# Patient Record
Sex: MALE | Race: WHITE
[De-identification: names, ages, dates, MRNs, and addresses within clinical notes are randomized per-mention and may not be internally consistent; named-entity substitution may affect disease eponyms.]

---

## 2018-06-09 ENCOUNTER — HOSPITAL ENCOUNTER (OUTPATIENT)
Dept: HOSPITAL 97 - ER | Age: 53
Setting detail: OBSERVATION
LOS: 2 days | Discharge: HOME | End: 2018-06-11
Attending: HOSPITALIST | Admitting: HOSPITALIST
Payer: COMMERCIAL

## 2018-06-09 VITALS — BODY MASS INDEX: 19.9 KG/M2

## 2018-06-09 DIAGNOSIS — F17.210: ICD-10-CM

## 2018-06-09 DIAGNOSIS — E11.9: ICD-10-CM

## 2018-06-09 DIAGNOSIS — K92.2: Primary | ICD-10-CM

## 2018-06-09 LAB
ALBUMIN SERPL BCP-MCNC: 5.1 G/DL (ref 3.2–5.5)
ALP SERPL-CCNC: 190 IU/L (ref 42–121)
ALT SERPL W P-5'-P-CCNC: 135 IU/L (ref 10–60)
AST SERPL W P-5'-P-CCNC: 147 IU/L (ref 10–42)
BUN BLD-MCNC: 29 MG/DL (ref 6–20)
GLUCOSE SERPLBLD-MCNC: 178 MG/DL (ref 65–120)
HCT VFR BLD CALC: 51.2 % (ref 39.6–49)
INR BLD: 1.02
LIPASE SERPL-CCNC: 31 U/L (ref 22–51)
LYMPHOCYTES # SPEC AUTO: 0.5 K/UL (ref 0.7–4.9)
MCH RBC QN AUTO: 32.1 PG (ref 27–35)
MCV RBC: 92.1 FL (ref 80–100)
MORPHOLOGY BLD-IMP: (no result)
PMV BLD: 9.5 FL (ref 7.6–11.3)
POTASSIUM SERPL-SCNC: 3.8 MEQ/L (ref 3.6–5)
RBC # BLD: 5.56 M/UL (ref 4.33–5.43)

## 2018-06-09 PROCEDURE — 80048 BASIC METABOLIC PNL TOTAL CA: CPT

## 2018-06-09 PROCEDURE — 85014 HEMATOCRIT: CPT

## 2018-06-09 PROCEDURE — 81015 MICROSCOPIC EXAM OF URINE: CPT

## 2018-06-09 PROCEDURE — 85018 HEMOGLOBIN: CPT

## 2018-06-09 PROCEDURE — 84100 ASSAY OF PHOSPHORUS: CPT

## 2018-06-09 PROCEDURE — 85610 PROTHROMBIN TIME: CPT

## 2018-06-09 PROCEDURE — 80053 COMPREHEN METABOLIC PANEL: CPT

## 2018-06-09 PROCEDURE — 85730 THROMBOPLASTIN TIME PARTIAL: CPT

## 2018-06-09 PROCEDURE — 81003 URINALYSIS AUTO W/O SCOPE: CPT

## 2018-06-09 PROCEDURE — 83690 ASSAY OF LIPASE: CPT

## 2018-06-09 PROCEDURE — 99285 EMERGENCY DEPT VISIT HI MDM: CPT

## 2018-06-09 PROCEDURE — 74176 CT ABD & PELVIS W/O CONTRAST: CPT

## 2018-06-09 PROCEDURE — 82962 GLUCOSE BLOOD TEST: CPT

## 2018-06-09 PROCEDURE — 85025 COMPLETE CBC W/AUTO DIFF WBC: CPT

## 2018-06-09 PROCEDURE — 80076 HEPATIC FUNCTION PANEL: CPT

## 2018-06-09 PROCEDURE — 36415 COLL VENOUS BLD VENIPUNCTURE: CPT

## 2018-06-09 PROCEDURE — 83735 ASSAY OF MAGNESIUM: CPT

## 2018-06-09 NOTE — XMS REPORT
Clinical Summary

 Created on:2018



Patient:Cleveland Barkley Jr

Sex:Male

:1965

External Reference #:[id number]





Demographics







 Address  [address]



   



   [place] [address]

 

 Home Phone  [phone]

 

 Home Phone  [phone]

 

 Email Address  [email or web address]

 

 Preferred Language  English

 

 Marital Status  

 

 Synagogue Affiliation  Unknown

 

 Race  White

 

 Ethnic Group  Not  or 









Author







 Organization  [facility]

 

 Address  [address] [place] [address]









Support







 Name  Relationship  Address  Phone

 

 Sabrina Greer  Unavailable  Unavailable  [phone]









Care Team Providers







 Name  Role  Phone

 

 Asked, No Pcp  Primary Care Provider  Unavailable









Allergies

No Known Allergies



Current Medications







 Prescription  Sig.  Disp.  Refills  Start  End  Status



         Date  Date  

 

 metFORMIN XR  Take 1,000 mg      20    Active



 (GLUCOPHAGE-XR) 500  by mouth daily.      17    



 mg 24 hr tablet            

 

 insulin GLARGINE  Inject 20 Units          Active



 (LANTUS) 100  under the skin          



 unit/mL injection  nightly.          



 (vial)            

 

 levETIRAcetam  Take 1,000 mg          Active



 (KEPPRA) 1000 MG  by mouth 2          



 tablet  (two) times a          



   day.          

 

 venlafaxine XR  Take 150 mg by          Active



 (EFFEXOR-XR) 150 MG  mouth daily.          



 24 hr capsule            

 

 diazePAM (VALIUM)  Take 10 mg by      20    Active



 10 MG tablet  mouth 3 (three)      18    



   times a day.          

 

 QUEtiapine  Take 600 mg by      20    Active



 (SEROquel) 300 MG  mouth nightly.      18    



 tablet            

 

 traMADol (ULTRAM)  Take 50 mg by          Active



 50 mg tablet  mouth every 6          



   (six) hours as          



   needed for          



   moderate pain.          

 

 gabapentin  Take 800 mg by    2  20  Discontinued



 (NEURONTIN) 800 mg  mouth 4 (four)      17  018  



 tablet  times a day.          

 

 VENTOLIN HFA 90  Inhale 2 puffs    0  20  Discontinued



 mcg/actuation  every 6 (six)      17  017  



 inhaler  hours as needed          



   for wheezing or          



   shortness of          



   breath.          

 

 meloxicam (MOBIC)  Take 7.5 mg by    2  04/22/20  07/15/2  Discontinued



 7.5 mg tablet  mouth daily.      17  017  

 

 QUEtiapine  Take by mouth    2  20  Discontinued



 (SEROquel) 25 MG  daily.      17  017  



 tablet            

 

 QUEtiapine  Take 800 mg by    2  05/15/20  02/22/2  Discontinued



 (SEROquel) 400 MG  mouth nightly.      17  018  



 tablet  Take 2 tablets          



   at bedtime          

 

 busPIRone (BUSPAR)  Take 10 mg by          Discontinued



 10 MG tablet  mouth 3 (three)        018  



   times a day.          

 

 LEVETIRACETAM  Take 1 tablet          Discontinued



 (KEPPRA ORAL)  by mouth 2        017  



   (two) times a          



   day.          

 

 HYDROcodone-acetami  Take 1 tablet          Discontinued



 nophen (NORCO)  by mouth every        017  



  mg per  8 (eight) hours          



 tablet  as needed for          



   moderate pain.          

 

 levETIRAcetam  Take 1,000 mg        07/10/2  Discontinued



 (KEPPRA) 500 MG  by mouth 2        017  



 tablet  (two) times a          



   day.          

 

 acetaminophen-codei  TK 1 TO 2 TS PO    0  07/15/20  03/03/2  Discontinued



 ne (TYLENOL WITH  Q 6 H PRN P      17  018  



 CODEINE #3) 300-30            



 mg per tablet            

 

 ertapenem 1 g in  Infuse 1 g into  20 each  0  20  



 sodium chloride 0.9  a venous      17  017  



 % MBP 50 mL IVPB  catheter daily          



   for 20 days.          

 

 vancomycin 1,500 mg  Infuse 1,500 mg  20 each  0  20  



 in sodium chloride  into a venous      17  017  



 0.9 % 500 mL IVPB  catheter daily          



   for 20 days.          

 

 HYDROcodone-acetami  Take 1 tablet  10 tablet  0  20  



 nophen (NORCO)  by mouth every      17  017  



  mg per  6 (six) hours          



 tablet  as needed for          



   moderate pain          



   for up to 10          



   doses. Max          



   Daily Amount: 4          



   tablets          

 

 gabapentin  Take 1 tablet  90 tablet  0  20  



 (NEURONTIN) 800 mg  (800 mg total)      18  018  



 tablet  by mouth 3          



   (three) times a          



   day for 30          



   days.          

 

 metoprolol  Take 0.5  15 tablet  0  20  



 succinate XL  tablets (12.5      18  018  



 (TOPROL-XL) 25 mg  mg total) by          



 24 hr tablet  mouth daily for          



   30 days.          

 

 HYDROcodone-acetami  Take 1 tablet  15 tablet  0  20  



 nophen (NORCO)  by mouth every      18  018  



 7.5-325 mg per  6 (six) hours          



 tablet  as needed for          



   severe pain for          



   up to 14 days.          



   Max Daily          



   Amount: 4          



   tablets          

 

 pantoprazole  Take 1 tablet  30 tablet  0  20  



 (PROTONIX) 40 MG EC  (40 mg total)      18  018  



 tablet  by mouth daily          



   for 30 days.          

 

 ondansetron ODT  Take 1 tablet  15 tablet  0  20  Discontinued



 (ZOFRAN ODT) 4 MG  (4 mg total) by      18  018  



 disintegrating  mouth every 8          



 tablet  (eight) hours          



   as needed for          



   nausea or          



   vomiting for up          



   to 15 doses.          

 

 promethazine  Insert 1  12 suppository  0  20  Discontinued



 (PHENERGAN) 25 MG  suppository (25      18  018  



 suppository  mg total) into          



   the rectum          



   every 6 (six)          



   hours as needed          



   for nausea or          



   vomiting for up          



   to 12 doses.          

 

 hydromorPHONE  Take 1 tablet  10 tablet  0  03/03/20  03/10/2  



 (DILAUDID) 2 MG  (2 mg total) by      18  018  



 tablet  mouth every 8          



   (eight) hours          



   as needed for          



   severe pain          



   (score 7-10)          



   for up to 7          



   days. Max Daily          



   Amount: 6 mg          

 

 promethazine  Take 1 tablet  20 tablet  0  20  



 (PHENERGAN) 12.5 MG  (12.5 mg total)      18  018  



 tablet  by mouth every          



   8 (eight) hours          



   as needed for          



   nausea or          



   vomiting for up          



   to 30 days.          

 

 HYDROcodone-acetami  Take 1 tablet  20 tablet  0  03/03/20  03/10/2  



 nophen (NORCO)  by mouth every      18  018  



 7.5-325 mg per  8 (eight) hours          



 tablet  as needed for          



   moderate pain          



   for up to 7          



   days. Max Daily          



   Amount: 3          



   tablets          

 

 naproxen (NAPROSYN)  Take 1 tablet  28 tablet  0  20  



 500 MG tablet  (500 mg total)      18  018  



   by mouth 2          



   (two) times a          



   day with meals          



   for 14 days.          

 

 omeprazole  Take 2 capsules  60 capsule  0  20  



 (PriLOSEC) 20 MG  (40 mg total)      18  018  



 capsule  by mouth daily          



   for 30 days.          

 

 cyclobenzaprine  Take 1 tablet  20 tablet  0  20  



 (FLEXERIL) 10 mg  (10 mg total)      18  018  



 tablet  by mouth 3          



   (three) times a          



   day as needed          



   for muscle          



   spasms for up          



   to 10 days.          







Active Problems







 Problem  Noted Date

 

 Sciatica of left side  2018

 

 Syncope  2018

 

 Essential hypertension  2017

 

 Type 2 diabetes mellitus with hyperglycemia  2017

 

 PRIMITIVO (acute kidney injury)  2017

 

 Seizure disorder  2017

 

 Intractable vomiting with nausea  2017

 

 Abdominal pain  2017









 Overview: 







 Added automatically from request for surgery [id number]









 Gastrointestinal hemorrhage  10/11/2017

 

 Gastrointestinal hemorrhage with hematemesis  10/11/2017









 Overview: 







 Added automatically from request for surgery [id number]









 Vertigo  2017

 

 Pneumonia due to infectious organism  2017

 

 COPD with acute bronchitis  2017

 

 COPD (chronic obstructive pulmonary disease)  2017

 

 COPD exacerbation  2017

 

 Lung mass  2017

 

 Tobacco dependence  2017







Resolved Problems







 Problem  Noted Date  Resolved Date

 

 Leukocytosis  2017

 

 Pneumonia of right lower lobe due to infectious organism  2017







Encounters







 Date  Type  Specialty  Care Team  Description

 

 20  Emergency  Emergency Medicine  Cati,  Suicidal ideation (Primary Dx
)



 18      Chago Olivas MD  

 

 20  Emergency  Emergency Medicine  Hyder  Chronic right-sided thoracic 
back pain (Primary Dx);



 18      Judy,  Narcotic abuse;



       Raiza  Drug-seeking behavior



       MD Bryn  

 

 20  Hospital  Radiology  Dioni Garay  BC (bronchogenic carcinoma)



 18  Encounter    MD Milagros  

 

 20  Transcribe  Access  Dioni Garay  BC (bronchogenic carcinoma)



 18  Orders    MD Milagros  (Primary Dx)

 

 20  Emergency  General Internal  Drew Landaverde  Sciatica of left side (
Primary Dx);



 18 -    Gary GRECO MD



  Intractable pain



 20      Travon,  



 MD Jessenia Swift, Princess Guerin MD  

 

 20  Emergency  Emergency Medicine  Nestorky,  Other chronic pain (Primary



 18      Shaw Clement)



       DO  

 

 20  Emergency  General Internal  Norinsky,  Syncope, unspecified syncope 
type (Primary Dx);



 18 -    Medicine  Chago FORTUNE,  Other chronic pain



 20      DO



  



 18      Marianela Turner MD  

 

 20  Anesthesia  Gastroenterology  Natali,  



 Ros  Event    Svitlana Villalta MD  

 

 20  Procedure Pass  Gastroenterology    



 18        

 

 20  Surgery  Gastroenterology  Con Knowles  ESOPHAGOGASTRODUODENOSCOPY



 Ros Croft MD  (EGD) with bx

 

 20  Hospital  General Internal  Reichl, Ricco  Intractable vomiting with 
nausea, unspecified vomiting type (Primary Dx);



 17 -  Encounter  Medicine  MD Ariel



  Pain;



 20      Bavare,  Abdominal pain, unspecified abdominal location;



 18      Jm Cabrera,  Essential hypertension;



       MD



  Type 2 diabetes mellitus with hyperglycemia, with long-term current use of 
insulin



       Cinthya Orourke,   

 

 10/24/20  Telephone  Family Medicine  Jordy Lyles MD  

 

 10/19/20  Telephone  Gastroenterology  Jordy Lyles MD  

 

 10/13/20  Telephone  Gastroenterology  Jordy Lyles MD  

 

 10/13/20  Procedure Pass  Gastroenterology    



 17        

 

 10/13/20  Procedure Pass  Gastroenterology    



 17        

 

 10/11/20  Emergency  General Internal  Reichl, Ricco  Gastrointestinal 
hemorrhage with hematemesis (Primary Dx);



 17 -    Medicine  MD Ariel



  Gastrointestinal hemorrhage, unspecified gastrointestinal hemorrhage type;



 10/13/20      Gandhi, Izabel  Pain of upper abdomen;



 Umair Snow MD  Nausea and vomiting, intractability of vomiting not specified
, unspecified vomiting type

 

 20  Emergency  Emergency Medicine  Nile Lilly,  Vision blurred (Primary 
Dx)



 Umair MINAYA  

 

 20  Emergency  Emergency Medicine  Soledad Emanuel  Right upper quadrant 
abdominal pain (Primary Dx);



 Umair Almaraz DO  Other chronic pain

 

 20  Emergency  Emergency Medicine    



 17        

 

 20  Transcribe  Access  Gandhi, Izabel  Abnormal renal function test



 17  Juli Snow MD  (Primary Dx)

 

 20  [facility] Internal  [facility]  Vertigo (Primary Dx);



 17 -  Encounter  Medicine  Chago FORTUNE,  Acute nonintractable headache, 
unspecified headache type;



 20      DO



  Lactic acidosis;



 17      Gandhi, Izabel  Pneumonia due to infectious organism, unspecified 
laterality, unspecified part of lung;



       MD Gwendolyn  Pneumonia of right upper lobe due to infectious organism

 

 20  [facility]  General Internal  Drew Landaverde  Pneumonia due to 
infectious organism, unspecified laterality, unspecified part of lung (Primary 
Dx);



 17 -  Encounter  Medicine  MD ANGUS



  Shortness of breath;



 20      Popeye Strickland  Pneumonia of right middle lobe due to infectious 
organism



 17      MD Hiram Strange, Izabel Snow MD  

 

 20  Telephone  Cardiovascular  [facility]  



 17      Kacy Holcomb MA  

 

 20  [facility]  [facility]  P[facility]onia of right lower [facility]



 17 -  Encounter  Medicine  MD Alodnra



  due to infectious organism



 20      Popeye Strickland  (Primary Dx)



 17      MD Alie  

 

 20  Orders Only  Cardiovascular  Fingleman,  Shortness of breath (Primary



 17      SAHIL Rosa  Dx)

 

 20  Anesthesia  General Surgery  [facility],  



   Event    Sandra Fischer MD  

 

 20  Procedure Pass  General Surgery    



 17        

 

 20  Surgery  General Surgery  O'Chris,  Right Lobectomy, Using Vats,



 17      Jonathan MONK  WEDGE RESECTION, FROZEN



       MD Jimmy  BIOPSY-RIGHT

 

 20  [facility]  Pulmonology  Macy Verde  



 17  Encounter    CARMEN Tran  

 

 07/10/20  [facility]  Critical Care  [facility],  COPD exacerbation (Primary Dx);



 17 -  Encounter  Medicine  Chaka  Lung mass;



 07/15/20      MD Jesús



  Bronchitis, chronic obstructive, with exacerbation



 17      Ray Stinson MD Burns, Izabel Snow MD  

 

 20  Telephone  Cardiothoracic  Hemlock,  



     Surgery  [facility] [place]  

 

 20  Hospital  Procedural  O'Chris,  Shortness of breath;



 17  Encounter  Cardiology  Jonathan MONK  Mass of lower lobe of right lung



       MD Jimmy  

 

 20  Transcribe  Cardiovascular  O'Chris,  Shortness of breath (Primary Dx)
;



 17  Orders    Jonathan MONK  Lung mass



       MD Jimmy  

 

 20  Office Visit  Cardiovascular  O'Chris,  Lung mass (Primary Dx)



 MD Ammon Kunz Jr., Amy Kathryn, PA-C  

 

 20  Transcribe  Cardiovascular  O'Chris,  Shortness of breath (Primary Dx)
;



 17  Orders    Jonathan MONK  Mass of lower lobe of right lung



       MD Jimmy  

 

 20  Hospital  Procedural  Moran,  



 17  Encounter  Cardiology  Luis Carlos Cook MD  

 

 20  Telephone  Cardiothoracic  Elisha,  



 17    Surgery  SAHIL Cornejo  

 

 20  Procedure Pass  Procedural    



 17    Cardiology    

 

 20  Surgery  Procedural  Moran,  Ep cardioversion w thien [98560



 17    Cardiology  Luis Carlos  (CPT)]



       MD Joey  

 

 20  Transcribe  Access  Moran,  Mass in chest (Primary Dx)



 17  Orders    Luis Carlos Cook MD  

 

 20  Procedure Pass  General Surgery    



 17        

 

 20  [facility]  General Internal  Juan Daniel Emanuelmarce  Pneumonia of right lower 
lobe due to infectious organism (Primary Dx);



 17 -  Encounter  Medicine  DO Sung



  Tachycardia;



 20      Gandhi, Izabel  Hypoxia;



 17      MD Gwendolyn  Lung mass

 

 20  Transcribe  Access  Sashital,  Secondary malignant neoplasm of left 
lung (Primary Dx);



 17  Orders    Terra Ponce  Lung mass



       MD  

 

 20  [facility]  Radiology  Rio,  



 17  Encounter    Rustam Grace III, MD  

 

 20  [facility]  Radiology  Rio,  



 17  Encounter    Rustam Grace III, MD  

 

 20  [facility]  Radiology  Rio,  



 17  Encounter    Rustam Grace III, MD  

 

 20  [facility]  Radiology  Sashital,  Lung mass



 17  Encounter    Terra Ponce MD  

 

 20  Ancillary  Access  Sashital,  Lung mass



 17  Orders    Terra Ponce MD  

 

 20  Ancillary  Access  Sashital,  Lung mass



 17  Orders    Terra Ponce MD  

 

 20  Ancillary  Access  Sashital,  



 17  Orders    Terra Ponce MD  

 

 20  Ancillary  Access  Sashital,  Lung mass



 17  Orders    Terra Ponce MD  

 

 20  Ancillary  Access  Sashital,  Lung mass



 17  Orders    Terra Ponce MD  

 

 20  Transcribe  Access  Sashital,  Lung mass (Primary Dx)



 17  Orders    Terra Ponce MD  

 

 20  [facility]  Radiology  Sashital,  Nonspecific abnormal results of 
liver function study;



 17  Encounter    Terra Ponce  Lung mass



       MD  



after 2017



Immunizations







 Name  Dates Previously Given  Next Due

 

 FLUCELVAX QUAD PF (0.5mL syringe)  2018  







Family History







 Medical History  Relation  Name  Comments

 

 Cerebral aneurysm  Father    

 

 Heart disease  Father    

 

 Kidney cancer  Father    

 

 Lung cancer  Father    

 

 Stroke  Father    

 

 Diabetes  Mother    

 

 Heart attack  Mother    

 

 Heart disease  Mother    

 

 Hyperlipidemia  Sister    

 

 Hypertension  Sister    









 Relation  Name  Status  Comments

 

 Father      

 

 Mother      

 

 Sister      







Social History







 Tobacco Use  Types  Packs/Day  Years Used  Date

 

 Current Every Day Smoker  Cigarettes  0.5  35  









 Smokeless Tobacco: Former User      









 Tobacco Cessation: Counseling Given: Yes



 Comments: per patient now only smoking 4-5 cigarette/day









 Alcohol Use  Drinks/Week  oz/Week  Comments

 

 No      stopped for 1.5 yrs









 Sex Assigned at Birth  Date Recorded

 

 Not on file  







Last Filed Vital Signs







 Vital Sign  Reading  Time Taken

 

 Blood Pressure  120/77  2018  1:15 PM CDT

 

 Pulse  76  2018  1:15 PM CDT

 

 Temperature  35.8 C (96.5 F)  2018  1:15 PM CDT

 

 Respiratory Rate  16  2018  1:15 PM CDT

 

 Oxygen Saturation  97%  2018  1:15 PM CDT

 

 Inhaled Oxygen Concentration  -  -

 

 Weight  73.9 kg (163 lb)  2018  8:09 AM CDT

 

 Height  185.4 cm (6' 1")  2018  8:09 AM CDT

 

 Body Mass Index  21.51  2018  8:09 AM CDT







Plan of Treatment







 Health Maintenance  Due Date  Last Done  Comments

 

 DIABETIC FOOT EXAM  1974    

 

 DIABETIC RETINAL EYE EXAM  1974    

 

 COLON CANCER SCREENING  2014    

 

 SHINGRIX VACCINE (#1)  2014    

 

 INFLUENZA VACCINE  2018  







Implants







 Implanted  Type  Area    Device  Expiration  Model /



         Identifier  Date  Serial /



             Lot

 

 Stimulator  Stimulator          

 

 Stimulator-2007  Stimulator  Hip        



 Implanted: 2007 (Quantity not on file)            

 

 Kit [employer] Air Leak 4ml Strl Progel - [id number]  Surgical  N/A: N/A  
[employer]      [id number] /



 Implanted: Qty: 1 on 2017 by Jonathan Fuentes Jr., MD  Implants;       
    /



   Expanders;          



   Extenders;          



   Surgical Wires          

 

 Dorsal Colum            



 Stimulator            

 

 Dorsal Column            



 Stimulator            

 

 Dorsal Colum Stimulator-2007    Lower:        



 Implanted: 2007 (Quantity not on file)    Back,        



     Other        



     than        



     Spine        







Procedures







 Procedure Name  Priority  Date/Time  Associated  Comments



       Diagnosis  

 

 ESOPHAGOGASTRODUODENOSCOPY (EGD)    2018  Abdominal pain,  



 with bx    12:15 PM CST  unspecified  



       abdominal  



       location  

 

 HC CATH DUAL LUMEN PICC  Routine  2017    Results for



     12:19 PM CDT    this procedure



         are in the



         results



         section.

 

 HC US GUIDED VASCULAR ACCESS  Routine  2017    Results for



     12:19 PM CDT    this procedure



         are in the



         results



         section.

 

 HC CVL PICC INSERT 5 YRS OR >  Routine  2017    Results for



     12:19 PM CDT    this procedure



         are in the



         results



         section.

 

 WI AN ELECTIVE ENDOTRACHEAL  Routine  2017    



 AIRWAY    8:51 AM CDT    









   Procedure Note - Sandra Bro MD - 2017  8:50 AM CDT



Airway



   Performed by: SANDRA BRO



   Authorized by: SANDRA BRO



   



   Location:  OR



   Urgency:  Elective



   Difficult Airway: No



   Anesthesiologist:  SANDRA BRO



   Performed by:



      anesthesiologist



   Preoxygenated with 100% O2: Yes



   Mask Ventilation:  Easy mask



   Final Airway Type:  Endotracheal airway



   Final Endotracheal Airway:  ETT - double lumen left



   Cuffed: Yes



   Technique Used:  Direct laryngoscopy



   Insertion Site:  Oral



   Blade Type:  Kraig



   Laryngoscope Blade/Videolaryngoscope Blade Size:  3



   ETT Double Lumen (fr):  37



   Cuff at minimum occlusion pressure: Yes



   Measured from:  Lips



   Placement Verified by: CO2 detection, direct visualization and equal breath 
sounds



   Laryngoscopic view:  Grade IIa - partial view of glottis



   Number of Attempts at Approach:  1









 SPIROMETRY PRE AND POST  Routine  2017 12:31 PM  Lung mass  



 WITH BRONCHILATOR    CDT    

 

 ECHOCARDIOGRAM 2D  Routine  2017  8:37 AM  Shortness of  Results for this



 COMPLETE W MMODE    CDT  breath



  procedure are in



 SPECTRAL COLOR DOPPLER      Mass of lower lobe  the results



 (63905)      of right lung  section.

 

 WI CRITICAL CARE, E/M  Routine  2017  8:18 PM    Results for this



 30-74 MINUTES    CDT    procedure are in



         the results



         section.



after 2017



Results

Urinalysis screen and microscopy, with reflex to culture (2018  9:16 AM)
Only the most recent of6 resultswithin the time period is included.





 Component  Value  Ref Range

 

 Specimen site  Clean catch  

 

 Color, UA  Yellow  

 

 Appearance, UA  Clear  

 

 Specific gravity, UA  1.011  1.001 - 1.035

 

 pH, UA  5.0  5.0 - 8.5

 

 Protein, UA  Negative  Negative

 

 Glucose, UA  Negative  Negative

 

 Ketones, UA  Negative  Negative

 

 Bilirubin, UA  Negative  Negative

 

 Blood, UA  Negative  Negative

 

 Nitrite, UA  Negative  Negative

 

 Urobilinogen, UA  2.0 (A)  <2.0

 

 Leukocyte esterase, UA  Negative  Negative

 

 Epithelial cells, UA  Few  /HPF

 

 WBC, UA  1  0 - 1 /HPF

 

 RBC, UA  <1  0 - 5 /HPF

 

 Bacteria, UA  None seen  None seen

 

 Yeast, UA  None seen  

 

 Yeast with pseudohyphae, UA  None seen  









 Specimen  Performing Laboratory

 

 [place]  [facility]







   [address]







   [place] [address]



Urine drugs of abuse screen (2018  9:16 AM)Only the most recent of3 
resultswithin the time period is included.





 Component  Value  Ref Range

 

 Amphetamine screen, urine  NEG  

 

 Barbiturate screen, urine  NEG  

 

 Benzodiazepine screen, urine  POS  

 

 Cocaine screen, urine  NEG  

 

 Methadone screen, urine  NEG  

 

 Opiates screen, urine  POS  

 

 Phencyclidine screen, urine  NEG  

 

 Cannabinoid screen, urine  POS  



   Comment:  



   Drug screen minimum concentration of detectability  



   Oaxnnztwngva3503 ng/mL  



   Wztriyodhrvzpyvo3540 ng/mL  



   Barbiturates 300 ng/mL  



   Dplyddoojxqxsut925 ng/mL  



   Ceiwbuj668 ng/mL  



   Dzqlbbuif702 ng/mL  



   Ssphqot342 ng/mL  



   Phencyclidine 25 ng/mL  



   Jkoyditbduob10 ng/mL  



   Bvkdlqjohd1767 ng/mL  



   Negative test results indicates presumptive evidence of lack  



   of clinically significant drug concentration in this urine  



   specimen.  



   Positive test results are presumptive evidence of clinically  



   significant drug concentration in this urine specimen.  



   Testing performed for medical purposes only.  



     









 Specimen  Performing Laboratory

 

 [place]rine  [facility]







   [address]







   [place] [address]



Urine culture (2018  9:16 AM)Only the most recent of5 resultswithin the 
time period is included.





 Component  Value  Ref Range

 

 Urine culture  SEE COMMENTComment: Bacteriuria screen negative.  









 Specimen  Performing Laboratory

 

 Urine  [facility]







   [address]







   [place] [address]



Estimated GFR (2018  9:02 AM)Only the most recent of35 resultswithin the 
time period is included.





 Component  Value  Ref Range

 

 GFR Non Af Amer  49 (A)  mL/min/1.73 m2

 

 GFR Af Amer  59 (A)  mL/min/1.73 m2



   Comment:  



   Chronic kidney disease: <60 mL/min/1.73m2  



   Kidney failure: <15 mL/min/1.73m2  



   The estimated GFR is calculated from the IDMS-traceable Modification of Diet
  



   in Renal Disease Equation. The accuracy of the calculation is poor when the  



   creatinine is normal. Calculated values >90 mL/min/1.73m2 are not reported.  



   This equation has not been validated in children (<18 years), pregnant  



   women, the elderly (>70 years), or ethnic groups other than Caucasians and  



    Americans.  



     









 Specimen  Performing Laboratory

 

 Plasma specimen  [facility] – [place] DEPARTMENT OF [facility]OLOGY AND GENOMIC MEDICINE







   [address]







   [place] [address]



CBC with platelet and differential (2018  9:02 AM)Only the most recent 
of34 resultswithin the time period is included.





 Component  Value  Ref Range

 

 WBC  9.1  4.2 - 11.0 k/uL

 

 RBC  4.57  4.04 - 5.86 m/uL

 

 HGB  14.7  13.0 - 17.3 g/dL

 

 HCT  44.6  34.0 - 45.0 %

 

 MCV  97.6  80.0 - 98.0 fL

 

 MCH  32.2  27.0 - 34.0 pg

 

 MCHC  33.0  31.5 - 36.5 g/dL

 

 RDW - SD  55.1 (H)  37.0 - 51.0 fL

 

 MPV  10.1  7.4 - 10.4 fL

 

 Platelet count  124 (L)  150 - 400 k/uL

 

 Nucleated RBC  0.00  /100 WBC

 

 Neutrophils  80.4 (H)  36.0 - 66.0 %

 

 Lymphocytes  11.6 (L)  24.0 - 44.0 %

 

 Monocytes  5.8  0.0 - 6.0 %

 

 Eosinophils  1.3  0.0 - 6.0 %

 

 Basophils  0.6  0.0 - 1.2 %

 

 Immature granulocytes  0.3  0.0 - 1.0 %









 Specimen  Performing Laboratory

 

 Blood  [facility]







   [address]







   [place] [address]



Thyroid stimulating hormone (2018  9:02 AM)Only the most recent of2 
resultswithin the time period is included.





 Component  Value  Ref Range

 

 TSH  2.60  0.38 - 4.82 uIU/mL









 Specimen  Performing Laboratory

 

 Plasma specimen  [facility]







   [address]







   [place] [address]



T4, free (2018  9:02 AM)





 Component  Value  Ref Range

 

 T4, free  1.00  0.70 - 1.61 ng/dL









 Specimen  Performing Laboratory

 

 Plasma specimen  [facility]







   [address]







   [place] [address]



Alcohol level, blood (2018  9:02 AM)Only the most recent of2 
resultswithin the time period is included.





 Component  Value  Ref Range

 

 Alcohol  None Detected  mg/dL



   Comment:  



   NormalNone Detected  



   Legal Intoxication in [place] 80 mg/dL (0.08%)  



   Toxic Concentration 200 mg/dL (0.2%)  



   Potentially Fatal 350-500 mg/dL (0.35%-0.5%)  



     

 

 Alcohol percent  None Detected  %









 Specimen  Performing Laboratory

 

 Blood  [facility]







   [address]







   [place] [address]



Acetaminophen level (2018  9:02 AM)





 Component  Value  Ref Range

 

 Acetaminophen level  <2.0 (L)  10.0 - 20.0 ug/mL



   Comment:  



   Therapeutic 10-30 ug/mL
  



   Possible Toxicity 150-200 ug/mL
  



   Probable Toxicity >200 ug/mL  



     









 Specimen  Performing Laboratory

 

 Blood  [facility]







   [address]







   [place] [address]



Salicylate level (2018  9:02 AM)





 Component  Value  Ref Range

 

 Salicylate  7.0  mg/dL



   Comment:  



   Therapeutic Range:  



    5 - 30 mg/dL  



     









 Specimen  Performing Laboratory

 

 Blood  [facility]







   [address]







   [place] [address]



Comprehensive metabolic panel (2018  9:02 AM)Only the most recent of20 
resultswithin the time period is included.





 Component  Value  Ref Range

 

 Sodium  140  135 - 150 mEq/L

 

 Potassium  3.8  3.5 - 5.0 mEq/L

 

 Chloride  109  100 - 109 mEq/L

 

 CO2  24  24 - 32 mmol/L

 

 Anion gap  7@ANIO  7 - 15 mEq/L

 

 BUN  14  7 - 18 mg/dL

 

 Creatinine  1.5  0.8 - 1.5 mg/dL

 

 Glucose  103 (H)  65 - 100 mg/dL

 

 Calcium  8.6  8.6 - 10.7 mg/dL

 

 Protein  7.9  6.3 - 8.2 g/dL

 

 Albumin  3.4  3.2 - 5.0 g/dL

 

 A/G ratio  0.8  0.7 - 3.8

 

 Alkaline phosphatase  153 (H)  30 - 120 U/L

 

 AST  21  15 - 37 U/L

 

 ALT  17 (L)  30 - 65 U/L

 

 Total bilirubin  0.2  0.2 - 1.2 mg/dL









 Specimen  Performing Laboratory

 

 Plasma specimen  [facility]







   [address]







   [place] [address]



CT Abdomen Pelvis Wo Contrast (2018  3:27 AM)Only the most recent of3 
resultswithin the time period is included.





 Specimen  Performing Laboratory

 

    [facility]







   [address] [place] [address]









 Narrative

 

 EXAMINATION:CT ABDOMEN PELVIS WO CONTRAST







  







 CLINICAL HISTORY:R flank pain







  







 TECHNIQUE: Multiple axial images of the abdomen and pelvis were obtained 
without



 intravenous administration of iodinated contrast. Sagittal and coronal 
computerized



 reformatted images were also obtained. The lack of intravenous contrast 
reduces the 







 sensitivity of detecting solid organ disease.







  







 CT imaging was performed with iterative reconstruction technique and/or 
automated



 exposure control to reduce radiation dose.







  







 COMPARISON:2017







  







  







 IMPRESSION:







  







 Subsegmental atelectasis is seen of the lung bases.







  







 Patient is status post cholecystectomy. Pancreas and adrenal glands are normal.







  







 Cirrhotic liver.







  







 Spleen is enlarged measuring 14.5 cm in length. 







  







 Kidneys, ureters and bladder are normal.







  







 No free intraperitoneal fluid or air. Atherosclerotic vascular calcifications 
are



 seen. Some paraesophageal varices are seen, compatible with portal 
hypertension.







  







 Diverticulosis is seen without diverticulitis. Appendix cannot be seen. No



 gastrointestinal tract obstruction.







  







 No acute osseous abnormalities. Postoperative appearance of the lower lumbar 
spine. A



 device is seen of the right gluteal region, with lead entering the thoracic 
spinal



 canal.







  







 CONCLUSION:







  







 Cirrhotic liver with small paraesophageal varices, compatible with portal



 hypertension.







  







  







  







  







  







  







  







  







  







  







 [facility]-[id number]







 









 Procedure Note

 

  Interface, Radiology Results Incoming - 2018  4:08 AM CDT



EXAMINATION:  CT ABDOMEN PELVIS WO CONTRAST



 



 CLINICAL HISTORY:  R flank pain



 



 TECHNIQUE: Multiple axial images of the abdomen and pelvis were obtained 
without intravenous administration of iodinated contrast. Sagittal and coronal 
computerized reformatted images were also obtained. The lack of intravenous 
contrast reduces the



 sensitivity of detecting solid organ disease.



 



 CT imaging was performed with iterative reconstruction technique and/or 
automated exposure control to reduce radiation dose.



 



 COMPARISON:  2017



 



 



 IMPRESSION:



 



 Subsegmental atelectasis is seen of the lung bases.



 



 Patient is status post cholecystectomy. Pancreas and adrenal glands are normal.



 



 Cirrhotic liver.



 



 Spleen is enlarged measuring 14.5 cm in length.



 



 Kidneys, ureters and bladder are normal.



 



 No free intraperitoneal fluid or air. Atherosclerotic vascular calcifications 
are seen. Some paraesophageal varices are seen, compatible with portal 
hypertension.



 



 Diverticulosis is seen without diverticulitis. Appendix cannot be seen. No 
gastrointestinal tract obstruction.



 



 No acute osseous abnormalities. Postoperative appearance of the lower lumbar 
spine. A device is seen of the right gluteal region, with lead entering the 
thoracic spinal canal.



 



 CONCLUSION:



 



 Cirrhotic liver with small paraesophageal varices, compatible with portal 
hypertension.



 



 



 



 



 



 



 



 



 



 



 [facility]-[id number]



Smear review (2018  3:13 AM)Only the most recent of5 resultswithin the 
time period is included.





 Component  Value  Ref Range

 

 Smear review  Smear Reviewed  









 Specimen  Performing Laboratory

 

   [facility]







   [address] [place] [address].







   [place] [address]



Prothrombin time with INR (2018  3:13 AM)Only the most recent of10 
resultswithin the time period is included.





 Component  Value  Ref Range

 

 Prothrombin time  14.5  12.0 - 15.0 sec

 

 INR  1.11  0.92 - 1.12



   Comment:  



   For patients on anticoagulant therapy, reference ranges below:  



   Indication:INR 
Value  



   Treatment of Venous Thrombosis, 2.0-3.0  



   pulmonary emboli, or prophylaxis  



   of a venous thrombosis, or systemic  



   emboli.  



   High dose, high risk patients 3.0-4.5  



   with mechanical valves.  



   NOTE:INR values over 3.0 are sometimes associated with  



   gastrointestinal hemorrhage, especially values over 4.0.  



     









 Specimen  Performing Laboratory

 

 Blood  [facility]







   [address] [place] [address].







   [place] [address]



Lipase level (2018  3:13 AM)Only the most recent of7 resultswithin the 
time period is included.





 Component  Value  Ref Range

 

 Lipase  129  65 - 230 U/L









 Specimen  Performing Laboratory

 

 Plasma specimen  [facility]







   [address] [place] [address].







   [place] [address]



ECG 12 lead (2018  2:43 AM)Only the most recent of13 resultswithin the 
time period is included.





 Component  Value  Ref Range

 

 Ventricular rate  77  

 

 Atrial rate  77  

 

 WI interval  144  

 

 QRSD interval  94  

 

 QT interval  418  

 

 QTC interval  473  

 

 P axis 1  32  

 

 QRS axis 1  94  

 

 T wave axis  85  

 

 EKG impression  Normal sinus rhythm-Rightward axis-Borderline ECG-In  



   automated comparison with ECG of 2018 18:18,-No  



   significant change was found-Electronically Signed By Missy Meneses MD ([id number]) on 5/10/2018 7:43:28 AM  









 Specimen  Performing Laboratory

 

   [facility]







   [address] [place] [address]



ECG ED Preliminary Interpretation - NOT AN ORDER (2018  2:07 AM)Only the 
most recent of6 resultswithin the time period is included.





 Narrative

 

 Raiza Juarez MD 20183:47 AM







 ECG ED Preliminary Interpretation - Not an Order







 Performed by: RAIZA JUAREZ







 Authorized by: RAIZA JUAREZ 







  







 ECG reviewed by ED Physician in the absence of a cardiologist: yes







 Previous ECG: 







 Previous ECG:Compared to current







 Comparison ECG info:18







 Similarity:No change







 Interpretation: 







 Interpretation: normal







 Rate: 







 ECG rate:77







 ECG rate assessment: normal







 Rhythm: 







 Rhythm: sinus rhythm







 QRS: 







 QRS axis:Right







 Comments: 







  Normal sinus rhythm. Right axis deviation.



PET/CT Skull Base To Mid Thigh (2018  1:45 PM)Only the most recent of2 
resultswithin the time period is included.





 Specimen  Performing Laboratory

 

   [facility]







   [address] [place] [address]









 Narrative

 

 EXAMINATION:







 PET CT SKULL BASE TO MID THIGH







  







 Date and place of service: 3[place]26/2018 10:00 [place] [facility]







  







 DOSE: 11.8 mCi of 71-Bvrvez-7-Deoxyglucose (FDG) was injected intravenously 
into left



 wrist done infiltration at a serum glucose level of90 mg/dl.







  







 TECHNIQUE:







  







 PROCEDURE: Following injection of 92-Besymx-4-Deoxyglucose (FDG) and a standard



 uptake., The patient was imaged on a [employer] whole body PET CT scanner. Multiple 6 
minute



 bed position acquisitions were obtained along the length of the patient's body 
from 







 approximately the Skull base to the mid thighs without administration of 
intravenous



 or oral contrast. The software fused PET/CT images as well as the PET and CT 
images



 could be reviewed separately.







  







 The dose length product for this procedure was 408 mGy-cm.







  







 CT imaging was performed with iterative reconstruction technique and/or 
automated



 exposure control to reduce radiation dose







  







 COMPARISON:







 2017 done at [facility]. 







  







 CLINICAL HISTORY:







 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 Status post 
right



 lower lobectomy . No history of recent chemotherapy or radiation 
therapy.



 For restaging.







  







 FINDINGS:







  







 [facility] with the 
CAT



 scan from the Skull base to the mid thighs to the same time as the PET scan.







  







 Physiologic uptake is present in the components of Waldeyer's ring in the 
lateral



 pharynx. Vocal cord uptake is noted of doubtful significance. Mild tracer 
uptake is



 seen in the right and left axillary lymph node with the degree of uptake less 
than



 2.5 







 and is of doubtful significance.







  







 No abnormal radiotracer uptake is demonstrated in the visualized brain, neck, 
chest,



 abdomen, pelvis, spine, and visualized proximal upper and lower extremities.







  







 No abnormal radiotracer uptake is demonstrated in the brain. However, normal 
intense



 metabolic uptake of radiotracer in the brain can mask the presence of lesions. 
Brain



 lesions are generally better evaluated with an MRI without and with 
intravenous 







 gadolinium, if clinically indicated.







  







 Physiologic excretion of radiotracer is demonstrated into the kidneys, ureters
, and



 bladder. Physiologic excretion of radiotracer is also demonstrated into the 
small and



 large bowel.







  







 Interrogation of the CT scan of the chest in lung window settings demonstrates 
the



 previously noted masses in each lower lobe are not identified on the current



 examination consistent with right lower lobectomy on the right and 
posttreatment of



 the left 







 lung lesion.. Dependent atelectasis is noted in the posterior lung bases 
bilaterally.







  







 Interrogation of the CT scan of the whole body with soft tissue window settings



 demonstratessignificant dilatation of the pulmonary outflow tract 
consistent with



 pulmonary arterial hypertension. Coronary artery calcifications are present.







  







 An irregular contoured liver is noted consistent with cirrhosis. Calcified



 atheromatous plaque formation is seen in the aortoiliac system.







  







 Interrogation of the CT scan of the whole body and bone window settings 
demonstrates



 no osteolytic or osteoblastic lesions. Patient is had a spinal fusion in the 
lower



 lumbar spine.







  







 IMPRESSION:







 No evidence of metabolically active tumor.







  







 Otherwise unchanged .Pulmonary arterial hypertension as before. Cirrhosis as 
before.







  







 [id number]







 









 Procedure Note

 

 [facility], Radiology Results Incoming - 2018  4:30 PM CDT



EXAMINATION:



 PET CT SKULL BASE TO MID THIGH



 



 Date and place of service: 3[place]26/2018 10:00 [place] [facility]



 



 DOSE: 11.8 mCi of 85-Lsvazg-4-Deoxyglucose (FDG) was injected intravenously 
into left wrist done infiltration at a serum glucose level of  90 mg/dl.



 



 TECHNIQUE:



 



 PROCEDURE: Following injection of 68-Lkhqoq-7-Deoxyglucose (FDG) and a 
standard uptake., The patient was imaged on a [employer] whole body PET CT scanner. 
Multiple 6 minute bed position acquisitions were obtained along the length of 
the patient's body from



 approximately the Skull base to the mid thighs without administration of 
intravenous or oral contrast. The software fused PET/CT images as well as the 
PET and CT images could be reviewed separately.



 



 The dose length product for this procedure was 408 mGy-cm.



 



 CT imaging was performed with iterative reconstruction technique and/or 
automated exposure control to reduce radiation dose



 



 COMPARISON:



 2017 done at [facility].



 



 CLINICAL HISTORY:



 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 Status post 
right lower lobectomy . No history of recent chemotherapy or 
radiation therapy. For restaging.



 



 FINDINGS:



 



 Software fusion was performed of the nuclear medicine  FDG PET scan with the 
CAT scan from the Skull base to the mid thighs to the same time as the PET scan.



 



 Physiologic uptake is present in the components of Waldeyer's ring in the 
lateral pharynx. Vocal cord uptake is noted of doubtful significance. Mild 
tracer uptake is seen in the right and left axillary lymph node with the



 degree of uptake less than 2.5



 and is of doubtful significance.



 



 No abnormal radiotracer uptake is demonstrated in the visualized brain, neck, 
chest, abdomen, pelvis, spine, and visualized proximal upper and lower 
extremities.



 



 No abnormal radiotracer uptake is demonstrated in the brain. However, normal 
intense metabolic uptake of radiotracer in the brain can mask the presence of 
lesions. Brain lesions are generally better evaluated with an MRI without and 
with intravenous



 gadolinium, if clinically indicated.



 



 Physiologic excretion of radiotracer is demonstrated into the kidneys, ureters
, and bladder. Physiologic excretion of radiotracer is also demonstrated into 
the small and large bowel.



 



 Interrogation of the CT scan of the chest in lung window settings demonstrates 
the previously noted masses in each lower lobe are not identified on the 
current examination consistent with right lower



 lobectomy on the right and posttreatment of the left



 lung lesion.. Dependent atelectasis is noted in the posterior lung bases 
bilaterally.



 



 Interrogation of the CT scan of the whole body with soft tissue window 
settings demonstrates  significant dilatation of the pulmonary outflow tract 
consistent with pulmonary arterial hypertension. Coronary artery calcifications 
are present.



 



 An irregular contoured liver is noted consistent with cirrhosis. Calcified 
atheromatous plaque formation is seen in the aortoiliac system.



 



 Interrogation of the CT scan of the whole body and bone window settings 
demonstrates no osteolytic or osteoblastic lesions. Patient is had a spinal 
fusion in the lower lumbar spine.



 



 IMPRESSION:



 No evidence of metabolically active tumor.



 



 Otherwise unchanged .Pulmonary arterial hypertension as before. Cirrhosis as 
before.



 



 [facility][id number]



POC glucose (2018 11:36 AM)Only the most recent of147 resultswithin the 
time period is included.





 Component  Value  Ref Range

 

 POC glucose  90  65 - 100 mg/dL



   Comment:  



   Meter ID: [id number]  



   : Cinthya Mcduffie  



     









 Specimen  Performing Laboratory

 

   [facility]







   [address]







   [place] [address]



Keppra (Levetiracetam) level (2018  5:13 AM)





 Component  Value  Ref Range

 

 Levetiracetam  27  12 - 46 ug/mL



   Comment:  



   INTERPRETIVE INFORMATION: Keppra (Levetiracetam)  



   Therapeutic Range:12-46 ug/mL  



   Toxic:Not well Established  



   Pharmacokinetics of levetiracetam are affected by renal function.  



   Adverse effects may include somnolence, weakness, headache and  



   vomiting.  



   Performed by [facility],
  



   [address] [place] [address] [phone]
  



   [email or web address] Charlie Balbuena MD - Lab. Director  



     









 Specimen  Performing Laboratory

 

 Serum  [facility]







   [address] [place] [address]



Basic metabolic panel (2018  5:13 AM)Only the most recent of15 
resultswithin the time period is included.





 Component  Value  Ref Range

 

 Sodium  134 (L)  135 - 150 mEq/L

 

 Potassium  4.2  3.5 - 5.0 mEq/L

 

 Chloride  102  100 - 109 mEq/L

 

 CO2  22 (L)  24 - 32 mmol/L

 

 Anion gap  10  7 - 15 mEq/L



   Comment:  



   Starting from  , anion gap calculation  



   no longer incorporates potassium. Please note the change.  



     

 

 BUN  17  7 - 18 mg/dL

 

 Creatinine  1.2  0.8 - 1.5 mg/dL

 

 Glucose  139 (H)  65 - 100 mg/dL

 

 Calcium  9.4  8.6 - 10.7 mg/dL









 Specimen  Performing Laboratory

 

 Plasma specimen  [facility]







   [address]







   [place] [address]



Hepatic function panel (2018  8:14 PM)





 Component  Value  Ref Range

 

 Albumin  3.8  3.2 - 5.0 g/dL

 

 Total bilirubin  0.8  0.2 - 1.2 mg/dL

 

 Bilirubin direct  0.2  0.0 - 0.4 mg/dL

 

 Alkaline phosphatase  231 (H)  30 - 120 U/L

 

 Protein  9.7 (H)  6.3 - 8.2 g/dL

 

 ALT  17 (L)  30 - 65 U/L

 

 AST  29  15 - 37 U/L









 Specimen  Performing Laboratory

 

 Plasma specimen  [facility]







   [address]







   [place] [address]



CT Lumbar Spine Wo Contrast (2018  5:12 PM)





 Specimen  Performing Laboratory

 

   [facility]







   [address] [place] [address]









 Narrative

 

 EXAMINATION: CT LUMBAR SPINE WO CONTRAST







  







 CLINICAL HISTORY: pain







  







 COMPARISON: CT myelogram lumbar spine 2011







  







 TECHNIQUE: Axial noncontrast enhanced images of lumbar spine was performed with



 coronal sagittal reconstruction algorithms. CT imaging was performed with 
iterative



 reconstruction technique and/or automated exposure control to reduce radiation 
dose.







  







  







 FINDINGS:







  







 There are 5 non-rib bearing lumbar type vertebrae. Status post posterior 
spinous



 mentation with rods and screws and anterior interbody grafts at L4-5 and L5-S1.



 Status post decompressive laminectomies at L4-5 and L5-S1. There is partial 
solid



 interbody 







 osseous fusion at L5-S1, without definite solid interbody osseous fusion at L4-
5.



 There is solid osseous fusion of the posterior elements of L4-S1. No evidence 
of



 hardware loosening. Hardware is well positioned.







  







 No fracture. 1 to 2 mm retrolisthesis L3 on L4. Facet alignment is anatomic.







  







 Limited dilation of the visualized intracranial contents demonstrates 
atherosclerosis



 of the abdominal aorta and branch vessels. No abdominal mass is identified on 
limited



 assessment. 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 







 sclerosis.







  







  







 Axial images through the disc spaces demonstrate the following:







  







 L1-L2: Small disc bulge without significant spinal canal or neural foraminal



 stenosis. Small Schmorl's nodes.







  







 L2-L3: Small disc bulge contributes to mild subarticular zone stenosis without



 significant spinal canal or neural foraminal stenosis. Small Schmorl's nodes.







  







 L3-L4: Small disc bulge, slight prominence of dorsal epidural fat, and mild 
ligament



 flavum infolding contribute to mild spinal canal stenosis. There is mild 
bilateral



 neural foraminal stenosis secondary to facet arthropathy and small endplate 







 osteophytes.







  







 L4-L5: Status post decompressive laminectomy. Mild neural foraminal stenosis



 secondary to small endplate osteophytes bilaterally.







  







 L5-S1: Status post decompressive laminectomy. No significant neural foraminal



 stenosis.







  







  







  







  







 IMPRESSION: 







  







 Postsurgical changes L4-S1 as detailed above.







  







 No fracture. Multilevel degenerative changes of the lumbar spine, notably with 
mild



 spinal canal and neural foraminal stenosis at L3-4.







  







  







  







  







 [id number]







 









 Procedure Note

 

 [facility] Interface, Radiology Results Incoming - 2018  5:24 PM CST



EXAMINATION: CT LUMBAR SPINE WO CONTRAST



 



 CLINICAL HISTORY: pain



 



 COMPARISON: CT myelogram lumbar spine 2011



 



 TECHNIQUE: Axial noncontrast enhanced images of lumbar spine was performed 
with coronal sagittal reconstruction algorithms. CT imaging was performed with 
iterative reconstruction technique and/or automated exposure control to reduce 
radiation dose.



 



 



 FINDINGS:



 



 There are 5 non-rib bearing lumbar type vertebrae. Status post posterior 
spinous mentation with rods and screws and anterior interbody grafts at L4-5 
and L5-S1. Status post decompressive laminectomies at L4-5 and L5-S1.



 There is partial solid interbody



 osseous fusion at L5-S1, without definite solid interbody osseous fusion at L4-
5. There is solid osseous fusion of the posterior elements of L4-S1. No 
evidence of hardware loosening. Hardware is well positioned.



 



 No fracture. 1 to 2 mm retrolisthesis L3 on L4. Facet alignment is anatomic.



 



 Limited dilation of the visualized intracranial contents demonstrates 
atherosclerosis of the abdominal aorta and branch vessels. No abdominal mass is 
identified on limited assessment. Osteoarthrosis sacroiliac joints



 with small vacuum clefts and mild



 sclerosis.



 



 



 Axial images through the disc spaces demonstrate the following:



 



 L1-L2: Small disc bulge without significant spinal canal or neural foraminal 
stenosis. Small Schmorl's nodes.



 



 L2-L3: Small disc bulge contributes to mild subarticular zone stenosis without 
significant spinal canal or neural foraminal stenosis. Small Schmorl's nodes.



 



 L3-L4: Small disc bulge, slight prominence of dorsal epidural fat, and mild 
ligament flavum infolding contribute to mild spinal canal stenosis. There is 
mild bilateral neural foraminal stenosis secondary to facet arthropathy and 
small endplate



 osteophytes.



 



 L4-L5: Status post decompressive laminectomy. Mild neural foraminal stenosis 
secondary to small endplate osteophytes bilaterally.



 



 L5-S1: Status post decompressive laminectomy. No significant neural foraminal 
stenosis.



 



 



 



 



 IMPRESSION:



 



 Postsurgical changes L4-S1 as detailed above.



 



 No fracture. Multilevel degenerative changes of the lumbar spine, notably with 
mild spinal canal and neural foraminal stenosis at L3-4.



 



 



 



 



 [id number]



Partial thromboplastin time, activated (2018 12:29 PM)Only the most 
recent of5 resultswithin the time period is included.





 Component  Value  Ref Range

 

 PTT  29.2  23.0 - 36.0 sec



   Comment:  



   PTT therapeutic range for unfractionated heparin is  



   61.0-112.0 seconds which corresponds to Anti-Xa  



   0.3-0.7 U/ml.  



   Note:Change in Panic Value  



   The PTT Panic Value is changing from 110 sec. to 100 sec.  



   due to new instrumentation and reagents.  



   Correlation studies have been performed to validate this result.  



     









 Specimen  Performing Laboratory

 

 Blood  [facility]







   [address] [place] [address]



Creatine kinase, total (CPK) (2018  6:29 PM)Only the most recent of3 
resultswithin the time period is included.





 Component  Value  Ref Range

 

 Creatine kinase  134  61 - 224 U/L









 Specimen  Performing Laboratory

 

 Plasma specimen  [facility]







   [address] [place] [address]



CT Head Wo Contrast (2018  6:19 PM)Only the most recent of2 resultswithin 
the time period is included.





 Specimen  Performing Laboratory

 

   [facility]







   [address] [place] [address]









 Narrative

 

 EXAMINATION: CT HEAD WO CONTRAST







  







 CLINICAL HISTORY: seizure syncope







  







 COMPARISON:Brain CT dated 2017







  







 TECHNIQUE: Noncontrast enhanced images of the brain were obtained from the 
skull base



 to the vertex. Both soft tissue and bone reconstruction algorithms were



 performed.CT imaging was performed with iterative reconstruction technique 
and/or



 automated 







 exposure control to reduce radiation dose.







  







  







 FINDINGS:







  







 There is no evidence of acute intracranial hemorrhage, intracranial mass, mass



 effect, midline shift or focal extra-axial collection. The gray-white matter



 differentiation is preserved, and I do not see a confluent area of acute



 transcortical ischemia. 







 No discretely hyperdense vessel is identified. 







  







 The ventricles and extra-axial spaces are mildly prominent bilaterally. The



 ventricles have remained stable in size and configuration compared to the 
previous



 head CT.







  







 Minimal foci of decreased attenuation are noted mainly along the 
periventricular



 white matter at the level of the corona radiata. These are nonspecific, but 
most



 commonly related to chronic microvascular ischemic change.







  







 Beam hardening artifact obscures details at the level of the skull base, 
including



 portions of the supraorbital frontal lobes, inferior temporal lobes, brainstem 
and



 cerebellum.







  







 The mastoid air cells and middle ear cavities are clear. There is soft tissue 
density



 in the external auditory canals bilaterally, nonspecific, but most commonly



 representing cerumen.







  







 The calvarium shows no aggressive bone lesion or evidence of fracture. No fluid



 levels are seen in the visualized paranasal sinuses.







  







 IMPRESSION:







  







 No acute intracranial abnormality identified.







  







  







  







  







  







  







 [id number]







 









 Procedure Note

 

 Hm Interface, Radiology Results Incoming - 2018  6:27 PM CST



EXAMINATION: CT HEAD WO CONTRAST



 



 CLINICAL HISTORY: seizure syncope



 



 COMPARISON:  Brain CT dated 2017



 



 TECHNIQUE: Noncontrast enhanced images of the brain were obtained from the 
skull base to the vertex. Both soft tissue and bone reconstruction algorithms 
were performed.  CT imaging was performed with iterative



 reconstruction technique and/or automated



 exposure control to reduce radiation dose.



 



 



 FINDINGS:



 



 There is no evidence of acute intracranial hemorrhage, intracranial mass, mass 
effect, midline shift or focal extra-axial collection. The gray-white matter 
differentiation is preserved, and I do not see a confluent area of



 acute transcortical ischemia.



 No discretely hyperdense vessel is identified.



 



 The ventricles and extra-axial spaces are mildly prominent bilaterally. The 
ventricles have remained stable in size and configuration compared to the 
previous head CT.



 



 Minimal foci of decreased attenuation are noted mainly along the 
periventricular white matter at the level of the corona radiata. These are 
nonspecific, but most commonly related to chronic microvascular ischemic change.



 



 Beam hardening artifact obscures details at the level of the skull base, 
including portions of the supraorbital frontal lobes, inferior temporal lobes, 
brainstem and cerebellum.



 



 The mastoid air cells and middle ear cavities are clear. There is soft tissue 
density in the external auditory canals bilaterally, nonspecific, but most 
commonly representing cerumen.



 



 The calvarium shows no aggressive bone lesion or evidence of fracture. No 
fluid levels are seen in the visualized paranasal sinuses.



 



 IMPRESSION:



 



 No acute intracranial abnormality identified.



 



 



 



 



 



 



 [id number]



Surgical pathology request (2018  1:28 PM)Only the most recent of4 
resultswithin the time period is included.





 Component  Value  Ref Range

 

 Case number  [id number]  

 

 Surgical pathology report  See link below for PDF Lab Report  

 

 Result status  This is Final Report to [id number]  









 Specimen  Performing Laboratory

 

   [facility]







   [address]







   [place] [address]



Magnesium level (2018  5:59 AM)Only the most recent of4 resultswithin the 
time period is included.





 Component  Value  Ref Range

 

 Magnesium  2.00  1.60 - 2.40 mg/dL









 Specimen  Performing Laboratory

 

 Plasma specimen  [facility]







   [address]







   [place] [address]



XR Chest 2 Vw (2017  7:47 PM)Only the most recent of4 resultswithin the 
time period is included.





 Specimen  Performing Laboratory

 

    [facility]







   [address] [place] [address]









 Narrative

 

 EXAMINATION:XR CHEST 2 VW







  







 CLINICAL HISTORY:sob cough todayleukocytosisR basilar crackles







  







  







  







 IMPRESSION:







  







 Aortic arch calcified. Heart size is normal. Lungs are clear of acute 
infiltrate.



 There has been a prior partial pulmonary resection in the right side. There 
are no



 effusions. There is an intraspinal catheter present, and there is hardware in



 cervical 







 spine.







  







  







  







 [facility]-[id number]







 









 Procedure Note

 

  Interface, Radiology Results Incoming - 2017  8:23 PM CST



EXAMINATION:  XR CHEST 2 VW



 



 CLINICAL HISTORY:  sob cough today  leukocytosis  R basilar crackles



 



 



 



 IMPRESSION:



 



 Aortic arch calcified. Heart size is normal. Lungs are clear of acute 
infiltrate. There has been a prior partial pulmonary resection in the right 
side. There are no effusions. There is an intraspinal catheter



 present, and there is hardware in cervical



 spine.



 



 



 



 [facility]-[id number]



Troponin (2017  7:33 PM)Only the most recent of5 resultswithin the time 
period is included.





 Component  Value  Ref Range

 

 Troponin  <0.01  0.00 - 0.60 ng/mL



   Comment:  



   0.11 - 1.49 ng/mlMay indicate increased risk of acute
  



    coronary syndrome.
  



   >=1.5 ng/mlConsistent with acute myocardial  



    infarction.  



   
  



   The diagnostic value of a single normal or non-diagnostic  



   result is questionable.Serial samples at 2-6 hour intervals  



   are required to rule out acute myocardial injury.
  



     



     









 Specimen  Performing Laboratory

 

 Plasma specimen  [facility]







   [address]







   [place] [address]



Influenza antigen (2017  3:58 PM)





 Component  Value  Ref Range

 

 Influenza antigen  Negative for Influenza A/B antigen.  



   Comment:  



   Specimen Information  



   Specimen Source: Nares  



   Specimen Site: Right  



     









 Specimen  Performing Laboratory

 

 Nares - Right  [facility]







   [address]







   [place] [address]



B natriuretic peptide (2017  3:45 PM)Only the most recent of2 
resultswithin the time period is included.





 Component  Value  Ref Range

 

 BNP  43  0 - 100 pg/mL









 Specimen  Performing Laboratory

 

 Blood  [facility]







   [address]







   [place] [address]



US Abdomen Complete (10/13/2017  7:28 AM)





 Specimen  Performing Laboratory

 

    [facility]







   [address] [place] [address]









 Narrative

 

 EXAM: US ABDOMEN COMPLETE







  







 CLINICAL DATA:ABDOMINAL PAIN







  







 COMPARISON: 2016







  







 FINDINGS:







  







 LIVER:The liver is slightly heterogeneous in echogenicity and has an 
irregular



 contour suggestive of cirrhosis. No discrete mass is identified with 
ultrasound.







  







 MPV:Doppler evaluation of the portal vein demonstrates normal hepatopetal 
flow. 







  







 GALLBLADDER:The gallbladder has been surgically removed.







  







 CBD: Common bile duct is not dilated measuring 6 mm.







  







 PANCREAS:The visualized portions of the pancreas are within normal limits.







  







 SPLEEN:Spleen is enlarged measuring 15.3 x 7 x 6.7 cm. 







  







 KIDNEYS:The kidneys are normal in size and echogenicity. There is no 
evidence of



 hydronephrosis.







  







 AORTA:The visualized upper abdominal aorta demonstrates no evidence of 
ectasia or



 aneurysm.







  







 IVC:The visualized portions of the inferior vena cava are unremarkable.







  







 ASCITES: No abnormal abdominal fluid collections are visualized. There is no 
evidence



 of ascites.







  







 PLEURAL EFFUSION:There are no pleural effusions.







  







  







 IMPRESSION:







 1. Cirrhosis with splenomegaly.







  







 2.Status post cholecystectomy.







  







  







 [id number]







 









 Procedure Note

 

  Interface, Radiology Results Incoming - 10/13/2017  7:34 AM CDT



EXAM: US ABDOMEN COMPLETE



 



 CLINICAL DATA:  ABDOMINAL PAIN



 



 COMPARISON: 2016



 



 FINDINGS:



 



 LIVER:  The liver is slightly heterogeneous in echogenicity and has an 
irregular contour suggestive of cirrhosis. No discrete mass is identified with 
ultrasound.



 



 MPV:  Doppler evaluation of the portal vein demonstrates normal hepatopetal 
flow.



 



 GALLBLADDER:  The gallbladder has been surgically removed.



 



 CBD: Common bile duct is not dilated measuring 6 mm.



 



 PANCREAS:  The visualized portions of the pancreas are within normal limits.



 



 SPLEEN:  Spleen is enlarged measuring 15.3 x 7 x 6.7 cm.



 



 KIDNEYS:  The kidneys are normal in size and echogenicity. There is no 
evidence of hydronephrosis.



 



 AORTA:  The visualized upper abdominal aorta demonstrates no evidence of 
ectasia or aneurysm.



 



 IVC:  The visualized portions of the inferior vena cava are unremarkable.



 



 ASCITES: No abnormal abdominal fluid collections are visualized. There is no 
evidence of ascites.



 



 PLEURAL EFFUSION:  There are no pleural effusions.



 



 



 IMPRESSION:



 1.   Cirrhosis with splenomegaly.



 



 2.  Status post cholecystectomy.



 



 



 [id number]



Total iron binding capacity (10/13/2017  5:33 AM)Only the most recent of2 
resultswithin the time period is included.





 Component  Value  Ref Range

 

 Iron level  86  76 - 198 ug/dL

 

 Iron binding capacity  252 (L)  271 - 474 ug/dL

 

 % Saturation  34.1  20.0 - 40.0 %









 Specimen  Performing Laboratory

 

 Blood  [facility]







   [address] [place], [place] [id number]



Hepatitis C antibody (10/13/2017  5:33 AM)Only the most recent of2 
resultswithin the time period is included.





 Component  Value  Ref Range

 

 Hepatitis C Ab  Non-reactive  Non-reactive









 Specimen  Performing Laboratory

 

 Blood  [facility]







   [address] [place] [address]



Alpha-1 antitrypsin level (10/13/2017  5:33 AM)Only the most recent of2 
resultswithin the time period is included.





 Component  Value  Ref Range

 

 Alpha-1 antitrypsin  134  90 - 200 mg/dL









 Specimen  Performing Laboratory

 

 Plasma specimen  [facility] DEPARTMENT OF PATHOLOGY AND [facility] MEDICINE







   [address] [place] [address]



Hepatitis A antibody IgM (10/13/2017  5:33 AM)





 Component  Value  Ref Range

 

 Hepatitis A IgM  Non-reactive  Non-reactive









 Specimen  Performing Laboratory

 

 Blood  [facility]







   [address] [place] [address]



Ceruloplasmin level (10/13/2017  5:33 AM)Only the most recent of2 resultswithin 
the time period is included.





 Component  Value  Ref Range

 

 Ceruloplasmin  26  15 - 30 mg/dL









 Specimen  Performing Laboratory

 

 Plasma specimen  [facility] DEPARTMENT OF PATHOLOGY AND GENOMIC MEDICINE







   [address] [place] [address]



Alpha fetoprotein (10/13/2017  5:33 AM)Only the most recent of2 resultswithin 
the time period is included.





 Component  Value  Ref Range

 

 Alpha fetoprotein  3.5  0.0 - 8.3 ng/mL



   Comment:  



   The Tru 8000 AFP immunoassay was used.  



   Results obtained with different assay methods or kits  



   should not be used interchangeably and may be different.  



     









 Specimen  Performing Laboratory

 

 Serum  [facility] DEPARTMENT OF PATHOLOGY AND [facility] MEDICINE







   [address] [place] [address]



Hepatitis B core antibody IgM (10/13/2017  5:33 AM)Only the most recent of2 
resultswithin the time period is included.





 Component  Value  Ref Range

 

 Hepatitis B core IgM  Non-reactive  Non-reactive









 Specimen  Performing Laboratory

 

 Blood  [facility] MEDICINE







   [address]







   [place] [address]



Hepatitis B surface antibody (10/13/2017  5:33 AM)Only the most recent of2 
resultswithin the time period is included.





 Component  Value  Ref Range

 

 Hepatitis B surface Ab  Non-reactive  Non-reactive









 Specimen  Performing Laboratory

 

 Blood  [facility] OF PATHOLOGY AND [facility] MEDICINE







   [address] [place] [address].







   [place] [address]



Hepatitis B surface antigen (10/13/2017  5:33 AM)Only the most recent of2 
resultswithin the time period is included.





 Component  Value  Ref Range

 

 Hepatitis B surface Ag  Non-reactive  Non-reactive









 Specimen  Performing Laboratory

 

 Blood  [facility] PATHOLOGY AND [facility] MEDICINE







   [address] [place] [address]



RADHA (10/13/2017  5:33 AM)Only the most recent of2 resultswithin the time period 
is included.





 Component  Value  Ref Range

 

 RADHA screen  Not Detected  Not-Detected









 Specimen  Performing Laboratory

 

 Blood  [facility] PATHOLOGY [address] [address]



Ferritin level (10/13/2017  5:33 AM)Only the most recent of2 resultswithin the 
time period is included.





 Component  Value  Ref Range

 

 Ferritin level  143  18 - 158 ng/mL









 Specimen  Performing Laboratory

 

 Serum  [facility]







   [address] [place] [address].







   [place] [address]



CT Abdomen Pelvis W Wo Contrast (10/13/2017  1:34 AM)





 Specimen  Performing Laboratory

 

   [address] [address]









 Narrative

 

 CT ABDOMEN PELVIS W WO CONTRAST







  







 CLINICAL INDICATION:cirrhosis







  







 TECHNIQUE: Multidetector CT of the abdomen and pelvis was performed before and 
then



 following intravenous administration of iodinated contrast with multiplanar



 reformats.







  







 CT scans are performed using radiation dose reduction techniques (iterative



 reconstruction and/or automated exposure control). Technical factors are 
evaluated



 and adjusted to ensure appropriate moderation of exposure. Automated dose 
management



 technology







  is applied to adjust radiation exposure while achieving a diagnostic quality 
image.







  







 COMPARISON:None. 







  







 FINDINGS:







  







 Lung bases:Dependent subsegmental atelectasis/scarring.







  







 Liver:Cirrhotic morphology with underlying hepatic steatosis. No arterial



 hyperenhancing lesions are visualized.







  







 Gallbladder and biliary:The gallbladder is absent. Clips within the 
gallbladder



 fossa. 







  







 Pancreas:Moderately atrophic with fatty replacement.







  







 Spleen: Enlarged measuring up to 15.5 cm.







  







 Gastrointestinal:Mild sigmoid diverticulosis. Large and small bowel are 
normal in



 caliber. Appendix is not visualized. No focal inflammatory changes within the 
right



 lower quadrant of the abdomen.







  







 Adrenals:Normal. 







  







 Kidneys and ureters:No mass or hydronephrosis.







  







 Urinary bladder: Decompressed.







  







 Lymph nodes:No enlarged lymph nodes in the abdomen or pelvis.







  







 Peritoneum:No ascites or free air.







  







 Vascular:Moderate-extensive atherosclerotic changes of the abdominal aorta 
and



 major branch vessels.







  







 Reproductive organs:Normal prostate gland and seminal vesicles.







  







 Abdominal wall: Spinal stimulator device in the right lower back subcutaneous 
soft



 tissues with partially visualized electrodes.







  







 Bones:Posterior spinal instrumentation at L4-S1.







  







 IMPRESSION:







  







 1. Cirrhosis. No focal or suspicious hepatic lesions visualized on liver 
protocol CT.







  







 2. Splenomegaly.







  







  







 [facility]-[id number]







 









 Procedure Note

 

 [facility], Radiology Results Incoming - 10/13/2017  1:55 AM CDT



CT ABDOMEN PELVIS W WO CONTRAST



 



 CLINICAL INDICATION:  cirrhosis



 



 TECHNIQUE: Multidetector CT of the abdomen and pelvis was performed before and 
then following intravenous administration of iodinated contrast with 
multiplanar reformats.



 



 CT scans are performed using radiation dose reduction techniques (iterative 
reconstruction and/or automated exposure control). Technical factors are 
evaluated and adjusted to ensure appropriate moderation of



 exposure. Automated dose management technology



  is applied to adjust radiation exposure while achieving a diagnostic quality 
image.



 



 COMPARISON:  None.



 



 FINDINGS:



 



 Lung bases:  Dependent subsegmental atelectasis/scarring.



 



 Liver:  Cirrhotic morphology with underlying hepatic steatosis. No arterial 
hyperenhancing lesions are visualized.



 



 Gallbladder and biliary:  The gallbladder is absent. Clips within the 
gallbladder fossa.



 



 Pancreas:  Moderately atrophic with fatty replacement.



 



 Spleen: Enlarged measuring up to 15.5 cm.



 



 Gastrointestinal:  Mild sigmoid diverticulosis. Large and small bowel are 
normal in caliber. Appendix is not visualized. No focal inflammatory changes 
within the right lower quadrant of the abdomen.



 



 Adrenals:  Normal.



 



 Kidneys and ureters:  No mass or hydronephrosis.



 



 Urinary bladder: Decompressed.



 



 Lymph nodes:  No enlarged lymph nodes in the abdomen or pelvis.



 



 Peritoneum:  No ascites or free air.



 



 Vascular:  Moderate-extensive atherosclerotic changes of the abdominal aorta 
and major branch vessels.



 



 Reproductive organs:  Normal prostate gland and seminal vesicles.



 



 Abdominal wall: Spinal stimulator device in the right lower back subcutaneous 
soft tissues with partially visualized electrodes.



 



 Bones:  Posterior spinal instrumentation at L4-S1.



 



 IMPRESSION:



 



 1. Cirrhosis. No focal or suspicious hepatic lesions visualized on liver 
protocol CT.



 



 2. Splenomegaly.



 



 



 [facility]-[id number]



Ammonia level (10/12/2017  5:45 PM)Only the most recent of2 resultswithin the 
time period is included.





 Component  Value  Ref Range

 

 Ammonia  33  3 - 37 umol/L









 Specimen  Performing Laboratory

 

 Plasma specimen  [facility]







   [address] [place] [address] [place]







   [place] [address]



Anti smooth muscle Ab titer (10/12/2017  1:27 PM)





 Component  Value  Ref Range

 

 Anti smooth muscle Ab titer  1:80 (A)  Not-Detected









 Specimen  Performing Laboratory

 

 Blood  [facility] DEPARTMENT OF PATHOLOGY AND GENOMIC MEDICINE







   [address] [place] [address]



Anti smooth muscle Ab screen (10/12/2017  1:27 PM)





 Component  Value  Ref Range

 

 Anti smooth muscle Ab screen  Detected (A)  Not-Detected









 Specimen  Performing Laboratory

 

 Blood  [facility] DEPARTMENT OF PATHOLOGY AND GENOMIC MEDICINE







   [address] [place] [address]



Hemoglobin &amp; hematocrit (10/12/2017  1:27 PM)





 Component  Value  Ref Range

 

 HGB  14.4  13.0 - 17.3 g/dL

 

 HCT  44.0  34.0 - 45.0 %









 Specimen  Performing Laboratory

 

 Blood  [facility]







   [address] [place] [address] [place]







   [place] [address]



Lactic acid level (10/11/2017 10:17 PM)Only the most recent of6 resultswithin 
the time period is included.





 Component  Value  Ref Range

 

 Lactic acid  1.4  0.5 - 2.2 mmol/L









 Specimen  Performing Laboratory

 

 Blood  [facility]IC[facility]







   [address] [place] [address] [place]







   [place] [address]



Bilirubin direct (10/11/2017  7:49 PM)Only the most recent of3 resultswithin 
the time period is included.





 Component  Value  Ref Range

 

 Bilirubin direct  0.1  0.0 - 0.4 mg/dL









 Specimen  Performing Laboratory

 

 Plasma specimen  [facility]IC[facility]







   [address]







   [place] [address]









 Narrative

 

 LA RESULT CALLED TO DAHLIA SUMNER NP10/11/461229:25 BY BRIEN



Amylase level (10/11/2017  7:49 PM)Only the most recent of3 resultswithin the 
time period is included.





 Component  Value  Ref Range

 

 Amylase  59  34 - 122 U/L









 Specimen  Performing Laboratory

 

 Plasma specimen  [facility]OMIC MEDICINE







   [address]







   [place] [address]



CT Chest W Contrast Abdomen W Contrast Pelvis W Contrast (2017  9:31 PM)





 Specimen  Performing Laboratory

 

   [facility]







   [address] [place] [address]









 Narrative

 

 EXAMINATION:CT CHEST W CONTRAST ABDOMEN W CONTRAST PELVIS W CONTRAST







  







 CLINICAL HISTORY:RLL chest pain







  







 TECHNIQUE: Multiple axial images of the chest, abdomen, and pelvis were 
obtained



 following intravenous administration of iodinated contrast. Sagittal and 
coronal



 computerized reformatted images were obtained.Automatic exposure control and



 iterative 







 reconstruction techniques used to reduce dose.







  







 COMPARISON:2017







  







 FINDINGS:







  







 Chest: 







  Mild diffuse emphysematous changes throughout the lungs.







  







 Mild atelectasis in the lingula postoperative changes in the right middle 
lobe. Mild



 bronchiectasis in the bilateral upper and lower lobes. No suspicious focal 
pulmonary



 nodules or infiltrates present







  







 Small nonspecific subcentimeter mediastinal and hilar lymph nodes







  







 Diffuse calcified atherosclerotic vascular disease throughout the arterial



 structures.







  







 No pleural or pericardial effusions are present.







  







 Degenerative changes are present throughout the bony structures without 
evidence of a



 suspicious focal lesion.







  







 Abdomen/Pelvis:







  Diffuse cirrhotic changes are present throughout the liver without evidence 
of focal



 mass







  







 The gallbladder has been removed







  







 The spleen is enlarged







  







 Portal vein is patent







  







 Pancreas is unremarkable







  







 Subcentimeter adenoma in the left adrenal gland







  







 Kidneys are within normal limits







  







 No significant lymphadenopathy free fluid present







  







 Diffuse calcified atherosclerotic vascular disease throughout the arterial



 structures.







  







 No evidence of appendicitis







  







 Diffuse diverticulosis of colon without evidence of diverticulitis. The small 
bowel



 is unremarkable







  







 Diffuse calcified atherosclerotic vascular disease throughout the arterial



 structures.







  







 Pelvis:







  







 No significant lymphadenopathy, solid masses or free fluid present







  







 Degenerative changes are present throughout the bony structures without 
evidence of a



 suspicious focal lesion.







  







  







 IMPRESSION:







  







 Diffuse emphysematous changes throughout the lungs. No suspicious focal 
pulmonary



 nodules or infiltrates present







  







 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







  







 Diverticulosis without evidence of diverticulitis







  







 [facility]-[id number]







 









 Procedure Note

 

  Interface, Radiology Results Incoming - 2017  9:42 PM CDT



EXAMINATION:  CT CHEST W CONTRAST ABDOMEN W CONTRAST PELVIS W CONTRAST



 



 CLINICAL HISTORY:  RLL chest pain



 



 TECHNIQUE: Multiple axial images of the chest, abdomen, and pelvis were 
obtained following intravenous administration of iodinated contrast. Sagittal 
and coronal computerized reformatted images were obtained.Automatic exposure 
control and iterative



 reconstruction techniques used to reduce dose.



 



 COMPARISON:  2017



 



 FINDINGS:



 



 Chest:



  Mild diffuse emphysematous changes throughout the lungs.



 



 Mild atelectasis in the lingula postoperative changes in the right middle 
lobe. Mild bronchiectasis in the bilateral upper and lower lobes. No suspicious 
focal pulmonary nodules or infiltrates present



 



 Small nonspecific subcentimeter mediastinal and hilar lymph nodes



 



 Diffuse calcified atherosclerotic vascular disease throughout the arterial 
structures.



 



 No pleural or pericardial effusions are present.



 



 Degenerative changes are present throughout the bony structures without 
evidence of a suspicious focal lesion.



 



 Abdomen/Pelvis:



  Diffuse cirrhotic changes are present throughout the liver without evidence 
of focal mass



 



 The gallbladder has been removed



 



 The spleen is enlarged



 



 Portal vein is patent



 



 Pancreas is unremarkable



 



 Subcentimeter adenoma in the left adrenal gland



 



 Kidneys are within normal limits



 



 No significant lymphadenopathy free fluid present



 



 Diffuse calcified atherosclerotic vascular disease throughout the arterial 
structures.



 



 No evidence of appendicitis



 



 Diffuse diverticulosis of colon without evidence of diverticulitis. The small 
bowel is unremarkable



 



 Diffuse calcified atherosclerotic vascular disease throughout the arterial 
structures.



 



 Pelvis:



 



 No significant lymphadenopathy, solid masses or free fluid present



 



 Degenerative changes are present throughout the bony structures without 
evidence of a suspicious focal lesion.



 



 



 IMPRESSION:



 



 Diffuse emphysematous changes throughout the lungs. No suspicious focal 
pulmonary nodules or infiltrates present



 



 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



 



 Diverticulosis without evidence of diverticulitis



 



 [facility]-[id number]



Vancomycin level, trough (2017  9:07 AM)





 Component  Value  Ref Range

 

 Vancomycin, trough  18.9  10.0 - 20.0 ug/mL



   Comment:  



   Therapeutic Ranges:  



    Peak30.0 - 40.0 ug/mL  



    Eopcgx72.0 - 20.0 ug/mL  



     









 Specimen  Performing Laboratory

 

 Blood  [facility] [place] [place]







   [address] [place] [address] [address]







   [place] [address]



XR Chest 1 Vw Portable (2017  5:27 PM)Only the most recent of15 
resultswithin the time period is included.





 Specimen  Performing Laboratory

 

    [facility]







   [address] [place] [address]









 Narrative

 

 EXAMINATION:XR CHEST 1 VW PORTABLE







  







 CLINICAL HISTORY: PICC Line Insertion







  







 COMPARISON:2017







  







 IMPRESSION:







  







 Redemonstration of a right sided PICC with the catheter tip at the cavoatrial



 junction. Spinal canal stimulator leads are again noted. Furthermore, partial



 residual aeration of the lower cervical ACDF.







  







 Cardiomediastinal silhouette is within normal limits of size. Calcifications 
are



 identified within the aortic arch.







  







 Perihilar vascular congestion is present, not significant change from prior



 examination. Lungs are otherwise clear. Trace right-sided pleural effusion is



 suspected. No significant left-sided pleural effusion or pneumothorax 
identified. 







  







 Visualized osseous structures are intact.







  







  







 [facility]-[id number]







 









 Procedure Note

 

  Interface, Radiology Results Incoming - 2017  5:33 PM CDT



EXAMINATION:  XR CHEST 1 VW PORTABLE



 



 CLINICAL HISTORY: PICC Line Insertion



 



 COMPARISON:  2017



 



 IMPRESSION:



 



 Redemonstration of a right sided PICC with the catheter tip at the cavoatrial 
junction. Spinal canal stimulator leads are again noted. Furthermore, partial 
residual aeration of the lower cervical ACDF.



 



 Cardiomediastinal silhouette is within normal limits of size. Calcifications 
are identified within the aortic arch.



 



 Perihilar vascular congestion is present, not significant change from prior 
examination. Lungs are otherwise clear. Trace right-sided pleural effusion is 
suspected. No significant left-sided pleural effusion or pneumothorax 
identified.



 



 Visualized osseous structures are intact.



 



 



 [facility]-[id number]



Blood culture, aerobic &amp; anaerobic (2017  5:16 PM)Only the most 
recent of6 resultswithin the time period is included.





 Component  Value  Ref Range

 

 Blood culture isolate  No growth after 5 days of incubation.  



   Comment:  



   Specimen Information  



   Specimen Source: Blood  



   Specimen Site: Peripheral Hand Left  



     









 Specimen  Performing Laboratory

 

 Blood  [facility] DEPARTMENT OF PATHOLOGY AND GENOMIC MEDICINE







   [address] [place] [address]



PICC INSERTION (2017 12:19 PM)





 Narrative

 

 Cinthya Whitt 2017 12:19 PM







 PICC insertion







 Date/Time: 2017 12:16 PM







 Performed by: CINTHYA WHITT







 Authorized by: POPEYE STRICKLAND 







  







 Consent: 







 Consent obtained:Written







 Consent given by:Patient







 Risks discussed: arterial puncture, incorrect placement, nerve damage, 







 pneumothorax, infection, bleeding, superficial thrombus and deep vein 







 thrombus







 Universal protocol: 







 Procedure explained and questions answered to patient or proxy's 







 satisfaction: yes







 Relevant documents present and verified: yes







 Test results available and properly labeled: yes







 Imaging studies available: yes







 Required blood products, implants, devices, and special equipment 







 available: yes







 Site/side marked: yes







 Immediately prior to procedure, a time out was called: yes







 Patient identity confirmed:Verbally with patient, hospital-assigned 







 identification number and arm band







 Pre-procedure details: 







 Hand hygiene: Hand hygiene performed prior to insertion







 Sterile barrier technique: All elements of maximal sterile technique 







 followed







 Skin preparation:ChloraPrep







 Skin preparation agent: Skin preparation agent completely dried prior to 







 procedure







 Anesthesia (see MAR for exact dosages): 







 Anesthesia method:Local infiltration







 Local anesthetic:Lidocaine 1% w/o epi







 Route of administration:Subcutaneous







 PICC Line Placement Details (Will create an LDA): 







 Extremity Cirumference Upper (cm):30







 Extremity Circumference Forearm (cm):23







 Extremity Circumference Site:27







 Patient position:Flat







 Vessel Size (mm):5







 Indication:Known long term IV therapy







 Location:Right basilic







 Device Type:Valved







 Catheter size:5 Fr







 PICC Characteristics: 







 Catheter Brand:Power PICC solo catheter with sherlock 3CG tip 







 positioning system stylet







 External Catheter Length (cm):0







 Internal Catheter Length (cm):40







 Total Catheter Length (cm):40







 Catheter Lot Number:[id number]







 Catheter Expiration Date:3/31/2018







 Micro-Introducer Lot Number:[id number]







 Micro-Introducer Expiration Date:3/31/2018







 Procedure Details: 







 Landmarks identified: yes







 Ultrasound guidance: yes







 Sterile ultrasound techniques: Sterile gel and sterile probe covers were 







 used







 Number of attempts:1







 Number of PICC kits used during procedure:1







 Purpose of procedure:PICC Placement







 Successful PICC Placement: Yes







 Patency/Placement:Flushes without difficulty, flushed with 10 mL 







 normal saline and positive blood return







 PICC placed utlizing ultrasound-guided Modified Seldinger Technique: Yes 







  







 Dressing/Securement:Gauze applied, transparent semipermeable dressing 







 and catheter securement device







 Blood Loss Amount:Less than 20 mL







 Post-Procedure Details: 







 Post-procedure:Dressing applied







 Patient tolerance of procedure:Tolerated well, no immediate 







 complications







 Comments: 







  3CG confirmation obtained and placed in chart. Report given to Anay Arcos RN



CT Chest Wo Contrast (2017  1:37 PM)Only the most recent of2 
resultswithin the time period is included.





 Specimen  Performing Laboratory

 

    [facility]







   [address] [place] [address]









 Narrative

 

 EXAMINATION: CT CHEST WO CONTRAST







  







 CLINICAL HISTORY: chest tubeempyema







  







 TECHNIQUE:Axial images of the chest were obtained without intravenous 
contrast.



 The lack of intravenous contrast reduces the sensitivity of the exam and 
evaluating



 vasculature. CT imaging was performed with iterative reconstruction technique 
and/or 







 automated exposure control to reduce radiation dose.







  







 COMPARISON:CT chest dated 2017.







  







 FINDINGS:







  







 CHEST:







  







 1. Aorta: The thoracic aorta is nonaneurysmal with atherosclerotic disease. 
Main



 pulmonary artery is dilated measuring up to 4.3 cm consistent with pulmonary 
arterial



 hypertension.







  







 2. Heart: Heart is mildly enlarged with coronary artery calcification.







  







 3. Pericardial Fluid: Small pericardial effusion.







  







 4. Mediastinum: Multiple subcentimeter mediastinal lymph nodes are unchanged 
compared



 to previous examination and likely reactive. Limited evaluation of the hilum 
the



 absence of intravenous contrast material.







  







 5. Airways: Central airways are patent.







  







 6. Lungs: Slightly more prominent focus of nodular consolidation in the 
lingula on



 series 3, slice 69 compared to previous examination. Minimal left lower lobe 
and



 lingular atelectasis. Postoperative opacity extending along the major fissure 
with 







 associated calcification and surgical material is again unchanged.







  







 7. Pleural Fluid: Interval placement of a right-sided pleural catheter into the



 previously identified right sided empyema which has significantly decreased in 
size.



 Small amount of fluid and air persists.







  







 8. Bones: Osseous structures intact. No destructive bony lesions.







  







 9. Upper Abdomen: Nodular appearance of the liver consistent with cirrhosis. 
Spleen



 is at the upper limits of normal in size. Fatty infiltration of the pancreas.



 Gallbladder surgically absent.







  







 10. Other Findings: None







  







 IMPRESSION:







  







  







 1. Right pleural drainage catheter in place with significant decrease in size 
of a



 loculated right pleural effusion. Only a small amount of pleural fluid and air



 persists.







 2. Small nodular consolidation in the lingula is slightly more prominent 
compared to



 prior examination, likely infectious in etiology.







 3. Enlarged main pulmonary artery which can be seen with pulmonary arterial



 hypertension.







  







  







 [facility]-[id number]







 









 Procedure Note

 

  Interface, Radiology Results Incoming - 2017  2:01 PM CDT



EXAMINATION: CT CHEST WO CONTRAST



 



 CLINICAL HISTORY: chest tube  empyema



 



 TECHNIQUE:  Axial images of the chest were obtained without intravenous 
contrast. The lack of intravenous contrast reduces the sensitivity of the exam 
and evaluating vasculature. CT imaging was performed with iterative



 reconstruction technique and/or



 automated exposure control to reduce radiation dose.



 



 COMPARISON:  CT chest dated 2017.



 



 FINDINGS:



 



 CHEST:



 



 1. Aorta: The thoracic aorta is nonaneurysmal with atherosclerotic disease. 
Main pulmonary artery is dilated measuring up to 4.3 cm consistent with 
pulmonary arterial hypertension.



 



 2. Heart: Heart is mildly enlarged with coronary artery calcification.



 



 3. Pericardial Fluid: Small pericardial effusion.



 



 4. Mediastinum: Multiple subcentimeter mediastinal lymph nodes are unchanged 
compared to previous examination and likely reactive. Limited evaluation of the 
hilum the absence of intravenous contrast material.



 



 5. Airways: Central airways are patent.



 



 6. Lungs: Slightly more prominent focus of nodular consolidation in the 
lingula on series 3, slice 69 compared to previous examination. Minimal left 
lower lobe and lingular atelectasis. Postoperative opacity extending along the 
major fissure with



 associated calcification and surgical material is again unchanged.



 



 7. Pleural Fluid: Interval placement of a right-sided pleural catheter into 
the previously identified right sided empyema which has significantly decreased 
in size. Small amount of fluid and air persists.



 



 8. Bones: Osseous structures intact. No destructive bony lesions.



 



 9. Upper Abdomen: Nodular appearance of the liver consistent with cirrhosis. 
Spleen is at the upper limits of normal in size. Fatty infiltration of the 
pancreas. Gallbladder surgically absent.



 



 10. Other Findings: None



 



 IMPRESSION:



 



 



 1. Right pleural drainage catheter in place with significant decrease in size 
of a loculated right pleural effusion. Only a small amount of pleural fluid and 
air persists.



 2. Small nodular consolidation in the lingula is slightly more prominent 
compared to prior examination, likely infectious in etiology.



 3. Enlarged main pulmonary artery which can be seen with pulmonary arterial 
hypertension.



 



 



 [facility]-[id number]



Aerobic culture (2017  4:40 PM)Only the most recent of2 resultswithin the 
time period is included.





 Component  Value  Ref Range

 

 Aerobic culture isolate  Streptococcus anginosus  



   Recovered in Broth only:  



   The performance characteristics of this assay on this isolate  



   were validated by the Microbiology Laboratory at [facility].This source has not been approved by the U.S. Food  



   and Drug Administration.The results are not intended to be  



   used as the sole means for clinical diagnosis or patient  



   management.The Microbiology Laboratory is authorized under  



   the clinical Laboratory Improvement Amendments of 1988  



   (CLIA-88) to perform high complexity testing.  



     (A)  



   Comment:  



   Specimen Information  



   Specimen Source: Pleural fluid  



   Specimen Site: Right Pleural Fluid  



     









 Specimen  Performing Laboratory

 

 Pleural fluid  [facility] DEPARTMENT OF PATHOLOGY AND [facility] MEDICINE







   [address] [place] [id number]









 Organism  Antibiotic  Method  Susceptibility

 

 Streptococcus anginosus  Clindamycin  RACHEL  >2 mcg/mL: Resistant

 

 Streptococcus anginosus  Ceftriaxone  RACHEL  <=0.0625 mcg/mL: Susceptible

 

 Streptococcus anginosus  Cefotaxime  RACHEL  <=0.0625 mcg/mL: Susceptible

 

 Streptococcus anginosus  Penicillin G  RACHEL  <=0.08648 mcg/mL: Susceptible

 

 Streptococcus anginosus  Vancomycin  RACHEL  1 mcg/mL: Susceptible



Gram stain (2017  4:40 PM)Only the most recent of2 resultswithin the time 
period is included.





 Component  Value  Ref Range

 

 Gram stain isolate  Many WBC's  



   Many Gram positive cocci in pairs  



     



   Comment:  



   Specimen Information  



   Specimen Source: Pleural fluid  



   Specimen Site: Right Pleural Fluid  



     









 Specimen  Performing Laboratory

 

 Pleural fluid  [facility] [place] [address]



CT Needle Biopsy No Contrast (2017 10:00 AM)Only the most recent of3 
resultswithin the time period is included.





 Specimen  Performing Laboratory

 

   [facility]







   [address] [place] [address]









 Narrative

 

 Clinical history: Empyema







  







 drainage , right pleural effusion







  







 TECHNIQUE:







 DLP:Technical factors are evaluated and adjusted to ensure appropriate



 moderation of exposure. Automated dose management technology is supplied to 
adjust



 the radiation dose to minimize exposure while achieving a diagnostic quality 
image.







  







  







 PROCEDURE: CT-GUIDED PERCUTANEOUS CATHETER ABSCESS DRAINAGE.







  







 Informed consent was obtained. The procedure and risks as well as other 
options were



 discussed.







 The patient was fully monitored and given moderate sedation.







 Doctor-patient face-to-face time was 30 minutes..







 The patient was prepped and draped in a sterile fashion. Measurements were 
obtained



 on the CT computer showing abscess outline and adjacent anatomic structures. 
Angle



 and depth of abscess drain placement was determined.







 Local anesthetic was given.







 A Yueh needle was placed into the abscess.







  







 Needle placement was documented with CT images.







 An Amplatzguidewire was inserted into the Yueh sheath after the Yueh 
needle was



 removed. Fascial dilators were placed over the guidewire that were sized to 
match the



 catheter size. An 8.5 French pigtail all-purpose drainage catheter was 
inserted over 







 the guidewire.







  







 The drainage catheter position was documented with additional CT images.







 The pigtail catheter was locked and abscess fluid was removed with a syringe 
and sent



 for culture and sensitivity..







  







 The catheter was fastened with monofilament suture and left to gravity 
drainage.







 The patient tolerated the procedure well and left the radiology department in 
good



 condition.







  







 IMPRESSION:







  







 There were no complications 







 150 cc of purulent material were sent to the laboratory for culture 
sensitivity and



 Gram stain.







  







 [id number] 







 









 Procedure Note

 

 Hm Interface, Radiology Results Incoming - 2017  3:21 PM CDT



Clinical history: Empyema



 



 drainage , right pleural effusion



 



 TECHNIQUE:



 DLP:    Technical factors are evaluated and adjusted to ensure appropriate 
moderation of exposure. Automated dose management technology is supplied to 
adjust the radiation dose to minimize exposure while achieving a diagnostic 
quality image.



 



 



 PROCEDURE: CT-GUIDED PERCUTANEOUS CATHETER ABSCESS DRAINAGE.



 



 Informed consent was obtained. The procedure and risks as well as other 
options were discussed.



 The patient was fully monitored and given moderate sedation.



 Doctor-patient face-to-face time was 30 minutes..



 The patient was prepped and draped in a sterile fashion. Measurements were 
obtained on the CT computer showing abscess outline and adjacent anatomic 
structures. Angle and depth of abscess drain placement was determined.



 Local anesthetic was given.



 A Yueh needle was placed into the abscess.



 



 Needle placement was documented with CT images.



 An Amplatz  guidewire was inserted into the Yueh sheath after the Yueh needle 
was removed. Fascial dilators were placed over the guidewire that were sized to 
match the catheter size. An 8.5 French pigtail all-purpose



 drainage catheter was inserted over



 the guidewire.



 



 The drainage catheter position was documented with additional CT images.



 The pigtail catheter was locked and abscess fluid was removed with a syringe 
and sent for culture and sensitivity..



 



 The catheter was fastened with monofilament suture and left to gravity 
drainage.



 The patient tolerated the procedure well and left the radiology department in 
good condition.



 



 IMPRESSION:



 



 There were no complications



 150 cc of purulent material were sent to the laboratory for culture 
sensitivity and Gram stain.



 



 [id number]



Hemoglobin A1c (2017  5:45 AM)





 Component  Value  Ref Range

 

 Hemoglobin A1C  7.5 (H)  4.0 - 6.0 %



   Comment:  



   *****************************************************  



   Less than 6% - Goal of therapy for Type II Diabetes  



   Less than 7%-Goal of therapy for Type I Diabetes  



   Less than 8%-Acceptable control for Type I or Type  



   II Diabetes  



   Greater than 8%-Unacceptable control; action indicated.  



   (ADA94)  



     









 Specimen  Performing Laboratory

 

 [place]  [facility] [place]







   [address]







   [place] [address]



CT Abdomen Pelvis W Contrast (2017  4:49 PM)





 Specimen  Performing Laboratory

 

    [facility]







   [address] [place] [address]









 Narrative

 

 EXAMINATION:CT ABDOMEN PELVIS W CONTRAST







  







 CLINICAL HISTORY:ABDOMINAL PAIN







  







 TECHNIQUE:







  







  Multiple axial images of the abdomen and pelvis were obtained following 
intravenous



 administration of iodinated contrast. Sagittal and coronal computerized 
reformatted



 images were also obtained. Approximately 80 cc of Omnipaque 300 was used.







  







 All CT scan performed using radiation dose reduction techniques. Technical 
factors



 are evaluated and adjusted to ensure appropriate moderation of exposure. 
Automated



 dose management technology is applied to adjust the radiation dose to minimize 
expose



 







 whileachieving a diagnostic quality image.







  







  







 COMPARISON:CT chest 2017..







  







 FINDINGS:







  







 Lung bases: A loculated wall enhancing moderate pleural effusion within the 
posterior



 aspect of the right lung base is again seen, suggesting of empyema.. A right 
middle



 lobe and right lower lobe masslike density with curvilinear calcification is 
again 







 noted..







  







 Liver: Hepatic cirrhosis is seen. There is no intrahepatic biliary dilatation 
or



 enhancing mass.







  







 Gallbladder: Surgically absent.







  







 Pancreas: The pancreas is normal in caliber and attenuation. No inflammatory 
process.



 The pancreatic duct is within normal limits.







  







 Spleen: Splenomegaly is seen. The spleen is otherwise unremarkable...







  







 Kidneys and ureters: The kidneys function symmetrically. There is no enhancing 
renal



 lesion. No hydronephrosis or renal stone. The ureters are normal in course and



 caliber.







  







 Adrenal glands: Unremarkable.







  







 GI tract: The small bowel is normal in course and caliber. The colon is 
unremarkable.



 No bowel wall thickening is identified. There is no acute inflammatory 
process. The



 appendix is not seen. No right lower quadrant inflammation is seen. The 
stomach is 







 unremarkable







  







 Ascites: None.







  







 Pelvis:







  







 The urinary bladder is within normal limits.







  







 Limited evaluation of the prostate gland is unremarkable. Incidental note is 
made of



 right hydrocele..







  







 Bones: Unremarkable.







  







 Retroperitoneum: No retroperitoneal or mesenteric lymphadenopathy is seen. 
Scattered



 atherosclerotic disease is seen.No abnormal aortic aneurysm is seen.. The



 visceral and mesenteric vascular branches are patent.







  







 Abdominal wall: Unremarkable.







  







  







 IMPRESSION:







  







 Hepatitic cirrhosis with splenomegaly. No CT evidence of hepatitic mass.







  







 No CT evidence of colitis or bowel obstruction.







  







 Findings again suggesting of a moderate right empyema.







  







  







 [id number]







 









 Procedure Note

 

 [facility] Interface, Radiology Results Incoming - 2017  5:06 PM CDT



EXAMINATION:  CT ABDOMEN PELVIS W CONTRAST



 



 CLINICAL HISTORY:      ABDOMINAL PAIN



 



 TECHNIQUE:



 



  Multiple axial images of the abdomen and pelvis were obtained following 
intravenous administration of iodinated contrast. Sagittal and coronal 
computerized reformatted images were also obtained. Approximately 80 cc of 
Omnipaque 300 was used.



 



 All CT scan performed using radiation dose reduction techniques. Technical 
factors are evaluated and adjusted to ensure appropriate moderation of 
exposure. Automated dose management technology is applied to adjust the



 radiation dose to minimize expose



 while  achieving a diagnostic quality image.



 



 



 COMPARISON:  CT chest 2017..



 



 FINDINGS:



 



 Lung bases: A loculated wall enhancing moderate pleural effusion within the 
posterior aspect of the right lung base is again seen, suggesting of empyema.. 
A right middle lobe and right lower lobe masslike density with



 curvilinear calcification is again



 noted..



 



 Liver: Hepatic cirrhosis is seen. There is no intrahepatic biliary dilatation 
or enhancing mass.



 



 Gallbladder: Surgically absent.



 



 Pancreas: The pancreas is normal in caliber and attenuation. No inflammatory 
process. The pancreatic duct is within normal limits.



 



 Spleen: Splenomegaly is seen. The spleen is otherwise unremarkable...



 



 Kidneys and ureters: The kidneys function symmetrically. There is no enhancing 
renal lesion. No hydronephrosis or renal stone. The ureters are normal in 
course and caliber.



 



 Adrenal glands: Unremarkable.



 



 GI tract: The small bowel is normal in course and caliber. The colon is 
unremarkable. No bowel wall thickening is identified. There is no acute 
inflammatory process. The appendix is not seen. No right lower quadrant



 inflammation is seen. The stomach is



 unremarkable



 



 Ascites: None.



 



 Pelvis:



 



 The urinary bladder is within normal limits.



 



 Limited evaluation of the prostate gland is unremarkable. Incidental note is 
made of right hydrocele..



 



 Bones: Unremarkable.



 



 Retroperitoneum: No retroperitoneal or mesenteric lymphadenopathy is seen. 
Scattered atherosclerotic disease is seen.  No abnormal aortic aneurysm is 
seen.. The visceral and mesenteric vascular branches are patent.



 



 Abdominal wall: Unremarkable.



 



 



 IMPRESSION:



 



 Hepatitic cirrhosis with splenomegaly. No CT evidence of hepatitic mass.



 



 No CT evidence of colitis or bowel obstruction.



 



 Findings again suggesting of a moderate right empyema.



 



 



 [id number]



CT Chest W Contrast (2017  7:21 PM)





 Specimen  Performing Laboratory

 

    [facility]







   [address] [address]









 Narrative

 

 Study:CT CHEST W CONTRAST







  







 History: R chest wall tendernessrecent biopsy







  







 COMPARISON: 2017, 2017







  







 TECHNIQUE: Multiple axial CT images of the chest performed With IV contrast.. 
Coronal



 and sagittal reconstructions were done. 







  







 CT imaging was performed with iterative reconstruction technique and/or 
automated



 exposure control to reduce radiation dose.







  







 FINDINGS:







  







 LUNGS: The opacity in the right lung is mainly right middle lobe and right 
lower lobe



 and is branching straddles the right major fissure difficult to measure but is



 approximately 3.6 x 1.7 x 2.8 cm associated with some linear calcification. 
This



 appears 







 more lobulated when compared to prior exam but is similar in size. The right 
lower



 lobe posterior and medial loculated pleural collection has become more 
lobulated when



 compared to the prior exam measuring up to 8 cm in greatest dimension with 
some 







 surrounding lung parenchymal thickening. Focal opacities in the lingula from 
the



 prior exams have resolved. Some residual tiny nodular opacities in the lingula



 measure 6 mm in image 72 series 3. No pneumothorax.







  







 AIRWAYS: No central endobronchial or endotracheal lesions are seen.







  







 MEDIASTINUM: The thoracic aorta is without aneurysm or dissection. The main 
pulmonary



 artery is dilated to about 24.4 cm in diameter indicating pulmonary arterial



 hypertension similar to the prior exams. Heart is upper limits of normal in 
size with



 







 atherosclerotic calcification of the coronary arteries. No significant 
pericardial



 effusion is present. Small mediastinal lymph nodes are similar to the prior 
exam.



 Partially calcified right hilar lymph node measuring up to 2 cm also stable. 
The 







 esophagus is unremarkable.







  







 BONES AND OVERLYING SOFT TISSUES: The visualized bones are without destructive



 lesions. No enlarged axillary lymph nodes present. There is no acute 
abnormality of



 the chest wall.







  







 VISUALIZED LOWER NECK AND UPPER ABDOMEN: Liver is cirrhotic. Visualized lower







 Unremarkable.







  







 IMPRESSION:







  







 No acute abnormality of the right chest wall.







  







 Branching lobulated right middle and right lower lobe opacity straddling the 
right



 major fissure associated with some linear calcification probably postoperative.



 Previous opacities in the lingula have resolved.







  







 A pleural collection in the posterior medial right lower lobe has become more



 loculated.







  







  







 [id number] 







 









 Procedure Note

 

  Interface, Radiology Results Incoming - 2017  7:50 PM CDT



Study:CT CHEST W CONTRAST



 



 History: R chest wall tenderness  recent biopsy



 



 COMPARISON: 2017, 2017



 



 TECHNIQUE: Multiple axial CT images of the chest performed With IV contrast.. 
Coronal and sagittal reconstructions were done.



 



 CT imaging was performed with iterative reconstruction technique and/or 
automated exposure control to reduce radiation dose.



 



 FINDINGS:



 



 LUNGS: The opacity in the right lung is mainly right middle lobe and right 
lower lobe and is branching straddles the right major fissure difficult to 
measure but is approximately 3.6 x 1.7 x 2.8 cm associated with some



 linear calcification. This appears



 more lobulated when compared to prior exam but is similar in size. The right 
lower lobe posterior and medial loculated pleural collection has become more 
lobulated when compared to the prior exam measuring up to 8 cm in greatest 
dimension with some



 surrounding lung parenchymal thickening. Focal opacities in the lingula from 
the prior exams have resolved. Some residual tiny nodular opacities in the 
lingula measure 6 mm in image 72 series 3. No pneumothorax.



 



 AIRWAYS: No central endobronchial or endotracheal lesions are seen.



 



 MEDIASTINUM: The thoracic aorta is without aneurysm or dissection. The main 
pulmonary artery is dilated to about 24.4 cm in diameter indicating pulmonary 
arterial hypertension similar to the prior exams. Heart is upper limits of 
normal in size with



 atherosclerotic calcification of the coronary arteries. No significant 
pericardial effusion is present. Small mediastinal lymph nodes are similar to 
the prior exam. Partially calcified right hilar lymph node measuring up to 2 cm 
also stable. The



 esophagus is unremarkable.



 



 BONES AND OVERLYING SOFT TISSUES: The visualized bones are without destructive 
lesions. No enlarged axillary lymph nodes present. There is no acute 
abnormality of the chest wall.



 



 VISUALIZED LOWER NECK AND UPPER ABDOMEN: Liver is cirrhotic. Visualized lower



 Unremarkable.



 



 IMPRESSION:



 



 No acute abnormality of the right chest wall.



 



 Branching lobulated right middle and right lower lobe opacity straddling the 
right major fissure associated with some linear calcification probably 
postoperative. Previous opacities in the lingula have resolved.



 



 A pleural collection in the posterior medial right lower lobe has become more 
loculated.



 



 



 [id number]



XR Ribs 2 Vw Right (2017  3:55 PM)





 Specimen  Performing Laboratory

 

   [facility]







   [address] [place] [address]









 Narrative

 

 EXAM:XR RIBS 2 VW RIGHT







  







 HISTORY:CHEST PAINNORMAL EKG







  







 COMPARISON:None available







  







 IMPRESSION:







  







  







 1. No displaced rib fracture or acute osseous abnormality.







 2. Postsurgical changes are in the right middle lobe. There is atelectasis or



 scarring in the right middle lobe and right lower lobe. There is a small right



 pleural effusion or pleural thickening.







 3. The cardiac silhouette is not enlarged. The thoracic aorta is 
atherosclerotic.







 4. A spinal stimulator device projects over the lower thoracic spine. Cervical 
spine



 fusion hardware is partially visualized.







  







  







  







  







 [id number]







 









 Procedure Note

 

 [facility], Radiology Results Incoming - 2017  4:50 PM CDT



EXAM:  XR RIBS 2 VW RIGHT



 



 HISTORY:  CHEST PAIN  NORMAL EKG



 



 COMPARISON:  None available



 



 IMPRESSION:



 



 



 1. No displaced rib fracture or acute osseous abnormality.



 2. Postsurgical changes are in the right middle lobe. There is atelectasis or 
scarring in the right middle lobe and right lower lobe. There is a small right 
pleural effusion or pleural thickening.



 3. The cardiac silhouette is not enlarged. The thoracic aorta is 
atherosclerotic.



 4. A spinal stimulator device projects over the lower thoracic spine. Cervical 
spine fusion hardware is partially visualized.



 



 



 



 



 [id number]



Fungus smear (2017 10:03 AM)





 Component  Value  Ref Range

 

 Fungus smear  No fungi observed.  



   Comment:  



   Specimen Information  



   Specimen Source: Tissue  



   Specimen Site: Lung, right middle lobe  



     









 Specimen  Performing Laboratory

 

 Tissue - Lung, right middle lobe  [facility] DEPARTMENT OF PATHOLOGY AND GENOMIC 
MEDICINE







   [address] [place] [address]



AFB culture (2017 10:03 AM)





 Component  Value  Ref Range

 

 AFB culture isolate  No growth after 6 weeks of incubation.  



   Comment:  



   Specimen Information  



   Specimen Source: Tissue  



   Specimen Site: Lung, right middle lobe  



     









 Specimen  Performing Laboratory

 

 Tissue - Lung, right middle lobe  [facility] DEPARTMENT OF PATHOLOGY AND GENOMIC 
MEDICINE







   [address] [place] [address]



AFB stain (2017 10:03 AM)





 Component  Value  Ref Range

 

 AFB stain  No acid fast bacilli (AFB) seen.  



   Comment:  



   Specimen Information  



   Specimen Source: Tissue  



   Specimen Site: Lung, right middle lobe  



     









 Specimen  Performing Laboratory

 

 Tissue - Lung, right middle lobe  [facility] DEPARTMENT OF PATHOLOGY AND GENOMIC 
MEDICINE







   [address] [place] [address]



Fungus culture (2017 10:03 AM)





 Component  Value  Ref Range

 

 Fungus culture isolate  No growth after 4 weeks of incubation.  



   Comment:  



   Specimen Information  



   Specimen Source: Tissue  



   Specimen Site: Lung, right middle lobe  



     









 Specimen  Performing Laboratory

 

 Tissue - Lung, right middle lobe  [facility] DEPARTMENT OF PATHOLOGY AND GENOMIC 
MEDICINE







   [address] [place] [address]



Anaerobic culture (2017 10:03 AM)





 Component  Value  Ref Range

 

 Anaerobic culture isolate  No anaerobic organisms isolated.  



   Comment:  



   Specimen Information  



   Specimen Source: Tissue  



   Specimen Site: Lung, right middle lobe  



     









 Specimen  Performing Laboratory

 

 Tissue - Lung, right middle lobe  [facility] DEPARTMENT OF PATHOLOGY AND GENOMIC 
MEDICINE







   [address] [place] [address]



Type and screen (2017  2:19 PM)





 Component  Value  Ref Range

 

 ABO grouping  A  

 

 Rh type  POS  

 

 Antibody screen (gel)  NEG  









 Specimen  Performing Laboratory

 

 Blood  [facility]







   [address]







   [place] [address]



Spirometry pre &amp; post w/ bronchodilator (2017 12:31 PM)Echocardiogram 
complete w contrast and 3D if needed (2017  8:37 AM)





 Component  Value  Ref Range

 

 Velocity Ratio (V1/V2)  0.78  m/s

 

 IVS,d  1.10  0.6 - 1.2 cm

 

 EF  66.08  %

 

 LVPWD,d  1.06  cm

 

 AoV Mean PG  3.27  mmHg

 

 AV LVOT peak gradient  4.37  mmHg

 

 MV mean gradient  1.22  mmHg

 

 MV valve area p 1/2 method  4.51  cm2

 

 PV Pk Grad  3.08  mmHg

 

 E/A ratio  0.88  

 

 E wave decelartion time  168.22  msec

 

 LVOT Diam,S  1.81  cm

 

 LVOT area  2.57  cm2

 

 LVOT Vmax  1.05  m/s

 

 LVOT VTI  0.18  m

 

 AoV Peak PG  5.94  mmHg

 

 MV Peak E Tristan  0.67  m/s

 

 MV stenosis pressure 1/2 time  48.78  ms

 

 MV Peak A Tristan  0.76  m/s

 

 BSA  2.04  m2

 

 AoV Area, Vmax  2.00  cm2

 

 AoV Area, VTI  2.53  cm2

 

 AoV Vmax  1.34  m/s

 

 BSA Snow  2.04  m2

 

 BSA Haycock  2.04  m2

 

 IVS/LVPW,2D  1.04  

 

 Left Atrium Dimension Anterior  2.77  cm

 

 LV,d  4.02  cm

 

 LV,s  2.57  cm

 

 PV VMAX  0.88  m/s

 

 TR Vpeak  3.98  mm/s

 

 BMI  24.41  kg/m2

 

 MV E A ratio  0.88  mmHg

 

 TR pk grad  45.78  mmHg

 

 AoV area i VTI BSA Balta  1.26  cm2/m2

 

 MR peak grad  3.46  mmHg

 

 Ao Root Diameter  3.31  cm

 

 LV SYS VOL  23.99  ml

 

 LV FALK VOL  70.73  ml

 

 LV SI Teich 2D  22.95  ml/m2

 

 LV SV Teich 2D  46.75  ml

 

 LV Vol s Teich PSAX  23.99  ml

 

 LVOT CI  2.02  l/min/m2

 

 LVOT CO  4.13  l/min

 

 LVOT HR for LVOT CO  89.07  bpm

 

 LVOT SI  22.70  ml/m2

 

 MV Vmax  0.93  m

 

 MV VTI Tips  0.15  m

 

 AoV Vmn  0.84  

 

 IVS s 2D  1.72  

 

 LV FS Cube 2D  35.95  

 

 LV FS Teich 2D  35.95  

 

 Ao Root Diameter  3.31  cm

 

 AoV VTI  0.18  m

 

 LV EF,2D  73.72  %

 

 MV AE ratio  1.14  

 

 LVOT Vmn  0.79  

 

 Pt Size  182.88  

 

 Pt Wt  81.65  

 

 Aov area Vmn  2.36  cm2

 

 LVOT mean grad  2.69  mmHg

 

 MAX Pred HR  166.64  

 

 85 of MPHR  141.64  

 

 AoV area I VMN bsa  1.16  cm2/m2

 

 Calc MPHR  166.64  bpm

 

 IVS pct thck PLAX  55.63  %

 

 LV SI Cube 2D  23.47  ml/m2

 

 LV SV Cube 2D  47.81  ml

 

 LV vol d cube 2D  64.85  ml

 

 LV vol s cube 2D  17.04  ml

 

 LVPW pct thck PLAX  56.42  %

 

 LVPW s PLAX  1.66  cm

 

 MV Decel slope  3.98  m/s2

 

 Pred Exer Dur R1  9.73  

 

 Pred METS R1  10.00  









 Specimen  Performing Laboratory

 

    [facility]







   [address] [place] [address]









 Narrative

 

  Left Ventricle: Left Ventricular ejection fraction is 65 - 70%. The left 







 ventricular chamber size is







  Trace mitral valve regurgitation







  Pulmonic Valve: Mild pulmonary valve regurgitation.







  Pericardium: No pericardial effusion seen.







 



Hepatitis acute panel (2017  3:51 AM)





 Component  Value  Ref Range

 

 Hepatitis A IgM  Non-reactive  Non-reactive

 

 Hepatitis B core IgM  Non-reactive  Non-reactive

 

 Hepatitis B surface Ag  Non-reactive  Non-reactive

 

 Hepatitis C Ab  Non-reactive  Non-reactive









 Specimen  Performing Laboratory

 

 Serum  [facility] [place]IC[facility]







   [address]







   [place] [address]



Phosphorus level (2017  3:51 AM)Only the most recent of2 resultswithin 
the time period is included.





 Component  Value  Ref Range

 

 Phosphorus  4.9 (H)  2.5 - 4.5 mg/dL









 Specimen  Performing Laboratory

 

 Plasma specimen  [facility]OM[facility]IC[facility]







   [address]







   [place] [address]



CRITICAL CARE (2017  8:18 PM)





 Narrative

 

 Soledad Emanuel [place] 20178:18 PM







 Critical Care







 Performed by: SOLEDAD EMANUEL







 Authorized by: SOLEDAD EMANUEL 







  







 Critical care provider statement: 







 Critical care time (minutes):40







 Critical care time was exclusive of:Separately billable procedures and 







 treating other patients







 Critical care was necessary to treat or prevent imminent or 







 life-threatening deterioration of the following conditions:Circulatory 







 failure and respiratory failure







 Critical care was time spent personally by me on the following 







 activities:Development of treatment plan with patient or surrogate, 







 discussions with consultants, discussions with primary provider, 







 evaluation of patient's response to treatment, examination of patient, 







 obtaining history from patient or surrogate, interpretation of cardiac 







 output measurements, ordering and performing treatments and interventions, 







 ordering and review of laboratory studies, ordering and review of 







 radiographic studies, pulse oximetry, re-evaluation of patient's condition 







 and review of old charts



CT Head W Wo Contrast (2017 11:14 AM)





 Specimen  Performing Laboratory

 

   [facility]







   [address] [place] [address]









 Narrative

 

 EXAMINATION:CT HEAD W WO CONTRAST







  







 CLINICAL HISTORY:LUNG CANCERUNSPECIFIED







  







 COMPARISON:2015







  







  







 FINDINGS:







  







  







  







 Axial images were obtained before and after intravenous contrast infusion.







  







  CT scans are performed using radiation dose reduction techniques. Technical 
factors



 are evaluated and adjusted to ensure appropriate moderation of exposure. 
Automated



 dose management technology is applied to adjust radiation exposure while 
achieving a 







 highly diagnostic quality image..







  







 The ventricular system and subarachnoid spaces are dilated consistent with



 age-related atrophic changes. There are minimal white matter lucencies in the 
centrum



 semiovale bilaterally consistent with minimal nonspecific chronic microvascular



 ischemic 







 changes appropriate for the patient's age.







  







 After intravenous contrast infusion, there was no evidence of abnormal 
enhancement of



 the brain parenchyma or the leptomeninges to suggest metastatic disease. There 
is



 visualization of a normal vascular enhancement pattern within the arterial 
venous 







 structures.







  







 There is no gross bone destruction.







  







  







 IMPRESSION:







  







 Stable mild age-appropriate involutional changes.







  







 No enhancing lesions to suggest intracranial metastatic disease.







  







 No gross bone destruction.







  







  







 [facility]-[id number]







 









 Procedure Note

 

 Hm Interface, Radiology Results Incoming - 2017 11:22 AM CDT



EXAMINATION:  CT HEAD W WO CONTRAST



 



 CLINICAL HISTORY:  LUNG CANCER  UNSPECIFIED



 



 COMPARISON:  2015



 



 



 FINDINGS:



 



 



 



 Axial images were obtained before and after intravenous contrast infusion.



 



  CT scans are performed using radiation dose reduction techniques. Technical 
factors are evaluated and adjusted to ensure appropriate moderation of 
exposure. Automated dose management technology is applied to



 adjust radiation exposure while achieving a



 highly diagnostic quality image..



 



 The ventricular system and subarachnoid spaces are dilated consistent with age-
related atrophic changes. There are minimal white matter lucencies in the 
centrum semiovale bilaterally consistent with minimal nonspecific chronic 
microvascular ischemic



 changes appropriate for the patient's age.



 



 After intravenous contrast infusion, there was no evidence of abnormal 
enhancement of the brain parenchyma or the leptomeninges to suggest metastatic 
disease. There is visualization of a normal vascular enhancement pattern within 
the arterial venous



 structures.



 



 There is no gross bone destruction.



 



 



 IMPRESSION:



 



 Stable mild age-appropriate involutional changes.



 



 No enhancing lesions to suggest intracranial metastatic disease.



 



 No gross bone destruction.



 



 



 [facility]-[id number]



after 2017



Insurance







 Payer  Benefit Plan / Group  Subscriber ID  Type  Phone  Address

 

 [employer]ARE  [employer] MEDICARE HMO/PPO [facility]  xxxxxxxx  HMO    









 Guarantor Name  Account Type  Relation to  Date of  Phone  Billing Address



     Patient  Birth    

 

 CLEVELAND BARKLEY  Personal/Famil  Self  1965  Home:  [address]   y      [phone]  [address]







         [address]  







           [place]



           [address]

## 2018-06-09 NOTE — XMS REPORT
Patient Summary Document

 Created on:2018



Patient:CARLOS BARKLEY

Sex:Male

:1965

External Reference #:[id number]





Demographics







 Address  [address]



   [place] [address]

 

 Home Phone  [phone]

 

 Preferred Language  Unknown

 

 Marital Status  Unknown

 

 Adventist Affiliation  Unknown

 

 Race  Unknown

 

 Additional Race(s)  Unavailable

 

 Ethnic Group  Unknown









Author







 Organization  [facility]

 

 Address  [facility] Yo[place] Lowe [address] [place] [id number]

 

 Phone  [phone]









Care Team Providers







 Name  Role  Phone

 

 RENATA SAHUD  Unavailable  Unavailable









Problems

This patient has no known problems.



Allergies, Adverse Reactions, Alerts

This patient has no known allergies or adverse reactions.



Medications

This patient has no known medications.



Results







 Test Description  Test Time  Test Comments  Text Results  Atomic Results  
Result Comments









 Comprehensive Metabolic Panel  2017 04:50:00    









   Test Item  Value  Reference Range  Comments









 Sodium (test code=NA)  137 mmol/L  135-145  

 

 Potassium (test code=K)  4.1 mmol/L  3.5-5.1  

 

 Chloride (test code=CL)  101 mmol/L    

 

 Carbon Dioxide (test  23 mmol/L  22-29  



 code=CO2)      

 

 Glucose (test code=GLU)  145 mg/dL    

 

 Blood Urea Nitrogen (test  9 mg/dL  6-20  



 code=BUN)      

 

 Creatinine (test code=CREAT)  1.0 mg/dL  0.7-1.2  

 

 Calcium (test code=CA)  9.6 mg/dL  8.3-10.5  

 

 Prot Total (test code=TP)  8.0 g/dL  6.4-8.3  

 

 Albumin (test code=ALB)  3.8 g/dL  3.5-5.2  

 

 A/G Ratio (test  0.9 Ratio    



 code=AGRATIO)      

 

 Globulin (test code=GLOB)  4.2  2.9-3.1  

 

 Bili Total (test code=TBIL)  0.4 mg/dL  0.1-0.9  

 

 Alk Phos (test code=APHOS)  149 U/L    

 

 AST (test code=AST)  29 U/L  1-40  

 

 ALT (test code=ALT)  22 U/L  1-41  

 

 BUN/Creatinine Ratio (test  9.0    



 code=BCRATIO)      

 

 Anion Gap (test code=AGAP)  13 mmol/L  7-16  

 

 Estimated GFR (test  >60 mL/min/1.73m2    eGFR (estimated Glomerular



 code=GFR)      Filtration Rate) is an



       estimated value,calculated



       from the patient's serum



       creatinine using the MDRD



       equation.It is NOT the



       patient's actual GFR. The eGFR



       provides a more



       clinicallyuseful measure of



       kidney disease than serum



       creatinine alone.***This



       calculation takes sex and race



       into account, if the



       informationis provided. If the



       race is not provided, and the



       patient isAfrican-American,



       multiply by 1.212. If sex is



       not provided, and thepatient



       is female, multiply by 0.742.



       Results for patients <18 years



       ofage have not been validated



       by the MDRD study and should



       be interpretedwith



       caution.eGFR Result



       Interpretation:eGFR > or=60 is



       in the Normal RangeeGFR < 60



       may mean kidney diseaseeGFR <



       15 may mean kidney



       failure***Ranges recommended



       by the National Kidney



       Foundation,http://nkdep.nih.go



       v



[id number] 04:50:00





 Test Item  Value  Reference Range  Comments

 

 Amphetamine (test code=AMPH)  Negative  Negative  For diagnostic purposes only,



       positive results should always



       be assessedin conjunctionwith



       the patient's medical



       history,clinical examination



       and otherfindings.To fulfill



       legal requirements, a more



       specific alternate chemical



       methodmust be used inorder to



       obtain a Confirmed analytical



       result. GC/MS is the preferred



       confirmatory method.

 

 Barbiturates (test code=MARQUIS)  Negative  Negative  

 

 Benzodiazepine (test  Negative  Negative  



 code=DEJAN)      

 

 Cocaine (test code=COCA)  Negative  Negative  

 

 Methadone (test code=MTHD)  Negative  Negative  

 

 Opiates (test code=OPIA)  Negative  Negative  

 

 PCP (test code=PCP)  Negative  Negative  

 

 Propoxyphene (test  Negative  Negative  



 code=PROPOX)      

 

 THC (test code=THC)  POSITIVE  Negative  



CBC with [id number] 03:53:00





 Test Item  Value  Reference Range  Comments

 

 WBC (test code=WBC)  12.5 K/cumm  4.4-10.5  

 

 RBC (test code=RBC)  4.71 M/cumm  4.10-5.70  

 

 Hemoglobin (test code=HGB)  15.0 gm/dL  13.4-17.4  

 

 Hematocrit (test code=HCT)  44.0 %  38.7-52.0  

 

 MCV (test code=MCV)  93.5 fL    

 

 MCH (test code=MCH)  31.8 pg  27.0-32.5  

 

 MCHC (test code=MCHC)  34.0 g/dL  32.0-37.5  

 

 RDW (test code=RDW)  14.7 %  11.5-14.5  

 

 Platelet Count (test code=PLTCT)  257 K/cumm  140-440  

 

 MPV (test code=MPV)  9.0 fL    

 

 Diff Method (test code=DIFFM)  Auto    

 

 Neutrophil (test code=NEUT)  79.4 %  36-70  

 

 Lymphocyte (test code=LYMPH)  13.7 %  12-44  

 

 Monocyte (test code=MONO)  5.2 %  0-11  

 

 Eosinophil (test code=EOS)  1.3 %  0-7  

 

 Basophil (test code=BASO)  0.4 %  0-2  

 

 Neutro Abs (test code=ANEUT)  9.9 K/cumm  1.6-7.4  

 

 Lymph Abs (test code=ALYMPH)  1.7 K/cumm  0.5-4.6  

 

 Mono Abs (test code=AMONO)  0.7 K/cumm  0.0-1.2  

 

 Eos Abs (test code=AEOS)  0.16 K/cumm  0.00-0.74  

 

 Baso Abs (test code=ABASO)  0.1 K/cumm  0.00-0.21

## 2018-06-10 VITALS — OXYGEN SATURATION: 95 %

## 2018-06-10 LAB
BUN BLD-MCNC: 25 MG/DL (ref 6–20)
GLUCOSE SERPLBLD-MCNC: 104 MG/DL (ref 65–120)
HCT VFR BLD CALC: 42.4 % (ref 39.6–49)
HCT VFR BLD CALC: 44.3 % (ref 39.6–49)
POTASSIUM SERPL-SCNC: 3.9 MEQ/L (ref 3.6–5)
UA COMPLETE W REFLEX CULTURE PNL UR: (no result)
UA DIPSTICK W REFLEX MICRO PNL UR: (no result)

## 2018-06-10 RX ADMIN — SODIUM CHLORIDE SCH MLS: 0.9 INJECTION, SOLUTION INTRAVENOUS at 21:43

## 2018-06-10 RX ADMIN — SODIUM CHLORIDE SCH MLS: 0.9 INJECTION, SOLUTION INTRAVENOUS at 08:34

## 2018-06-10 RX ADMIN — ONDANSETRON PRN MG: 2 INJECTION INTRAMUSCULAR; INTRAVENOUS at 04:07

## 2018-06-10 RX ADMIN — MORPHINE SULFATE PRN MG: 4 INJECTION, SOLUTION INTRAMUSCULAR; INTRAVENOUS at 13:09

## 2018-06-10 RX ADMIN — MORPHINE SULFATE PRN MG: 4 INJECTION, SOLUTION INTRAMUSCULAR; INTRAVENOUS at 21:40

## 2018-06-10 RX ADMIN — SODIUM CHLORIDE SCH MLS: 0.9 INJECTION, SOLUTION INTRAVENOUS at 18:15

## 2018-06-10 RX ADMIN — MORPHINE SULFATE PRN MG: 4 INJECTION, SOLUTION INTRAMUSCULAR; INTRAVENOUS at 17:40

## 2018-06-10 RX ADMIN — DIAZEPAM SCH MG: 5 TABLET ORAL at 16:48

## 2018-06-10 RX ADMIN — DIAZEPAM SCH: 5 TABLET ORAL at 13:07

## 2018-06-10 RX ADMIN — Medication SCH: at 21:00

## 2018-06-10 RX ADMIN — Medication SCH: at 08:36

## 2018-06-10 RX ADMIN — SODIUM CHLORIDE SCH MLS: 0.9 INJECTION, SOLUTION INTRAVENOUS at 04:06

## 2018-06-10 RX ADMIN — SODIUM CHLORIDE SCH MLS: 0.9 INJECTION, SOLUTION INTRAVENOUS at 08:33

## 2018-06-10 NOTE — ER
Nurse's Notes                                                                                     

 [facility]                                                                

Name: Cleveland Ricketts Jr                                                                            

Age: 53 yrs                                                                                       

Sex: Male                                                                                         

: 1965                                                                                   

MRN: [id number]                                                                                   

Arrival Date: 2018                                                                          

Time: 21:12                                                                                       

Account#: [id number]                                                                            

Bed 7                                                                                             

Private MD:                                                                                       

Diagnosis: Hematemesis                                                                            

                                                                                                  

Presentation:                                                                                     

                                                                                             

21:25 Presenting complaint: Patient states: back pain, abdominal pain, vomiting anne blood   aj1 

      since 0600 this am. Reports weakness. Transition of care: patient was not received from     

      another setting of care. Onset of symptoms was 2018 at 06:00. Risk Assessment:     

      Do you want to hurt yourself or someone else? Patient reports no desire to harm self or     

      others. Initial Sepsis Screen: Does the patient meet any 2 criteria? No. Patient's          

      initial sepsis screen is negative. Does the patient have a suspected source of              

      infection? No. Patient's initial sepsis screen is negative. Care prior to arrival: None.    

21:25 Method Of Arrival: Wheelchair                                                           aj1 

21:25 Acuity: JENNIFER 3                                                                           aj1 

                                                                                                  

Triage Assessment:                                                                                

21:30 General: Appears in no apparent distress. uncomfortable, Behavior is calm, cooperative, aj1 

      appropriate for age. Pain: Complains of pain in back and abdomen Pain currently is 9        

      out of 10 on a pain scale.                                                                  

                                                                                                  

Historical:                                                                                       

- Allergies:                                                                                      

21:30 No Known Allergies;                                                                     aj1 

- Home Meds:                                                                                      

21:30 Effexor XR Oral [Active]; tizanidine oral oral [Active]; Keppra Oral [Active]; Seroquel aj1 

      Oral [Active];                                                                              

- PMHx:                                                                                           

21:30 lunch cancer; Seizures;                                                                 aj1 

- PSHx:                                                                                           

21:30 lobectomy; cervical fusion; lumbar fusion; dorsal column stimulator implant;            aj1 

      Appendectomy;                                                                               

                                                                                                  

- Immunization history:: Flu vaccine is up to date.                                               

- Social history:: Smoking status: Patient uses tobacco products, smokes one pack                 

  cigarettes per day.                                                                             

- Ebola Screening: : Patient denies travel to an Ebola-affected area in the 21 days               

  before illness onset.                                                                           

- Family history:: not pertinent.                                                                 

- Hospitalizations: : Patient was recently seen at.                                               

                                                                                                  

                                                                                                  

Screenin:42 Abuse screen: Denies threats or abuse. Nutritional screening: No deficits noted.        jd3 

      Tuberculosis screening: No symptoms or risk factors identified. Fall Risk None              

      identified.                                                                                 

                                                                                                  

Assessment:                                                                                       

21:50 General: Appears uncomfortable, Behavior is cooperative, anxious. Pain: Complains of    jd3 

      pain in abdomen Pain does not radiate. Quality of pain is described as aching,              

      pressure, sharp, Is continuous, Also complains of constipation, nausea. Neuro: Level of     

      Consciousness is awake, alert, obeys commands, Oriented to person, place, time,             

      situation. Cardiovascular: Heart tones S1 S2 present Capillary refill < 3 seconds           

      Patient's skin is warm and dry. Respiratory: Airway is patent Respiratory effort is         

      even, unlabored, Respiratory pattern is regular, symmetrical, Breath sounds are clear       

      bilaterally. GI: Abdomen is flat, Bowel sounds present X 4 quads. Abd is soft Abdomen       

      is tender to palpation X 4 quads. Reports lower abdominal pain, upper abdominal pain,       

      nausea, vomiting, dark colored stools and vomiting dark color bile. : No signs and/or     

      symptoms were reported regarding the genitourinary system. EENT: No signs and/or            

      symptoms were reported regarding the EENT system. Derm: Skin is intact, Skin is dry,        

      Skin is normal, Skin temperature is warm. Musculoskeletal: Circulation, motion, and         

      sensation intact. Range of motion: intact in all extremities.                               

23:42 Reassessment: Patient appears in no apparent distress at this time. No changes from     jd3 

      previously documented assessment. Patient and/or family updated on plan of care and         

      expected duration. Pain level reassessed. Patient is alert, oriented x 3, equal             

      unlabored respirations, skin warm/dry/pink.                                                 

06/10                                                                                             

00:34 Reassessment: Patient is alert, oriented x 3, equal unlabored respirations, skin        bb  

      warm/dry/pink. pt returned from CT via stretcher, IV sites intact, patent with fluids       

      infusing.                                                                                   

01:35 Reassessment: Patient appears in no apparent distress at this time. Patient and/or      jd3 

      family updated on plan of care and expected duration. Pain level reassessed. Patient is     

      alert, oriented x 3, equal unlabored respirations, skin warm/dry/pink.                      

02:47 Reassessment: Patient appears in no apparent distress at this time. Patient and/or      jd3 

      family updated on plan of care and expected duration. Pain level reassessed. Patient is     

      alert, oriented x 3, equal unlabored respirations, skin warm/dry/pink. pt lying in bed,     

      with eyes closed, even and unlabored respirations, no distress noted at this time. call     

      bell in reach. Patient states feeling better.                                               

03:15 Reassessment: Patient appears in no apparent distress at this time. Patient and/or      jd3 

      family updated on plan of care and expected duration. Pain level reassessed. Patient is     

      alert, oriented x 3, equal unlabored respirations, skin warm/dry/pink. pt reported          

      understanding of need for admission.                                                        

                                                                                                  

Vital Signs:                                                                                      

                                                                                             

21:30  / 92; Pulse 99; Resp 22; Temp 98.1; Pulse Ox 98% ; Weight 73.94 kg; Height 6 ft. aj1 

      1 in. (185.42 cm); Pain 9/10;                                                               

23:42  / 91; Pulse 97; Resp 19 S; Pulse Ox 98% on R/A; Pain 8/10;                       jd3 

06/10                                                                                             

00:35  / 91; Pulse 85; Resp 16 S; Pulse Ox 97% on R/A;                                  bb  

01:28  / 88; Pulse 84; Resp 16 S; Temp 97.6(TE); Pulse Ox 96% on R/A;                   bb  

02:44  / 98; Pulse 81; Resp 17 S; Pulse Ox 95% on R/A; Pain 0/10;                       jd3 

                                                                                             

21:30 Body Mass Index 21.51 (73.94 kg, 185.42 cm)                                             aj1 

                                                                                                  

ED Course:                                                                                        

                                                                                             

21:12 Patient arrived in ED.                                                                  am2 

21:27 Triage completed.                                                                       aj1 

21:30 Arm band placed on Patient placed in an exam room.                                      aj1 

21:34 Theron May MD is Attending Physician.                                                rn  

22:31 Joe Rich RN is Primary Nurse.                                                  jd3 

22:32 Inserted saline lock: 20 gauge in right antecubital area, using aseptic technique.      oe  

      Blood collected.                                                                            

23:00 Radiology exam delayed due to lab results not completed at this time. (BUN/Creatinine). vm2 

23:25 Inserted saline lock: 22 gauge in left antecubital area, using aseptic technique.       jd3 

23:42 Patient has correct armband on for positive identification. Placed in gown. Bed in low  jd3 

      position. Call light in reach. Side rails up X2. Adult w/ patient.                          

06/10                                                                                             

00:18 Patient moved to CT via stretcher.                                                      eh  

00:21 Abdomen In Process Unspecified.                                                         EDMS

00:33 CT completed. Patient tolerated procedure well. Patient moved back from CT.               

01:14 Luiz Lyles MD is Hospitalizing Provider.                                           rn  

01:30 No provider procedures requiring assistance completed. Patient admitted, IV remains in  bb  

      place.                                                                                      

                                                                                                  

Administered Medications:                                                                         

                                                                                             

23:34 Drug: Zofran 4 mg Route: IVP; Site: right antecubital;                                  [facility] 

06/10                                                                                             

02:48 Follow up: Response: No adverse reaction; Nausea is decreased                           [facility] 

                                                                                             

23:35 Drug: Rocephin - (cefTRIAXone) 1 grams Route: IVPB; Infused Over: 30 mins; Site: right  [facility] 

      antecubital;                                                                                

06/10                                                                                             

02:48 Follow up: Response: No adverse reaction; IV Status: Completed infusion                 [facility] 

                                                                                             

23:36 Drug: Octreotide Infusion (50 mcg/hr) - (Octreotide 500 mcg, NS 0.9% 500 ml) Route: IV; jd3 

      Rate: 50 ml/hr; Site: left antecubital;                                                     

06/10                                                                                             

02:49 Follow up: Response: No adverse reaction; IV Status: Infusion continued upon admission  [facility] 

                                                                                             

23:37 Drug: ProTONIX 40 mg Route: IVP; Site: right antecubital;                               [facility] 

06/10                                                                                             

02:49 Follow up: Response: No adverse reaction                                                [facility] 

                                                                                             

23:37 Drug: ProTONIX 8 mg/hr Route: IV; Rate: 25 ml/hr; Site: right antecubital;              [facility] 

06/10                                                                                             

02:49 Follow up: Response: No adverse reaction; IV Status: Infusion continued upon admission  [facility] 

                                                                                             

23:37 Drug: Octreotide 50 mcg Route: IV; Rate: calculated rate; Site: left antecubital;       [facility] 

06/10                                                                                             

02:49 Follow up: Response: No adverse reaction; IV Status: Completed infusion                 j 

00:06 Drug: NS 0.9% 500 ml Route: IV; Rate: bolus; Site: left antecubital;                    jd3 

02:48 Follow up: Response: No adverse reaction; IV Status: Completed infusion; IV Intake:     jd3 

      500ml                                                                                       

                                                                                                  

                                                                                                  

Intake:                                                                                           

02:48 IV: 500ml; Total: 500ml.                                                                j 

                                                                                                  

Outcome:                                                                                          

01:14 Decision to Hospitalize by Provider.                                                    rn  

01:31 Admitted to [facility] accompanied by tech, via stretcher, room 427, with chart.              bb  

01:31 Condition: stable                                                                           

01:31 Instructed on the need for admit.                                                           

02:57 Admitted to Tele accompanied by tech, via stretcher, room 427, with chart, Report       jd3 

      called to  Keily MONGE                                                                      

03:16 Patient left the ED.                                                                    jd3 

                                                                                                  

Signatures:                                                                                       

Dispatcher [employer]                           EDMelodie Davidson, RN                     RN   aj1                                                  

Abdoul Landeros Brenda, RN                     RN   bb                                                   

Theron May MD MD rn Espinosa, Orlando oe Moreno, Amanda                               Inez Harris                            [facility]                                                  

Joe Rich RN                    RN   jd3                                                  

                                                                                                  

Corrections: (The following items were deleted from the chart)                                    

01:31 01:28  / 96; Pulse 84bpm; Resp 16bpm; Spontaneous; Pulse Ox 96% RA; Temp 97.6F    bb  

      Temporal; bb                                                                                

                                                                                                  

**************************************************************************************************

## 2018-06-10 NOTE — EDPHYS
Physician Documentation                                                                           

 [facility]                                                                

Name: Cleveland Ricketts Jr                                                                            

Age: 53 yrs                                                                                       

Sex: Male                                                                                         

: 1965                                                                                   

MRN: [id number]                                                                                   

Arrival Date: 2018                                                                          

Time: 21:12                                                                                       

Account#: [id number]                                                                            

Bed 7                                                                                             

Private MD:                                                                                       

ED Physician Theron May                                                                         

HPI:                                                                                              

                                                                                             

22:50 This 53 yrs old  Male presents to ER via Wheelchair with complaints of         rn  

      Abdominal Pain, Vomiting.                                                                   

22:50 The patient presents to the emergency department with nausea, vomiting, abdominal pain. rn  

      Onset: The symptoms/episode began/occurred 3 day(s) ago. Possible causes: unknown.          

      Associated signs and symptoms: Pertinent positives: abdominal pain, GI bleeding,            

      nausea, vomiting. Severity of symptoms: At their worst the symptoms were mild in the        

      emergency department the symptoms are unchanged. The patient has experienced a previous     

      episode. Reports dark red blood in vomit, + dark stool for 2 days, + cirrhosis, not on      

      blood thinners, + upper abd pain. .                                                         

                                                                                                  

Historical:                                                                                       

- Allergies:                                                                                      

21:30 No Known Allergies;                                                                     aj1 

- Home Meds:                                                                                      

21:30 Effexor XR Oral [Active]; tizanidine oral oral [Active]; Keppra Oral [Active]; Seroquel aj1 

      Oral [Active];                                                                              

- PMHx:                                                                                           

21:30 lunch cancer; Seizures;                                                                 aj1 

- PSHx:                                                                                           

21:30 lobectomy; cervical fusion; lumbar fusion; dorsal column stimulator implant;            aj1 

      Appendectomy;                                                                               

                                                                                                  

- Immunization history:: Flu vaccine is up to date.                                               

- Social history:: Smoking status: Patient uses tobacco products, smokes one pack                 

  cigarettes per day.                                                                             

- Ebola Screening: : Patient denies travel to an Ebola-affected area in the 21 days               

  before illness onset.                                                                           

- Family history:: not pertinent.                                                                 

- Hospitalizations: : Patient was recently seen at.                                               

                                                                                                  

                                                                                                  

ROS:                                                                                              

22:50 Constitutional: Negative for fever, chills, and weight loss, Eyes: Negative for injury, rn  

      pain, redness, and discharge, Neck: Negative for injury, pain, and swelling,                

      Cardiovascular: Negative for chest pain, palpitations, and edema, Respiratory: Negative     

      for shortness of breath, cough, wheezing, and pleuritic chest pain, Abdomen/GI: + abd       

      pain/nausea/vomiting MS/Extremity: Negative for injury and deformity, Skin: Negative        

      for injury, rash, and discoloration, Neuro: Negative for headache, weakness, numbness,      

      tingling, and seizure.                                                                      

                                                                                                  

Exam:                                                                                             

22:50 Constitutional:  thin male, no acute distress Head/Face:  Normocephalic, atraumatic.    rn  

      Eyes:  Pupils equal round and reactive to light, extra-ocular motions intact.  Lids and     

      lashes normal.  Conjunctiva and sclera are non-icteric and not injected.  Cornea within     

      normal limits.  Periorbital areas with no swelling, redness, or edema. Cardiovascular:      

      Regular rate and rhythm with a normal S1 and S2.  No gallops, murmurs, or rubs.  Normal     

      PMI, no JVD.  No pulse deficits. Respiratory:  + mild tachypnea, faint exp wheezing, no     

      retractions, speaking full sentences Abdomen/GI:  soft, + mild epigastric tenderness        

      MS/ Extremity:  Pulses equal, no cyanosis.  Neurovascular intact.  Full, normal range       

      of motion.  Equal circumference. Neuro:  Awake and alert, GCS 15, oriented to person,       

      place, time, and situation.  Cranial nerves II-XII grossly intact.  Motor strength 5/5      

      in all extremities.  Sensory grossly intact.  Cerebellar exam normal.                       

                                                                                                  

Vital Signs:                                                                                      

21:30  / 92; Pulse 99; Resp 22; Temp 98.1; Pulse Ox 98% ; Weight 73.94 kg; Height 6 ft. aj1 

      1 in. (185.42 cm); Pain 9/10;                                                               

23:42  / 91; Pulse 97; Resp 19 S; Pulse Ox 98% on R/A; Pain 8/10;                       jd3 

06/10                                                                                             

00:35  / 91; Pulse 85; Resp 16 S; Pulse Ox 97% on R/A;                                  bb  

01:28  / 88; Pulse 84; Resp 16 S; Temp 97.6(TE); Pulse Ox 96% on R/A;                   bb  

02:44  / 98; Pulse 81; Resp 17 S; Pulse Ox 95% on R/A; Pain 0/10;                       jd3 

                                                                                             

21:30 Body Mass Index 21.51 (73.94 kg, 185.42 cm)                                             aj1 

                                                                                                  

MDM:                                                                                              

                                                                                             

21:34 Patient medically screened.                                                             rn  

06/10                                                                                             

01:13 Differential diagnosis: Nonspecific abd pain, gastritis, viral gastroenteritis,         rn  

      gastroenteritis, UGIB. Data reviewed: vital signs, nurses notes, lab test result(s),        

      radiologic studies, CT scan, and as a result, I will admit patient. Counseling: I had a     

      detailed discussion with the patient and/or guardian regarding: the historical points,      

      exam findings, and any diagnostic results supporting the discharge/admit diagnosis, lab     

      results, radiology results, the need for further work-up and treatment in the hospital.     

      Admission orders: after a detailed discussion of the patient's condition and case, the      

      admit orders are written by me.                                                             

                                                                                                  

                                                                                             

21:35 Order name: Basic Metabolic Panel; Complete Time: 23:37                                 rn  

                                                                                             

21:35 Order name: CBC with Diff; Complete Time: 23:37                                         rn  

                                                                                             

21:35 Order name: Hepatic Function; Complete Time: 23:37                                      rn  

                                                                                             

21:35 Order name: Lipase; Complete Time: 23:37                                                rn  

                                                                                             

21:35 Order name: Urine Microscopic Only                                                      rn  

                                                                                             

21:35 Order name: Protime (+inr); Complete Time: 23:37                                        rn  

                                                                                             

21:35 Order name: Ptt, Activated; Complete Time: 23:37                                        rn  

                                                                                             

22:40 Order name: Manual Differential; Complete Time: 23:37                                   EDMS

06/10                                                                                             

00:17 Order name: Abdomen                                                                     EDMS

                                                                                             

21:35 Order name: IV Saline Lock; Complete Time: 22:32                                        rn  

                                                                                             

21:35 Order name: Labs collected and sent; Complete Time: 22:32                               rn  

                                                                                                  

Administered Medications:                                                                         

                                                                                             

23:34 Drug: Zofran 4 mg Route: IVP; Site: right antecubital;                                  jd3 

06/10                                                                                             

02:48 Follow up: Response: No adverse reaction; Nausea is decreased                           jd3 

                                                                                             

23:35 Drug: Rocephin - (cefTRIAXone) 1 grams Route: IVPB; Infused Over: 30 mins; Site: right  jd3 

      antecubital;                                                                                

06/10                                                                                             

02:48 Follow up: Response: No adverse reaction; IV Status: Completed infusion                 jd3 

                                                                                             

23:36 Drug: Octreotide Infusion (50 mcg/hr) - (Octreotide 500 mcg, NS 0.9% 500 ml) Route: IV; jd3 

      Rate: 50 ml/hr; Site: left antecubital;                                                     

06/10                                                                                             

02:49 Follow up: Response: No adverse reaction; IV Status: Infusion continued upon admission  jd3 

                                                                                             

23:37 Drug: ProTONIX 40 mg Route: IVP; Site: right antecubital;                               jd3 

06/10                                                                                             

02:49 Follow up: Response: No adverse reaction                                                jd3 

                                                                                             

23:37 Drug: ProTONIX 8 mg/hr Route: IV; Rate: 25 ml/hr; Site: right antecubital;              jd3 

06/10                                                                                             

02:49 Follow up: Response: No adverse reaction; IV Status: Infusion continued upon admission  jd3 

                                                                                             

23:37 Drug: Octreotide 50 mcg Route: IV; Rate: calculated rate; Site: left antecubital;       jd3 

06/10                                                                                             

02:49 Follow up: Response: No adverse reaction; IV Status: Completed infusion                 jd3 

00:06 Drug: NS 0.9% 500 ml Route: IV; Rate: bolus; Site: left antecubital;                    jd3 

02:48 Follow up: Response: No adverse reaction; IV Status: Completed infusion; IV Intake:     jd3 

      500ml                                                                                       

                                                                                                  

                                                                                                  

Disposition:                                                                                      

06/10/18 01:14 Hospitalization ordered by Luiz Lyles for Inpatient Admission. Preliminary     

  diagnosis is Hematemesis.                                                                       

- Bed requested for Telemetry/MedSurg (Inpatient).                                                

- Status is Inpatient Admission.                                                              jd3 

- Condition is Stable.                                                                            

- Problem is new.                                                                                 

- Symptoms have improved.                                                                         

UTI on Admission? No                                                                              

                                                                                                  

                                                                                                  

                                                                                                  

Signatures:                                                                                       

Dispatcher [employer]                           [facility]                                                 

Melodie Lomax RN                     RN   aj1                                                  

Ban Chavarria RN                        Theron Do MD MD rn Davies, Jonathon, RN                    RN   jd3                                                  

                                                                                                  

Corrections: (The following items were deleted from the chart)                                    

                                                                                             

23:23 21:36 Creatinine for Radiology+C.LAB.BRZ ordered. [facility]

06/10                                                                                             

00:17  21:36 Abdomen Pelvis W Con+CT.RAD.BRZ ordered. [facility]

06/10                                                                                             

01:21 01:14 Hospitalization Ordered by Luiz Lyles MD for Inpatient Admission. Preliminary    

      diagnosis is Hematemesis. Bed requested for Telemetry/MedSurg (Inpatient). Status is        

      Inpatient Admission. Condition is Stable. Problem is new. Symptoms have improved. UTI       

      on Admission? No. rn                                                                        

03:16 01:21 06/10/2018 01:14 Hospitalization Ordered by Luiz Lyles MD for Inpatient        jd3 

      Admission. Preliminary diagnosis is Hematemesis. Bed requested for Telemetry/MedSurg        

      (Inpatient). Status is Inpatient Admission. Condition is Stable. Problem is new.            

      Symptoms have improved. UTI on Admission? No. mw                                            

                                                                                                  

**************************************************************************************************

## 2018-06-10 NOTE — P.HP
Certification for Inpatient


Patient admitted to: Inpatient


With expected LOS: >2 Midnights


Patient will require the following post-hospital care: None


Practitioner: I am a practitioner with admitting privileges, knowledge of 

patient current condition, hospital course, and medical plan of care.


Services: Services provided to patient in accordance with Admission 

requirements found in Title 42 Section 412.3 of the Code of Federal Regulations





Patient History


Date of Service: 06/10/18


Reason for admission: Upper GI bleeding


History of Present Illness: 





Patient is a 53-year-old gentleman who came to the hospital with upper GI 

bleeding.  Patient had bright red blood in his vomit.  Patient recently moved 

here from [facility].  He was in the hospital less than a year ago for upper GI 

bleeding.  He states he has had some bleeding issues related to his alcohol 

use.  He quit in 1999. He has also has some liver disease and has clubbing in 

both of his hands.  He had a partial pneumonectomy from what he describes as an 

infection.  It was felt that patient may have a malignancy but his 

pneumonectomy did not reveal any cancer.  Patient does not take any medicine 

for gastritis.  Will go ahead and admit patient put him on a PPI.  Monitor his H

&H.


Allergies





No Known Allergies Allergy (Verified 06/10/18 04:25)


 





Home Medications: 








Albuterol Sulfate [Ventolin Hfa] 2 puff IH Q4H PRN 06/10/18 


Aspirin [Adult Aspirin] 81 mg PO DAILY 06/10/18 


Atorvastatin Calcium 20 mg PO BEDTIME 06/10/18 


Hydrocodone Bit/Acetaminophen [Hydrocodon-Acetaminoph 7.5-325] 1 each PO Q12H 

PRN 06/10/18 


Insulin Glargine,Hum.rec.anlog [Lantus] 20 unit SQ DAILY 06/10/18 


Levetiracetam [Keppra] 1,000 mg PO BID 06/10/18 


Lorazepam [Ativan] 0.25 mg PO BEDTIME 06/10/18 


Quetiapine Fumarate [Seroquel] 600 mg PO BEDTIME 06/10/18 


Tizanidine HCl 4 mg PO Q12H PRN 06/10/18 


Venlafaxine HCl [Effexor XR] 150 mg PO BEDTIME 06/10/18 


diazePAM [Diazepam] 10 mg PO TID 06/10/18 


traMADol HCL [Ultram*] 50 mg PO Q8H PRN 06/10/18 








- Past Medical/Surgical History


Has patient received pneumonia vaccine in the past: Yes


Diabetic: Yes


-: DM


-: LUNG MASS-benign


-: SEIZURES


-: APPY


-: CERVICAL FUSION


-: LUMBAR FUSION


-: R HIP IMPLANT


-: DORSAL COLUMN STIMULATOR IMPLANT


-: HEAD SX





- Family History


  ** Father


Family History: Reviewed- Non-Contributory





- Social History


Smoking Status: Current every day smoker


Alcohol use: No


Caffeine use: Yes


Place of Residence: Home





Review of Systems


10-point ROS is otherwise unremarkable





Physical Examination





- Vital Signs


Temperature: 98.2 F


Blood Pressure: 100/69


Pulse: 76


Respirations: 16


Pulse Ox (%): 97





- Physical Exam


General: Alert, In no apparent distress, Oriented x3


HEENT: Atraumatic, PERRLA, Mucous membr. moist/pink, EOMI, Sclerae nonicteric


Neck: Supple, 2+ carotid pulse no bruit, No LAD, Without JVD or thyroid 

abnormality


Respiratory: Clear to auscultation bilaterally, Normal air movement


Cardiovascular: Regular rate/rhythm, Normal S1 S2, No murmurs


Gastrointestinal: Normal bowel sounds, Soft and benign, Non-distended, No 

tenderness


Musculoskeletal: No clubbing, No swelling, No tenderness


Integumentary: No rashes


Neurological: Normal gait, Normal speech, Normal strength at 5/5 x4 extr, 

Normal tone, Sensation intact, Cranial nerves 3-12 intact, Normal affect


Lymphatics: No axilla or inguinal lymphadenopathy





- Studies


Laboratory Data (last 24 hrs)





06/09/18 22:10: PT 12.0, INR 1.02, APTT 28.5


06/09/18 22:10: Creatinine Cancelled


06/09/18 22:10: WBC 10.1, Hgb 17.8, Hct 51.2 H, Plt Count 140 L


06/09/18 22:10: Sodium 137, Potassium 3.8, BUN 29 H, Creatinine 1.60 H, Glucose 

178 H, Total Bilirubin 0.9,  H,  H, Alkaline Phosphatase 190 H, 

Lipase 31








Assessment & Plan





- Problems (Diagnosis)


(1) UGIB (upper gastrointestinal bleed)


Current Visit: Yes   Status: Acute   





(2) Type 2 diabetes mellitus


Current Visit: Yes   Status: Acute   





(3) History of seizures


Current Visit: Yes   Status: Acute   





- Plan





Plan:


1. Continue with IV hydration and PPI drip


2. Continue with IV antibiotics


3. Continue with pain control


4. NPO


5. GI consultation


6. Serial H&H, and we will monitor LFTs and lipase along with electrolytes.


7. GI and DVT prophylaxis





- Advance Directives


Does patient have a Living Will: No


Does patient have a Durable POA for Healthcare: No





- Code Status/Comfort Care


Code Status Assessed: Yes


Code Status: Full Code


Critical Care: No


Time Spent Managing PTS Care (In Minutes): 50

## 2018-06-10 NOTE — RAD REPORT
EXAM DESCRIPTION:  CT - Abdomen   Pelvis Wo Contrast - 6/10/2018 3:20 am

 

CLINICAL HISTORY:  Abdominal  pain with vomiting

 

COMPARISON:   None

 

TECHNIQUE:  Computed axial tomography of the abdomen and pelvis was obtained. IV contrast was not req
uested. A preliminary report was generated by [employer] and reviewed prior to this dictation


 

All CT scans are performed using dose optimization technique as appropriate and may include automated
 exposure control or mA/KV adjustment according to patient size.

 

FINDINGS:   The evaluation of solid organs, and vessels is limited secondary to the lack of contrast 
administration.

 

A cirrhotic liver is present. The spleen is mildly enlarged.

 

There is no evidence of diverticulitis. A a moderate amount of stool is present within the colon.

 

Postsurgical changes involve the spine. A neurostimulator device is in place.

 

IMPRESSION:  Cirrhosis

 

Moderate amount of stool within the colon

## 2018-06-11 VITALS — TEMPERATURE: 97.6 F

## 2018-06-11 VITALS — SYSTOLIC BLOOD PRESSURE: 131 MMHG | DIASTOLIC BLOOD PRESSURE: 73 MMHG

## 2018-06-11 LAB
ALBUMIN SERPL BCP-MCNC: 3 G/DL (ref 3.2–5.5)
ALP SERPL-CCNC: 119 IU/L (ref 42–121)
ALT SERPL W P-5'-P-CCNC: 78 IU/L (ref 10–60)
AST SERPL W P-5'-P-CCNC: 71 IU/L (ref 10–42)
BLD SMEAR INTERP: (no result)
BUN BLD-MCNC: 19 MG/DL (ref 6–20)
GLUCOSE SERPLBLD-MCNC: 67 MG/DL (ref 65–120)
HCT VFR BLD CALC: 39.5 % (ref 39.6–49)
INR BLD: 1.08
LYMPHOCYTES # SPEC AUTO: 1.4 K/UL (ref 0.7–4.9)
MAGNESIUM SERPL-MCNC: 1.9 MG/DL (ref 1.8–2.5)
MCH RBC QN AUTO: 32.1 PG (ref 27–35)
MCV RBC: 94.4 FL (ref 80–100)
MORPHOLOGY BLD-IMP: (no result)
PMV BLD: 9.4 FL (ref 7.6–11.3)
POTASSIUM SERPL-SCNC: 3.7 MEQ/L (ref 3.6–5)
RBC # BLD: 4.18 M/UL (ref 4.33–5.43)

## 2018-06-11 RX ADMIN — MORPHINE SULFATE PRN MG: 4 INJECTION, SOLUTION INTRAMUSCULAR; INTRAVENOUS at 14:22

## 2018-06-11 RX ADMIN — ONDANSETRON PRN MG: 2 INJECTION INTRAMUSCULAR; INTRAVENOUS at 14:25

## 2018-06-11 RX ADMIN — DIAZEPAM SCH MG: 5 TABLET ORAL at 10:12

## 2018-06-11 RX ADMIN — SODIUM CHLORIDE SCH: 0.9 INJECTION, SOLUTION INTRAVENOUS at 04:00

## 2018-06-11 RX ADMIN — MORPHINE SULFATE PRN MG: 4 INJECTION, SOLUTION INTRAMUSCULAR; INTRAVENOUS at 10:14

## 2018-06-11 RX ADMIN — MORPHINE SULFATE PRN MG: 4 INJECTION, SOLUTION INTRAMUSCULAR; INTRAVENOUS at 01:48

## 2018-06-11 RX ADMIN — ONDANSETRON PRN MG: 2 INJECTION INTRAMUSCULAR; INTRAVENOUS at 10:14

## 2018-06-11 RX ADMIN — MORPHINE SULFATE PRN MG: 4 INJECTION, SOLUTION INTRAMUSCULAR; INTRAVENOUS at 05:54

## 2018-06-11 RX ADMIN — DIAZEPAM SCH MG: 5 TABLET ORAL at 14:25

## 2018-06-11 RX ADMIN — SODIUM CHLORIDE SCH: 0.9 INJECTION, SOLUTION INTRAVENOUS at 09:00

## 2018-06-11 RX ADMIN — Medication SCH ML: at 10:13

## 2018-06-12 ENCOUNTER — HOSPITAL ENCOUNTER (OUTPATIENT)
Dept: HOSPITAL 97 - ER | Age: 53
Setting detail: OBSERVATION
LOS: 1 days | Discharge: HOME | End: 2018-06-13
Attending: FAMILY MEDICINE | Admitting: FAMILY MEDICINE
Payer: COMMERCIAL

## 2018-06-12 VITALS — BODY MASS INDEX: 21.1 KG/M2

## 2018-06-12 DIAGNOSIS — R53.1: ICD-10-CM

## 2018-06-12 DIAGNOSIS — E87.6: ICD-10-CM

## 2018-06-12 DIAGNOSIS — M54.2: ICD-10-CM

## 2018-06-12 DIAGNOSIS — E83.42: ICD-10-CM

## 2018-06-12 DIAGNOSIS — E11.65: ICD-10-CM

## 2018-06-12 DIAGNOSIS — M54.9: ICD-10-CM

## 2018-06-12 DIAGNOSIS — K92.0: Primary | ICD-10-CM

## 2018-06-12 DIAGNOSIS — G40.909: ICD-10-CM

## 2018-06-12 LAB
ALBUMIN SERPL BCP-MCNC: 3.9 G/DL (ref 3.2–5.5)
ALP SERPL-CCNC: 129 IU/L (ref 42–121)
ALT SERPL W P-5'-P-CCNC: 59 IU/L (ref 10–60)
AST SERPL W P-5'-P-CCNC: 45 IU/L (ref 10–42)
BUN BLD-MCNC: 12 MG/DL (ref 6–20)
CKMB CREATINE KINASE MB: 1.5 NG/ML (ref 0.3–4)
GLUCOSE SERPLBLD-MCNC: 122 MG/DL (ref 65–120)
HCT VFR BLD CALC: 41.8 % (ref 39.6–49)
HCT VFR BLD CALC: 42.8 % (ref 39.6–49)
INR BLD: 0.98
LIPASE SERPL-CCNC: 38 U/L (ref 22–51)
LYMPHOCYTES # SPEC AUTO: 0.6 K/UL (ref 0.7–4.9)
MAGNESIUM SERPL-MCNC: 1.6 MG/DL (ref 1.8–2.5)
MCH RBC QN AUTO: 32.1 PG (ref 27–35)
MCV RBC: 93.2 FL (ref 80–100)
MORPHOLOGY BLD-IMP: (no result)
PMV BLD: 8.9 FL (ref 7.6–11.3)
POTASSIUM SERPL-SCNC: 3.1 MEQ/L (ref 3.6–5)
RBC # BLD: 4.48 M/UL (ref 4.33–5.43)

## 2018-06-12 PROCEDURE — 87088 URINE BACTERIA CULTURE: CPT

## 2018-06-12 PROCEDURE — 94760 N-INVAS EAR/PLS OXIMETRY 1: CPT

## 2018-06-12 PROCEDURE — 85014 HEMATOCRIT: CPT

## 2018-06-12 PROCEDURE — 36415 COLL VENOUS BLD VENIPUNCTURE: CPT

## 2018-06-12 PROCEDURE — 81003 URINALYSIS AUTO W/O SCOPE: CPT

## 2018-06-12 PROCEDURE — 83880 ASSAY OF NATRIURETIC PEPTIDE: CPT

## 2018-06-12 PROCEDURE — 86850 RBC ANTIBODY SCREEN: CPT

## 2018-06-12 PROCEDURE — 82553 CREATINE MB FRACTION: CPT

## 2018-06-12 PROCEDURE — 83735 ASSAY OF MAGNESIUM: CPT

## 2018-06-12 PROCEDURE — 86901 BLOOD TYPING SEROLOGIC RH(D): CPT

## 2018-06-12 PROCEDURE — 82962 GLUCOSE BLOOD TEST: CPT

## 2018-06-12 PROCEDURE — 85610 PROTHROMBIN TIME: CPT

## 2018-06-12 PROCEDURE — 85730 THROMBOPLASTIN TIME PARTIAL: CPT

## 2018-06-12 PROCEDURE — 87086 URINE CULTURE/COLONY COUNT: CPT

## 2018-06-12 PROCEDURE — 80048 BASIC METABOLIC PNL TOTAL CA: CPT

## 2018-06-12 PROCEDURE — 99285 EMERGENCY DEPT VISIT HI MDM: CPT

## 2018-06-12 PROCEDURE — 85018 HEMOGLOBIN: CPT

## 2018-06-12 PROCEDURE — 93005 ELECTROCARDIOGRAM TRACING: CPT

## 2018-06-12 PROCEDURE — 80076 HEPATIC FUNCTION PANEL: CPT

## 2018-06-12 PROCEDURE — 86900 BLOOD TYPING SEROLOGIC ABO: CPT

## 2018-06-12 PROCEDURE — 82550 ASSAY OF CK (CPK): CPT

## 2018-06-12 PROCEDURE — 83690 ASSAY OF LIPASE: CPT

## 2018-06-12 PROCEDURE — 84484 ASSAY OF TROPONIN QUANT: CPT

## 2018-06-12 PROCEDURE — 71045 X-RAY EXAM CHEST 1 VIEW: CPT

## 2018-06-12 PROCEDURE — 85025 COMPLETE CBC W/AUTO DIFF WBC: CPT

## 2018-06-12 RX ADMIN — DEXTROSE AND SODIUM CHLORIDE SCH MLS: 5; .45 INJECTION, SOLUTION INTRAVENOUS at 21:53

## 2018-06-12 RX ADMIN — SODIUM CHLORIDE SCH: 0.9 INJECTION, SOLUTION INTRAVENOUS at 12:00

## 2018-06-12 RX ADMIN — MEPERIDINE HYDROCHLORIDE PRN MG: 25 INJECTION, SOLUTION INTRAMUSCULAR; INTRAVENOUS; SUBCUTANEOUS at 16:39

## 2018-06-12 RX ADMIN — HUMAN INSULIN SCH: 100 INJECTION, SOLUTION SUBCUTANEOUS at 21:00

## 2018-06-12 RX ADMIN — MEPERIDINE HYDROCHLORIDE PRN MG: 25 INJECTION, SOLUTION INTRAMUSCULAR; INTRAVENOUS; SUBCUTANEOUS at 20:28

## 2018-06-12 RX ADMIN — SODIUM CHLORIDE SCH: 0.9 INJECTION, SOLUTION INTRAVENOUS at 21:46

## 2018-06-12 RX ADMIN — DEXTROSE AND SODIUM CHLORIDE SCH MLS: 5; .45 INJECTION, SOLUTION INTRAVENOUS at 13:00

## 2018-06-12 RX ADMIN — HUMAN INSULIN SCH: 100 INJECTION, SOLUTION SUBCUTANEOUS at 16:30

## 2018-06-12 RX ADMIN — ONDANSETRON PRN MG: 2 INJECTION INTRAMUSCULAR; INTRAVENOUS at 20:28

## 2018-06-12 RX ADMIN — Medication SCH: at 20:24

## 2018-06-12 NOTE — EDPHYS
Physician Documentation                                                                           

 [facility]                                                                

Name: Cleveland Ricketts Jr                                                                            

Age: 53 yrs                                                                                       

Sex: Male                                                                                         

: 1965                                                                                   

MRN: [id number]                                                                                   

Arrival Date: 2018                                                                          

Time: 09:44                                                                                       

Account#: [id number]                                                                            

Bed 4                                                                                             

Private MD: None, None                                                                            

ED Physician Jas Tate                                                                      

HPI:                                                                                              

                                                                                             

10:21 This 53 yrs old  Male presents to ER via Wheelchair with complaints of GI      rafaela 

      Bleeding.                                                                                   

10:21 The patient presents to the emergency department vomiting blood, with rectal bleeding,  rafaela 

      a small amount. Onset: The symptoms/episode began/occurred 2 day(s) ago. Abdominal          

      pain: none is appreciated. Modifying factors: The symptoms are alleviated by nothing.       

      Associated signs and symptoms: The patient has no apparent associated signs or              

      symptoms. Severity of symptoms: At their worst the symptoms were mild in the emergency      

      department the symptoms are unchanged. The patient has experienced similar episodes in      

      the past, several times.                                                                    

                                                                                                  

Historical:                                                                                       

- Allergies:                                                                                      

10:01 No Known Allergies;                                                                     ch  

- Home Meds:                                                                                      

10:01 Effexor XR Oral [Active]; Keppra Oral [Active];                                         ch  

- PMHx:                                                                                           

09:47 Seizures; lung cancer;                                                                  sv  

- PSHx:                                                                                           

09:47 cervical fusion; lumbar fusion; dorsal column stimulator implant; Appendectomy; right   sv  

      lobectomy;                                                                                  

                                                                                                  

- Immunization history:: Adult Immunizations up to date.                                          

- Social history:: Smoking status: Patient uses tobacco products, smokes one pack                 

  cigarettes per day.                                                                             

- Ebola Screening: : Patient negative for fever greater than or equal to 101.5 degrees            

  Fahrenheit, and additional compatible Ebola Virus Disease symptoms Patient denies               

  exposure to infectious person Patient denies travel to an Ebola-affected area in the            

  21 days before illness onset No symptoms or risks identified at this time.                      

                                                                                                  

                                                                                                  

ROS:                                                                                              

10:23 Constitutional: Negative for fever, chills, and weight loss, Eyes: Negative for injury, rafaela 

      pain, redness, and discharge, ENT: Negative for injury, pain, and discharge, Neck:          

      Negative for injury, pain, and swelling, Cardiovascular: Negative for chest pain,           

      palpitations, and edema, Respiratory: Negative for shortness of breath, cough,              

      wheezing, and pleuritic chest pain, Back: Negative for injury and pain, : Negative        

      for injury, bleeding, discharge, and swelling, MS/Extremity: Negative for injury and        

      deformity, Skin: Negative for injury, rash, and discoloration, Neuro: Negative for          

      headache, weakness, numbness, tingling, and seizure, Psych: Negative for depression,        

      anxiety, suicide ideation, homicidal ideation, and hallucinations, Allergy/Immunology:      

      Negative for hives, rash, and allergies, Endocrine: Negative for neck swelling,             

      polydipsia, polyuria, polyphagia, and marked weight changes, Hematologic/Lymphatic:         

      Negative for swollen nodes, abnormal bleeding, and unusual bruising.                        

10:23 Abdomen/GI: Positive for abdominal pain, nausea and vomiting, rectal bleeding.              

                                                                                                  

Exam:                                                                                             

10:23 Constitutional:  This is a well developed, well nourished patient who is awake, alert,  rafaela 

      and in no acute distress. Head/Face:  Normocephalic, atraumatic. Eyes:  Pupils equal        

      round and reactive to light, extra-ocular motions intact.  Lids and lashes normal.          

      Conjunctiva and sclera are non-icteric and not injected.  Cornea within normal limits.      

      Periorbital areas with no swelling, redness, or edema. ENT:  Nares patent. No nasal         

      discharge, no septal abnormalities noted.  Tympanic membranes are normal and external       

      auditory canals are clear.  Oropharynx with no redness, swelling, or masses, exudates,      

      or evidence of obstruction, uvula midline.  Mucous membranes moist. Neck:  Trachea          

      midline, no thyromegaly or masses palpated, and no cervical lymphadenopathy.  Supple,       

      full range of motion without nuchal rigidity, or vertebral point tenderness.  No            

      Meningismus. Chest/axilla:  Normal chest wall appearance and motion.  Nontender with no     

      deformity.  No lesions are appreciated. Cardiovascular:  Regular rate and rhythm with a     

      normal S1 and S2.  No gallops, murmurs, or rubs.  Normal PMI, no JVD.  No pulse             

      deficits. Respiratory:  Lungs have equal breath sounds bilaterally, clear to                

      auscultation and percussion.  No rales, rhonchi or wheezes noted.  No increased work of     

      breathing, no retractions or nasal flaring. Back:  No spinal tenderness.  No                

      costovertebral tenderness.  Full range of motion. Male :  Normal genitalia with no        

      discharge or lesions. Skin:  Warm, dry with normal turgor.  Normal color with no            

      rashes, no lesions, and no evidence of cellulitis. MS/ Extremity:  Pulses equal, no         

      cyanosis.  Neurovascular intact.  Full, normal range of motion. Neuro:  Awake and           

      alert, GCS 15, oriented to person, place, time, and situation.  Cranial nerves II-XII       

      grossly intact.  Motor strength 5/5 in all extremities.  Sensory grossly intact.            

      Cerebellar exam normal.  Normal gait. Psych:  Awake, alert, with orientation to person,     

      place and time.  Behavior, mood, and affect are within normal limits.                       

10:23 Abdomen/GI: Inspection: abdomen appears normal, Bowel sounds: active, Palpation:            

      moderate abdominal tenderness, in all quadrants, Rectal exam: is unremarkable,              

      Prostate: normal, rectal tone normal, Stool: normal, brown, guaiac negative,                

      hemorrhoid(s), are not appreciated, mass, is not appreciated, swelling, is not              

      appreciated.                                                                                

                                                                                                  

Vital Signs:                                                                                      

09:47  / 107; Pulse 66; Resp 18; Temp 98.1; Pulse Ox 100% ; Weight 72.57 kg; Height 6   sv  

      ft. 1 in. (185.42 cm); Pain 10/10;                                                          

10:22  / 92; Pulse 78; Resp 16; Pulse Ox 99% on R/A; Pain 9/10;                         ch  

11:31  / 111; Pulse 66; Resp 12; Pulse Ox 96% on R/A; Pain 9/10;                        ch  

12:55  / 97; Pulse 61; Resp 15; Temp 97.3; Pulse Ox 99% on R/A; Pain 7/10;              ch  

14:25  / 85; Pulse 63; Resp 15; Temp 97.9; Pulse Ox 99% on R/A; Pain 7/10;              ch  

09:47 Body Mass Index 21.11 (72.57 kg, 185.42 cm)                                             sv  

11:31 ERP NOTIFIED OF PT BP. PT MEDICATED FOR PAIN AND WILL RE CHECK BP.                        

                                                                                                  

Procedures:                                                                                       

11:15 Peripheral line: by aseptic technique a peripheral line was placed in the right         [facility] 

      external jugular vein, double lumen.                                                        

                                                                                                  

MDM:                                                                                              

10:04 Patient medically screened.                                                             [facility] 

10:25 Data reviewed: vital signs, nurses notes, lab test result(s), EKG, radiologic studies,  [facility] 

      CT scan, plain films.                                                                       

                                                                                                  

                                                                                             

10:09 Order name: Basic Metabolic Panel; Complete Time: 13:04                                 [facility] 

                                                                                             

10:09 Order name: BNP; Complete Time: 13:04                                                   [facility] 

                                                                                             

10:09 Order name: CBC with Diff; Complete Time: 13:04                                         [facility] 

                                                                                             

10:09 Order name: Ckmb; Complete Time: 13:04                                                  [facility] 

                                                                                             

10:09 Order name: CPK; Complete Time: 13:04                                                   [facility] 

                                                                                             

10:09 Order name: LFT's; Complete Time: 13:04                                                 [facility] 

                                                                                             

10:09 Order name: Magnesium; Complete Time: 13:04                                             [facility] 

                                                                                             

10:09 Order name: PT-INR; Complete Time: 13:04                                                [facility] 

                                                                                             

10:09 Order name: Ptt, Activated; Complete Time: 13:04                                        [facility] 

                                                                                             

10:09 Order name: Troponin (emerg Dept Use Only); Complete Time: 13:04                        [facility] 

                                                                                             

10:09 Order name: Lipase; Complete Time: 13:04                                                [facility] 

                                                                                             

10:09 Order name: Type And Screen; Complete Time: 13:04                                       [facility] 

                                                                                             

10:09 Order name: Urine Culture                                                               [facility] 

                                                                                             

11:52 Order name: Urine Dipstick--Ancillary (enter results)                                     

                                                                                             

10:09 Order name: XRAY Chest (1 view); Complete Time: 13:04                                   [facility] 

                                                                                             

11:58 Order name: Manual Differential; Complete Time: 13:04                                   [facility]

                                                                                             

12:25 Order name: Urine Dipstick-Ancillary; Complete Time: 13:04                              [facility]

                                                                                             

13:18 Order name: ABO/RH no charge                                                            [facility]

                                                                                             

10:09 Order name: EKG; Complete Time: 10:11                                                   [facility] 

                                                                                             

10:09 Order name: Cardiac monitoring; Complete Time: 11:17                                    [facility] 

                                                                                             

10:09 Order name: EKG - Nurse/Tech; Complete Time: 11:                                      [facility] 

                                                                                             

10:09 Order name: IV Saline Lock; Complete Time: 11:                                        [facility] 

                                                                                             

10:09 Order name: Labs collected and sent; Complete Time: 11:                               [facility] 

                                                                                             

10:09 Order name: O2 Per Protocol; Complete Time: 11:                                       [facility] 

                                                                                             

10:09 Order name: O2 Sat Monitoring; Complete Time: 11:                                     [facility] 

                                                                                             

10:09 Order name: Urine Dipstick-Ancillary (obtain specimen); Complete Time: 11:            [facility] 

                                                                                             

10:09 Order name: IV Saline Lock - Large Bore; Complete Time: 11:                           [facility] 

                                                                                             

11:26 Order name: CONS Physician Consult                                                      [facility]

                                                                                                  

Administered Medications:                                                                         

10:30 Drug: NS 0.9% 1000 ml Route: IV; Rate: 1 bolus; Site: left forearm;                     ch  

11:33 Follow up: IV Status: Completed infusion; IV Intake: 1000ml                             ch  

10:30 Drug: ProTONIX 40 mg Route: IVP; Site: left forearm;                                    ch  

11:32 Follow up: Response: No adverse reaction; Marked relief of symptoms                     ch  

10:35 Drug: Zofran 4 mg Route: IVP; Site: left forearm;                                       ch  

11:32 Follow up: Response: No adverse reaction                                                ch  

10:40 Drug: ProTONIX 40 mg Route: IVP; Site: left forearm;                                    ch  

11:32 Follow up: Response: No adverse reaction                                                ch  

11:16 Drug: morphine 4 mg Route: IVP; Site: left forearm;                                     ch  

11:32 Follow up: Response: No adverse reaction; Marked relief of symptoms                     ch  

11:18 Drug: morphine 4 mg Route: IVP; Site: left forearm;                                     ch  

11:32 Follow up: Response: No adverse reaction; Pain is decreased                             ch  

12:30 Drug: ProTONIX 8 mg/hr Route: IV; Rate: 25 ml/hr; Site: left forearm;                   ch  

12:57 Follow up: IV Status: Infusion continued upon admission; IV Intake: 125ml               ch  

12:30 Drug: Trandate 20 mg Route: IVP; Site: left forearm;                                    ch  

12:56 Follow up: Response: No adverse reaction; Marked relief of symptoms                     ch  

13:53 Drug: Magnesium Sulfate 1 grams Route: IVPB; Infused Over: 1 hrs; Site: left forearm;   ch  

14:30 Follow up: IV Status: Completed infusion; IV Intake: 100ml                              ch  

13:53 Drug: Potassium Chloride 20 mEq Route: IV; Rate: per protocol; Site: right jugular;     ch  

13:54 Follow up: IV Status: Infusion continued upon admission                                 ch  

13:53 Drug: NS 0.9% with KCl 20 mEq/L 1000 ml Route: IV; Rate: 125 ml/hr; Site: right jugular;ch  

13:54 Follow up: IV Status: Infusion continued upon admission                                 ch  

13:53 Drug: morphine 4 mg Route: IVP; Site: left forearm;                                     ch  

13:54 Follow up: Response: No adverse reaction; Pain is decreased                             ch  

                                                                                                  

                                                                                                  

Disposition:                                                                                      

18 11:18 Hospitalization ordered by Joni Morfin for Inpatient Admission. Preliminary      

  diagnosis are Abdominal tenderness, Gastrointestinal hemorrhage, unspecified,                   

  Essential (primary) hypertension, Hypokalemia, Hypomagnesemia.                                  

- Bed requested for Telemetry/MedSurg (Inpatient).                                                

- Status is Inpatient Admission.                                                                

- Condition is Stable.                                                                            

- Problem is new.                                                                                 

- Symptoms have improved.                                                                         

UTI on Admission? No                                                                              

                                                                                                  

                                                                                                  

                                                                                                  

Signatures:                                                                                       

Dispatcher [employer]                           EDMS                                                 

Melonie Aragon RN RN ch Verde, Stephanie, RN RN sv Woody, Diana, RN RN dw Anderson, Corey, MD MD   [facility]                                                  

                                                                                                  

Corrections: (The following items were deleted from the chart)                                    

11 11:18 Hospitalization Ordered by Joni Morfin MD for Inpatient Admission. Preliminary   [facility] 

      diagnosis is Abdominal tenderness; Gastrointestinal hemorrhage, unspecified. Bed            

      requested for Telemetry/MedSurg (Inpatient). Status is Inpatient Admission. Condition       

      is Stable. Problem is new. Symptoms have improved. UTI on Admission? No. rafaela                

13:06 11:27 2018 11:18 Hospitalization Ordered by Joni Morfin MD for Inpatient         rafaela 

      Admission. Preliminary diagnosis is Abdominal tenderness; Gastrointestinal hemorrhage,      

      unspecified; Essential (primary) hypertension. Bed requested for Telemetry/MedSurg          

      (Inpatient). Status is Inpatient Admission. Condition is Stable. Problem is new.            

      Symptoms have improved. UTI on Admission? No. rafaela                                           

13:13 13:06 2018 11:18 Hospitalization Ordered by Joni Morfin MD for Inpatient         dw  

      Admission. Preliminary diagnosis is Abdominal tenderness; Gastrointestinal hemorrhage,      

      unspecified; Essential (primary) hypertension; Hypokalemia; Hypomagnesemia. Bed             

      requested for Telemetry/MedSurg (Inpatient). Status is Inpatient Admission. Condition       

      is Stable. Problem is new. Symptoms have improved. UTI on Admission? No. rafaela                

15:14 13:13 2018 11:18 Hospitalization Ordered by Joni Morfin MD for Inpatient         ch  

      Admission. Preliminary diagnosis is Abdominal tenderness; Gastrointestinal hemorrhage,      

      unspecified; Essential (primary) hypertension; Hypokalemia; Hypomagnesemia. Bed             

      requested for Telemetry/MedSurg (Inpatient). Status is Inpatient Admission. Condition       

      is Stable. Problem is new. Symptoms have improved. UTI on Admission? No. dw                 

                                                                                                  

**************************************************************************************************

## 2018-06-12 NOTE — ER
Nurse's Notes                                                                                     

 [facility]                                                                

Name: Cleveland Ricketts Jr                                                                            

Age: 53 yrs                                                                                       

Sex: Male                                                                                         

: 1965                                                                                   

MRN: [id number]                                                                                   

Arrival Date: 2018                                                                          

Time: 09:44                                                                                       

Account#: [id number]                                                                            

Bed 4                                                                                             

Private MD: None, None                                                                            

Diagnosis: Abdominal tenderness;Gastrointestinal hemorrhage, unspecified;Essential (primary)      

  hypertension;Hypokalemia;Hypomagnesemia                                                         

                                                                                                  

Presentation:                                                                                     

                                                                                             

09:45 Presenting complaint: Patient states: RLQ, sharp pain. Rectal bleeding x 1 day.         sv  

      Transition of care: patient was not received from another setting of care. Onset of         

      symptoms was 2018. Risk Assessment: Do you want to hurt yourself or someone        

      else? Patient reports no desire to harm self or others. Care prior to arrival: None.        

09:45 Method Of Arrival: Wheelchair                                                           sv  

09:45 Acuity: JENNIFER 3                                                                           sv  

                                                                                                  

Historical:                                                                                       

- Allergies:                                                                                      

10:01 No Known Allergies;                                                                     ch  

- Home Meds:                                                                                      

10:01 Effexor XR Oral [Active]; Keppra Oral [Active];                                         ch  

- PMHx:                                                                                           

09:47 Seizures; lung cancer;                                                                  sv  

- PSHx:                                                                                           

09:47 cervical fusion; lumbar fusion; dorsal column stimulator implant; Appendectomy; right   sv  

      lobectomy;                                                                                  

                                                                                                  

- Immunization history:: Adult Immunizations up to date.                                          

- Social history:: Smoking status: Patient uses tobacco products, smokes one pack                 

  cigarettes per day.                                                                             

- Ebola Screening: : Patient negative for fever greater than or equal to 101.5 degrees            

  Fahrenheit, and additional compatible Ebola Virus Disease symptoms Patient denies               

  exposure to infectious person Patient denies travel to an Ebola-affected area in the            

  21 days before illness onset No symptoms or risks identified at this time.                      

                                                                                                  

                                                                                                  

Screening:                                                                                        

10:22 Abuse screen: Denies threats or abuse. Denies injuries from another. Nutritional        ch  

      screening: No deficits noted. Tuberculosis screening: No symptoms or risk factors           

      identified. Fall Risk None identified.                                                      

                                                                                                  

Assessment:                                                                                       

10:22 Pain: Complains of pain in abdomen Pain currently is 8 out of 10 on a pain scale. at      

      worst was 10 out of 10 on a pain scale. Quality of pain is described as crampy. Neuro:      

      No deficits noted. Respiratory: Airway is patent Respiratory effort is even, unlabored,     

      Breath sounds are clear bilaterally. GI: Abdomen is flat, non-distended, Bowel sounds       

      present X 4 quads. Abd is soft and non tender X 4 quads. Derm: Skin is pale.                

12:55 Reassessment: Patient appears in no apparent distress at this time. Patient and/or      ch  

      family updated on plan of care and expected duration. Pain level reassessed. Patient is     

      alert, oriented x 3, equal unlabored respirations, skin warm/dry/pink. Patient states       

      feeling better. Patient states symptoms have improved. General: Appears in no apparent      

      distress. comfortable, Behavior is calm, cooperative, appropriate for age.                  

14:25 Reassessment: Patient appears in no apparent distress at this time. Patient and/or      ch  

      family updated on plan of care and expected duration. Pain level reassessed. Patient is     

      alert, oriented x 3, equal unlabored respirations, skin warm/dry/pink. Patient states       

      feeling better. Patient states symptoms have improved.                                      

                                                                                                  

Vital Signs:                                                                                      

09:47  / 107; Pulse 66; Resp 18; Temp 98.1; Pulse Ox 100% ; Weight 72.57 kg; Height 6   sv  

      ft. 1 in. (185.42 cm); Pain 10/10;                                                          

10:22  / 92; Pulse 78; Resp 16; Pulse Ox 99% on R/A; Pain 9/10;                         ch  

11:31  / 111; Pulse 66; Resp 12; Pulse Ox 96% on R/A; Pain 9/10;                        ch  

12:55  / 97; Pulse 61; Resp 15; Temp 97.3; Pulse Ox 99% on R/A; Pain 7/10;              ch  

14:25  / 85; Pulse 63; Resp 15; Temp 97.9; Pulse Ox 99% on R/A; Pain 7/10;              ch  

09:47 Body Mass Index 21.11 (72.57 kg, 185.42 cm)                                             sv  

11:31 ERP NOTIFIED OF PT BP. PT MEDICATED FOR PAIN AND WILL RE CHECK BP.                        

                                                                                                  

ED Course:                                                                                        

09:44 Patient arrived in ED.                                                                  sb2 

09:44 None, None is Private Physician.                                                        sb2 

09:46 Triage completed.                                                                       sv  

09:47 Arm band placed on right wrist.                                                         sv  

09:53 Melonie Aragon, RN is Primary Nurse.                                                ch  

10:04 Jas Monteiro MD is Attending Physician.                                             rafaela 

10:08 Missed attempt(s): 18 gauge in right antecubital area. Bleeding controlled, band aid    em1 

      applied, catheter tip intact.                                                               

10:22 No apparent distress.                                                                   ch  

10:22 Patient has correct armband on for positive identification. Placed in gown. Bed in low  ch  

      position. Call light in reach. Side rails up X2. Adult w/ patient. Cardiac monitor on.      

      Pulse ox on. NIBP on.                                                                       

10:30 Inserted saline lock: 22 gauge in left forearm, using aseptic technique.                ch  

10:41 EKG done, by EKG tech. reviewed by Jas Monteiro MD.                                   at1 

10:58 XRAY Chest (1 view) In Process Unspecified.                                             EDMS

11:17 Joni Morfin MD is Hospitalizing Provider.                                            rafaela 

11:31 DR MONTEIRO PLACES EJ TO PT R NECK. PT TOLERATED WELL. TIME OUT PERFORMED, PT VERB      ch  

      UNDERSTANDING OF RISKS, AREA CLEANED WITH CHLORHEXIDINE PRIOR TO INSERTION. GOOD BLOOD      

      RETURN, FLUSHES EASILY.                                                                     

12:55 Warm blanket given.                                                                     ch  

13:10 Urine Dipstick--Ancillary (enter results) Sent.                                         ch  

14:25 Patient admitted, IV remains in place.                                                  ch  

                                                                                                  

Administered Medications:                                                                         

10:30 Drug: NS 0.9% 1000 ml Route: IV; Rate: 1 bolus; Site: left forearm;                     ch  

11:33 Follow up: IV Status: Completed infusion; IV Intake: 1000ml                             ch  

10:30 Drug: ProTONIX 40 mg Route: IVP; Site: left forearm;                                    ch  

11:32 Follow up: Response: No adverse reaction; Marked relief of symptoms                     ch  

10:35 Drug: Zofran 4 mg Route: IVP; Site: left forearm;                                       ch  

11:32 Follow up: Response: No adverse reaction                                                ch  

10:40 Drug: ProTONIX 40 mg Route: IVP; Site: left forearm;                                    ch  

11:32 Follow up: Response: No adverse reaction                                                ch  

11:16 Drug: morphine 4 mg Route: IVP; Site: left forearm;                                     ch  

11:32 Follow up: Response: No adverse reaction; Marked relief of symptoms                     ch  

11:18 Drug: morphine 4 mg Route: IVP; Site: left forearm;                                     ch  

11:32 Follow up: Response: No adverse reaction; Pain is decreased                             ch  

12:30 Drug: ProTONIX 8 mg/hr Route: IV; Rate: 25 ml/hr; Site: left forearm;                   ch  

12:57 Follow up: IV Status: Infusion continued upon admission; IV Intake: 125ml               ch  

12:30 Drug: Trandate 20 mg Route: IVP; Site: left forearm;                                    ch  

12:56 Follow up: Response: No adverse reaction; Marked relief of symptoms                     ch  

13:53 Drug: Magnesium Sulfate 1 grams Route: IVPB; Infused Over: 1 hrs; Site: left forearm;   ch  

14:30 Follow up: IV Status: Completed infusion; IV Intake: 100ml                              ch  

13:53 Drug: Potassium Chloride 20 mEq Route: IV; Rate: per protocol; Site: right jugular;     ch  

13:54 Follow up: IV Status: Infusion continued upon admission                                 ch  

13:53 Drug: NS 0.9% with KCl 20 mEq/L 1000 ml Route: IV; Rate: 125 ml/hr; Site: right jugular;ch  

13:54 Follow up: IV Status: Infusion continued upon admission                                 ch  

13:53 Drug: morphine 4 mg Route: IVP; Site: left forearm;                                     ch  

13:54 Follow up: Response: No adverse reaction; Pain is decreased                             ch  

                                                                                                  

                                                                                                  

Intake:                                                                                           

11:33 IV: 1000ml; Total: 1000ml.                                                              ch  

12:57 IV: 125ml; Total: 1125ml.                                                               ch  

14:30 IV: 100ml; Total: 1225ml.                                                                 

                                                                                                  

Outcome:                                                                                          

11:18 Decision to Hospitalize by Provider.                                                    [facility] 

14:33 Admitted to Med/surg accompanied by tech, via wheelchair, with chart, Report called to    

      JOSE                                                                                     

14:33 Condition: stable                                                                           

14:33 Instructed on the need for admit.                                                           

15:14 Patient left the ED.                                                                      

                                                                                                  

Signatures:                                                                                       

Dispatcher [employer]                           EDMelonie Barnett RN RN ch Verde, Stephanie, RN RN sv Anderson, Corey, MD MD cha Martinez, Eric                               em1                                                  

Shaista brothers, EKG Tech              EKG Mikey1                                                  

Magalie Gaytan                               sb2                                                  

                                                                                                  

Corrections: (The following items were deleted from the chart)                                    

11:31 10:22 No provider procedures requiring assistance completed.                          ch  

                                                                                                  

**************************************************************************************************

## 2018-06-12 NOTE — RAD REPORT
EXAM DESCRIPTION:  RAD - Chest Single View - 6/12/2018 10:59 am

 

CLINICAL HISTORY:  Chest pain.

 

COMPARISON:  None.

 

FINDINGS:  Portable technique limits examination quality.

 

The lungs are emphysematous but grossly clear. The heart is normal in size. No displaced fractures.

 

IMPRESSION:  COPD.

## 2018-06-12 NOTE — XMS REPORT
Clinical Summary

 Created on:2018



Patient:Cleveland Barkley Jr

Sex:Male

:1965

External Reference #:[id number]





Demographics







 Address  [address]



   



   [place] [address]

 

 Home Phone  [phone]

 

 Home Phone  [phone]

 

 Email Address  [email or web address]

 

 Preferred Language  English

 

 Marital Status  

 

 Mu-ism Affiliation  Unknown

 

 Race  White

 

 Ethnic Group  Not  or 









Author







 Organization  [facility]

 

 Address  [address] [place] [address]









Support







 Name  Relationship  Address  Phone

 

 Sabrina Greer  Unavailable  Unavailable  [phone]









Care Team Providers







 Name  Role  Phone

 

 Asked, No Pcp  Primary Care Provider  Unavailable









Allergies

No Known Allergies



Current Medications







 Prescription  Sig.  Disp.  Refills  Start  End  Status



         Date  Date  

 

 metFORMIN XR  Take 1,000 mg      20    Active



 (GLUCOPHAGE-XR) 500  by mouth daily.      17    



 mg 24 hr tablet            

 

 insulin GLARGINE  Inject 20 Units          Active



 (LANTUS) 100  under the skin          



 unit/mL injection  nightly.          



 (vial)            

 

 levETIRAcetam  Take 1,000 mg          Active



 (KEPPRA) 1000 MG  by mouth 2          



 tablet  (two) times a          



   day.          

 

 venlafaxine XR  Take 150 mg by          Active



 (EFFEXOR-XR) 150 MG  mouth daily.          



 24 hr capsule            

 

 diazePAM (VALIUM)  Take 10 mg by      20    Active



 10 MG tablet  mouth 3 (three)      18    



   times a day.          

 

 QUEtiapine  Take 600 mg by      20    Active



 (SEROquel) 300 MG  mouth nightly.      18    



 tablet            

 

 traMADol (ULTRAM)  Take 50 mg by          Active



 50 mg tablet  mouth every 6          



   (six) hours as          



   needed for          



   moderate pain.          

 

 gabapentin  Take 800 mg by    2  20  Discontinued



 (NEURONTIN) 800 mg  mouth 4 (four)      17  018  



 tablet  times a day.          

 

 VENTOLIN HFA 90  Inhale 2 puffs    0  20  Discontinued



 mcg/actuation  every 6 (six)      17  017  



 inhaler  hours as needed          



   for wheezing or          



   shortness of          



   breath.          

 

 meloxicam (MOBIC)  Take 7.5 mg by    2  04/22/20  07/15/2  Discontinued



 7.5 mg tablet  mouth daily.      17  017  

 

 QUEtiapine  Take by mouth    2  20  Discontinued



 (SEROquel) 25 MG  daily.      17  017  



 tablet            

 

 QUEtiapine  Take 800 mg by    2  05/15/20  02/22/2  Discontinued



 (SEROquel) 400 MG  mouth nightly.      17  018  



 tablet  Take 2 tablets          



   at bedtime          

 

 busPIRone (BUSPAR)  Take 10 mg by          Discontinued



 10 MG tablet  mouth 3 (three)        018  



   times a day.          

 

 LEVETIRACETAM  Take 1 tablet          Discontinued



 (KEPPRA ORAL)  by mouth 2        017  



   (two) times a          



   day.          

 

 HYDROcodone-acetami  Take 1 tablet          Discontinued



 nophen (NORCO)  by mouth every        017  



  mg per  8 (eight) hours          



 tablet  as needed for          



   moderate pain.          

 

 levETIRAcetam  Take 1,000 mg        07/10/2  Discontinued



 (KEPPRA) 500 MG  by mouth 2        017  



 tablet  (two) times a          



   day.          

 

 acetaminophen-codei  TK 1 TO 2 TS PO    0  07/15/20  03/03/2  Discontinued



 ne (TYLENOL WITH  Q 6 H PRN P      17  018  



 CODEINE #3) 300-30            



 mg per tablet            

 

 ertapenem 1 g in  Infuse 1 g into  20 each  0  20  



 sodium chloride 0.9  a venous      17  017  



 % MBP 50 mL IVPB  catheter daily          



   for 20 days.          

 

 vancomycin 1,500 mg  Infuse 1,500 mg  20 each  0  20  



 in sodium chloride  into a venous      17  017  



 0.9 % 500 mL IVPB  catheter daily          



   for 20 days.          

 

 HYDROcodone-acetami  Take 1 tablet  10 tablet  0  20  



 nophen (NORCO)  by mouth every      17  017  



  mg per  6 (six) hours          



 tablet  as needed for          



   moderate pain          



   for up to 10          



   doses. Max          



   Daily Amount: 4          



   tablets          

 

 gabapentin  Take 1 tablet  90 tablet  0  20  



 (NEURONTIN) 800 mg  (800 mg total)      18  018  



 tablet  by mouth 3          



   (three) times a          



   day for 30          



   days.          

 

 metoprolol  Take 0.5  15 tablet  0  20  



 succinate XL  tablets (12.5      18  018  



 (TOPROL-XL) 25 mg  mg total) by          



 24 hr tablet  mouth daily for          



   30 days.          

 

 HYDROcodone-acetami  Take 1 tablet  15 tablet  0  20  



 nophen (NORCO)  by mouth every      18  018  



 7.5-325 mg per  6 (six) hours          



 tablet  as needed for          



   severe pain for          



   up to 14 days.          



   Max Daily          



   Amount: 4          



   tablets          

 

 pantoprazole  Take 1 tablet  30 tablet  0  20  



 (PROTONIX) 40 MG EC  (40 mg total)      18  018  



 tablet  by mouth daily          



   for 30 days.          

 

 ondansetron ODT  Take 1 tablet  15 tablet  0  20  Discontinued



 (ZOFRAN ODT) 4 MG  (4 mg total) by      18  018  



 disintegrating  mouth every 8          



 tablet  (eight) hours          



   as needed for          



   nausea or          



   vomiting for up          



   to 15 doses.          

 

 promethazine  Insert 1  12 suppository  0  20  Discontinued



 (PHENERGAN) 25 MG  suppository (25      18  018  



 suppository  mg total) into          



   the rectum          



   every 6 (six)          



   hours as needed          



   for nausea or          



   vomiting for up          



   to 12 doses.          

 

 hydromorPHONE  Take 1 tablet  10 tablet  0  03/03/20  03/10/2  



 (DILAUDID) 2 MG  (2 mg total) by      18  018  



 tablet  mouth every 8          



   (eight) hours          



   as needed for          



   severe pain          



   (score 7-10)          



   for up to 7          



   days. Max Daily          



   Amount: 6 mg          

 

 promethazine  Take 1 tablet  20 tablet  0  20  



 (PHENERGAN) 12.5 MG  (12.5 mg total)      18  018  



 tablet  by mouth every          



   8 (eight) hours          



   as needed for          



   nausea or          



   vomiting for up          



   to 30 days.          

 

 HYDROcodone-acetami  Take 1 tablet  20 tablet  0  03/03/20  03/10/2  



 nophen (NORCO)  by mouth every      18  018  



 7.5-325 mg per  8 (eight) hours          



 tablet  as needed for          



   moderate pain          



   for up to 7          



   days. Max Daily          



   Amount: 3          



   tablets          

 

 naproxen (NAPROSYN)  Take 1 tablet  28 tablet  0  20  



 500 MG tablet  (500 mg total)      18  018  



   by mouth 2          



   (two) times a          



   day with meals          



   for 14 days.          

 

 omeprazole  Take 2 capsules  60 capsule  0  20  



 (PriLOSEC) 20 MG  (40 mg total)      18  018  



 capsule  by mouth daily          



   for 30 days.          

 

 cyclobenzaprine  Take 1 tablet  20 tablet  0  20  



 (FLEXERIL) 10 mg  (10 mg total)      18  018  



 tablet  by mouth 3          



   (three) times a          



   day as needed          



   for muscle          



   spasms for up          



   to 10 days.          







Active Problems







 Problem  Noted Date

 

 Sciatica of left side  2018

 

 Syncope  2018

 

 Essential hypertension  2017

 

 Type 2 diabetes mellitus with hyperglycemia  2017

 

 PRIMITIVO (acute kidney injury)  2017

 

 Seizure disorder  2017

 

 Intractable vomiting with nausea  2017

 

 Abdominal pain  2017









 Overview: 







 Added automatically from request for surgery [id number]









 Gastrointestinal hemorrhage  10/11/2017

 

 Gastrointestinal hemorrhage with hematemesis  10/11/2017









 Overview: 







 Added automatically from request for surgery [id number]









 Vertigo  2017

 

 Pneumonia due to infectious organism  2017

 

 COPD with acute bronchitis  2017

 

 COPD (chronic obstructive pulmonary disease)  2017

 

 COPD exacerbation  2017

 

 Lung mass  2017

 

 Tobacco dependence  2017







Resolved Problems







 Problem  Noted Date  Resolved Date

 

 Leukocytosis  2017

 

 Pneumonia of right lower lobe due to infectious organism  2017







Encounters







 Date  Type  Specialty  Care Team  Description

 

 20  Emergency  Emergency Medicine  Cait,  Suicidal ideation (Primary Dx
)



 18      Chago Olivas MD  

 

 20  Emergency  Emergency Medicine  [place]  Chronic right-sided thoracic 
back pain (Primary Dx);



 18      Judy,  Narcotic abuse;



       Raiza  Drug-seeking behavior



       MD Bryn  

 

 20  Hospital  Radiology  Dioni Garay  BC (bronchogenic carcinoma)



 18  Encounter    MD Milagros  

 

 20  Transcribe  Access  Dioni Garay  BC (bronchogenic carcinoma)



 18  Orders    MD Milagros  (Primary Dx)

 

 20  Emergency  General Internal  Drew Landaverde  Sciatica of left side (
Primary Dx);



 18 -    Gary GRECO MD



  Intractable pain



 20      Travon,  



 MD Jessenia Swift, Princess Guerin MD  

 

 20  Emergency  Emergency Medicine  Nestorky,  Other chronic pain (Primary



 18      Shaw Clement)



       DO  

 

 20  Emergency  General Internal  Norinsky,  Syncope, unspecified syncope 
type (Primary Dx);



 18 -    Medicine  Chago FORTUNE,  Other chronic pain



 20      DO



  



 18      Marianela Turner MD  

 

 20  Anesthesia  Gastroenterology  Natali,  



 Ros  Event    Svitlana Villalta MD  

 

 20  Procedure Pass  Gastroenterology    



 18        

 

 20  Surgery  Gastroenterology  Con Knowles  ESOPHAGOGASTRODUODENOSCOPY



 Ros Croft MD  (EGD) with bx

 

 20  Hospital  General Internal  Reichl, Ricco  Intractable vomiting with 
nausea, unspecified vomiting type (Primary Dx);



 17 -  Encounter  Medicine  MD Ariel



  Pain;



 20      Bavare,  Abdominal pain, unspecified abdominal location;



 18      Jm Cabrera,  Essential hypertension;



       MD



  Type 2 diabetes mellitus with hyperglycemia, with long-term current use of 
insulin



       Cinthya Orourke,   

 

 10/24/20  Telephone  Family Medicine  Jordy Lyles MD  

 

 10/19/20  Telephone  Gastroenterology  Jordy Lyles MD  

 

 10/13/20  Telephone  Gastroenterology  Jordy Lyles MD  

 

 10/13/20  Procedure Pass  Gastroenterology    



 17        

 

 10/13/20  Procedure Pass  Gastroenterology    



 17        

 

 10/11/20  Emergency  General Internal  Reichl, Ricco  Gastrointestinal 
hemorrhage with hematemesis (Primary Dx);



 17 -    Medicine  MD Ariel



  Gastrointestinal hemorrhage, unspecified gastrointestinal hemorrhage type;



 10/13/20      Gandhi, Izabel  Pain of upper abdomen;



 Umair Snow MD  Nausea and vomiting, intractability of vomiting not specified
, unspecified vomiting type

 

 20  Emergency  Emergency Medicine  Nile Lilly,  Vision blurred (Primary 
Dx)



 Umair MINAYA  

 

 20  Emergency  Emergency Medicine  Soledad Emanuel  Right upper quadrant 
abdominal pain (Primary Dx);



 Umair Almaraz DO  Other chronic pain

 

 20  Emergency  Emergency Medicine    



 17        

 

 20  Transcribe  Access  Gandhi, Izabel  Abnormal renal function test



 17  Juli Snow MD  (Primary Dx)

 

 20  [facility]  [facility]  Vertigo (Primary Dx);



 17 -  Encounter  Medicine  Chago FORTUNE,  Acute nonintractable headache, 
unspecified headache type;



 20      DO



  Lactic acidosis;



 17      Gandhi, Izabel  Pneumonia due to infectious organism, unspecified 
laterality, unspecified part of lung;



       MD Gwendolyn  Pneumonia of right upper lobe due to infectious organism

 

 20  [facility]  General Internal  Drew Landaverde  Pneumonia due to 
infectious organism, unspecified laterality, unspecified part of lung (Primary 
Dx);



 17 -  Encounter  Medicine  MD ANGUS



  Shortness of breath;



 20      Popeye Strickland  Pneumonia of right middle lobe due to infectious 
organism



 17      MD Hiram Strange, Izabel Snow MD  

 

 20  Telephone  Cardiovascular  [facility]  



 17      Kacy Holcomb MA  

 

 20  [facility]  Pneumonia of right lower lobe



 17 -  Encounter  Medicine  MD Alondra



  due to infectious organism



 20      Popeye Strickland  (Primary Dx)



 17      MD Alie  

 

 20  Orders Only  Cardiovascular  [place],  Shortness of breath (Primary



 17      SAHIL Rosa  Dx)

 

 20  Anesthesia  General Surgery  [facility],  



   Event    Sandra Fischer MD  

 

 20  Procedure Pass  General Surgery    



 17        

 

 20  Surgery  General Surgery  [place],  Right Lobectomy, Using Vats,



 17      Jonathan MONK  WEDGE RESECTION, FROZEN



       MD Jimmy  BIOPSY-RIGHT

 

 20  [facility]  Pulmonology  Macy Verde  



 17  Encounter    CARMEN Tran  

 

 07/10/20  [facility]  Critical Care  [facility],  COPD exacerbation (Primary Dx);



 17 -  Encounter  Medicine  Chaka  Lung mass;



 07/15/20      MD Jesús



  Bronchitis, chronic obstructive, with exacerbation



 17      Ray Stinson MD Burns, Izabel Snow MD  

 

 20  Telephone  Cardiothoracic  [place],  



     Surgery  [facility] [place]  

 

 20  Hospital  Procedural  [place],  Shortness of breath;



 17  Encounter  Cardiology  Jonathan MONK  Mass of lower lobe of right lung



       MD Jimmy  

 

 20  Transcribe  Cardiovascular  [place]Chris,  Shortness of breath (Primary Dx)
;



 17  Orders    Jonathan MONK  Lung mass



       MD Jimmy  

 

 20  Office Visit  Cardiovascular  [place],  Lung mass (Primary Dx)



 MD Ammon Kunz Jr., Amy Kathryn, PA-C  

 

 20  Transcribe  Cardiovascular  [place],  Shortness of breath (Primary Dx)
;



 17  Orders    Jonathan MONK  Mass of lower lobe of right lung



       MD Jimmy  

 

 20  Hospital  Procedural  [place],  



 17  Encounter  Cardiology  Luis Carlos Cook MD  

 

 20  Telephone  Cardiothoracic  Elisha,  



 17    Surgery  SAHIL Cornejo  

 

 20  Procedure Pass  Procedural    



 17    Cardiology    

 

 20  Surgery  Procedural  [place],  Ep cardioversion w thien [06136



 17    Cardiology  Luis Carlos  (CPT)]



       MD Joey  

 

 20  Transcribe  Access  [place],  Mass in chest (Primary Dx)



 17  Orders    Luis Carlos Cook MD  

 

 20  Procedure Pass  General Surgery    



 17        

 

 20  [facility]  General Internal  Juan Daniel Emanuelmarce  Pneumonia of right lower 
lobe due to infectious organism (Primary Dx);



 17 -  Encounter  Medicine  DO Sung



  Tachycardia;



 20      Gandhi, Izabel  Hypoxia;



 17      MD Gwendolyn  Lung mass

 

 20  Transcribe  Access  [place]ash[place],  Secondary malignant neoplasm of left 
lung (Primary Dx);



 17  Orders    Terra Ponce,  Lung mass



       MD  

 

 20  [facility]  Radiology  [place],  



 17  Encounter    Rustam Grace III, MD  

 

 20  [facility]  Radiology  [place],  



 17  Encounter    Rustam Grace III, MD  

 

 20  [facility]  Radiology  [place],  



 17  Encounter    Rustam Grace III, MD  

 

 20  [facility]  Radiology  [place],  Lung mass



 17  Encounter    Terra Ponce MD  

 

 20  Ancillary  Access  [place],  Lung mass



 17  Orders    Terra Ponce MD  

 

 20  Ancillary  Access  [place],  Lung mass



 17  Orders    Terra Ponce MD  

 

 20  Ancillary  Access  [place],  



 17  Orders    Terra Ponce MD  

 

 20  Ancillary  Access  [place],  Lung mass



 17  Orders    Terra Ponce MD  

 

 20  Ancillary  Access  [place],  Lung mass



 17  Orders    Terra Ponce MD  

 

 20  Transcribe  Access  Sash[place],  Lung mass (Primary Dx)



 17  Orders    Terra Ponce MD  

 

 20  [facility]  Radiology  [place],  Nonspecific abnormal results of 
liver function study;



 17  Encounter    Terra Ponce  Lung mass



       MD  



after 2017



Immunizations







 Name  Dates Previously Given  Next Due

 

 FLUCELVAX QUAD PF (0.5mL syringe)  2018  







Family History







 Medical History  Relation  Name  Comments

 

 Cerebral aneurysm  Father    

 

 Heart disease  Father    

 

 Kidney cancer  Father    

 

 Lung cancer  Father    

 

 Stroke  Father    

 

 Diabetes  Mother    

 

 Heart attack  Mother    

 

 Heart disease  Mother    

 

 Hyperlipidemia  Sister    

 

 Hypertension  Sister    









 Relation  Name  Status  Comments

 

 Father      

 

 Mother      

 

 Sister      







Social History







 Tobacco Use  Types  Packs/Day  Years Used  Date

 

 Current Every Day Smoker  Cigarettes  0.5  35  









 Smokeless Tobacco: Former User      









 Tobacco Cessation: Counseling Given: Yes



 Comments: per patient now only smoking 4-5 cigarette/day









 Alcohol Use  Drinks/Week  oz/Week  Comments

 

 No      stopped for 1.5 yrs









 Sex Assigned at Birth  Date Recorded

 

 Not on file  







Last Filed Vital Signs







 Vital Sign  Reading  Time Taken

 

 Blood Pressure  120/77  2018  1:15 PM CDT

 

 Pulse  76  2018  1:15 PM CDT

 

 Temperature  35.8 C (96.5 F)  2018  1:15 PM CDT

 

 Respiratory Rate  16  2018  1:15 PM CDT

 

 Oxygen Saturation  97%  2018  1:15 PM CDT

 

 Inhaled Oxygen Concentration  -  -

 

 Weight  73.9 kg (163 lb)  2018  8:09 AM CDT

 

 Height  185.4 cm (6' 1")  2018  8:09 AM CDT

 

 Body Mass Index  21.51  2018  8:09 AM CDT







Plan of Treatment







 Health Maintenance  Due Date  Last Done  Comments

 

 DIABETIC FOOT EXAM  1974    

 

 DIABETIC RETINAL EYE EXAM  1974    

 

 COLON CANCER SCREENING  2014    

 

 SHINGRIX VACCINE (#1)  2014    

 

 INFLUENZA VACCINE  2018  







Implants







 Implanted  Type  Area    Device  Expiration  Model /



         Identifier  Date  Serial /



             Lot

 

 Stimulator  Stimulator          

 

 Stimulator-2007  Stimulator  Hip        



 Implanted: 2007 (Quantity not on file)            

 

 Kit [employer] Air Leak 4ml Strl Progel - [id number]  Surgical  N/A: N/A  
[employer]      [id number] /



 Implanted: Qty: 1 on 2017 by Jonathan Fuentes Jr., MD  Implants;       
    /



   Expanders;          



   Extenders;          



   Surgical Wires          

 

 Dorsal Colum            



 Stimulator            

 

 Dorsal Column            



 Stimulator            

 

 Dorsal Colum Stimulator-2007    Lower:        



 Implanted: 2007 (Quantity not on file)    Back,        



     Other        



     than        



     Spine        







Procedures







 Procedure Name  Priority  Date/Time  Associated  Comments



       Diagnosis  

 

 ESOPHAGOGASTRODUODENOSCOPY (EGD)    2018  Abdominal pain,  



 with bx    12:15 PM CST  unspecified  



       abdominal  



       location  

 

 HC CATH DUAL LUMEN PICC  Routine  2017    Results for



     12:19 PM CDT    this procedure



         are in the



         results



         section.

 

 HC US GUIDED VASCULAR ACCESS  Routine  2017    Results for



     12:19 PM CDT    this procedure



         are in the



         results



         section.

 

 HC CVL PICC INSERT 5 YRS OR >  Routine  2017    Results for



     12:19 PM CDT    this procedure



         are in the



         results



         section.

 

 [facility] AN ELECTIVE ENDOTRACHEAL  Routine  2017    



 AIRWAY    8:51 AM CDT    









   Procedure Note - Sandra Bro MD - 2017  8:50 AM CDT



Airway



   Performed by: SANDRA BRO



   Authorized by: SANDRA BRO



   



   Location:  OR



   Urgency:  Elective



   Difficult Airway: No



   Anesthesiologist:  SANDRA BRO



   Performed by:



      anesthesiologist



   Preoxygenated with 100% O2: Yes



   Mask Ventilation:  Easy mask



   Final Airway Type:  Endotracheal airway



   Final Endotracheal Airway:  ETT - double lumen left



   Cuffed: Yes



   Technique Used:  Direct laryngoscopy



   Insertion Site:  Oral



   Blade Type:  Kraig



   Laryngoscope Blade/Videolaryngoscope Blade Size:  3



   ETT Double Lumen (fr):  37



   Cuff at minimum occlusion pressure: Yes



   Measured from:  Lips



   Placement Verified by: CO2 detection, direct visualization and equal breath 
sounds



   Laryngoscopic view:  Grade IIa - partial view of glottis



   Number of Attempts at Approach:  1









 SPIROMETRY PRE AND POST  Routine  2017 12:31 PM  Lung mass  



 WITH BRONCHILATOR    CDT    

 

 ECHOCARDIOGRAM 2D  Routine  2017  8:37 AM  Shortness of  Results for this



 COMPLETE W MMODE    CDT  breath



  procedure are in



 SPECTRAL COLOR DOPPLER      Mass of lower lobe  the results



 (13516)      of right lung  section.

 

 [facility] CRITICAL CARE, E/M  Routine  2017  8:18 PM    Results for this



 30-74 MINUTES    CDT    procedure are in



         the results



         section.



after 2017



Results

Urinalysis screen and microscopy, with reflex to culture (2018  9:16 AM)
Only the most recent of6 resultswithin the time period is included.





 Component  Value  Ref Range

 

 Specimen site  Clean catch  

 

 Color, UA  Yellow  

 

 Appearance, UA  Clear  

 

 Specific gravity, UA  1.011  1.001 - 1.035

 

 pH, UA  5.0  5.0 - 8.5

 

 Protein, UA  Negative  Negative

 

 Glucose, UA  Negative  Negative

 

 Ketones, UA  Negative  Negative

 

 Bilirubin, UA  Negative  Negative

 

 Blood, UA  Negative  Negative

 

 Nitrite, UA  Negative  Negative

 

 Urobilinogen, UA  2.0 (A)  <2.0

 

 Leukocyte esterase, UA  Negative  Negative

 

 Epithelial cells, UA  Few  /HPF

 

 WBC, UA  1  0 - 1 /HPF

 

 RBC, UA  <1  0 - 5 /HPF

 

 Bacteria, UA  None seen  None seen

 

 Yeast, UA  None seen  

 

 Yeast with pseudohyphae, UA  None seen  









 Specimen  Performing Laboratory

 

 Urine  [facility]







   [address]







   [place] [address]



Urine drugs of abuse screen (2018  9:16 AM)Only the most recent of3 
resultswithin the time period is included.





 Component  Value  Ref Range

 

 Amphetamine screen, urine  NEG  

 

 Barbiturate screen, urine  NEG  

 

 Benzodiazepine screen, urine  POS  

 

 Cocaine screen, urine  NEG  

 

 Methadone screen, urine  NEG  

 

 Opiates screen, urine  POS  

 

 Phencyclidine screen, urine  NEG  

 

 Cannabinoid screen, urine  POS  



   Comment:  



   Drug screen minimum concentration of detectability  



   Kokbnsghtcit4878 ng/mL  



   Fqxeitoktzhwjegy6357 ng/mL  



   Barbiturates 300 ng/mL  



   Jkghevxuaatvwju351 ng/mL  



   Tusebar617 ng/mL  



   Hgvjeijhd479 ng/mL  



   Zjagvwu609 ng/mL  



   Phencyclidine 25 ng/mL  



   Iarsngbrsfhz14 ng/mL  



   Csqldefrqi6476 ng/mL  



   Negative test results indicates presumptive evidence of lack  



   of clinically significant drug concentration in this urine  



   specimen.  



   Positive test results are presumptive evidence of clinically  



   significant drug concentration in this urine specimen.  



   Testing performed for medical purposes only.  



     









 Specimen  Performing Laboratory

 

 Urine  [facility]







   [address]







   [place] [address]



Urine culture (2018  9:16 AM)Only the most recent of5 resultswithin the 
time period is included.





 Component  Value  Ref Range

 

 Urine culture  SEE COMMENTComment: Bacteriuria screen negative.  









 Specimen  Performing Laboratory

 

 Urine  [facility]







   [address]







   [place] [address]



Estimated GFR (2018  9:02 AM)Only the most recent of35 resultswithin the 
time period is included.





 Component  Value  Ref Range

 

 GFR Non Af Amer  49 (A)  mL/min/1.73 m2

 

 GFR Af Amer  59 (A)  mL/min/1.73 m2



   Comment:  



   Chronic kidney disease: <60 mL/min/1.73m2  



   Kidney failure: <15 mL/min/1.73m2  



   The estimated GFR is calculated from the IDMS-traceable Modification of Diet
  



   in Renal Disease Equation. The accuracy of the calculation is poor when the  



   creatinine is normal. Calculated values >90 mL/min/1.73m2 are not reported.  



   This equation has not been validated in children (<18 years), pregnant  



   women, the elderly (>70 years), or ethnic groups other than Caucasians and  



    Americans.  



     









 Specimen  Performing Laboratory

 

 Plasma specimen  [facility]







   [address]







   [place] [place] [address]



CBC with platelet and differential (2018  9:02 AM)Only the most recent 
of34 resultswithin the time period is included.





 Component  Value  Ref Range

 

 WBC  9.1  4.2 - 11.0 k/uL

 

 RBC  4.57  4.04 - 5.86 m/uL

 

 HGB  14.7  13.0 - 17.3 g/dL

 

 HCT  44.6  34.0 - 45.0 %

 

 MCV  97.6  80.0 - 98.0 fL

 

 MCH  32.2  27.0 - 34.0 pg

 

 MCHC  33.0  31.5 - 36.5 g/dL

 

 RDW - SD  55.1 (H)  37.0 - 51.0 fL

 

 MPV  10.1  7.4 - 10.4 fL

 

 Platelet count  124 (L)  150 - 400 k/uL

 

 Nucleated RBC  0.00  /100 WBC

 

 Neutrophils  80.4 (H)  36.0 - 66.0 %

 

 Lymphocytes  11.6 (L)  24.0 - 44.0 %

 

 Monocytes  5.8  0.0 - 6.0 %

 

 Eosinophils  1.3  0.0 - 6.0 %

 

 Basophils  0.6  0.0 - 1.2 %

 

 Immature granulocytes  0.3  0.0 - 1.0 %









 Specimen  Performing Laboratory

 

 Blood  [facility] [employer] [facility]







   [address]







   [place] [address]



Thyroid stimulating hormone (2018  9:02 AM)Only the most recent of2 
resultswithin the time period is included.





 Component  Value  Ref Range

 

 TSH  2.60  0.38 - 4.82 uIU/mL









 Specimen  Performing Laboratory

 

 Plasma specimen  [facility]







   [address]







   [place] [address]



T4, free (2018  9:02 AM)





 Component  Value  Ref Range

 

 T4, free  1.00  0.70 - 1.61 ng/dL









 Specimen  Performing Laboratory

 

 Plasma specimen  [facility]







   [address]







   [place] [address]



Alcohol level, blood (2018  9:02 AM)Only the most recent of2 
resultswithin the time period is included.





 Component  Value  Ref Range

 

 Alcohol  None Detected  mg/dL



   Comment:  



   NormalNone Detected  



   Legal Intoxication in [place] 80 mg/dL (0.08%)  



   Toxic Concentration 200 mg/dL (0.2%)  



   Potentially Fatal 350-500 mg/dL (0.35%-0.5%)  



     

 

 Alcohol percent  None Detected  %









 Specimen  Performing Laboratory

 

 Blood  [facility]







   [address]







   [place] [address]



Acetaminophen level (2018  9:02 AM)





 Component  Value  Ref Range

 

 Acetaminophen level  <2.0 (L)  10.0 - 20.0 ug/mL



   Comment:  



   Therapeutic 10-30 ug/mL
  



   Possible Toxicity 150-200 ug/mL
  



   Probable Toxicity >200 ug/mL  



     









 Specimen  Performing Laboratory

 

 Blood  [facility]







   [address]







   [place] [address]



Salicylate level (2018  9:02 AM)





 Component  Value  Ref Range

 

 Salicylate  7.0  mg/dL



   Comment:  



   Therapeutic Range:  



    5 - 30 mg/dL  



     









 Specimen  Performing Laboratory

 

 Blood  [facility]







   [address]







   [place] [address]



Comprehensive metabolic panel (2018  9:02 AM)Only the most recent of20 
resultswithin the time period is included.





 Component  Value  Ref Range

 

 Sodium  140  135 - 150 mEq/L

 

 Potassium  3.8  3.5 - 5.0 mEq/L

 

 Chloride  109  100 - 109 mEq/L

 

 CO2  24  24 - 32 mmol/L

 

 Anion gap  7@ANIO  7 - 15 mEq/L

 

 BUN  14  7 - 18 mg/dL

 

 Creatinine  1.5  0.8 - 1.5 mg/dL

 

 Glucose  103 (H)  65 - 100 mg/dL

 

 Calcium  8.6  8.6 - 10.7 mg/dL

 

 Protein  7.9  6.3 - 8.2 g/dL

 

 Albumin  3.4  3.2 - 5.0 g/dL

 

 A/G ratio  0.8  0.7 - 3.8

 

 Alkaline phosphatase  153 (H)  30 - 120 U/L

 

 AST  21  15 - 37 U/L

 

 ALT  17 (L)  30 - 65 U/L

 

 Total bilirubin  0.2  0.2 - 1.2 mg/dL









 Specimen  Performing Laboratory

 

 Plasma specimen  [facility]







   [address]







   [place] [address]



CT Abdomen Pelvis Wo Contrast (2018  3:27 AM)Only the most recent of3 
resultswithin the time period is included.





 Specimen  Performing Laboratory

 

    [facility]







   [address] [place] [address]









 Narrative

 

 EXAMINATION:CT ABDOMEN PELVIS WO CONTRAST







  







 CLINICAL HISTORY:R flank pain







  







 TECHNIQUE: Multiple axial images of the abdomen and pelvis were obtained 
without



 intravenous administration of iodinated contrast. Sagittal and coronal 
computerized



 reformatted images were also obtained. The lack of intravenous contrast 
reduces the 







 sensitivity of detecting solid organ disease.







  







 CT imaging was performed with iterative reconstruction technique and/or 
automated



 exposure control to reduce radiation dose.







  







 COMPARISON:2017







  







  







 IMPRESSION:







  







 Subsegmental atelectasis is seen of the lung bases.







  







 Patient is status post cholecystectomy. Pancreas and adrenal glands are normal.







  







 Cirrhotic liver.







  







 Spleen is enlarged measuring 14.5 cm in length. 







  







 Kidneys, ureters and bladder are normal.







  







 No free intraperitoneal fluid or air. Atherosclerotic vascular calcifications 
are



 seen. Some paraesophageal varices are seen, compatible with portal 
hypertension.







  







 Diverticulosis is seen without diverticulitis. Appendix cannot be seen. No



 gastrointestinal tract obstruction.







  







 No acute osseous abnormalities. Postoperative appearance of the lower lumbar 
spine. A



 device is seen of the right gluteal region, with lead entering the thoracic 
spinal



 canal.







  







 CONCLUSION:







  







 Cirrhotic liver with small paraesophageal varices, compatible with portal



 hypertension.







  







  







  







  







  







  







  







  







  







  







 [facility]-[id number]







 









 Procedure Note

 

  Interface, Radiology Results Incoming - 2018  4:08 AM CDT



EXAMINATION:  CT ABDOMEN PELVIS WO CONTRAST



 



 CLINICAL HISTORY:  R flank pain



 



 TECHNIQUE: Multiple axial images of the abdomen and pelvis were obtained 
without intravenous administration of iodinated contrast. Sagittal and coronal 
computerized reformatted images were also obtained. The lack of intravenous 
contrast reduces the



 sensitivity of detecting solid organ disease.



 



 CT imaging was performed with iterative reconstruction technique and/or 
automated exposure control to reduce radiation dose.



 



 COMPARISON:  2017



 



 



 IMPRESSION:



 



 Subsegmental atelectasis is seen of the lung bases.



 



 Patient is status post cholecystectomy. Pancreas and adrenal glands are normal.



 



 Cirrhotic liver.



 



 Spleen is enlarged measuring 14.5 cm in length.



 



 Kidneys, ureters and bladder are normal.



 



 No free intraperitoneal fluid or air. Atherosclerotic vascular calcifications 
are seen. Some paraesophageal varices are seen, compatible with portal 
hypertension.



 



 Diverticulosis is seen without diverticulitis. Appendix cannot be seen. No 
gastrointestinal tract obstruction.



 



 No acute osseous abnormalities. Postoperative appearance of the lower lumbar 
spine. A device is seen of the right gluteal region, with lead entering the 
thoracic spinal canal.



 



 CONCLUSION:



 



 Cirrhotic liver with small paraesophageal varices, compatible with portal 
hypertension.



 



 



 



 



 



 



 



 



 



 



 [facility]-[id number]



Smear review (2018  3:13 AM)Only the most recent of5 resultswithin the 
time period is included.





 Component  Value  Ref Range

 

 Smear review  Smear Reviewed  









 Specimen  Performing Laboratory

 

   [facility]







   [address] [place] [address].







   [place] [address]



Prothrombin time with INR (2018  3:13 AM)Only the most recent of10 
resultswithin the time period is included.





 Component  Value  Ref Range

 

 Prothrombin time  14.5  12.0 - 15.0 sec

 

 INR  1.11  0.92 - 1.12



   Comment:  



   For patients on anticoagulant therapy, reference ranges below:  



   Indication:INR 
Value  



   Treatment of Venous Thrombosis, 2.0-3.0  



   pulmonary emboli, or prophylaxis  



   of a venous thrombosis, or systemic  



   emboli.  



   High dose, high risk patients 3.0-4.5  



   with mechanical valves.  



   NOTE:INR values over 3.0 are sometimes associated with  



   gastrointestinal hemorrhage, especially values over 4.0.  



     









 Specimen  Performing Laboratory

 

 Blood  [facility]







   [address] [place] [address].







   [place] [address]



Lipase level (2018  3:13 AM)Only the most recent of7 resultswithin the 
time period is included.





 Component  Value  Ref Range

 

 Lipase  129  65 - 230 U/L









 Specimen  Performing Laboratory

 

 Plasma specimen  [facility]







   [address] [place] [address].







   [place] [address]



ECG 12 lead (2018  2:43 AM)Only the most recent of13 resultswithin the 
time period is included.





 Component  Value  Ref Range

 

 Ventricular rate  77  

 

 Atrial rate  77  

 

 WV interval  144  

 

 QRSD interval  94  

 

 QT interval  418  

 

 QTC interval  473  

 

 P axis 1  32  

 

 QRS axis 1  94  

 

 T wave axis  85  

 

 EKG impression  Normal sinus rhythm-Rightward axis-Borderline ECG-In  



   automated comparison with ECG of 2018 18:18,-No  



   significant change was found-Electronically Signed By Missy Meneses MD ([id number]) on 5/10/2018 7:43:28 AM  









 Specimen  Performing Laboratory

 

   [facility]







   [address] [place] [address]



ECG ED Preliminary Interpretation - NOT AN ORDER (2018  2:07 AM)Only the 
most recent of6 resultswithin the time period is included.





 Narrative

 

 Raiza Juarez MD 20183:47 AM







 ECG ED Preliminary Interpretation - Not an Order







 Performed by: RAIZA JUAREZ







 Authorized by: RAIZA JUAREZ 







  







 ECG reviewed by ED Physician in the absence of a cardiologist: yes







 Previous ECG: 







 Previous ECG:Compared to current







 Comparison ECG info:18







 Similarity:No change







 Interpretation: 







 Interpretation: normal







 Rate: 







 ECG rate:77







 ECG rate assessment: normal







 Rhythm: 







 Rhythm: sinus rhythm







 QRS: 







 QRS axis:Right







 Comments: 







  Normal sinus rhythm. Right axis deviation.



PET/CT Skull Base To Mid Thigh (2018  1:45 PM)Only the most recent of2 
resultswithin the time period is included.





 Specimen  Performing Laboratory

 

   [facility]







   [address] [place] [address]









 Narrative

 

 EXAMINATION:







 PET CT SKULL BASE TO MID THIGH







  







 Date and place of service: 3/26/2018 10:00 AM at [facility]







  







 DOSE: 11.8 mCi of 78-Vsjnah-9-Deoxyglucose (FDG) was injected intravenously 
into left



 wrist done infiltration at a serum glucose level of90 mg/dl.







  







 TECHNIQUE:







  







 PROCEDURE: Following injection of 29-Veaksm-5-Deoxyglucose (FDG) and a standard



 uptake., The patient was imaged on a [employer] whole body PET CT scanner. Multiple 6 
minute



 bed position acquisitions were obtained along the length of the patient's body 
from 







 approximately the Skull base to the mid thighs without administration of 
intravenous



 or oral contrast. The software fused PET/CT images as well as the PET and CT 
images



 could be reviewed separately.







  







 The dose length product for this procedure was 408 mGy-cm.







  







 CT imaging was performed with iterative reconstruction technique and/or 
automated



 exposure control to reduce radiation dose







  







 COMPARISON:







 2017 done at [facility]. 







  







 CLINICAL HISTORY:







 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 Status post 
right



 lower lobectomy . No history of recent chemotherapy or radiation 
therapy.



 For restaging.







  







 FINDINGS:







  







 [facility] the 
CAT



 scan from the Skull base to the mid thighs to the same time as the PET scan.







  







 Physiologic uptake is present in the components of Waldeyer's ring in the 
lateral



 pharynx. Vocal cord uptake is noted of doubtful significance. Mild tracer 
uptake is



 seen in the right and left axillary lymph node with the degree of uptake less 
than



 2.5 







 and is of doubtful significance.







  







 No abnormal radiotracer uptake is demonstrated in the visualized brain, neck, 
chest,



 abdomen, pelvis, spine, and visualized proximal upper and lower extremities.







  







 No abnormal radiotracer uptake is demonstrated in the brain. However, normal 
intense



 metabolic uptake of radiotracer in the brain can mask the presence of lesions. 
Brain



 lesions are generally better evaluated with an MRI without and with 
intravenous 







 gadolinium, if clinically indicated.







  







 Physiologic excretion of radiotracer is demonstrated into the kidneys, ureters
, and



 bladder. Physiologic excretion of radiotracer is also demonstrated into the 
small and



 large bowel.







  







 Interrogation of the CT scan of the chest in lung window settings demonstrates 
the



 previously noted masses in each lower lobe are not identified on the current



 examination consistent with right lower lobectomy on the right and 
posttreatment of



 the left 







 lung lesion.. Dependent atelectasis is noted in the posterior lung bases 
bilaterally.







  







 Interrogation of the CT scan of the whole body with soft tissue window settings



 demonstratessignificant dilatation of the pulmonary outflow tract 
consistent with



 pulmonary arterial hypertension. Coronary artery calcifications are present.







  







 An irregular contoured liver is noted consistent with cirrhosis. Calcified



 atheromatous plaque formation is seen in the aortoiliac system.







  







 Interrogation of the CT scan of the whole body and bone window settings 
demonstrates



 no osteolytic or osteoblastic lesions. Patient is had a spinal fusion in the 
lower



 lumbar spine.







  







 IMPRESSION:







 No evidence of metabolically active tumor.







  







 Otherwise unchanged .Pulmonary arterial hypertension as before. Cirrhosis as 
before.







  







 [facility]-[id number]







 









 Procedure Note

 

 [facility], Radiology Results Incoming - 2018  4:30 PM CDT



EXAMINATION:



 PET CT SKULL BASE TO MID THIGH



 



 Date and place of service: 3/26/2018 10:00 AM at [facility]



 



 DOSE: 11.8 mCi of 54-Ufeepz-7-Deoxyglucose (FDG) was injected intravenously 
into left wrist done infiltration at a serum glucose level of  90 mg/dl.



 



 TECHNIQUE:



 



 PROCEDURE: Following injection of 61-Ifshfb-8-Deoxyglucose (FDG) and a 
standard uptake., The patient was imaged on a [employer] whole body PET CT scanner. 
Multiple 6 minute bed position acquisitions were obtained along the length of 
the patient's body from



 approximately the Skull base to the mid thighs without administration of 
intravenous or oral contrast. The software fused PET/CT images as well as the 
PET and CT images could be reviewed separately.



 



 The dose length product for this procedure was 408 mGy-cm.



 



 CT imaging was performed with iterative reconstruction technique and/or 
automated exposure control to reduce radiation dose



 



 COMPARISON:



 2017 done at [facility].



 



 CLINICAL HISTORY:



 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 Status post 
right lower lobectomy . No history of recent chemotherapy or 
radiation therapy. For restaging.



 



 FINDINGS:



 



 Software fusion was performed of the nuclear medicine  FDG PET scan with the 
CAT scan from the Skull base to the mid thighs to the same time as the PET scan.



 



 Physiologic uptake is present in the components of Waldeyer's ring in the 
lateral pharynx. Vocal cord uptake is noted of doubtful significance. Mild 
tracer uptake is seen in the right and left axillary lymph node with the



 degree of uptake less than 2.5



 and is of doubtful significance.



 



 No abnormal radiotracer uptake is demonstrated in the visualized brain, neck, 
chest, abdomen, pelvis, spine, and visualized proximal upper and lower 
extremities.



 



 No abnormal radiotracer uptake is demonstrated in the brain. However, normal 
intense metabolic uptake of radiotracer in the brain can mask the presence of 
lesions. Brain lesions are generally better evaluated with an MRI without and 
with intravenous



 gadolinium, if clinically indicated.



 



 Physiologic excretion of radiotracer is demonstrated into the kidneys, ureters
, and bladder. Physiologic excretion of radiotracer is also demonstrated into 
the small and large bowel.



 



 Interrogation of the CT scan of the chest in lung window settings demonstrates 
the previously noted masses in each lower lobe are not identified on the 
current examination consistent with right lower



 lobectomy on the right and posttreatment of the left



 lung lesion.. Dependent atelectasis is noted in the posterior lung bases 
bilaterally.



 



 Interrogation of the CT scan of the whole body with soft tissue window 
settings demonstrates  significant dilatation of the pulmonary outflow tract 
consistent with pulmonary arterial hypertension. Coronary artery calcifications 
are present.



 



 An irregular contoured liver is noted consistent with cirrhosis. Calcified 
atheromatous plaque formation is seen in the aortoiliac system.



 



 Interrogation of the CT scan of the whole body and bone window settings 
demonstrates no osteolytic or osteoblastic lesions. Patient is had a spinal 
fusion in the lower lumbar spine.



 



 IMPRESSION:



 No evidence of metabolically active tumor.



 



 Otherwise unchanged .Pulmonary arterial hypertension as before. Cirrhosis as 
before.



 



 [facility]-[id number]



POC glucose (2018 11:36 AM)Only the most recent of147 resultswithin the 
time period is included.





 Component  Value  Ref Range

 

 POC glucose  90  65 - 100 mg/dL



   Comment:  



   Meter ID: [id number]  



   : Cinthya Mcduffie  



     









 Specimen  Performing Laboratory

 

   [facility]







   [address]







   [place] [address]



Keppra (Levetiracetam) level (2018  5:13 AM)





 Component  Value  Ref Range

 

 Levetiracetam  27  12 - 46 ug/mL



   Comment:  



   INTERPRETIVE INFORMATION: Keppra (Levetiracetam)  



   Therapeutic Range:12-46 ug/mL  



   Toxic:Not well Established  



   Pharmacokinetics of levetiracetam are affected by renal function.  



   Adverse effects may include somnolence, weakness, headache and  



   vomiting.  



   Performed by [facility],
  



   [address] [place] [id number] [phone]
  



   [email or web address], Charlie Balbuena MD - Lab. Director  



     









 Specimen  Performing Laboratory

 

 Serum  [facility]







   [address] [place] [address]



Basic metabolic panel (2018  5:13 AM)Only the most recent of15 
resultswithin the time period is included.





 Component  Value  Ref Range

 

 Sodium  134 (L)  135 - 150 mEq/L

 

 Potassium  4.2  3.5 - 5.0 mEq/L

 

 Chloride  102  100 - 109 mEq/L

 

 CO2  22 (L)  24 - 32 mmol/L

 

 Anion gap  10  7 - 15 mEq/L



   Comment:  



   Starting from  , anion gap calculation  



   no longer incorporates potassium. Please note the change.  



     

 

 BUN  17  7 - 18 mg/dL

 

 Creatinine  1.2  0.8 - 1.5 mg/dL

 

 Glucose  139 (H)  65 - 100 mg/dL

 

 Calcium  9.4  8.6 - 10.7 mg/dL









 Specimen  Performing Laboratory

 

 Plasma specimen  [facility]







   [address]







   [place] [address]



Hepatic function panel (2018  8:14 PM)





 Component  Value  Ref Range

 

 Albumin  3.8  3.2 - 5.0 g/dL

 

 Total bilirubin  0.8  0.2 - 1.2 mg/dL

 

 Bilirubin direct  0.2  0.0 - 0.4 mg/dL

 

 Alkaline phosphatase  231 (H)  30 - 120 U/L

 

 Protein  9.7 (H)  6.3 - 8.2 g/dL

 

 ALT  17 (L)  30 - 65 U/L

 

 AST  29  15 - 37 U/L









 Specimen  Performing Laboratory

 

 Plasma specimen  [facility]







   [address]







   [place] [address]



CT Lumbar Spine Wo Contrast (2018  5:12 PM)





 Specimen  Performing Laboratory

 

   [facility]







   [address] [place] [address]









 Narrative

 

 EXAMINATION: CT LUMBAR SPINE WO CONTRAST







  







 CLINICAL HISTORY: pain







  







 COMPARISON: CT myelogram lumbar spine 2011







  







 TECHNIQUE: Axial noncontrast enhanced images of lumbar spine was performed with



 coronal sagittal reconstruction algorithms. CT imaging was performed with 
iterative



 reconstruction technique and/or automated exposure control to reduce radiation 
dose.







  







  







 FINDINGS:







  







 There are 5 non-rib bearing lumbar type vertebrae. Status post posterior 
spinous



 mentation with rods and screws and anterior interbody grafts at L4-5 and L5-S1.



 Status post decompressive laminectomies at L4-5 and L5-S1. There is partial 
solid



 interbody 







 osseous fusion at L5-S1, without definite solid interbody osseous fusion at L4-
5.



 There is solid osseous fusion of the posterior elements of L4-S1. No evidence 
of



 hardware loosening. Hardware is well positioned.







  







 No fracture. 1 to 2 mm retrolisthesis L3 on L4. Facet alignment is anatomic.







  







 Limited dilation of the visualized intracranial contents demonstrates 
atherosclerosis



 of the abdominal aorta and branch vessels. No abdominal mass is identified on 
limited



 assessment. 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 







 sclerosis.







  







  







 Axial images through the disc spaces demonstrate the following:







  







 L1-L2: Small disc bulge without significant spinal canal or neural foraminal



 stenosis. Small Schmorl's nodes.







  







 L2-L3: Small disc bulge contributes to mild subarticular zone stenosis without



 significant spinal canal or neural foraminal stenosis. Small Schmorl's nodes.







  







 L3-L4: Small disc bulge, slight prominence of dorsal epidural fat, and mild 
ligament



 flavum infolding contribute to mild spinal canal stenosis. There is mild 
bilateral



 neural foraminal stenosis secondary to facet arthropathy and small endplate 







 osteophytes.







  







 L4-L5: Status post decompressive laminectomy. Mild neural foraminal stenosis



 secondary to small endplate osteophytes bilaterally.







  







 L5-S1: Status post decompressive laminectomy. No significant neural foraminal



 stenosis.







  







  







  







  







 IMPRESSION: 







  







 Postsurgical changes L4-S1 as detailed above.







  







 No fracture. Multilevel degenerative changes of the lumbar spine, notably with 
mild



 spinal canal and neural foraminal stenosis at L3-4.







  







  







  







  







 [id number]







 









 Procedure Note

 

 [facility] Interface, Radiology Results Incoming - 2018  5:24 PM CST



EXAMINATION: CT LUMBAR SPINE WO CONTRAST



 



 CLINICAL HISTORY: pain



 



 COMPARISON: CT myelogram lumbar spine 2011



 



 TECHNIQUE: Axial noncontrast enhanced images of lumbar spine was performed 
with coronal sagittal reconstruction algorithms. CT imaging was performed with 
iterative reconstruction technique and/or automated exposure control to reduce 
radiation dose.



 



 



 FINDINGS:



 



 There are 5 non-rib bearing lumbar type vertebrae. Status post posterior 
spinous mentation with rods and screws and anterior interbody grafts at L4-5 
and L5-S1. Status post decompressive laminectomies at L4-5 and L5-S1.



 There is partial solid interbody



 osseous fusion at L5-S1, without definite solid interbody osseous fusion at L4-
5. There is solid osseous fusion of the posterior elements of L4-S1. No 
evidence of hardware loosening. Hardware is well positioned.



 



 No fracture. 1 to 2 mm retrolisthesis L3 on L4. Facet alignment is anatomic.



 



 Limited dilation of the visualized intracranial contents demonstrates 
atherosclerosis of the abdominal aorta and branch vessels. No abdominal mass is 
identified on limited assessment. Osteoarthrosis sacroiliac joints



 with small vacuum clefts and mild



 sclerosis.



 



 



 Axial images through the disc spaces demonstrate the following:



 



 L1-L2: Small disc bulge without significant spinal canal or neural foraminal 
stenosis. Small Schmorl's nodes.



 



 L2-L3: Small disc bulge contributes to mild subarticular zone stenosis without 
significant spinal canal or neural foraminal stenosis. Small Schmorl's nodes.



 



 L3-L4: Small disc bulge, slight prominence of dorsal epidural fat, and mild 
ligament flavum infolding contribute to mild spinal canal stenosis. There is 
mild bilateral neural foraminal stenosis secondary to facet arthropathy and 
small endplate



 osteophytes.



 



 L4-L5: Status post decompressive laminectomy. Mild neural foraminal stenosis 
secondary to small endplate osteophytes bilaterally.



 



 L5-S1: Status post decompressive laminectomy. No significant neural foraminal 
stenosis.



 



 



 



 



 IMPRESSION:



 



 Postsurgical changes L4-S1 as detailed above.



 



 No fracture. Multilevel degenerative changes of the lumbar spine, notably with 
mild spinal canal and neural foraminal stenosis at L3-4.



 



 



 



 



 [id number]



Partial thromboplastin time, activated (2018 12:29 PM)Only the most 
recent of5 resultswithin the time period is included.





 Component  Value  Ref Range

 

 PTT  29.2  23.0 - 36.0 sec



   Comment:  



   PTT therapeutic range for unfractionated heparin is  



   61.0-112.0 seconds which corresponds to Anti-Xa  



   0.3-0.7 U/ml.  



   Note:Change in Panic Value  



   The PTT Panic Value is changing from 110 sec. to 100 sec.  



   due to new instrumentation and reagents.  



   Correlation studies have been performed to validate this result.  



     









 Specimen  Performing Laboratory

 

 Blood  [facility]







   [address] [place] [address]



Creatine kinase, total (CPK) (2018  6:29 PM)Only the most recent of3 
resultswithin the time period is included.





 Component  Value  Ref Range

 

 Creatine kinase  134  61 - 224 U/L









 Specimen  Performing Laboratory

 

 Plasma specimen  [facility]







   [address] [place] [address]



CT Head Wo Contrast (2018  6:19 PM)Only the most recent of2 resultswithin 
the time period is included.





 Specimen  Performing Laboratory

 

   [facility]







   [address] [place] [address]









 Narrative

 

 EXAMINATION: CT HEAD WO CONTRAST







  







 CLINICAL HISTORY: seizure syncope







  







 COMPARISON:Brain CT dated 2017







  







 TECHNIQUE: Noncontrast enhanced images of the brain were obtained from the 
skull base



 to the vertex. Both soft tissue and bone reconstruction algorithms were



 performed.CT imaging was performed with iterative reconstruction technique 
and/or



 automated 







 exposure control to reduce radiation dose.







  







  







 FINDINGS:







  







 There is no evidence of acute intracranial hemorrhage, intracranial mass, mass



 effect, midline shift or focal extra-axial collection. The gray-white matter



 differentiation is preserved, and I do not see a confluent area of acute



 transcortical ischemia. 







 No discretely hyperdense vessel is identified. 







  







 The ventricles and extra-axial spaces are mildly prominent bilaterally. The



 ventricles have remained stable in size and configuration compared to the 
previous



 head CT.







  







 Minimal foci of decreased attenuation are noted mainly along the 
periventricular



 white matter at the level of the corona radiata. These are nonspecific, but 
most



 commonly related to chronic microvascular ischemic change.







  







 Beam hardening artifact obscures details at the level of the skull base, 
including



 portions of the supraorbital frontal lobes, inferior temporal lobes, brainstem 
and



 cerebellum.







  







 The mastoid air cells and middle ear cavities are clear. There is soft tissue 
density



 in the external auditory canals bilaterally, nonspecific, but most commonly



 representing cerumen.







  







 The calvarium shows no aggressive bone lesion or evidence of fracture. No fluid



 levels are seen in the visualized paranasal sinuses.







  







 IMPRESSION:







  







 No acute intracranial abnormality identified.







  







  







  







  







  







  







 [id number]







 









 Procedure Note

 

 Hm Interface, Radiology Results Incoming - 2018  6:27 PM CST



EXAMINATION: CT HEAD WO CONTRAST



 



 CLINICAL HISTORY: seizure syncope



 



 COMPARISON:  Brain CT dated 2017



 



 TECHNIQUE: Noncontrast enhanced images of the brain were obtained from the 
skull base to the vertex. Both soft tissue and bone reconstruction algorithms 
were performed.  CT imaging was performed with iterative



 reconstruction technique and/or automated



 exposure control to reduce radiation dose.



 



 



 FINDINGS:



 



 There is no evidence of acute intracranial hemorrhage, intracranial mass, mass 
effect, midline shift or focal extra-axial collection. The gray-white matter 
differentiation is preserved, and I do not see a confluent area of



 acute transcortical ischemia.



 No discretely hyperdense vessel is identified.



 



 The ventricles and extra-axial spaces are mildly prominent bilaterally. The 
ventricles have remained stable in size and configuration compared to the 
previous head CT.



 



 Minimal foci of decreased attenuation are noted mainly along the 
periventricular white matter at the level of the corona radiata. These are 
nonspecific, but most commonly related to chronic microvascular ischemic change.



 



 Beam hardening artifact obscures details at the level of the skull base, 
including portions of the supraorbital frontal lobes, inferior temporal lobes, 
brainstem and cerebellum.



 



 The mastoid air cells and middle ear cavities are clear. There is soft tissue 
density in the external auditory canals bilaterally, nonspecific, but most 
commonly representing cerumen.



 



 The calvarium shows no aggressive bone lesion or evidence of fracture. No 
fluid levels are seen in the visualized paranasal sinuses.



 



 IMPRESSION:



 



 No acute intracranial abnormality identified.



 



 



 



 



 



 



 [id number]



Surgical pathology request (2018  1:28 PM)Only the most recent of4 
resultswithin the time period is included.





 Component  Value  Ref Range

 

 Case number  [id number]  

 

 Surgical pathology report  See link below for PDF Lab Report  

 

 Result status  This is Final Report to [id number]  









 Specimen  Performing Laboratory

 

   [facility]







   [address]







   [place] [address]



Magnesium level (2018  5:59 AM)Only the most recent of4 resultswithin the 
time period is included.





 Component  Value  Ref Range

 

 Magnesium  2.00  1.60 - 2.40 mg/dL









 Specimen  Performing Laboratory

 

 Plasma specimen  [facility]







   [address]







   [place] [address]



XR Chest 2 Vw (2017  7:47 PM)Only the most recent of4 resultswithin the 
time period is included.





 Specimen  Performing Laboratory

 

    [facility]







   [address] [place] [address]









 Narrative

 

 EXAMINATION:XR CHEST 2 VW







  







 CLINICAL HISTORY:sob cough todayleukocytosisR basilar crackles







  







  







  







 IMPRESSION:







  







 Aortic arch calcified. Heart size is normal. Lungs are clear of acute 
infiltrate.



 There has been a prior partial pulmonary resection in the right side. There 
are no



 effusions. There is an intraspinal catheter present, and there is hardware in



 cervical 







 spine.







  







  







  







 [facility]-[id number]







 









 Procedure Note

 

  Interface, Radiology Results Incoming - 2017  8:23 PM CST



EXAMINATION:  XR CHEST 2 VW



 



 CLINICAL HISTORY:  sob cough today  leukocytosis  R basilar crackles



 



 



 



 IMPRESSION:



 



 Aortic arch calcified. Heart size is normal. Lungs are clear of acute 
infiltrate. There has been a prior partial pulmonary resection in the right 
side. There are no effusions. There is an intraspinal catheter



 present, and there is hardware in cervical



 spine.



 



 



 



 [facility]-[id number]



Troponin (2017  7:33 PM)Only the most recent of5 resultswithin the time 
period is included.





 Component  Value  Ref Range

 

 Troponin  <0.01  0.00 - 0.60 ng/mL



   Comment:  



   0.11 - 1.49 ng/mlMay indicate increased risk of acute
  



    coronary syndrome.
  



   >=1.5 ng/mlConsistent with acute myocardial  



    infarction.  



   
  



   The diagnostic value of a single normal or non-diagnostic  



   result is questionable.Serial samples at 2-6 hour intervals  



   are required to rule out acute myocardial injury.
  



     



     









 Specimen  Performing Laboratory

 

 Plasma specimen  [facility]







   [address]







   [place] [address]



Influenza antigen (2017  3:58 PM)





 Component  Value  Ref Range

 

 Influenza antigen  Negative for Influenza A/B antigen.  



   Comment:  



   Specimen Information  



   Specimen Source: Nares  



   Specimen Site: Right  



     









 Specimen  Performing Laboratory

 

 Nares - Right  [facility]







   [address]







   [place] [address]



B natriuretic peptide (2017  3:45 PM)Only the most recent of2 
resultswithin the time period is included.





 Component  Value  Ref Range

 

 BNP  43  0 - 100 pg/mL









 Specimen  Performing Laboratory

 

 Blood  [facility]







   [address]







   [place] [address]



US Abdomen Complete (10/13/2017  7:28 AM)





 Specimen  Performing Laboratory

 

    [facility]







   [address] [place] [address]









 Narrative

 

 EXAM: US ABDOMEN COMPLETE







  







 CLINICAL DATA:ABDOMINAL PAIN







  







 COMPARISON: 2016







  







 FINDINGS:







  







 LIVER:The liver is slightly heterogeneous in echogenicity and has an 
irregular



 contour suggestive of cirrhosis. No discrete mass is identified with 
ultrasound.







  







 MPV:Doppler evaluation of the portal vein demonstrates normal hepatopetal 
flow. 







  







 GALLBLADDER:The gallbladder has been surgically removed.







  







 CBD: Common bile duct is not dilated measuring 6 mm.







  







 PANCREAS:The visualized portions of the pancreas are within normal limits.







  







 SPLEEN:Spleen is enlarged measuring 15.3 x 7 x 6.7 cm. 







  







 KIDNEYS:The kidneys are normal in size and echogenicity. There is no 
evidence of



 hydronephrosis.







  







 AORTA:The visualized upper abdominal aorta demonstrates no evidence of 
ectasia or



 aneurysm.







  







 IVC:The visualized portions of the inferior vena cava are unremarkable.







  







 ASCITES: No abnormal abdominal fluid collections are visualized. There is no 
evidence



 of ascites.







  







 PLEURAL EFFUSION:There are no pleural effusions.







  







  







 IMPRESSION:







 1. Cirrhosis with splenomegaly.







  







 2.Status post cholecystectomy.







  







  







 [id number]







 









 Procedure Note

 

  Interface, Radiology Results Incoming - 10/13/2017  7:34 AM CDT



EXAM: US ABDOMEN COMPLETE



 



 CLINICAL DATA:  ABDOMINAL PAIN



 



 COMPARISON: 2016



 



 FINDINGS:



 



 LIVER:  The liver is slightly heterogeneous in echogenicity and has an 
irregular contour suggestive of cirrhosis. No discrete mass is identified with 
ultrasound.



 



 MPV:  Doppler evaluation of the portal vein demonstrates normal hepatopetal 
flow.



 



 GALLBLADDER:  The gallbladder has been surgically removed.



 



 CBD: Common bile duct is not dilated measuring 6 mm.



 



 PANCREAS:  The visualized portions of the pancreas are within normal limits.



 



 SPLEEN:  Spleen is enlarged measuring 15.3 x 7 x 6.7 cm.



 



 KIDNEYS:  The kidneys are normal in size and echogenicity. There is no 
evidence of hydronephrosis.



 



 AORTA:  The visualized upper abdominal aorta demonstrates no evidence of 
ectasia or aneurysm.



 



 IVC:  The visualized portions of the inferior vena cava are unremarkable.



 



 ASCITES: No abnormal abdominal fluid collections are visualized. There is no 
evidence of ascites.



 



 PLEURAL EFFUSION:  There are no pleural effusions.



 



 



 IMPRESSION:



 1.   Cirrhosis with splenomegaly.



 



 2.  Status post cholecystectomy.



 



 



 [facility]-[id number]



Total iron binding capacity (10/13/2017  5:33 AM)Only the most recent of2 
resultswithin the time period is included.





 Component  Value  Ref Range

 

 Iron level  86  76 - 198 ug/dL

 

 Iron binding capacity  252 (L)  271 - 474 ug/dL

 

 % Saturation  34.1  20.0 - 40.0 %









 Specimen  Performing Laboratory

 

 Blood  [facility]







   [address] [place], [place] [id number]



Hepatitis C antibody (10/13/2017  5:33 AM)Only the most recent of2 
resultswithin the time period is included.





 Component  Value  Ref Range

 

 Hepatitis C Ab  Non-reactive  Non-reactive









 Specimen  Performing Laboratory

 

 Blood  [facility]







   [address] [place] [address]



Alpha-1 antitrypsin level (10/13/2017  5:33 AM)Only the most recent of2 
resultswithin the time period is included.





 Component  Value  Ref Range

 

 Alpha-1 antitrypsin  134  90 - 200 mg/dL









 Specimen  Performing Laboratory

 

 Plasma specimen  [facility] DEPARTMENT OF PATHOLOGY AND [facility] MEDICINE







   [address] [place] [address]



Hepatitis A antibody IgM (10/13/2017  5:33 AM)





 Component  Value  Ref Range

 

 Hepatitis A IgM  Non-reactive  Non-reactive









 Specimen  Performing Laboratory

 

 Blood  [facility]







   [address] [place] [address]



Ceruloplasmin level (10/13/2017  5:33 AM)Only the most recent of2 resultswithin 
the time period is included.





 Component  Value  Ref Range

 

 Ceruloplasmin  26  15 - 30 mg/dL









 Specimen  Performing Laboratory

 

 Plasma specimen  [facility] DEPARTMENT OF PATHOLOGY AND GENOMIC MEDICINE







   [address] [place] [address]



Alpha fetoprotein (10/13/2017  5:33 AM)Only the most recent of2 resultswithin 
the time period is included.





 Component  Value  Ref Range

 

 Alpha fetoprotein  3.5  0.0 - 8.3 ng/mL



   Comment:  



   The Tru 8000 AFP immunoassay was used.  



   Results obtained with different assay methods or kits  



   should not be used interchangeably and may be different.  



     









 Specimen  Performing Laboratory

 

 Serum  [facility] DEPARTMENT OF PATHOLOGY AND [facility] MEDICINE







   [address] [place] [address]



Hepatitis B core antibody IgM (10/13/2017  5:33 AM)Only the most recent of2 
resultswithin the time period is included.





 Component  Value  Ref Range

 

 Hepatitis B core IgM  Non-reactive  Non-reactive









 Specimen  Performing Laboratory

 

 Blood  [facility]







   [address]







   [place] [address]



Hepatitis B surface antibody (10/13/2017  5:33 AM)Only the most recent of2 
resultswithin the time period is included.





 Component  Value  Ref Range

 

 Hepatitis B surface Ab  Non-reactive  Non-reactive









 Specimen  Performing Laboratory

 

 Blood  [facility] OF PATHOLOGY AND [facility] MEDICINE







   [address] [place] [address].







   [place] [address]



Hepatitis B surface antigen (10/13/2017  5:33 AM)Only the most recent of2 
resultswithin the time period is included.





 Component  Value  Ref Range

 

 Hepatitis B surface Ag  Non-reactive  Non-reactive









 Specimen  Performing Laboratory

 

 Blood  [facility] PATHOLOGY AND [facility] MEDICINE







   [address] [place] [address]



RADHA (10/13/2017  5:33 AM)Only the most recent of2 resultswithin the time period 
is included.





 Component  Value  Ref Range

 

 RADHA screen  Not Detected  Not-Detected









 Specimen  Performing Laboratory

 

 Blood  [facility] PATHOLOGY [address] [address]



Ferritin level (10/13/2017  5:33 AM)Only the most recent of2 resultswithin the 
time period is included.





 Component  Value  Ref Range

 

 Ferritin level  143  18 - 158 ng/mL









 Specimen  Performing Laboratory

 

 Serum  [facility]







   [address] [place] [address].







   [place] [address]



CT Abdomen Pelvis W Wo Contrast (10/13/2017  1:34 AM)





 Specimen  Performing Laboratory

 

   [address] [address]









 Narrative

 

 CT ABDOMEN PELVIS W WO CONTRAST







  







 CLINICAL INDICATION:cirrhosis







  







 TECHNIQUE: Multidetector CT of the abdomen and pelvis was performed before and 
then



 following intravenous administration of iodinated contrast with multiplanar



 reformats.







  







 CT scans are performed using radiation dose reduction techniques (iterative



 reconstruction and/or automated exposure control). Technical factors are 
evaluated



 and adjusted to ensure appropriate moderation of exposure. Automated dose 
management



 technology







  is applied to adjust radiation exposure while achieving a diagnostic quality 
image.







  







 COMPARISON:None. 







  







 FINDINGS:







  







 Lung bases:Dependent subsegmental atelectasis/scarring.







  







 Liver:Cirrhotic morphology with underlying hepatic steatosis. No arterial



 hyperenhancing lesions are visualized.







  







 Gallbladder and biliary:The gallbladder is absent. Clips within the 
gallbladder



 fossa. 







  







 Pancreas:Moderately atrophic with fatty replacement.







  







 Spleen: Enlarged measuring up to 15.5 cm.







  







 Gastrointestinal:Mild sigmoid diverticulosis. Large and small bowel are 
normal in



 caliber. Appendix is not visualized. No focal inflammatory changes within the 
right



 lower quadrant of the abdomen.







  







 Adrenals:Normal. 







  







 Kidneys and ureters:No mass or hydronephrosis.







  







 Urinary bladder: Decompressed.







  







 Lymph nodes:No enlarged lymph nodes in the abdomen or pelvis.







  







 Peritoneum:No ascites or free air.







  







 Vascular:Moderate-extensive atherosclerotic changes of the abdominal aorta 
and



 major branch vessels.







  







 Reproductive organs:Normal prostate gland and seminal vesicles.







  







 Abdominal wall: Spinal stimulator device in the right lower back subcutaneous 
soft



 tissues with partially visualized electrodes.







  







 Bones:Posterior spinal instrumentation at L4-S1.







  







 IMPRESSION:







  







 1. Cirrhosis. No focal or suspicious hepatic lesions visualized on liver 
protocol CT.







  







 2. Splenomegaly.







  







  







 [facility]-[id number]







 









 Procedure Note

 

 [facility], Radiology Results Incoming - 10/13/2017  1:55 AM CDT



CT ABDOMEN PELVIS W WO CONTRAST



 



 CLINICAL INDICATION:  cirrhosis



 



 TECHNIQUE: Multidetector CT of the abdomen and pelvis was performed before and 
then following intravenous administration of iodinated contrast with 
multiplanar reformats.



 



 CT scans are performed using radiation dose reduction techniques (iterative 
reconstruction and/or automated exposure control). Technical factors are 
evaluated and adjusted to ensure appropriate moderation of



 exposure. Automated dose management technology



  is applied to adjust radiation exposure while achieving a diagnostic quality 
image.



 



 COMPARISON:  None.



 



 FINDINGS:



 



 Lung bases:  Dependent subsegmental atelectasis/scarring.



 



 Liver:  Cirrhotic morphology with underlying hepatic steatosis. No arterial 
hyperenhancing lesions are visualized.



 



 Gallbladder and biliary:  The gallbladder is absent. Clips within the 
gallbladder fossa.



 



 Pancreas:  Moderately atrophic with fatty replacement.



 



 Spleen: Enlarged measuring up to 15.5 cm.



 



 Gastrointestinal:  Mild sigmoid diverticulosis. Large and small bowel are 
normal in caliber. Appendix is not visualized. No focal inflammatory changes 
within the right lower quadrant of the abdomen.



 



 Adrenals:  Normal.



 



 Kidneys and ureters:  No mass or hydronephrosis.



 



 Urinary bladder: Decompressed.



 



 Lymph nodes:  No enlarged lymph nodes in the abdomen or pelvis.



 



 Peritoneum:  No ascites or free air.



 



 Vascular:  Moderate-extensive atherosclerotic changes of the abdominal aorta 
and major branch vessels.



 



 Reproductive organs:  Normal prostate gland and seminal vesicles.



 



 Abdominal wall: Spinal stimulator device in the right lower back subcutaneous 
soft tissues with partially visualized electrodes.



 



 Bones:  Posterior spinal instrumentation at L4-S1.



 



 IMPRESSION:



 



 1. Cirrhosis. No focal or suspicious hepatic lesions visualized on liver 
protocol CT.



 



 2. Splenomegaly.



 



 



 [facility]-[id number]



Ammonia level (10/12/2017  5:45 PM)Only the most recent of2 resultswithin the 
time period is included.





 Component  Value  Ref Range

 

 Ammonia  33  3 - 37 umol/L









 Specimen  Performing Laboratory

 

 Plasma specimen  [facility]







   [address] [place] [address] [address]







   [place] [address]



Anti smooth muscle Ab titer (10/12/2017  1:27 PM)





 Component  Value  Ref Range

 

 Anti smooth muscle Ab titer  1:80 (A)  Not-Detected









 Specimen  Performing Laboratory

 

 Blood  [facility] DEPARTMENT OF PATHOLOGY AND GENOMIC MEDICINE







   [address] [place] [address]



Anti smooth muscle Ab screen (10/12/2017  1:27 PM)





 Component  Value  Ref Range

 

 Anti smooth muscle Ab screen  Detected (A)  Not-Detected









 Specimen  Performing Laboratory

 

 Blood  [facility] DEPARTMENT OF PATHOLOGY AND GENOMIC MEDICINE







   [address] [place] [address]



Hemoglobin &amp; hematocrit (10/12/2017  1:27 PM)





 Component  Value  Ref Range

 

 HGB  14.4  13.0 - 17.3 g/dL

 

 HCT  44.0  34.0 - 45.0 %









 Specimen  Performing Laboratory

 

 Blood  [facility]







   [address] [place] [address] [address]







   [place] [address]



Lactic acid level (10/11/2017 10:17 PM)Only the most recent of6 resultswithin 
the time period is included.





 Component  Value  Ref Range

 

 Lactic acid  1.4  0.5 - 2.2 mmol/L









 Specimen  Performing Laboratory

 

 Blood  [facility]







   [address] [place] [address] [address]







   [place] [address]



Bilirubin direct (10/11/2017  7:49 PM)Only the most recent of3 resultswithin 
the time period is included.





 Component  Value  Ref Range

 

 Bilirubin direct  0.1  0.0 - 0.4 mg/dL









 Specimen  Performing Laboratory

 

 Plasma specimen  [facility]







   [address]







   [place] [address]









 Narrative

 

 LA RESULT CALLED TO DAHLIA SUMNER NP10/11/[id number] BY BRIEN



Amylase level (10/11/2017  7:49 PM)Only the most recent of3 resultswithin the 
time period is included.





 Component  Value  Ref Range

 

 Amylase  59  34 - 122 U/L









 Specimen  Performing Laboratory

 

 Plasma specimen  [facility]







   [address]







   [place] [address]



CT Chest W Contrast Abdomen W Contrast Pelvis W Contrast (2017  9:31 PM)





 Specimen  Performing Laboratory

 

   [facility]







   [address] [place] [address]









 Narrative

 

 EXAMINATION:CT CHEST W CONTRAST ABDOMEN W CONTRAST PELVIS W CONTRAST







  







 CLINICAL HISTORY:RLL chest pain







  







 TECHNIQUE: Multiple axial images of the chest, abdomen, and pelvis were 
obtained



 following intravenous administration of iodinated contrast. Sagittal and 
coronal



 computerized reformatted images were obtained.Automatic exposure control and



 iterative 







 reconstruction techniques used to reduce dose.







  







 COMPARISON:2017







  







 FINDINGS:







  







 Chest: 







  Mild diffuse emphysematous changes throughout the lungs.







  







 Mild atelectasis in the lingula postoperative changes in the right middle 
lobe. Mild



 bronchiectasis in the bilateral upper and lower lobes. No suspicious focal 
pulmonary



 nodules or infiltrates present







  







 Small nonspecific subcentimeter mediastinal and hilar lymph nodes







  







 Diffuse calcified atherosclerotic vascular disease throughout the arterial



 structures.







  







 No pleural or pericardial effusions are present.







  







 Degenerative changes are present throughout the bony structures without 
evidence of a



 suspicious focal lesion.







  







 Abdomen/Pelvis:







  Diffuse cirrhotic changes are present throughout the liver without evidence 
of focal



 mass







  







 The gallbladder has been removed







  







 The spleen is enlarged







  







 Portal vein is patent







  







 Pancreas is unremarkable







  







 Subcentimeter adenoma in the left adrenal gland







  







 Kidneys are within normal limits







  







 No significant lymphadenopathy free fluid present







  







 Diffuse calcified atherosclerotic vascular disease throughout the arterial



 structures.







  







 No evidence of appendicitis







  







 Diffuse diverticulosis of colon without evidence of diverticulitis. The small 
bowel



 is unremarkable







  







 Diffuse calcified atherosclerotic vascular disease throughout the arterial



 structures.







  







 Pelvis:







  







 No significant lymphadenopathy, solid masses or free fluid present







  







 Degenerative changes are present throughout the bony structures without 
evidence of a



 suspicious focal lesion.







  







  







 IMPRESSION:







  







 Diffuse emphysematous changes throughout the lungs. No suspicious focal 
pulmonary



 nodules or infiltrates present







  







 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







  







 Diverticulosis without evidence of diverticulitis







  







 [facility]-[id number]







 









 Procedure Note

 

  Interface, Radiology Results Incoming - 2017  9:42 PM CDT



EXAMINATION:  CT CHEST W CONTRAST ABDOMEN W CONTRAST PELVIS W CONTRAST



 



 CLINICAL HISTORY:  RLL chest pain



 



 TECHNIQUE: Multiple axial images of the chest, abdomen, and pelvis were 
obtained following intravenous administration of iodinated contrast. Sagittal 
and coronal computerized reformatted images were obtained.Automatic exposure 
control and iterative



 reconstruction techniques used to reduce dose.



 



 COMPARISON:  2017



 



 FINDINGS:



 



 Chest:



  Mild diffuse emphysematous changes throughout the lungs.



 



 Mild atelectasis in the lingula postoperative changes in the right middle 
lobe. Mild bronchiectasis in the bilateral upper and lower lobes. No suspicious 
focal pulmonary nodules or infiltrates present



 



 Small nonspecific subcentimeter mediastinal and hilar lymph nodes



 



 Diffuse calcified atherosclerotic vascular disease throughout the arterial 
structures.



 



 No pleural or pericardial effusions are present.



 



 Degenerative changes are present throughout the bony structures without 
evidence of a suspicious focal lesion.



 



 Abdomen/Pelvis:



  Diffuse cirrhotic changes are present throughout the liver without evidence 
of focal mass



 



 The gallbladder has been removed



 



 The spleen is enlarged



 



 Portal vein is patent



 



 Pancreas is unremarkable



 



 Subcentimeter adenoma in the left adrenal gland



 



 Kidneys are within normal limits



 



 No significant lymphadenopathy free fluid present



 



 Diffuse calcified atherosclerotic vascular disease throughout the arterial 
structures.



 



 No evidence of appendicitis



 



 Diffuse diverticulosis of colon without evidence of diverticulitis. The small 
bowel is unremarkable



 



 Diffuse calcified atherosclerotic vascular disease throughout the arterial 
structures.



 



 Pelvis:



 



 No significant lymphadenopathy, solid masses or free fluid present



 



 Degenerative changes are present throughout the bony structures without 
evidence of a suspicious focal lesion.



 



 



 IMPRESSION:



 



 Diffuse emphysematous changes throughout the lungs. No suspicious focal 
pulmonary nodules or infiltrates present



 



 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



 



 Diverticulosis without evidence of diverticulitis



 



 [facility]-[id number]



Vancomycin level, trough (2017  9:07 AM)





 Component  Value  Ref Range

 

 Vancomycin, trough  18.9  10.0 - 20.0 ug/mL



   Comment:  



   Therapeutic Ranges:  



    Peak30.0 - 40.0 ug/mL  



    Fxujfp81.0 - 20.0 ug/mL  



     









 Specimen  Performing Laboratory

 

 Blood  [facility]







   [address] [place] [address] [address].







   [place] [address]



XR Chest 1 Vw Portable (2017  5:27 PM)Only the most recent of15 
resultswithin the time period is included.





 Specimen  Performing Laboratory

 

    [facility]







   [address] [place] [address]









 Narrative

 

 EXAMINATION:XR CHEST 1 VW PORTABLE







  







 CLINICAL HISTORY: PICC Line Insertion







  







 COMPARISON:2017







  







 IMPRESSION:







  







 Redemonstration of a right sided PICC with the catheter tip at the cavoatrial



 junction. Spinal canal stimulator leads are again noted. Furthermore, partial



 residual aeration of the lower cervical ACDF.







  







 Cardiomediastinal silhouette is within normal limits of size. Calcifications 
are



 identified within the aortic arch.







  







 Perihilar vascular congestion is present, not significant change from prior



 examination. Lungs are otherwise clear. Trace right-sided pleural effusion is



 suspected. No significant left-sided pleural effusion or pneumothorax 
identified. 







  







 Visualized osseous structures are intact.







  







  







 [facility]-[id number]







 









 Procedure Note

 

  Interface, Radiology Results Incoming - 2017  5:33 PM CDT



EXAMINATION:  XR CHEST 1 VW PORTABLE



 



 CLINICAL HISTORY: PICC Line Insertion



 



 COMPARISON:  2017



 



 IMPRESSION:



 



 Redemonstration of a right sided PICC with the catheter tip at the cavoatrial 
junction. Spinal canal stimulator leads are again noted. Furthermore, partial 
residual aeration of the lower cervical ACDF.



 



 Cardiomediastinal silhouette is within normal limits of size. Calcifications 
are identified within the aortic arch.



 



 Perihilar vascular congestion is present, not significant change from prior 
examination. Lungs are otherwise clear. Trace right-sided pleural effusion is 
suspected. No significant left-sided pleural effusion or pneumothorax 
identified.



 



 Visualized osseous structures are intact.



 



 



 [facility]-[id number]



Blood culture, aerobic &amp; anaerobic (2017  5:16 PM)Only the most 
recent of6 resultswithin the time period is included.





 Component  Value  Ref Range

 

 Blood culture isolate  No growth after 5 days of incubation.  



   Comment:  



   Specimen Information  



   Specimen Source: Blood  



   Specimen Site: Peripheral Hand Left  



     









 Specimen  Performing Laboratory

 

 Blood  [facility] DEPARTMENT OF PATHOLOGY AND GENOMIC MEDICINE







   [address] [place] [address]



PICC INSERTION (2017 12:19 PM)





 Narrative

 

 Cinthya Whitt 2017 12:19 PM







 PICC insertion







 Date/Time: 2017 12:16 PM







 Performed by: CINTHYA WHITT







 Authorized by: POPEYE STRICKLAND 







  







 Consent: 







 Consent obtained:Written







 Consent given by:Patient







 Risks discussed: arterial puncture, incorrect placement, nerve damage, 







 pneumothorax, infection, bleeding, superficial thrombus and deep vein 







 thrombus







 Universal protocol: 







 Procedure explained and questions answered to patient or proxy's 







 satisfaction: yes







 Relevant documents present and verified: yes







 Test results available and properly labeled: yes







 Imaging studies available: yes







 Required blood products, implants, devices, and special equipment 







 available: yes







 Site/side marked: yes







 Immediately prior to procedure, a time out was called: yes







 Patient identity confirmed:Verbally with patient, hospital-assigned 







 identification number and arm band







 Pre-procedure details: 







 Hand hygiene: Hand hygiene performed prior to insertion







 Sterile barrier technique: All elements of maximal sterile technique 







 followed







 Skin preparation:ChloraPrep







 Skin preparation agent: Skin preparation agent completely dried prior to 







 procedure







 Anesthesia (see MAR for exact dosages): 







 Anesthesia method:Local infiltration







 Local anesthetic:Lidocaine 1% w/o epi







 Route of administration:Subcutaneous







 PICC Line Placement Details (Will create an LDA): 







 Extremity Cirumference Upper (cm):30







 Extremity Circumference Forearm (cm):23







 Extremity Circumference Site:27







 Patient position:Flat







 Vessel Size (mm):5







 Indication:Known long term IV therapy







 Location:Right basilic







 Device Type:Valved







 Catheter size:5 Fr







 PICC Characteristics: 







 Catheter Brand:Power PICC solo catheter with sherlock 3CG tip 







 positioning system stylet







 External Catheter Length (cm):0







 Internal Catheter Length (cm):40







 Total Catheter Length (cm):40







 Catheter Lot Number:[id number]







 Catheter Expiration Date:3/31/2018







 Micro-Introducer Lot Number:[id number]







 Micro-Introducer Expiration Date:3/31/2018







 Procedure Details: 







 Landmarks identified: yes







 Ultrasound guidance: yes







 Sterile ultrasound techniques: Sterile gel and sterile probe covers were 







 used







 Number of attempts:1







 Number of PICC kits used during procedure:1







 Purpose of procedure:PICC Placement







 Successful PICC Placement: Yes







 Patency/Placement:Flushes without difficulty, flushed with 10 mL 







 normal saline and positive blood return







 PICC placed utlizing ultrasound-guided Modified Seldinger Technique: Yes 







  







 Dressing/Securement:Gauze applied, transparent semipermeable dressing 







 and catheter securement device







 Blood Loss Amount:Less than 20 mL







 Post-Procedure Details: 







 Post-procedure:Dressing applied







 Patient tolerance of procedure:Tolerated well, no immediate 







 complications







 Comments: 







  3CG confirmation obtained and placed in chart. Report given to Anay Arcos RN



CT Chest Wo Contrast (2017  1:37 PM)Only the most recent of2 
resultswithin the time period is included.





 Specimen  Performing Laboratory

 

    [facility]







   [address] [place] [address]









 Narrative

 

 EXAMINATION: CT CHEST WO CONTRAST







  







 CLINICAL HISTORY: chest tubeempyema







  







 TECHNIQUE:Axial images of the chest were obtained without intravenous 
contrast.



 The lack of intravenous contrast reduces the sensitivity of the exam and 
evaluating



 vasculature. CT imaging was performed with iterative reconstruction technique 
and/or 







 automated exposure control to reduce radiation dose.







  







 COMPARISON:CT chest dated 2017.







  







 FINDINGS:







  







 CHEST:







  







 1. Aorta: The thoracic aorta is nonaneurysmal with atherosclerotic disease. 
Main



 pulmonary artery is dilated measuring up to 4.3 cm consistent with pulmonary 
arterial



 hypertension.







  







 2. Heart: Heart is mildly enlarged with coronary artery calcification.







  







 3. Pericardial Fluid: Small pericardial effusion.







  







 4. Mediastinum: Multiple subcentimeter mediastinal lymph nodes are unchanged 
compared



 to previous examination and likely reactive. Limited evaluation of the hilum 
the



 absence of intravenous contrast material.







  







 5. Airways: Central airways are patent.







  







 6. Lungs: Slightly more prominent focus of nodular consolidation in the 
lingula on



 series 3, slice 69 compared to previous examination. Minimal left lower lobe 
and



 lingular atelectasis. Postoperative opacity extending along the major fissure 
with 







 associated calcification and surgical material is again unchanged.







  







 7. Pleural Fluid: Interval placement of a right-sided pleural catheter into the



 previously identified right sided empyema which has significantly decreased in 
size.



 Small amount of fluid and air persists.







  







 8. Bones: Osseous structures intact. No destructive bony lesions.







  







 9. Upper Abdomen: Nodular appearance of the liver consistent with cirrhosis. 
Spleen



 is at the upper limits of normal in size. Fatty infiltration of the pancreas.



 Gallbladder surgically absent.







  







 10. Other Findings: None







  







 IMPRESSION:







  







  







 1. Right pleural drainage catheter in place with significant decrease in size 
of a



 loculated right pleural effusion. Only a small amount of pleural fluid and air



 persists.







 2. Small nodular consolidation in the lingula is slightly more prominent 
compared to



 prior examination, likely infectious in etiology.







 3. Enlarged main pulmonary artery which can be seen with pulmonary arterial



 hypertension.







  







  







 [facility]-[id number]







 









 Procedure Note

 

  Interface, Radiology Results Incoming - 2017  2:01 PM CDT



EXAMINATION: CT CHEST WO CONTRAST



 



 CLINICAL HISTORY: chest tube  empyema



 



 TECHNIQUE:  Axial images of the chest were obtained without intravenous 
contrast. The lack of intravenous contrast reduces the sensitivity of the exam 
and evaluating vasculature. CT imaging was performed with iterative



 reconstruction technique and/or



 automated exposure control to reduce radiation dose.



 



 COMPARISON:  CT chest dated 2017.



 



 FINDINGS:



 



 CHEST:



 



 1. Aorta: The thoracic aorta is nonaneurysmal with atherosclerotic disease. 
Main pulmonary artery is dilated measuring up to 4.3 cm consistent with 
pulmonary arterial hypertension.



 



 2. Heart: Heart is mildly enlarged with coronary artery calcification.



 



 3. Pericardial Fluid: Small pericardial effusion.



 



 4. Mediastinum: Multiple subcentimeter mediastinal lymph nodes are unchanged 
compared to previous examination and likely reactive. Limited evaluation of the 
hilum the absence of intravenous contrast material.



 



 5. Airways: Central airways are patent.



 



 6. Lungs: Slightly more prominent focus of nodular consolidation in the 
lingula on series 3, slice 69 compared to previous examination. Minimal left 
lower lobe and lingular atelectasis. Postoperative opacity extending along the 
major fissure with



 associated calcification and surgical material is again unchanged.



 



 7. Pleural Fluid: Interval placement of a right-sided pleural catheter into 
the previously identified right sided empyema which has significantly decreased 
in size. Small amount of fluid and air persists.



 



 8. Bones: Osseous structures intact. No destructive bony lesions.



 



 9. Upper Abdomen: Nodular appearance of the liver consistent with cirrhosis. 
Spleen is at the upper limits of normal in size. Fatty infiltration of the 
pancreas. Gallbladder surgically absent.



 



 10. Other Findings: None



 



 IMPRESSION:



 



 



 1. Right pleural drainage catheter in place with significant decrease in size 
of a loculated right pleural effusion. Only a small amount of pleural fluid and 
air persists.



 2. Small nodular consolidation in the lingula is slightly more prominent 
compared to prior examination, likely infectious in etiology.



 3. Enlarged main pulmonary artery which can be seen with pulmonary arterial 
hypertension.



 



 



 [facility]-[id number]



Aerobic culture (2017  4:40 PM)Only the most recent of2 resultswithin the 
time period is included.





 Component  Value  Ref Range

 

 Aerobic culture isolate  Streptococcus anginosus  



   Recovered in Broth only:  



   The performance characteristics of this assay on this isolate  



   were validated by the Microbiology Laboratory at [facility].This source has not been approved by the U.S. Food  



   and Drug Administration.The results are not intended to be  



   used as the sole means for clinical diagnosis or patient  



   management.The Microbiology Laboratory is authorized under  



   the clinical Laboratory Improvement Amendments of 1988  



   (CLIA-88) to perform high complexity testing.  



     (A)  



   Comment:  



   Specimen Information  



   Specimen Source: Pleural fluid  



   Specimen Site: Right Pleural Fluid  



     









 Specimen  Performing Laboratory

 

 Pleural fluid  [facility] DEPARTMENT OF PATHOLOGY AND [facility] MEDICINE







   [address] [place] [address]









 Organism  Antibiotic  Method  Susceptibility

 

 Streptococcus anginosus  Clindamycin  RACHEL  >2 mcg/mL: Resistant

 

 Streptococcus anginosus  Ceftriaxone  RACHEL  <=0.0625 mcg/mL: Susceptible

 

 Streptococcus anginosus  Cefotaxime  RACHEL  <=0.0625 mcg/mL: Susceptible

 

 Streptococcus anginosus  Penicillin G  RACHEL  <=0.80663 mcg/mL: Susceptible

 

 Streptococcus anginosus  Vancomycin  RACHEL  1 mcg/mL: Susceptible



Gram stain (2017  4:40 PM)Only the most recent of2 resultswithin the time 
period is included.





 Component  Value  Ref Range

 

 Gram stain isolate  Many WBC's  



   Many Gram positive cocci in pairs  



     



   Comment:  



   Specimen Information  



   Specimen Source: Pleural fluid  



   Specimen Site: Right Pleural Fluid  



     









 Specimen  Performing Laboratory

 

 Pleural fluid  [facility] [place] [address]



CT Needle Biopsy No Contrast (2017 10:00 AM)Only the most recent of3 
resultswithin the time period is included.





 Specimen  Performing Laboratory

 

   [facility]







   [address] [place] [address]









 Narrative

 

 Clinical history: Empyema







  







 drainage , right pleural effusion







  







 TECHNIQUE:







 DLP:Technical factors are evaluated and adjusted to ensure appropriate



 moderation of exposure. Automated dose management technology is supplied to 
adjust



 the radiation dose to minimize exposure while achieving a diagnostic quality 
image.







  







  







 PROCEDURE: CT-GUIDED PERCUTANEOUS CATHETER ABSCESS DRAINAGE.







  







 Informed consent was obtained. The procedure and risks as well as other 
options were



 discussed.







 The patient was fully monitored and given moderate sedation.







 Doctor-patient face-to-face time was 30 minutes..







 The patient was prepped and draped in a sterile fashion. Measurements were 
obtained



 on the CT computer showing abscess outline and adjacent anatomic structures. 
Angle



 and depth of abscess drain placement was determined.







 Local anesthetic was given.







 A Yueh needle was placed into the abscess.







  







 Needle placement was documented with CT images.







 An Amplatzguidewire was inserted into the Yueh sheath after the Yueh 
needle was



 removed. Fascial dilators were placed over the guidewire that were sized to 
match the



 catheter size. An 8.5 French pigtail all-purpose drainage catheter was 
inserted over 







 the guidewire.







  







 The drainage catheter position was documented with additional CT images.







 The pigtail catheter was locked and abscess fluid was removed with a syringe 
and sent



 for culture and sensitivity..







  







 The catheter was fastened with monofilament suture and left to gravity 
drainage.







 The patient tolerated the procedure well and left the radiology department in 
good



 condition.







  







 IMPRESSION:







  







 There were no complications 







 150 cc of purulent material were sent to the laboratory for culture 
sensitivity and



 Gram stain.







  







 [id number] 







 









 Procedure Note

 

 Hm Interface, Radiology Results Incoming - 2017  3:21 PM CDT



Clinical history: Empyema



 



 drainage , right pleural effusion



 



 TECHNIQUE:



 DLP:    Technical factors are evaluated and adjusted to ensure appropriate 
moderation of exposure. Automated dose management technology is supplied to 
adjust the radiation dose to minimize exposure while achieving a diagnostic 
quality image.



 



 



 PROCEDURE: CT-GUIDED PERCUTANEOUS CATHETER ABSCESS DRAINAGE.



 



 Informed consent was obtained. The procedure and risks as well as other 
options were discussed.



 The patient was fully monitored and given moderate sedation.



 Doctor-patient face-to-face time was 30 minutes..



 The patient was prepped and draped in a sterile fashion. Measurements were 
obtained on the CT computer showing abscess outline and adjacent anatomic 
structures. Angle and depth of abscess drain placement was determined.



 Local anesthetic was given.



 A Yueh needle was placed into the abscess.



 



 Needle placement was documented with CT images.



 An Amplatz  guidewire was inserted into the Yueh sheath after the Yueh needle 
was removed. Fascial dilators were placed over the guidewire that were sized to 
match the catheter size. An 8.5 French pigtail all-purpose



 drainage catheter was inserted over



 the guidewire.



 



 The drainage catheter position was documented with additional CT images.



 The pigtail catheter was locked and abscess fluid was removed with a syringe 
and sent for culture and sensitivity..



 



 The catheter was fastened with monofilament suture and left to gravity 
drainage.



 The patient tolerated the procedure well and left the radiology department in 
good condition.



 



 IMPRESSION:



 



 There were no complications



 150 cc of purulent material were sent to the laboratory for culture 
sensitivity and Gram stain.



 



 [id number]



Hemoglobin A1c (2017  5:45 AM)





 Component  Value  Ref Range

 

 Hemoglobin A1C  7.5 (H)  4.0 - 6.0 %



   Comment:  



   *****************************************************  



   Less than 6% - Goal of therapy for Type II Diabetes  



   Less than 7%-Goal of therapy for Type I Diabetes  



   Less than 8%-Acceptable control for Type I or Type  



   II Diabetes  



   Greater than 8%-Unacceptable control; action indicated.  



   (ADA94)  



     









 Specimen  Performing Laboratory

 

 Blood  [facility]







   [address]







   [place] [address]



CT Abdomen Pelvis W Contrast (2017  4:49 PM)





 Specimen  Performing Laboratory

 

    [facility]







   [address] [place] [address]









 Narrative

 

 EXAMINATION:CT ABDOMEN PELVIS W CONTRAST







  







 CLINICAL HISTORY:ABDOMINAL PAIN







  







 TECHNIQUE:







  







  Multiple axial images of the abdomen and pelvis were obtained following 
intravenous



 administration of iodinated contrast. Sagittal and coronal computerized 
reformatted



 images were also obtained. Approximately 80 cc of Omnipaque 300 was used.







  







 All CT scan performed using radiation dose reduction techniques. Technical 
factors



 are evaluated and adjusted to ensure appropriate moderation of exposure. 
Automated



 dose management technology is applied to adjust the radiation dose to minimize 
expose



 







 whileachieving a diagnostic quality image.







  







  







 COMPARISON:CT chest 2017..







  







 FINDINGS:







  







 Lung bases: A loculated wall enhancing moderate pleural effusion within the 
posterior



 aspect of the right lung base is again seen, suggesting of empyema.. A right 
middle



 lobe and right lower lobe masslike density with curvilinear calcification is 
again 







 noted..







  







 Liver: Hepatic cirrhosis is seen. There is no intrahepatic biliary dilatation 
or



 enhancing mass.







  







 Gallbladder: Surgically absent.







  







 Pancreas: The pancreas is normal in caliber and attenuation. No inflammatory 
process.



 The pancreatic duct is within normal limits.







  







 Spleen: Splenomegaly is seen. The spleen is otherwise unremarkable...







  







 Kidneys and ureters: The kidneys function symmetrically. There is no enhancing 
renal



 lesion. No hydronephrosis or renal stone. The ureters are normal in course and



 caliber.







  







 Adrenal glands: Unremarkable.







  







 GI tract: The small bowel is normal in course and caliber. The colon is 
unremarkable.



 No bowel wall thickening is identified. There is no acute inflammatory 
process. The



 appendix is not seen. No right lower quadrant inflammation is seen. The 
stomach is 







 unremarkable







  







 Ascites: None.







  







 Pelvis:







  







 The urinary bladder is within normal limits.







  







 Limited evaluation of the prostate gland is unremarkable. Incidental note is 
made of



 right hydrocele..







  







 Bones: Unremarkable.







  







 Retroperitoneum: No retroperitoneal or mesenteric lymphadenopathy is seen. 
Scattered



 atherosclerotic disease is seen.No abnormal aortic aneurysm is seen.. The



 visceral and mesenteric vascular branches are patent.







  







 Abdominal wall: Unremarkable.







  







  







 IMPRESSION:







  







 Hepatitic cirrhosis with splenomegaly. No CT evidence of hepatitic mass.







  







 No CT evidence of colitis or bowel obstruction.







  







 Findings again suggesting of a moderate right empyema.







  







  







 [id number]







 









 Procedure Note

 

 Hm Interface, Radiology Results Incoming - 2017  5:06 PM CDT



EXAMINATION:  CT ABDOMEN PELVIS W CONTRAST



 



 CLINICAL HISTORY:      ABDOMINAL PAIN



 



 TECHNIQUE:



 



  Multiple axial images of the abdomen and pelvis were obtained following 
intravenous administration of iodinated contrast. Sagittal and coronal 
computerized reformatted images were also obtained. Approximately 80 cc of 
Omnipaque 300 was used.



 



 All CT scan performed using radiation dose reduction techniques. Technical 
factors are evaluated and adjusted to ensure appropriate moderation of 
exposure. Automated dose management technology is applied to adjust the



 radiation dose to minimize expose



 while  achieving a diagnostic quality image.



 



 



 COMPARISON:  CT chest 2017..



 



 FINDINGS:



 



 Lung bases: A loculated wall enhancing moderate pleural effusion within the 
posterior aspect of the right lung base is again seen, suggesting of empyema.. 
A right middle lobe and right lower lobe masslike density with



 curvilinear calcification is again



 noted..



 



 Liver: Hepatic cirrhosis is seen. There is no intrahepatic biliary dilatation 
or enhancing mass.



 



 Gallbladder: Surgically absent.



 



 Pancreas: The pancreas is normal in caliber and attenuation. No inflammatory 
process. The pancreatic duct is within normal limits.



 



 Spleen: Splenomegaly is seen. The spleen is otherwise unremarkable...



 



 Kidneys and ureters: The kidneys function symmetrically. There is no enhancing 
renal lesion. No hydronephrosis or renal stone. The ureters are normal in 
course and caliber.



 



 Adrenal glands: Unremarkable.



 



 GI tract: The small bowel is normal in course and caliber. The colon is 
unremarkable. No bowel wall thickening is identified. There is no acute 
inflammatory process. The appendix is not seen. No right lower quadrant



 inflammation is seen. The stomach is



 unremarkable



 



 Ascites: None.



 



 Pelvis:



 



 The urinary bladder is within normal limits.



 



 Limited evaluation of the prostate gland is unremarkable. Incidental note is 
made of right hydrocele..



 



 Bones: Unremarkable.



 



 Retroperitoneum: No retroperitoneal or mesenteric lymphadenopathy is seen. 
Scattered atherosclerotic disease is seen.  No abnormal aortic aneurysm is 
seen.. The visceral and mesenteric vascular branches are patent.



 



 Abdominal wall: Unremarkable.



 



 



 IMPRESSION:



 



 Hepatitic cirrhosis with splenomegaly. No CT evidence of hepatitic mass.



 



 No CT evidence of colitis or bowel obstruction.



 



 Findings again suggesting of a moderate right empyema.



 



 



 [id number]



CT Chest W Contrast (2017  7:21 PM)





 Specimen  Performing Laboratory

 

    [facility]







   [address] [address]









 Narrative

 

 Study:CT CHEST W CONTRAST







  







 History: R chest wall tendernessrecent biopsy







  







 COMPARISON: 2017, 2017







  







 TECHNIQUE: Multiple axial CT images of the chest performed With IV contrast.. 
Coronal



 and sagittal reconstructions were done. 







  







 CT imaging was performed with iterative reconstruction technique and/or 
automated



 exposure control to reduce radiation dose.







  







 FINDINGS:







  







 LUNGS: The opacity in the right lung is mainly right middle lobe and right 
lower lobe



 and is branching straddles the right major fissure difficult to measure but is



 approximately 3.6 x 1.7 x 2.8 cm associated with some linear calcification. 
This



 appears 







 more lobulated when compared to prior exam but is similar in size. The right 
lower



 lobe posterior and medial loculated pleural collection has become more 
lobulated when



 compared to the prior exam measuring up to 8 cm in greatest dimension with 
some 







 surrounding lung parenchymal thickening. Focal opacities in the lingula from 
the



 prior exams have resolved. Some residual tiny nodular opacities in the lingula



 measure 6 mm in image 72 series 3. No pneumothorax.







  







 AIRWAYS: No central endobronchial or endotracheal lesions are seen.







  







 MEDIASTINUM: The thoracic aorta is without aneurysm or dissection. The main 
pulmonary



 artery is dilated to about 24.4 cm in diameter indicating pulmonary arterial



 hypertension similar to the prior exams. Heart is upper limits of normal in 
size with



 







 atherosclerotic calcification of the coronary arteries. No significant 
pericardial



 effusion is present. Small mediastinal lymph nodes are similar to the prior 
exam.



 Partially calcified right hilar lymph node measuring up to 2 cm also stable. 
The 







 esophagus is unremarkable.







  







 BONES AND OVERLYING SOFT TISSUES: The visualized bones are without destructive



 lesions. No enlarged axillary lymph nodes present. There is no acute 
abnormality of



 the chest wall.







  







 VISUALIZED LOWER NECK AND UPPER ABDOMEN: Liver is cirrhotic. Visualized lower







 Unremarkable.







  







 IMPRESSION:







  







 No acute abnormality of the right chest wall.







  







 Branching lobulated right middle and right lower lobe opacity straddling the 
right



 major fissure associated with some linear calcification probably postoperative.



 Previous opacities in the lingula have resolved.







  







 A pleural collection in the posterior medial right lower lobe has become more



 loculated.







  







  







 [id number] 







 









 Procedure Note

 

  Interface, Radiology Results Incoming - 2017  7:50 PM CDT



Study:CT CHEST W CONTRAST



 



 History: R chest wall tenderness  recent biopsy



 



 COMPARISON: 2017, 2017



 



 TECHNIQUE: Multiple axial CT images of the chest performed With IV contrast.. 
Coronal and sagittal reconstructions were done.



 



 CT imaging was performed with iterative reconstruction technique and/or 
automated exposure control to reduce radiation dose.



 



 FINDINGS:



 



 LUNGS: The opacity in the right lung is mainly right middle lobe and right 
lower lobe and is branching straddles the right major fissure difficult to 
measure but is approximately 3.6 x 1.7 x 2.8 cm associated with some



 linear calcification. This appears



 more lobulated when compared to prior exam but is similar in size. The right 
lower lobe posterior and medial loculated pleural collection has become more 
lobulated when compared to the prior exam measuring up to 8 cm in greatest 
dimension with some



 surrounding lung parenchymal thickening. Focal opacities in the lingula from 
the prior exams have resolved. Some residual tiny nodular opacities in the 
lingula measure 6 mm in image 72 series 3. No pneumothorax.



 



 AIRWAYS: No central endobronchial or endotracheal lesions are seen.



 



 MEDIASTINUM: The thoracic aorta is without aneurysm or dissection. The main 
pulmonary artery is dilated to about 24.4 cm in diameter indicating pulmonary 
arterial hypertension similar to the prior exams. Heart is upper limits of 
normal in size with



 atherosclerotic calcification of the coronary arteries. No significant 
pericardial effusion is present. Small mediastinal lymph nodes are similar to 
the prior exam. Partially calcified right hilar lymph node measuring up to 2 cm 
also stable. The



 esophagus is unremarkable.



 



 BONES AND OVERLYING SOFT TISSUES: The visualized bones are without destructive 
lesions. No enlarged axillary lymph nodes present. There is no acute 
abnormality of the chest wall.



 



 VISUALIZED LOWER NECK AND UPPER ABDOMEN: Liver is cirrhotic. Visualized lower



 Unremarkable.



 



 IMPRESSION:



 



 No acute abnormality of the right chest wall.



 



 Branching lobulated right middle and right lower lobe opacity straddling the 
right major fissure associated with some linear calcification probably 
postoperative. Previous opacities in the lingula have resolved.



 



 A pleural collection in the posterior medial right lower lobe has become more 
loculated.



 



 



 [id number]



XR Ribs 2 Vw Right (2017  3:55 PM)





 Specimen  Performing Laboratory

 

   [facility]







   [address] [place] [address]









 Narrative

 

 EXAM:XR RIBS 2 VW RIGHT







  







 HISTORY:CHEST PAINNORMAL EKG







  







 COMPARISON:None available







  







 IMPRESSION:







  







  







 1. No displaced rib fracture or acute osseous abnormality.







 2. Postsurgical changes are in the right middle lobe. There is atelectasis or



 scarring in the right middle lobe and right lower lobe. There is a small right



 pleural effusion or pleural thickening.







 3. The cardiac silhouette is not enlarged. The thoracic aorta is 
atherosclerotic.







 4. A spinal stimulator device projects over the lower thoracic spine. Cervical 
spine



 fusion hardware is partially visualized.







  







  







  







  







 [id number]







 









 Procedure Note

 

 [facility], Radiology Results Incoming - 2017  4:50 PM CDT



EXAM:  XR RIBS 2 VW RIGHT



 



 HISTORY:  CHEST PAIN  NORMAL EKG



 



 COMPARISON:  None available



 



 IMPRESSION:



 



 



 1. No displaced rib fracture or acute osseous abnormality.



 2. Postsurgical changes are in the right middle lobe. There is atelectasis or 
scarring in the right middle lobe and right lower lobe. There is a small right 
pleural effusion or pleural thickening.



 3. The cardiac silhouette is not enlarged. The thoracic aorta is 
atherosclerotic.



 4. A spinal stimulator device projects over the lower thoracic spine. Cervical 
spine fusion hardware is partially visualized.



 



 



 



 



 [id number]



Fungus smear (2017 10:03 AM)





 Component  Value  Ref Range

 

 Fungus smear  No fungi observed.  



   Comment:  



   Specimen Information  



   Specimen Source: Tissue  



   Specimen Site: Lung, right middle lobe  



     









 Specimen  Performing Laboratory

 

 Tissue - Lung, right middle lobe  [facility] DEPARTMENT OF PATHOLOGY AND GENOMIC 
MEDICINE







   [address] [place] [address]



AFB culture (2017 10:03 AM)





 Component  Value  Ref Range

 

 AFB culture isolate  No growth after 6 weeks of incubation.  



   Comment:  



   Specimen Information  



   Specimen Source: Tissue  



   Specimen Site: Lung, right middle lobe  



     









 Specimen  Performing Laboratory

 

 Tissue - Lung, right middle lobe  [facility] DEPARTMENT OF PATHOLOGY AND GENOMIC 
MEDICINE







   [address] [place] [address]



AFB stain (2017 10:03 AM)





 Component  Value  Ref Range

 

 AFB stain  No acid fast bacilli (AFB) seen.  



   Comment:  



   Specimen Information  



   Specimen Source: Tissue  



   Specimen Site: Lung, right middle lobe  



     









 Specimen  Performing Laboratory

 

 Tissue - Lung, right middle lobe  [facility] DEPARTMENT OF PATHOLOGY AND GENOMIC 
MEDICINE







   [address] [place] [address]



Fungus culture (2017 10:03 AM)





 Component  Value  Ref Range

 

 Fungus culture isolate  No growth after 4 weeks of incubation.  



   Comment:  



   Specimen Information  



   Specimen Source: Tissue  



   Specimen Site: Lung, right middle lobe  



     









 Specimen  Performing Laboratory

 

 Tissue - Lung, right middle lobe  [facility] DEPARTMENT OF PATHOLOGY AND GENOMIC 
MEDICINE







   [address] [place] [address]



Anaerobic culture (2017 10:03 AM)





 Component  Value  Ref Range

 

 Anaerobic culture isolate  No anaerobic organisms isolated.  



   Comment:  



   Specimen Information  



   Specimen Source: Tissue  



   Specimen Site: Lung, right middle lobe  



     









 Specimen  Performing Laboratory

 

 Tissue - Lung, right middle lobe  [facility] DEPARTMENT OF PATHOLOGY AND GENOMIC 
MEDICINE







   [address] [place] [address]



Type and screen (2017  2:19 PM)





 Component  Value  Ref Range

 

 ABO grouping  A  

 

 Rh type  POS  

 

 Antibody screen (gel)  NEG  









 Specimen  Performing Laboratory

 

 Blood  [facility]







   [address]







   [place] [address]



Spirometry pre &amp; post w/ bronchodilator (2017 12:31 PM)Echocardiogram 
complete w contrast and 3D if needed (2017  8:37 AM)





 Component  Value  Ref Range

 

 Velocity Ratio (V1/V2)  0.78  m/s

 

 IVS,d  1.10  0.6 - 1.2 cm

 

 EF  66.08  %

 

 LVPWD,d  1.06  cm

 

 AoV Mean PG  3.27  mmHg

 

 AV LVOT peak gradient  4.37  mmHg

 

 MV mean gradient  1.22  mmHg

 

 MV valve area p 1/2 method  4.51  cm2

 

 PV Pk Grad  3.08  mmHg

 

 E/A ratio  0.88  

 

 E wave decelartion time  168.22  msec

 

 LVOT Diam,S  1.81  cm

 

 LVOT area  2.57  cm2

 

 LVOT Vmax  1.05  m/s

 

 LVOT VTI  0.18  m

 

 AoV Peak PG  5.94  mmHg

 

 MV Peak E Tristan  0.67  m/s

 

 MV stenosis pressure 1/2 time  48.78  ms

 

 MV Peak A Tristan  0.76  m/s

 

 BSA  2.04  m2

 

 AoV Area, Vmax  2.00  cm2

 

 AoV Area, VTI  2.53  cm2

 

 AoV Vmax  1.34  m/s

 

 BSA Snow  2.04  m2

 

 BSA Haycock  2.04  m2

 

 IVS/LVPW,2D  1.04  

 

 Left Atrium Dimension Anterior  2.77  cm

 

 LV,d  4.02  cm

 

 LV,s  2.57  cm

 

 PV VMAX  0.88  m/s

 

 TR Vpeak  3.98  mm/s

 

 BMI  24.41  kg/m2

 

 MV E A ratio  0.88  mmHg

 

 TR pk grad  45.78  mmHg

 

 AoV area i VTI BSA Balta  1.26  cm2/m2

 

 MR peak grad  3.46  mmHg

 

 Ao Root Diameter  3.31  cm

 

 LV SYS VOL  23.99  ml

 

 LV FALK VOL  70.73  ml

 

 LV SI Teich 2D  22.95  ml/m2

 

 LV SV Teich 2D  46.75  ml

 

 LV Vol s Teich PSAX  23.99  ml

 

 LVOT CI  2.02  l/min/m2

 

 LVOT CO  4.13  l/min

 

 LVOT HR for LVOT CO  89.07  bpm

 

 LVOT SI  22.70  ml/m2

 

 MV Vmax  0.93  m

 

 MV VTI Tips  0.15  m

 

 AoV Vmn  0.84  

 

 IVS s 2D  1.72  

 

 LV FS Cube 2D  35.95  

 

 LV FS Teich 2D  35.95  

 

 Ao Root Diameter  3.31  cm

 

 AoV VTI  0.18  m

 

 LV EF,2D  73.72  %

 

 MV AE ratio  1.14  

 

 LVOT Vmn  0.79  

 

 Pt Size  182.88  

 

 Pt Wt  81.65  

 

 Aov area Vmn  2.36  cm2

 

 LVOT mean grad  2.69  mmHg

 

 MAX Pred HR  166.64  

 

 85 of MPHR  141.64  

 

 AoV area I VMN bsa  1.16  cm2/m2

 

 Calc MPHR  166.64  bpm

 

 IVS pct thck PLAX  55.63  %

 

 LV SI Cube 2D  23.47  ml/m2

 

 LV SV Cube 2D  47.81  ml

 

 LV vol d cube 2D  64.85  ml

 

 LV vol s cube 2D  17.04  ml

 

 LVPW pct thck PLAX  56.42  %

 

 LVPW s PLAX  1.66  cm

 

 MV Decel slope  3.98  m/s2

 

 Pred Exer Dur R1  9.73  

 

 Pred METS R1  10.00  









 Specimen  Performing Laboratory

 

    [facility]







   [address] [place] [address]









 Narrative

 

  Left Ventricle: Left Ventricular ejection fraction is 65 - 70%. The left 







 ventricular chamber size is







  Trace mitral valve regurgitation







  Pulmonic Valve: Mild pulmonary valve regurgitation.







  Pericardium: No pericardial effusion seen.







 



Hepatitis acute panel (2017  3:51 AM)





 Component  Value  Ref Range

 

 Hepatitis A IgM  Non-reactive  Non-reactive

 

 Hepatitis B core IgM  Non-reactive  Non-reactive

 

 Hepatitis B surface Ag  Non-reactive  Non-reactive

 

 Hepatitis C Ab  Non-reactive  Non-reactive









 Specimen  Performing Laboratory

 

 Serum  [facility]IC[facility]







   [address]







   [place] [address]



Phosphorus level (2017  3:51 AM)Only the most recent of2 resultswithin 
the time period is included.





 Component  Value  Ref Range

 

 Phosphorus  4.9 (H)  2.5 - 4.5 mg/dL









 Specimen  Performing Laboratory

 

 Plasma specimen  [facility] OF [facility]OMIC [facility]IC[facility]







   [address]







   [place] [address]



CRITICAL CARE (2017  8:18 PM)





 Narrative

 

 Soledad Emanuel [place] 20178:18 PM







 Critical Care







 Performed by: SOLEDAD EMANUEL







 Authorized by: SOLEDAD EMANUEL 







  







 Critical care provider statement: 







 Critical care time (minutes):40







 Critical care time was exclusive of:Separately billable procedures and 







 treating other patients







 Critical care was necessary to treat or prevent imminent or 







 life-threatening deterioration of the following conditions:Circulatory 







 failure and respiratory failure







 Critical care was time spent personally by me on the following 







 activities:Development of treatment plan with patient or surrogate, 







 discussions with consultants, discussions with primary provider, 







 evaluation of patient's response to treatment, examination of patient, 







 obtaining history from patient or surrogate, interpretation of cardiac 







 output measurements, ordering and performing treatments and interventions, 







 ordering and review of laboratory studies, ordering and review of 







 radiographic studies, pulse oximetry, re-evaluation of patient's condition 







 and review of old charts



CT Head W Wo Contrast (2017 11:14 AM)





 Specimen  Performing Laboratory

 

   [facility]







   [address] [place] [id number]









 Narrative

 

 EXAMINATION:CT HEAD W WO CONTRAST







  







 CLINICAL HISTORY:LUNG CANCERUNSPECIFIED







  







 COMPARISON:2015







  







  







 FINDINGS:







  







  







  







 Axial images were obtained before and after intravenous contrast infusion.







  







  CT scans are performed using radiation dose reduction techniques. Technical 
factors



 are evaluated and adjusted to ensure appropriate moderation of exposure. 
Automated



 dose management technology is applied to adjust radiation exposure while 
achieving a 







 highly diagnostic quality image..







  







 The ventricular system and subarachnoid spaces are dilated consistent with



 age-related atrophic changes. There are minimal white matter lucencies in the 
centrum



 semiovale bilaterally consistent with minimal nonspecific chronic microvascular



 ischemic 







 changes appropriate for the patient's age.







  







 After intravenous contrast infusion, there was no evidence of abnormal 
enhancement of



 the brain parenchyma or the leptomeninges to suggest metastatic disease. There 
is



 visualization of a normal vascular enhancement pattern within the arterial 
venous 







 structures.







  







 There is no gross bone destruction.







  







  







 IMPRESSION:







  







 Stable mild age-appropriate involutional changes.







  







 No enhancing lesions to suggest intracranial metastatic disease.







  







 No gross bone destruction.







  







  







 [facility]-[id number]







 









 Procedure Note

 

 Hm Interface, Radiology Results Incoming - 2017 11:22 AM CDT



EXAMINATION:  CT HEAD W WO CONTRAST



 



 CLINICAL HISTORY:  LUNG CANCER  UNSPECIFIED



 



 COMPARISON:  2015



 



 



 FINDINGS:



 



 



 



 Axial images were obtained before and after intravenous contrast infusion.



 



  CT scans are performed using radiation dose reduction techniques. Technical 
factors are evaluated and adjusted to ensure appropriate moderation of 
exposure. Automated dose management technology is applied to



 adjust radiation exposure while achieving a



 highly diagnostic quality image..



 



 The ventricular system and subarachnoid spaces are dilated consistent with age-
related atrophic changes. There are minimal white matter lucencies in the 
centrum semiovale bilaterally consistent with minimal nonspecific chronic 
microvascular ischemic



 changes appropriate for the patient's age.



 



 After intravenous contrast infusion, there was no evidence of abnormal 
enhancement of the brain parenchyma or the leptomeninges to suggest metastatic 
disease. There is visualization of a normal vascular enhancement pattern within 
the arterial venous



 structures.



 



 There is no gross bone destruction.



 



 



 IMPRESSION:



 



 Stable mild age-appropriate involutional changes.



 



 No enhancing lesions to suggest intracranial metastatic disease.



 



 No gross bone destruction.



 



 



 [facility]-[id number]



after 2017



Insurance







 Payer  Benefit Plan / Group  Subscriber ID  Type  Phone  Address

 

 [employer]ARE  [employer] MEDICARE HMO/PPO [facility]  xxxxxxxx  HMO    









 Guarantor Name  Account Type  Relation to  Date of  Phone  Billing Address



     Patient  Birth    

 

 CLEVELAND BARKLEY  Personal/Famil  Self  1965  Home:  [address]   y      [phone]  [address]







         [address]  







           [place]



           [address]

## 2018-06-12 NOTE — XMS REPORT
Patient Summary Document

 Created on:2018



Patient:CARLOS BARKLEY

Sex:Male

:1965

External Reference #:[id number]





Demographics







 Address  [address]



   [place] [address]

 

 Home Phone  [phone]

 

 Email Address  NONE

 

 Preferred Language  Unknown

 

 Marital Status  Unknown

 

 Adventism Affiliation  Unknown

 

 Race  Unknown

 

 Additional Race(s)  Unavailable

 

 Ethnic Group  Unknown









Author







 Organization  [facility]

 

 Address  [address]



   [place] [address]

 

 Phone  [phone]









Care Team Providers







 Name  Role  Phone

 

 BUD SAHU  Unavailable  Unavailable









Problems

This patient has no known problems.



Allergies, Adverse Reactions, Alerts

This patient has no known allergies or adverse reactions.



Medications

This patient has no known medications.



Results







 Test Description  Test Time  Test Comments  Text Results  Atomic Results  
Result Comments









 Comprehensive Metabolic Panel  2017 04:50:00    









   Test Item  Value  Reference Range  Comments









 Sodium (test code=NA)  137 mmol/L  135-145  

 

 Potassium (test code=K)  4.1 mmol/L  3.5-5.1  

 

 Chloride (test code=CL)  101 mmol/L    

 

 Carbon Dioxide (test  23 mmol/L  22-29  



 code=CO2)      

 

 Glucose (test code=GLU)  145 mg/dL    

 

 Blood Urea Nitrogen (test  9 mg/dL  6-20  



 code=BUN)      

 

 Creatinine (test code=CREAT)  1.0 mg/dL  0.7-1.2  

 

 Calcium (test code=CA)  9.6 mg/dL  8.3-10.5  

 

 Prot Total (test code=TP)  8.0 g/dL  6.4-8.3  

 

 Albumin (test code=ALB)  3.8 g/dL  3.5-5.2  

 

 A/G Ratio (test  0.9 Ratio    



 code=AGRATIO)      

 

 Globulin (test code=GLOB)  4.2  2.9-3.1  

 

 Bili Total (test code=TBIL)  0.4 mg/dL  0.1-0.9  

 

 Alk Phos (test code=APHOS)  149 U/L    

 

 AST (test code=AST)  29 U/L  1-40  

 

 ALT (test code=ALT)  22 U/L  1-41  

 

 BUN/Creatinine Ratio (test  9.0    



 code=BCRATIO)      

 

 Anion Gap (test code=AGAP)  13 mmol/L  7-16  

 

 Estimated GFR (test  >60 mL/min/1.73m2    eGFR (estimated Glomerular



 code=GFR)      Filtration Rate) is an



       estimated value,calculated



       from the patient's serum



       creatinine using the MDRD



       equation.It is NOT the



       patient's actual GFR. The eGFR



       provides a more



       clinicallyuseful measure of



       kidney disease than serum



       creatinine alone.***This



       calculation takes sex and race



       into account, if the



       informationis provided. If the



       race is not provided, and the



       patient isAfrican-American,



       multiply by 1.212. If sex is



       not provided, and thepatient



       is female, multiply by 0.742.



       Results for patients <18 years



       ofage have not been validated



       by the MDRD study and should



       be interpretedwith



       caution.eGFR Result



       Interpretation:eGFR > or=60 is



       in the Normal RangeeGFR < 60



       may mean kidney diseaseeGFR <



       15 may mean kidney



       failure***Ranges recommended



       by the National Kidney



       Foundation,http://nkdep.nih.go



       v



[id number] 04:50:00





 Test Item  Value  Reference Range  Comments

 

 Amphetamine (test code=AMPH)  Negative  Negative  For diagnostic purposes only,



       positive results should always



       be assessedin conjunctionwith



       the patient's medical



       history,clinical examination



       and otherfindings.To fulfill



       legal requirements, a more



       specific alternate chemical



       methodmust be used inorder to



       obtain a Confirmed analytical



       result. GC/MS is the preferred



       confirmatory method.

 

 Barbiturates (test code=MARQUIS)  Negative  Negative  

 

 Benzodiazepine (test  Negative  Negative  



 code=DEJAN)      

 

 Cocaine (test code=COCA)  Negative  Negative  

 

 Methadone (test code=MTHD)  Negative  Negative  

 

 Opiates (test code=OPIA)  Negative  Negative  

 

 PCP (test code=PCP)  Negative  Negative  

 

 Propoxyphene (test  Negative  Negative  



 code=PROPOX)      

 

 THC (test code=THC)  POSITIVE  Negative  



CBC with [id number] 03:53:00





 Test Item  Value  Reference Range  Comments

 

 WBC (test code=WBC)  12.5 K/cumm  4.4-10.5  

 

 RBC (test code=RBC)  4.71 M/cumm  4.10-5.70  

 

 Hemoglobin (test code=HGB)  15.0 gm/dL  13.4-17.4  

 

 Hematocrit (test code=HCT)  44.0 %  38.7-52.0  

 

 MCV (test code=MCV)  93.5 fL    

 

 MCH (test code=MCH)  31.8 pg  27.0-32.5  

 

 MCHC (test code=MCHC)  34.0 g/dL  32.0-37.5  

 

 RDW (test code=RDW)  14.7 %  11.5-14.5  

 

 Platelet Count (test code=PLTCT)  257 K/cumm  140-440  

 

 MPV (test code=MPV)  9.0 fL    

 

 Diff Method (test code=DIFFM)  Auto    

 

 Neutrophil (test code=NEUT)  79.4 %  36-70  

 

 Lymphocyte (test code=LYMPH)  13.7 %  12-44  

 

 Monocyte (test code=MONO)  5.2 %  0-11  

 

 Eosinophil (test code=EOS)  1.3 %  0-7  

 

 Basophil (test code=BASO)  0.4 %  0-2  

 

 Neutro Abs (test code=ANEUT)  9.9 K/cumm  1.6-7.4  

 

 Lymph Abs (test code=ALYMPH)  1.7 K/cumm  0.5-4.6  

 

 Mono Abs (test code=AMONO)  0.7 K/cumm  0.0-1.2  

 

 Eos Abs (test code=AEOS)  0.16 K/cumm  0.00-0.74  

 

 Baso Abs (test code=ABASO)  0.1 K/cumm  0.00-0.21

## 2018-06-12 NOTE — EKG
Test Date:    2018-06-12               Test Time:    10:38:39

Technician:   RUBENS                                     

                                                     

MEASUREMENT RESULTS:                                       

Intervals:                                           

Rate:         62                                     

IL:           146                                    

QRSD:         92                                     

QT:           434                                    

QTc:          440                                    

Axis:                                                

P:            64                                     

IL:           146                                    

QRS:          82                                     

T:            76                                     

                                                     

INTERPRETIVE STATEMENTS:                                       

                                                     

Normal sinus rhythm

Normal ECG

No previous ECG available for comparison



Electronically Signed On 06-12-18 14:39:57 CDT by Benjamín Taylor

## 2018-06-13 VITALS — OXYGEN SATURATION: 94 %

## 2018-06-13 VITALS — SYSTOLIC BLOOD PRESSURE: 115 MMHG | DIASTOLIC BLOOD PRESSURE: 75 MMHG | TEMPERATURE: 98.7 F

## 2018-06-13 LAB
BUN BLD-MCNC: 7 MG/DL (ref 6–20)
GLUCOSE SERPLBLD-MCNC: 98 MG/DL (ref 65–120)
HCT VFR BLD CALC: 38 % (ref 39.6–49)
LYMPHOCYTES # SPEC AUTO: 1.3 K/UL (ref 0.7–4.9)
MAGNESIUM SERPL-MCNC: 1.7 MG/DL (ref 1.8–2.5)
MCH RBC QN AUTO: 32.1 PG (ref 27–35)
MCV RBC: 93.4 FL (ref 80–100)
PMV BLD: 8.9 FL (ref 7.6–11.3)
POTASSIUM SERPL-SCNC: 3.1 MEQ/L (ref 3.6–5)
RBC # BLD: 4.07 M/UL (ref 4.33–5.43)

## 2018-06-13 RX ADMIN — POTASSIUM CHLORIDE SCH MLS: 200 INJECTION, SOLUTION INTRAVENOUS at 06:48

## 2018-06-13 RX ADMIN — MEPERIDINE HYDROCHLORIDE PRN MG: 25 INJECTION, SOLUTION INTRAMUSCULAR; INTRAVENOUS; SUBCUTANEOUS at 04:26

## 2018-06-13 RX ADMIN — POTASSIUM CHLORIDE SCH: 200 INJECTION, SOLUTION INTRAVENOUS at 08:56

## 2018-06-13 RX ADMIN — Medication SCH ML: at 09:19

## 2018-06-13 RX ADMIN — SODIUM CHLORIDE SCH: 0.9 INJECTION, SOLUTION INTRAVENOUS at 08:00

## 2018-06-13 RX ADMIN — DEXTROSE AND SODIUM CHLORIDE SCH: 5; .45 INJECTION, SOLUTION INTRAVENOUS at 08:56

## 2018-06-13 RX ADMIN — ONDANSETRON PRN MG: 2 INJECTION INTRAMUSCULAR; INTRAVENOUS at 04:26

## 2018-06-13 RX ADMIN — MEPERIDINE HYDROCHLORIDE PRN MG: 25 INJECTION, SOLUTION INTRAMUSCULAR; INTRAVENOUS; SUBCUTANEOUS at 00:20

## 2018-06-13 NOTE — DS
Date of Discharge:  06/13/2018



Consultants:  Dr. Fitzgerald with GI.



Admitting Diagnoses:  

1.Acute gastrointestinal bleed, likely upper source with hematemesis.

2.Type 2 diabetes mellitus, insulin requiring with hyperglycemia.

3.History of seizure disorder.

4.Chronic back pain and neck pain, stable.

5.Hypokalemia.

6.Hypomagnesemia.

7.Generalized weakness secondary to gastrointestinal bleed, improved.



Hospital Course:  The patient is a 53-year-old male, who was readmitted to the hospital for recurrent
 episode of hematemesis.  The patient's hemoglobin remained stable at 14.4.  He was started on IV flu
ids, IV PPI.  The patient had not been able to obtain his Protonix when he was discharged day prior t
o this admission due to cost he states and he will be able to obtain his medications now.  He was see
n by GI, Dr. Fitzgerald, who recommended outpatient EGD.  The patient's hemoglobin remained stable.  Ha
d some hemodilution with hemoglobin of 13 this morning.  No further hematemesis episodes.  His potass
ium and magnesium were replaced.  The patient is doing well.  Did complain of some chronic back pain 
and asked for medications until he is able to establish care with primary care physician.  He will be
 given tramadol for 72 hours until he is able to see a physician.  The patient was then discharged ho
me in stable condition.



Activity:  As tolerated.



Medications:  As per medication reconciliation list.



Followup:  Follow up with primary care physician to establish care in 1-2 weeks.  Follow up with [facility], 
Dr. Fitzgerald in 1-2 weeks for EGD.  Return to ER for worsening condition.



Diet:  Diabetic.



Physical Examination:

General:  Awake, alert, oriented, no acute distress. 

CV:  S1, S2.  No murmurs. 

Respiratory:  Clear to auscultation bilaterally.  No wheezing. 

Gastrointestinal:  Abdomen is soft, nontender, nondistended.  Positive bowel sounds. 

Extremities:  No clubbing, cyanosis, edema. 

Neurologic:  Nonfocal.





SA/MODL

DD:  06/13/2018 11:50:41Voice ID:  [id number]

DT:  06/13/2018 17:03:59Report ID:  [id number]

## 2018-06-13 NOTE — HP
Date of Admission:  06/12/2018



Primary Care Physician:  Unknown.



Chief Complaint:  GI bleed.



Consultants:  Dr. Peterson with GI.



History Of Present Illness:  The patient is a 53-year-old male with past medical history of diabetes,
 chronic back pain, seizure disorder, who was in his usual state of health, who was recently discharg
ed from the hospital yesterday on 06/11/2018 for similar symptoms of GI bleed.  The patient had come 
in for upper GI bleed with bright red blood in his vomit.  However, his hemoglobin remained stable.  
He did not have any further bleeding and therefore was discharged to follow up with GI as an outpatie
nt.  The patient was unable to obtain his Protonix prescription due to cost and had another episode o
f hematemesis with bright red blood.  The patient does report some antecedent sharp abdominal pain in
 the right upper quadrant, which lasted a few seconds.  The patient also feels weak; however, denies 
any dizziness or syncopal episodes.  The patient's symptoms are constant, moderate, progressively wor
sening.  The patient therefore came in to the ER for further evaluation.  Upon arrival, his vital sig
ns showed a blood pressure of 190/107.  His pulse was 66.  The patient was started on some IV fluids 
and Protonix drip and referred for admission.  His lab workup revealed hemoglobin of 14.4.  His potas
sium was low at 3.1.  When seen in the ER, the patient was awake, alert, oriented x3, in some mild di
stress.



Past Medical History:  Diabetes, history of lung mass which is benign, seizure disorder, chronic back
 and neck pain.



Past Surgical History:  Appendectomy, cervical fusion, lumbar fusion, right hip implant, dorsal colum
n stimulator implant, head surgery.



Allergies:  NO KNOWN DRUG ALLERGIES.



Medications:  As per medication reconciliation list, reviewed.



Social History:  The patient smokes everyday.  Denies any alcohol use or illicit drug use.  Lives at 
home.  Independent in activities of daily living.



Family History:  No history of premature coronary artery disease.



Review of Systems:

An 11-point system reviewed, negative except as per HPI.



Physical Examination:

Vital Signs:  Blood pressure 190/107, pulse 66, respirations 18, temperature 98.1, O2 100% on room ai
r. 

General:  Awake, alert, oriented x3, in some mild distress, ill-appearing, older than stated age male
, somewhat pale. 

HEENT:  Normocephalic, atraumatic.  PERRLA.  EOMI.  Dry mucous membranes.  Oropharynx is clear.  Poor
 dentition.  Conjunctivae are anicteric. 

Neck:  Supple.  No JVD.  Trachea midline. 

CV:  S1, S2.  No murmurs.  Regular rate and rhythm.  Peripheral pulses are present bilaterally. 

Respiratory:  Clear to auscultation bilaterally.  No wheezing.  No stridor.  No use of accessory musc
les. 

Gastrointestinal:  Abdomen is soft, nontender, nondistended.  Positive bowel sounds.  No guarding or 
rigidity. 

Extremities:  No clubbing, cyanosis, or edema.  No calf tenderness. 

Neurologic:  Cranial nerves 2 through 12 intact grossly.  No focal neurological deficit.  Speech is n
ormal.  Strength is 5/5 in bilateral upper and lower extremities. 

Skin:  No rashes.  Normal skin turgor. 

Psychiatric:  Mood is okay.  Affect is full.  Insight and judgment are good.



Laboratory Data:  Sodium 139, potassium 3.1, chloride 102, CO2 of 27, BUN 12, creatinine 1.01, glucos
e 122, calcium 9, magnesium 1.6.  AST 45 and ALT 59.  INR 0.98.  WBC 11.1, H and H 14.4 and 41.8, rochelle
telets 93, and neutrophils 89%.  UA is negative.  Chest x-ray shows no acute abnormalities.  COPD rafaela
nges are present.  EKG shows normal sinus rhythm, rate of 62.  No previous EKG for comparison.



Assessment And Plan:  A 53-year-old male with;

1.Upper gastrointestinal bleed, hematemesis.  Dr. Peterson has been consulted.  He will see the patient.
  We will keep patient on IV fluids.  We will start on clear liquid diet.  We will anticipate esophag
ogastroduodenoscopy likely in a.m.  Monitor H and H.  Transfuse as needed.  Currently, hemoglobin is 
at 14.

2.Type 2 diabetes mellitus, insulin requiring, with hyperglycemia.  We will place on sliding scale i
nsulin.  Monitor Accu-Cheks.

3.History of seizure disorder.  Resume home medications.  Seizure precautions.

4.Chronic back pain and neck pain.  The patient is asking for medication other than morphine, which 
he states does not help him.  Plan:  Admit the patient to Med-Surg, place as observation.

5.Hypokalemia.  We will replace and monitor.

6.Hypomagnesemia.  We will replace and monitor.

7.Generalized weakness secondary to gastrointestinal bleed.





DELVIS

DD:  06/12/2018 15:08:53Voice ID:  [id number]

## 2018-06-19 ENCOUNTER — HOSPITAL ENCOUNTER (EMERGENCY)
Dept: HOSPITAL 97 - ER | Age: 53
Discharge: HOME | End: 2018-06-19
Payer: COMMERCIAL

## 2018-06-19 VITALS — SYSTOLIC BLOOD PRESSURE: 118 MMHG | DIASTOLIC BLOOD PRESSURE: 83 MMHG | OXYGEN SATURATION: 95 %

## 2018-06-19 VITALS — TEMPERATURE: 98 F

## 2018-06-19 DIAGNOSIS — K27.9: Primary | ICD-10-CM

## 2018-06-19 DIAGNOSIS — L50.9: ICD-10-CM

## 2018-06-19 DIAGNOSIS — R56.9: ICD-10-CM
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
## 2018-06-19 DIAGNOSIS — T63.441A: ICD-10-CM

## 2018-06-19 DIAGNOSIS — Z85.118: ICD-10-CM

## 2018-06-19 DIAGNOSIS — F17.210: ICD-10-CM

## 2018-06-19 PROCEDURE — 99284 EMERGENCY DEPT VISIT MOD MDM: CPT

## 2018-06-19 PROCEDURE — 96374 THER/PROPH/DIAG INJ IV PUSH: CPT

## 2018-06-19 PROCEDURE — 96375 TX/PRO/DX INJ NEW DRUG ADDON: CPT

## 2018-06-19 PROCEDURE — 74022 RADEX COMPL AQT ABD SERIES: CPT

## 2018-06-19 NOTE — ER
Nurse's Notes                                                                                     

 [facility]                                                                

Name: Cleveland Ricketts Jr                                                                            

Age: 53 yrs                                                                                       

Sex: Male                                                                                         

: 1965                                                                                   

MRN: [id number]                                                                                   

Arrival Date: 2018                                                                          

Time: 19:09                                                                                       

Account#: [id number]                                                                            

Bed 23                                                                                            

Private MD:                                                                                       

Diagnosis: Peptic ulcer, site unspecified                                                         

                                                                                                  

Presentation:                                                                                     

                                                                                             

19:20 Presenting complaint: Patient states: that he was here a few days ago and was told to     

      return if he continued. States that he has had one black tarry stool today and four         

      dark emesis today. He also is concerned that he was attacked by some "africanized" bees     

      and now is having swelling to left side of face. Transition of care: patient was not        

      received from another setting of care. Onset of symptoms was 2018. Risk            

      Assessment: Do you want to hurt yourself or someone else? Patient reports no desire to      

      harm self or others. Care prior to arrival: Medication(s) given: Tylenol, last at 1600.     

19:20 Method Of Arrival: Ambulatory                                                             

19:20 Acuity: JENNIFER 3                                                                             

19:28 Initial Sepsis Screen: Does the patient meet any 2 criteria? HR > 90 bpm. Yes Does the    

      patient have a suspected source of infection? No. Patient's initial sepsis screen is        

      negative.                                                                                   

                                                                                                  

Triage Assessment:                                                                                

19:25 General: Appears uncomfortable, slender, Behavior is cooperative, appropriate for age,  fc  

      anxious. Pain: Complains of pain in abdomen Pain currently is 9 out of 10 on a pain         

      scale. Quality of pain is described as crampy, sharp, Is continuous. EENT: No deficits      

      noted. Neuro: Level of Consciousness is awake, alert, obeys commands, Oriented to           

      person, place, time, situation. Cardiovascular: No deficits noted. Respiratory: No          

      deficits noted. GI: Reports lower abdominal pain, upper abdominal pain, diarrhea,           

      bloody stool, nausea, vomiting. : No deficits noted. Derm: Skin is pink, warm \T\ dry.    

      Musculoskeletal: Circulation, motion, and sensation intact. Capillary refill < 3            

      seconds, Range of motion: intact in all extremities.                                        

                                                                                                  

Historical:                                                                                       

- Allergies:                                                                                      

19:24 No Known Allergies;                                                                     fc  

- Home Meds:                                                                                      

19:24 Effexor  mg oral cp24 1 cap once daily [Active]; Keppra 1,000 mg oral tab 1 tab   fc  

      every 12 hours [Active]; Seroquel 600 mg Oral 1 tab nightly [Active];                       

- PMHx:                                                                                           

19:24 Lung Cancer; Seizures;                                                                  fc  

- PSHx:                                                                                           

19:24 cervical fusion; lumbar fusion; dorsal column stimulator implant; Appendectomy; right   fc  

      lobectomy;                                                                                  

                                                                                                  

- Immunization history:: Last tetanus immunization: up to date.                                   

- Social history:: Smoking status: Patient uses tobacco products, smokes one-half pack            

  cigarettes per day.                                                                             

- Ebola Screening: : Patient negative for fever greater than or equal to 101.5 degrees            

  Fahrenheit, and additional compatible Ebola Virus Disease symptoms Patient denies               

  exposure to infectious person Patient denies travel to an Ebola-affected area in the            

  21 days before illness onset.                                                                   

                                                                                                  

                                                                                                  

Screenin:55 Abuse screen: Denies threats or abuse. Denies injuries from another. Nutritional        aj1 

      screening: No deficits noted. Tuberculosis screening: No symptoms or risk factors           

      identified.                                                                                 

21:38 Fall Risk None identified.                                                              aj1 

                                                                                                  

Assessment:                                                                                       

19:55 General: Appears in no apparent distress. uncomfortable, Behavior is calm, cooperative, aj1 

      appropriate for age. Pain: Complains of pain in face and abdomen. Neuro: Level of           

      Consciousness is awake, alert, obeys commands, Oriented to person, place, time,             

      situation, Speech is normal. Cardiovascular: Patient's skin is warm and dry.                

      Respiratory: Airway is patent Respiratory effort is even, unlabored, Respiratory            

      pattern is regular, symmetrical. GI: Abdomen is flat, non-distended, Bowel sounds           

      present X 4 quads. Abd is soft and non tender X 4 quads. Reports nausea, vomiting. :      

      No signs and/or symptoms were reported regarding the genitourinary system. EENT: No         

      signs and/or symptoms were reported regarding the EENT system. Derm: Skin is pink, warm     

      \T\ dry. normal, Patient has redness and swelling to left eye, bridge of nose. Patient      

      states that he was stung 50x by bees. Musculoskeletal: No signs and/or symptoms             

      reported regarding the musculoskeletal system. Circulation, motion, and sensation           

      intact.                                                                                     

21:00 Reassessment: Patient appears in no apparent distress at this time. No changes from     aj1 

      previously documented assessment. Patient and/or family updated on plan of care and         

      expected duration. Pain level reassessed. Patient is alert, oriented x 3, equal             

      unlabored respirations, skin warm/dry/pink.                                                 

21:46 Reassessment: Patient is alert, oriented x 3, equal unlabored respirations, skin        bb  

      warm/dry/pink. pt verbalized understanding of discharge instructions pt states he has       

      appt with Dr Charles on Thursday. Pt ambulated with steady gait.                              

                                                                                                  

Vital Signs:                                                                                      

19:24  / 100; Pulse 106; Resp 18; Temp 98.0(O); Pulse Ox 98% on R/A; Weight 73.94 kg    fc  

      (R); Height 6 ft. 1 in. (185.42 cm) (R); Pain 9/10;                                         

21:37  / 83; Pulse 86; Resp 18; Pulse Ox 95% on R/A;                                    aj1 

19:24 Body Mass Index 21.51 (73.94 kg, 185.42 cm)                                               

                                                                                                  

ED Course:                                                                                        

19:09 Patient arrived in ED.                                                                  am2 

19:22 Triage completed.                                                                       fc  

19:24 Arm band placed on Patient placed in an exam room, on a stretcher.                      fc  

19:27 Michael Bunn MD is Attending Physician.                                             ps1 

19:29 Melodie Lomax RN is Primary Nurse.                                                   aj1 

19:55 Patient has correct armband on for positive identification. Bed in low position. Call   aj1 

      light in reach. Side rails up X 1.                                                          

19:55 No provider procedures requiring assistance completed.                                  aj1 

20:15 Inserted saline lock: 22 gauge in right forearm, using aseptic technique.               aj1 

20:34 Patient moved to radiology via stretcher.                                               kc2 

20:34 X-ray completed. Patient tolerated procedure well.                                      kc2 

20:34 Patient moved back from radiology.                                                      kc2 

20:35 Abdomen Acute Series XRAY In Process Unspecified.                                       EDMS

21:07 Karthikeyan Fitzgerald MD is Referral Physician.                                              ps1 

21:37 IV discontinued, intact, bleeding controlled, No redness/swelling at site. Pressure     aj1 

      dressing applied.                                                                           

                                                                                                  

Administered Medications:                                                                         

19:52 Drug: SOLU-Medrol 125 mg Route: IVP; Site: right antecubital;                           aj1 

19:53 Drug: ProTONIX 80 mg Route: IVP; Site: right antecubital;                               aj1 

19:53 Drug: morphine 4 mg Route: IVP; Site: right antecubital;                                aj1 

19:53 Drug: Zofran 4 mg Route: IVP; Site: right antecubital;                                  aj1 

19:53 Drug: Benadryl 50 mg Route: IVP; Site: right antecubital;                               aj1 

21:01 Drug: CarafATE 1 grams Route: PO;                                                       tl3 

                                                                                                  

                                                                                                  

Outcome:                                                                                          

21:07 Discharge ordered by MD.                                                                ps1 

21:48 Discharged to home ambulatory.                                                          bb  

21:48 Condition: stable                                                                           

21:48 Discharge instructions given to patient, Instructed on discharge instructions, follow       

      up and referral plans. medication usage, Demonstrated understanding of instructions,        

      follow-up care, medications, Prescriptions given X 4.                                       

21:49 Patient left the ED.                                                                    bb  

                                                                                                  

Signatures:                                                                                       

Dispatcher [employer]                           EDMS                                                 

Melodie Lomax RN                     RN   aj1                                                  

Keara Rodriguez RN                   RN   fc                                                   

Chelsi Castanon RN                     RN   bb                                                   

Nusrat Priest2                                                  

Shaista Acosta Phillip, MD MD   ps1                                                  

Lana James RN                       RN   tl3                                                  

                                                                                                  

Corrections: (The following items were deleted from the chart)                                    

19:26 19:24 Pulse 106bpm; Resp 18bpm; Pulse Ox 98% RA; Temp 98.0F Oral; 73.94 kg Reported;    fc  

      Height 6 ft. 1 in. Reported; BMI: 21.5; Pain 9/10; fc                                       

                                                                                                  

**************************************************************************************************

## 2018-06-19 NOTE — EDPHYS
Physician Documentation                                                                           

 [facility]                                                                

Name: Cleveland Ricketts Jr                                                                            

Age: 53 yrs                                                                                       

Sex: Male                                                                                         

: 1965                                                                                   

MRN: [id number]                                                                                   

Arrival Date: 2018                                                                          

Time: 19:09                                                                                       

Account#: [id number]                                                                            

Bed 23                                                                                            

Private MD:                                                                                       

ED Physician Michael Bunn                                                                      

HPI:                                                                                              

                                                                                             

19:38 This 53 yrs old  Male presents to ER via Ambulatory with complaints of         ps1 

      Abdominal Pain, Vomiting - Blood, Bee Sting, Back Pain.                                     

19:38 patient was seen and evaluated for the same a couple of days ago and diagnosed with     ps1 

      reflux and peptic ulcer. Today he had abdominal pain and started having blood streaked      

      emesis described as dark with bright red streaks. Additionally he states that he            

      incidentally got stung by a bunch of bees. He took some ativan and protonix PTA but         

      threw it up. Additionally has chronic back pain. .                                          

                                                                                                  

Historical:                                                                                       

- Allergies:                                                                                      

19:24 No Known Allergies;                                                                     fc  

- Home Meds:                                                                                      

19:24 Effexor  mg oral cp24 1 cap once daily [Active]; Keppra 1,000 mg oral tab 1 tab   fc  

      every 12 hours [Active]; Seroquel 600 mg Oral 1 tab nightly [Active];                       

- PMHx:                                                                                           

19:24 Lung Cancer; Seizures;                                                                  fc  

- PSHx:                                                                                           

19:24 cervical fusion; lumbar fusion; dorsal column stimulator implant; Appendectomy; right   fc  

      lobectomy;                                                                                  

                                                                                                  

- Immunization history:: Last tetanus immunization: up to date.                                   

- Social history:: Smoking status: Patient uses tobacco products, smokes one-half pack            

  cigarettes per day.                                                                             

- Ebola Screening: : Patient negative for fever greater than or equal to 101.5 degrees            

  Fahrenheit, and additional compatible Ebola Virus Disease symptoms Patient denies               

  exposure to infectious person Patient denies travel to an Ebola-affected area in the            

  21 days before illness onset.                                                                   

                                                                                                  

                                                                                                  

ROS:                                                                                              

19:38 Constitutional: Negative for fever, chills, and weight loss, Eyes: Negative for injury, ps1 

      pain, redness, and discharge, ENT: Negative for injury, pain, and discharge,                

      Cardiovascular: Negative for chest pain, palpitations, and edema, Respiratory: Negative     

      for shortness of breath, cough, wheezing, and pleuritic chest pain.                         

19:38 MS/Extremity: Negative for injury and deformity, Skin: Negative for injury, rash, and       

      discoloration, Neuro: Negative for headache, weakness, numbness, tingling, and seizure.     

19:38 Abdomen/GI: Positive for nausea and vomiting, hematemesis.                                  

19:38 Back: Positive for pain with movement.                                                      

                                                                                                  

Exam:                                                                                             

19:38 Constitutional:  This is a well developed, well nourished patient who is awake, alert,  ps1 

      and in no acute distress. Head/Face:  Normocephalic, atraumatic. Eyes:  Pupils equal        

      round and reactive to light, extra-ocular motions intact.  Lids and lashes normal.          

      Conjunctiva and sclera are non-icteric and not injected. Neck:  Trachea midline, no         

      thyromegaly or masses palpated, and no cervical lymphadenopathy.  Supple, full range of     

      motion without nuchal rigidity, or vertebral point tenderness.  No Meningismus.             

      Chest/axilla:  Normal chest wall appearance and motion.  Nontender with no deformity.       

      No lesions are appreciated. Cardiovascular:  Regular rate and rhythm.  No gallops,          

      murmurs, or rubs.  Normal PMI, no JVD.  No pulse deficits. Respiratory:  Lungs have         

      equal breath sounds bilaterally, clear to auscultation and percussion.  No rales,           

      rhonchi or wheezes noted.  No increased work of breathing, no retractions or nasal          

      flaring. Abdomen/GI:  Soft, non-tender, with normal bowel sounds.  No distension or         

      tympany.  No guarding or rebound.  No evidence of tenderness throughout. MS/ Extremity:     

       Pulses equal, no cyanosis.  Neurovascular intact.  Full, normal range of motion.           

      Neuro:  Awake and alert, GCS 15, oriented to person, place, time, and situation.            

      Cranial nerves II-XII grossly intact. Sensory grossly intact. Psych:  Awake, alert,         

      with orientation to person, place and time.  Behavior, mood, and affect are within          

      normal limits.                                                                              

19:38 Skin: urticaria, and multiple bee stings. .                                                 

                                                                                                  

Vital Signs:                                                                                      

19:24  / 100; Pulse 106; Resp 18; Temp 98.0(O); Pulse Ox 98% on R/A; Weight 73.94 kg    fc  

      (R); Height 6 ft. 1 in. (185.42 cm) (R); Pain 9/10;                                         

21:37  / 83; Pulse 86; Resp 18; Pulse Ox 95% on R/A;                                    aj1 

19:24 Body Mass Index 21.51 (73.94 kg, 185.42 cm)                                               

                                                                                                  

MDM:                                                                                              

19:36 Patient medically screened.                                                             ps1 

21:09 Data reviewed: vital signs, nurses notes, radiologic studies.                           ps1 

                                                                                                  

                                                                                             

19:52 Order name: Abdomen Acute Series XRAY; Complete Time: 21:35                             ps1 

                                                                                                  

Administered Medications:                                                                         

19:52 Drug: SOLU-Medrol 125 mg Route: IVP; Site: right antecubital;                           aj1 

19:53 Drug: ProTONIX 80 mg Route: IVP; Site: right antecubital;                               aj1 

19:53 Drug: morphine 4 mg Route: IVP; Site: right antecubital;                                aj1 

19:53 Drug: Zofran 4 mg Route: IVP; Site: right antecubital;                                  aj1 

19:53 Drug: Benadryl 50 mg Route: IVP; Site: right antecubital;                               aj1 

21:01 Drug: CarafATE 1 grams Route: PO;                                                       tl3 

                                                                                                  

                                                                                                  

Disposition:                                                                                      

18 21:07 Discharged to Home. Impression: Peptic ulcer, site unspecified.                    

- Condition is Stable.                                                                            

- Discharge Instructions: Peptic Ulcer Disease.                                                   

- Prescriptions for Bentyl 10 mg Oral Capsule - take 1 capsule by ORAL route every 6              

  hours As needed; 40 capsule. Protonix 40 mg Oral Tablet - take 1 tablet by ORAL route           

  once daily; 30 tablet. Zofran 4 mg Oral Tablet - take 1 tablet by ORAL route every 12           

  hours As needed; 20 tablet. Carafate 1 gram Oral Tablet - take 2 tablet by ORAL route           

  every 12 hours take on an empty stomach, beginning on waking and last dose at                   

  bedtime; 100 tablet.                                                                            

- Medication Reconciliation Form, Thank You Letter, Antibiotic Education, Prescription            

  Opioid Use form.                                                                                

- Follow up: Karthikeyan Fitzgerald MD; When: As needed; Reason: Further diagnostic work-up,            

  Recheck today's complaints, Continuance of care, Re-evaluation by your physician.               

  Follow up: Emergency Department; When: As needed; Reason: Worsening of condition.               

- Problem is new.                                                                                 

- Symptoms have improved.                                                                         

                                                                                                  

                                                                                                  

                                                                                                  

Signatures:                                                                                       

Dispatcher [employer]                           EDMelodie Davidson RN                     RN   aj1                                                  

Keara Rodriguez RN RN   fc                                                   

Chelsi Castanon RN RN bb Singer, Phillip, MD MD   ps1                                                  

Lana James RN                       RN   tl3                                                  

                                                                                                  

Corrections: (The following items were deleted from the chart)                                    

21:49 21:07 2018 21:07 Discharged to Home. Impression: Peptic ulcer, site unspecified.  bb  

      Condition is Stable. Forms are Medication Reconciliation Form, Thank You Letter,            

      Antibiotic Education, Prescription Opioid Use. Follow up: Karthikeyan Fitzgerald; When: As          

      needed; Reason: Further diagnostic work-up, Recheck today's complaints, Continuance of      

      care, Re-evaluation by your physician. Follow up: Emergency Department; When: As            

      needed; Reason: Worsening of condition. Problem is new. Symptoms have improved. ps1         

                                                                                                  

**************************************************************************************************

## 2018-06-19 NOTE — XMS REPORT
Patient Summary Document

 Created on:2018



Patient:CARLOS BARKLEY

Sex:Male

:1965

External Reference #:[id number]





Demographics







 Address  [address] [place] [address]

 

 Home Phone  [phone]

 

 Email Address  NONE

 

 Preferred Language  Unknown

 

 Marital Status  Unknown

 

 Rastafarian Affiliation  Unknown

 

 Race  Unknown

 

 Additional Race(s)  Unavailable

 

 Ethnic Group  Unknown









Author







 Organization  [facility]

 

 Address  [facility] [address] [place] [id number]

 

 Phone  [phone]









Care Team Providers







 Name  Role  Phone

 

 BUD SAHU  Unavailable  Unavailable









Problems

This patient has no known problems.



Allergies, Adverse Reactions, Alerts

This patient has no known allergies or adverse reactions.



Medications

This patient has no known medications.



Results







 Test Description  Test Time  Test Comments  Text Results  Atomic Results  
Result Comments









 Comprehensive Metabolic Panel  2017 04:50:00    









   Test Item  Value  Reference Range  Comments









 Sodium (test code=NA)  137 mmol/L  135-145  

 

 Potassium (test code=K)  4.1 mmol/L  3.5-5.1  

 

 Chloride (test code=CL)  101 mmol/L    

 

 Carbon Dioxide (test  23 mmol/L  22-29  



 code=CO2)      

 

 Glucose (test code=GLU)  145 mg/dL    

 

 Blood Urea Nitrogen (test  9 mg/dL  6-20  



 code=BUN)      

 

 Creatinine (test code=CREAT)  1.0 mg/dL  0.7-1.2  

 

 Calcium (test code=CA)  9.6 mg/dL  8.3-10.5  

 

 Prot Total (test code=TP)  8.0 g/dL  6.4-8.3  

 

 Albumin (test code=ALB)  3.8 g/dL  3.5-5.2  

 

 A/G Ratio (test  0.9 Ratio    



 code=AGRATIO)      

 

 Globulin (test code=GLOB)  4.2  2.9-3.1  

 

 Bili Total (test code=TBIL)  0.4 mg/dL  0.1-0.9  

 

 Alk Phos (test code=APHOS)  149 U/L    

 

 AST (test code=AST)  29 U/L  1-40  

 

 ALT (test code=ALT)  22 U/L  1-41  

 

 BUN/Creatinine Ratio (test  9.0    



 code=BCRATIO)      

 

 Anion Gap (test code=AGAP)  13 mmol/L  7-16  

 

 Estimated GFR (test  >60 mL/min/1.73m2    eGFR (estimated Glomerular



 code=GFR)      Filtration Rate) is an



       estimated value,calculated



       from the patient's serum



       creatinine using the MDRD



       equation.It is NOT the



       patient's actual GFR. The eGFR



       provides a more



       clinicallyuseful measure of



       kidney disease than serum



       creatinine alone.***This



       calculation takes sex and race



       into account, if the



       informationis provided. If the



       race is not provided, and the



       patient isAfrican-American,



       multiply by 1.212. If sex is



       not provided, and thepatient



       is female, multiply by 0.742.



       Results for patients <18 years



       ofage have not been validated



       by the MDRD study and should



       be interpretedwith



       caution.eGFR Result



       Interpretation:eGFR > or=60 is



       in the Normal RangeeGFR < 60



       may mean kidney diseaseeGFR <



       15 may mean kidney



       failure***Ranges recommended



       by the National Kidney



       Foundation,http://nkdep.nih.go



       v



[id number] 04:50:00





 Test Item  Value  Reference Range  Comments

 

 Amphetamine (test code=AMPH)  Negative  Negative  For diagnostic purposes only,



       positive results should always



       be assessedin conjunctionwith



       the patient's medical



       history,clinical examination



       and otherfindings.To fulfill



       legal requirements, a more



       specific alternate chemical



       methodmust be used inorder to



       obtain a Confirmed analytical



       result. GC/MS is the preferred



       confirmatory method.

 

 Barbiturates (test code=MARQUIS)  Negative  Negative  

 

 Benzodiazepine (test  Negative  Negative  



 code=DEJAN)      

 

 Cocaine (test code=COCA)  Negative  Negative  

 

 Methadone (test code=MTHD)  Negative  Negative  

 

 Opiates (test code=OPIA)  Negative  Negative  

 

 PCP (test code=PCP)  Negative  Negative  

 

 Propoxyphene (test  Negative  Negative  



 code=PROPOX)      

 

 THC (test code=THC)  POSITIVE  Negative  



CBC with [id number] 03:53:00





 Test Item  Value  Reference Range  Comments

 

 WBC (test code=WBC)  12.5 K/cumm  4.4-10.5  

 

 RBC (test code=RBC)  4.71 M/cumm  4.10-5.70  

 

 Hemoglobin (test code=HGB)  15.0 gm/dL  13.4-17.4  

 

 Hematocrit (test code=HCT)  44.0 %  38.7-52.0  

 

 MCV (test code=MCV)  93.5 fL    

 

 MCH (test code=MCH)  31.8 pg  27.0-32.5  

 

 MCHC (test code=MCHC)  34.0 g/dL  32.0-37.5  

 

 RDW (test code=RDW)  14.7 %  11.5-14.5  

 

 Platelet Count (test code=PLTCT)  257 K/cumm  140-440  

 

 MPV (test code=MPV)  9.0 fL    

 

 Diff Method (test code=DIFFM)  Auto    

 

 Neutrophil (test code=NEUT)  79.4 %  36-70  

 

 Lymphocyte (test code=LYMPH)  13.7 %  12-44  

 

 Monocyte (test code=MONO)  5.2 %  0-11  

 

 Eosinophil (test code=EOS)  1.3 %  0-7  

 

 Basophil (test code=BASO)  0.4 %  0-2  

 

 Neutro Abs (test code=ANEUT)  9.9 K/cumm  1.6-7.4  

 

 Lymph Abs (test code=ALYMPH)  1.7 K/cumm  0.5-4.6  

 

 Mono Abs (test code=AMONO)  0.7 K/cumm  0.0-1.2  

 

 Eos Abs (test code=AEOS)  0.16 K/cumm  0.00-0.74  

 

 Baso Abs (test code=ABASO)  0.1 K/cumm  0.00-0.21

## 2018-06-19 NOTE — XMS REPORT
Clinical Summary

 Created on:2018



Patient:Cleveland Barkley Jr

Sex:Male

:1965

External Reference #:[id number]





Demographics







 Address  [address]



   



   [place] [address]

 

 Home Phone  [phone]

 

 Home Phone  [phone]

 

 Email Address  [email or web address]

 

 Preferred Language  English

 

 Marital Status  

 

 Druze Affiliation  Unknown

 

 Race  White

 

 Ethnic Group  Not  or 









Author







 Organization  [facility]

 

 Address  [address] [place] [address]









Support







 Name  Relationship  Address  Phone

 

 Sabrina Greer  Unavailable  Unavailable  [phone]









Care Team Providers







 Name  Role  Phone

 

 Asked, No Pcp  Primary Care Provider  Unavailable









Allergies

No Known Allergies



Current Medications







 Prescription  Sig.  Disp.  Refills  Start  End  Status



         Date  Date  

 

 metFORMIN XR  Take 1,000 mg      20    Active



 (GLUCOPHAGE-XR) 500  by mouth daily.      17    



 mg 24 hr tablet            

 

 insulin GLARGINE  Inject 20 Units          Active



 (LANTUS) 100  under the skin          



 unit/mL injection  nightly.          



 (vial)            

 

 levETIRAcetam  Take 1,000 mg          Active



 (KEPPRA) 1000 MG  by mouth 2          



 tablet  (two) times a          



   day.          

 

 venlafaxine XR  Take 150 mg by          Active



 (EFFEXOR-XR) 150 MG  mouth daily.          



 24 hr capsule            

 

 diazePAM (VALIUM)  Take 10 mg by      20    Active



 10 MG tablet  mouth 3 (three)      18    



   times a day.          

 

 QUEtiapine  Take 600 mg by      20    Active



 (SEROquel) 300 MG  mouth nightly.      18    



 tablet            

 

 traMADol (ULTRAM)  Take 50 mg by          Active



 50 mg tablet  mouth every 6          



   (six) hours as          



   needed for          



   moderate pain.          

 

 gabapentin  Take 800 mg by    2  20  Discontinued



 (NEURONTIN) 800 mg  mouth 4 (four)      17  018  



 tablet  times a day.          

 

 VENTOLIN HFA 90  Inhale 2 puffs    0  20  Discontinued



 mcg/actuation  every 6 (six)      17  017  



 inhaler  hours as needed          



   for wheezing or          



   shortness of          



   breath.          

 

 meloxicam (MOBIC)  Take 7.5 mg by    2  04/22/20  07/15/2  Discontinued



 7.5 mg tablet  mouth daily.      17  017  

 

 QUEtiapine  Take by mouth    2  20  Discontinued



 (SEROquel) 25 MG  daily.      17  017  



 tablet            

 

 QUEtiapine  Take 800 mg by    2  05/15/20  02/22/2  Discontinued



 (SEROquel) 400 MG  mouth nightly.      17  018  



 tablet  Take 2 tablets          



   at bedtime          

 

 busPIRone (BUSPAR)  Take 10 mg by          Discontinued



 10 MG tablet  mouth 3 (three)        018  



   times a day.          

 

 LEVETIRACETAM  Take 1 tablet          Discontinued



 (KEPPRA ORAL)  by mouth 2        017  



   (two) times a          



   day.          

 

 HYDROcodone-acetami  Take 1 tablet          Discontinued



 nophen (NORCO)  by mouth every        017  



  mg per  8 (eight) hours          



 tablet  as needed for          



   moderate pain.          

 

 levETIRAcetam  Take 1,000 mg        07/10/2  Discontinued



 (KEPPRA) 500 MG  by mouth 2        017  



 tablet  (two) times a          



   day.          

 

 acetaminophen-codei  TK 1 TO 2 TS PO    0  07/15/20  03/03/2  Discontinued



 ne (TYLENOL WITH  Q 6 H PRN P      17  018  



 CODEINE #3) 300-30            



 mg per tablet            

 

 ertapenem 1 g in  Infuse 1 g into  20 each  0  20  



 sodium chloride 0.9  a venous      17  017  



 % MBP 50 mL IVPB  catheter daily          



   for 20 days.          

 

 vancomycin 1,500 mg  Infuse 1,500 mg  20 each  0  20  



 in sodium chloride  into a venous      17  017  



 0.9 % 500 mL IVPB  catheter daily          



   for 20 days.          

 

 HYDROcodone-acetami  Take 1 tablet  10 tablet  0  20  



 nophen (NORCO)  by mouth every      17  017  



  mg per  6 (six) hours          



 tablet  as needed for          



   moderate pain          



   for up to 10          



   doses. Max          



   Daily Amount: 4          



   tablets          

 

 gabapentin  Take 1 tablet  90 tablet  0  20  



 (NEURONTIN) 800 mg  (800 mg total)      18  018  



 tablet  by mouth 3          



   (three) times a          



   day for 30          



   days.          

 

 metoprolol  Take 0.5  15 tablet  0  20  



 succinate XL  tablets (12.5      18  018  



 (TOPROL-XL) 25 mg  mg total) by          



 24 hr tablet  mouth daily for          



   30 days.          

 

 HYDROcodone-acetami  Take 1 tablet  15 tablet  0  20  



 nophen (NORCO)  by mouth every      18  018  



 7.5-325 mg per  6 (six) hours          



 tablet  as needed for          



   severe pain for          



   up to 14 days.          



   Max Daily          



   Amount: 4          



   tablets          

 

 pantoprazole  Take 1 tablet  30 tablet  0  20  



 (PROTONIX) 40 MG EC  (40 mg total)      18  018  



 tablet  by mouth daily          



   for 30 days.          

 

 ondansetron ODT  Take 1 tablet  15 tablet  0  20  Discontinued



 (ZOFRAN ODT) 4 MG  (4 mg total) by      18  018  



 disintegrating  mouth every 8          



 tablet  (eight) hours          



   as needed for          



   nausea or          



   vomiting for up          



   to 15 doses.          

 

 promethazine  Insert 1  12 suppository  0  20  Discontinued



 (PHENERGAN) 25 MG  suppository (25      18  018  



 suppository  mg total) into          



   the rectum          



   every 6 (six)          



   hours as needed          



   for nausea or          



   vomiting for up          



   to 12 doses.          

 

 hydromorPHONE  Take 1 tablet  10 tablet  0  03/03/20  03/10/2  



 (DILAUDID) 2 MG  (2 mg total) by      18  018  



 tablet  mouth every 8          



   (eight) hours          



   as needed for          



   severe pain          



   (score 7-10)          



   for up to 7          



   days. Max Daily          



   Amount: 6 mg          

 

 promethazine  Take 1 tablet  20 tablet  0  20  



 (PHENERGAN) 12.5 MG  (12.5 mg total)      18  018  



 tablet  by mouth every          



   8 (eight) hours          



   as needed for          



   nausea or          



   vomiting for up          



   to 30 days.          

 

 HYDROcodone-acetami  Take 1 tablet  20 tablet  0  03/03/20  03/10/2  



 nophen (NORCO)  by mouth every      18  018  



 7.5-325 mg per  8 (eight) hours          



 tablet  as needed for          



   moderate pain          



   for up to 7          



   days. Max Daily          



   Amount: 3          



   tablets          

 

 naproxen (NAPROSYN)  Take 1 tablet  28 tablet  0  20  



 500 MG tablet  (500 mg total)      18  018  



   by mouth 2          



   (two) times a          



   day with meals          



   for 14 days.          

 

 omeprazole  Take 2 capsules  60 capsule  0  20  



 (PriLOSEC) 20 MG  (40 mg total)      18  018  



 capsule  by mouth daily          



   for 30 days.          

 

 cyclobenzaprine  Take 1 tablet  20 tablet  0  20  



 (FLEXERIL) 10 mg  (10 mg total)      18  018  



 tablet  by mouth 3          



   (three) times a          



   day as needed          



   for muscle          



   spasms for up          



   to 10 days.          







Active Problems







 Problem  Noted Date

 

 Sciatica of left side  2018

 

 Syncope  2018

 

 Essential hypertension  2017

 

 Type 2 diabetes mellitus with hyperglycemia  2017

 

 PRIMITIVO (acute kidney injury)  2017

 

 Seizure disorder  2017

 

 Intractable vomiting with nausea  2017

 

 Abdominal pain  2017









 Overview: 







 Added automatically from request for surgery [id number]









 Gastrointestinal hemorrhage  10/11/2017

 

 Gastrointestinal hemorrhage with hematemesis  10/11/2017









 Overview: 







 Added automatically from request for surgery [id number]









 Vertigo  2017

 

 Pneumonia due to infectious organism  2017

 

 COPD with acute bronchitis  2017

 

 COPD (chronic obstructive pulmonary disease)  2017

 

 COPD exacerbation  2017

 

 Lung mass  2017

 

 Tobacco dependence  2017







Resolved Problems







 Problem  Noted Date  Resolved Date

 

 Leukocytosis  2017

 

 Pneumonia of right lower lobe due to infectious organism  2017







Encounters







 Date  Type  Specialty  Care Team  Description

 

 20  Emergency  Emergency Medicine  Cait,  Suicidal ideation (Primary Dx
)



 18      Chago Olivas MD  

 

 20  Emergency  Emergency Medicine  [place]  Chronic right-sided thoracic 
back pain (Primary Dx);



 18      Judy,  Narcotic abuse;



       Raiza  Drug-seeking behavior



       MD Bryn  

 

 20  Hospital  Radiology  Dioni Garay  BC (bronchogenic carcinoma)



 18  Encounter    MD Milagros  

 

 20  Transcribe  Access  Dioni Garay  BC (bronchogenic carcinoma)



 18  Orders    MD Milagros  (Primary Dx)

 

 20  Emergency  General Internal  Drew Landaverde  Sciatica of left side (
Primary Dx);



 18 -    Gary GRECO MD



  Intractable pain



 20      Travon,  



 MD Jessenia Swift, Princess Guerin MD  

 

 20  Emergency  Emergency Medicine  Nestorky,  Other chronic pain (Primary



 18      Shaw Clement)



       DO  

 

 20  Emergency  General Internal  Norinsky,  Syncope, unspecified syncope 
type (Primary Dx);



 18 -    Medicine  Chago FORTUNE,  Other chronic pain



 20      DO



  



 18      Marianela Turner MD  

 

 20  Anesthesia  Gastroenterology  Natali,  



 Ros  Event    Svitlana Villalta MD  

 

 20  Procedure Pass  Gastroenterology    



 18        

 

 20  Surgery  Gastroenterology  Con Knowles  ESOPHAGOGASTRODUODENOSCOPY



 Ros Croft MD  (EGD) with bx

 

 20  Hospital  General Internal  Reichl, Ricco  Intractable vomiting with 
nausea, unspecified vomiting type (Primary Dx);



 17 -  Encounter  Medicine  MD Ariel



  Pain;



 20      Bavare,  Abdominal pain, unspecified abdominal location;



 18      Jm Cabrera,  Essential hypertension;



       MD



  Type 2 diabetes mellitus with hyperglycemia, with long-term current use of 
insulin



       Cinthya Orourke,   

 

 10/24/20  Telephone  Family Medicine  Jordy Lyles MD  

 

 10/19/20  Telephone  Gastroenterology  Jordy Lyles MD  

 

 10/13/20  Telephone  Gastroenterology  Jordy Lyles MD  

 

 10/13/20  Procedure Pass  Gastroenterology    



 17        

 

 10/13/20  Procedure Pass  Gastroenterology    



 17        

 

 10/11/20  Emergency  General Internal  Reichl, Ricco  Gastrointestinal 
hemorrhage with hematemesis (Primary Dx);



 17 -    Medicine  MD Ariel



  Gastrointestinal hemorrhage, unspecified gastrointestinal hemorrhage type;



 10/13/20      Gandhi, Izabel  Pain of upper abdomen;



 Umair Snow MD  Nausea and vomiting, intractability of vomiting not specified
, unspecified vomiting type

 

 20  Emergency  Emergency Medicine  Nile Lilly,  Vision blurred (Primary 
Dx)



 Umair MINAYA  

 

 20  Emergency  Emergency Medicine  Soledad Emanuel  Right upper quadrant 
abdominal pain (Primary Dx);



 Umair Almaraz DO  Other chronic pain

 

 20  Emergency  Emergency Medicine    



 17        

 

 20  Transcribe  Access  Gandhi, Izabel  Abnormal renal function test



 17  Juli Snow MD  (Primary Dx)

 

 20  [facility] Internal  [facility]  Vertigo (Primary Dx);



 17 -  Encounter  Medicine  Chago FORTUNE,  Acute nonintractable headache, 
unspecified headache type;



 20      DO



  Lactic acidosis;



 17      Gandhi, Izabel  Pneumonia due to infectious organism, unspecified 
laterality, unspecified part of lung;



       MD Gwendolyn  Pneumonia of right upper lobe due to infectious organism

 

 20  [facility]  General Internal  Drew Landaverde  Pneumonia due to 
infectious organism, unspecified laterality, unspecified part of lung (Primary 
Dx);



 17 -  Encounter  Medicine  MD ANGUS



  Shortness of breath;



 20      Popeye Strickland  Pneumonia of right middle lobe due to infectious 
organism



 17      MD Hiram Strange, Izabel Snow MD  

 

 20  Telephone  Cardiovascular  [facility]  



 17      Kacy Holcomb MA  

 

 20  [facility] Internal  [facility]  Pneumonia of right lower lobe



 17 -  Encounter  Medicine  MD Alondra



  due to infectious organism



 20      Popeye Strickland  (Primary Dx)



 17      MD Alie  

 

 20  Orders Only  Cardiovascular  [place],  Shortness of breath (Primary



 17      SAHIL Rosa  Dx)

 

 20  Anesthesia  General Surgery  [facility],  



   Event    Sandra Fischer MD  

 

 20  Procedure Pass  General Surgery    



 17        

 

 20  Surgery  General Surgery  O'Chris,  Right Lobectomy, Using Vats,



 17      Jonathan MONK  WEDGE RESECTION, FROZEN



       MD Jimmy  BIOPSY-RIGHT

 

 20  [facility]  Pulmonology  Macy Verde  



 17  Encounter    CARMEN Tran  

 

 07/10/20  [facility]  Critical Care  [facility],  COPD exacerbation (Primary Dx);



 17 -  Encounter  Medicine  Chaka  Lung mass;



 07/15/20      MD Jesús



  Bronchitis, chronic obstructive, with exacerbation



 17      Ray Stinson MD Burns, Izabel Snow MD  

 

 20  Telephone  Cardiothoracic  [place],  



     Surgery  [facility] [place]  

 

 20  Hospital  Procedural  [place],  Shortness of breath;



 17  Encounter  Cardiology  Jonathan MONK  Mass of lower lobe of right lung



       MD Jimmy  

 

 20  Transcribe  Cardiovascular  O'Chris,  Shortness of breath (Primary Dx)
;



 17  Orders    Jonathan MONK  Lung mass



       MD Jimmy  

 

 20  Office Visit  Cardiovascular  [place]'Chris,  Lung mass (Primary Dx)



 MD Ammon Kunz Jr., Amy Kathryn, PA-C  

 

 20  Transcribe  Cardiovascular  O'Chris,  Shortness of breath (Primary Dx)
;



 17  Orders    Jonathan MONK  Mass of lower lobe of right lung



       MD Jimmy  

 

 20  Hospital  Procedural  Moran,  



 17  Encounter  Cardiology  Luis Carlos Cook MD  

 

 20  Telephone  Cardiothoracic  Elisha,  



 17    Surgery  SAHIL Cornejo  

 

 20  Procedure Pass  Procedural    



 17    Cardiology    

 

 20  Surgery  Procedural  Moran,  Ep cardioversion w thien [[id number]



 17    Cardiology  Luis Carlos  (CPT)]



       MD Joey  

 

 20  Transcribe  Access  Moran,  Mass in chest (Primary Dx)



 17  Orders    Luis Carlos Cook MD  

 

 20  Procedure Pass  General Surgery    



 17        

 

 20  [facility]  General Internal  Soledad Emanuel  Pneumonia of right lower 
lobe due to infectious organism (Primary Dx);



 17 -  Encounter  Medicine  DO Sung



  Tachycardia;



 20      Gandhi, Izabel  Hypoxia;



 17      MD Gwendolyn  Lung mass

 

 20  Transcribe  Access  Sashital,  Secondary malignant neoplasm of left 
lung (Primary Dx);



 17  Orders    Terra Ponce,  Lung mass



       MD  

 

 20  [facility]  Radiology  Dawson,  



 17  Encounter    Rustam Grace III, MD  

 

 20  [facility]  Radiology  Dawson,  



 17  Encounter    Rustam Grace III, MD  

 

 20  [facility]  Radiology  Dawson,  



 17  Encounter    Rustam Grace III, MD  

 

 20  [facility]  Radiology  Sashital,  Lung mass



 17  Encounter    Terra Ponce MD  

 

 20  Ancillary  Access  Sashital,  Lung mass



 17  Orders    Terra Ponce MD  

 

 20  Ancillary  Access  Sashital,  Lung mass



 17  Orders    Terra Ponce MD  

 

 20  Ancillary  Access  Sashital,  



 17  Orders    Terra Ponce MD  

 

 20  Ancillary  Access  Sashital,  Lung mass



 17  Orders    Terra Ponce MD  

 

 20  Ancillary  Access  Sashital,  Lung mass



 17  Orders    Terra Ponce MD  



after 2017



Immunizations







 Name  Dates Previously Given  Next Due

 

 FLUCELVAX QUAD PF (0.5mL syringe)  2018  







Family History







 Medical History  Relation  Name  Comments

 

 Cerebral aneurysm  Father    

 

 Heart disease  Father    

 

 Kidney cancer  Father    

 

 Lung cancer  Father    

 

 Stroke  Father    

 

 Diabetes  Mother    

 

 Heart attack  Mother    

 

 Heart disease  Mother    

 

 Hyperlipidemia  Sister    

 

 Hypertension  Sister    









 Relation  Name  Status  Comments

 

 Father      

 

 Mother      

 

 Sister      







Social History







 Tobacco Use  Types  Packs/Day  Years Used  Date

 

 Current Every Day Smoker  Cigarettes  0.5  35  









 Smokeless Tobacco: Former User      









 Tobacco Cessation: Counseling Given: Yes



 Comments: per patient now only smoking 4-5 cigarette/day









 Alcohol Use  Drinks/Week  oz/Week  Comments

 

 No      stopped for 1.5 yrs









 Sex Assigned at Birth  Date Recorded

 

 Not on file  







Last Filed Vital Signs







 Vital Sign  Reading  Time Taken

 

 Blood Pressure  120/77  2018  1:15 PM CDT

 

 Pulse  76  2018  1:15 PM CDT

 

 Temperature  35.8 C (96.5 F)  2018  1:15 PM CDT

 

 Respiratory Rate  16  2018  1:15 PM CDT

 

 Oxygen Saturation  97%  2018  1:15 PM CDT

 

 Inhaled Oxygen Concentration  -  -

 

 Weight  73.9 kg (163 lb)  2018  8:09 AM CDT

 

 Height  185.4 cm (6' 1")  2018  8:09 AM CDT

 

 Body Mass Index  21.51  2018  8:09 AM CDT







Plan of Treatment







 Health Maintenance  Due Date  Last Done  Comments

 

 DIABETIC FOOT EXAM  1974    

 

 DIABETIC RETINAL EYE EXAM  1974    

 

 COLON CANCER SCREENING  2014    

 

 SHINGRIX VACCINE (#1)  2014    

 

 INFLUENZA VACCINE  2018  







Implants







 Implanted  Type  Area    Device  Expiration  Model /



         Identifier  Date  Serial /



             Lot

 

 Stimulator  Stimulator          

 

 Stimulator-2007  Stimulator  Hip        



 Implanted: 2007 (Quantity not on file)            

 

 Kit [employer] Air Leak 4ml Strl Progel - [id number]  Surgical  N/A: N/A  
[employer]      [id number] /



 Implanted: Qty: 1 on 2017 by Jonathan Fuentes Jr., MD  Implants;       
    /



   Expanders;          



   Extenders;          



   Surgical Wires          

 

 Dorsal Colum            



 Stimulator            

 

 Dorsal Column            



 Stimulator            

 

 Dorsal Colum Stimulator-2007    Lower:        



 Implanted: 2007 (Quantity not on file)    Back,        



     Other        



     than        



     Spine        







Procedures







 Procedure Name  Priority  Date/Time  Associated  Comments



       Diagnosis  

 

 URINALYSIS SCREEN AND  STAT  2018    Results for



 MICROSCOPY, WITH REFLEX TO    9:16 AM CDT    this procedure



 CULTURE        are in the



         results



         section.

 

 URINE DRUGS OF ABUSE SCREEN  STAT  2018    Results for



     9:16 AM CDT    this procedure



         are in the



         results



         section.

 

 URINE CULTURE  STAT  2018    Results for



     9:16 AM CDT    this procedure



         are in the



         results



         section.

 

 ESTIMATED GFR  STAT  2018    Results for



     9:02 AM CDT    this procedure



         are in the



         results



         section.

 

 SALICYLATE LEVEL  STAT  2018    Results for



     9:02 AM CDT    this procedure



         are in the



         results



         section.

 

 ACETAMINOPHEN LEVEL  STAT  2018    Results for



     9:02 AM CDT    this procedure



         are in the



         results



         section.

 

 ALCOHOL LEVEL, BLOOD  STAT  2018    Results for



     9:02 AM CDT    this procedure



         are in the



         results



         section.

 

 HC COMPLETE BLD COUNT W/AUTO  STAT  2018    Results for



 DIFF    9:02 AM CDT    this procedure



         are in the



         results



         section.

 

 THYROID STIMULATING HORMONE  STAT  2018    Results for



     9:02 AM CDT    this procedure



         are in the



         results



         section.

 

 T4, FREE  STAT  2018    Results for



     9:02 AM CDT    this procedure



         are in the



         results



         section.

 

 COMPREHENSIVE METABOLIC PANEL  STAT  2018    Results for



     9:02 AM CDT    this procedure



         are in the



         results



         section.

 

 CT ABDOMEN PELVIS WO CONTRAST  STAT  2018    Results for



     3:27 AM CDT    this procedure



         are in the



         results



         section.

 

 SMEAR REVIEW  STAT  2018    Results for



     3:13 AM CDT    this procedure



         are in the



         results



         section.

 

 ESTIMATED GFR  STAT  2018    Results for



     3:13 AM CDT    this procedure



         are in the



         results



         section.

 

 LIPASE LEVEL  STAT  2018    Results for



     3:13 AM CDT    this procedure



         are in the



         results



         section.

 

 COMPREHENSIVE METABOLIC PANEL  STAT  2018    Results for



     3:13 AM CDT    this procedure



         are in the



         results



         section.

 

 PROTHROMBIN TIME WITH INR  STAT  2018    Results for



     3:13 AM CDT    this procedure



         are in the



         results



         section.

 

 HC COMPLETE BLD COUNT W/AUTO  STAT  2018    Results for



 DIFF    3:13 AM CDT    this procedure



         are in the



         results



         section.

 

 ECG 12-LEAD  STAT  2018    Results for



     2:43 AM CDT    this procedure



         are in the



         results



         section.

 

 ECG ED PRELIMINARY  Routine  2018    Results for



 INTERPRETATION    2:07 AM CDT    this procedure



         are in the



         results



         section.

 

 PET CT SKULL BASE TO MID THIGH  Routine  2018  BC (bronchogenic  Results 
for



     1:45 PM CDT  carcinoma)  this procedure



         are in the



         results



         section.

 

 POC GLUCOSE  Routine  2018    Results for



     11:36 AM CDT    this procedure



         are in the



         results



         section.

 

 POC GLUCOSE  Routine  2018    Results for



     11:29 AM CST    this procedure



         are in the



         results



         section.

 

 POC GLUCOSE  Routine  2018    Results for



     6:52 AM CST    this procedure



         are in the



         results



         section.

 

 SMEAR REVIEW  Routine  2018    Results for



     5:13 AM CST    this procedure



         are in the



         results



         section.

 

 ESTIMATED GFR  Routine  2018    Results for



     5:13 AM CST    this procedure



         are in the



         results



         section.

 

 BASIC METABOLIC PANEL  Routine  2018    Results for



     5:13 AM CST    this procedure



         are in the



         results



         section.

 

 HC COMPLETE BLD COUNT W/AUTO  Routine  2018    Results for



 DIFF    5:13 AM CST    this procedure



         are in the



         results



         section.

 

 KEPPRA (LEVETIRACETAM) LEVEL  Routine  2018    Results for



     5:13 AM CST    this procedure



         are in the



         results



         section.

 

 SMEAR REVIEW  STAT  2018    Results for



     8:14 PM CST    this procedure



         are in the



         results



         section.

 

 ESTIMATED GFR  STAT  2018    Results for



     8:14 PM CST    this procedure



         are in the



         results



         section.

 

 HEPATIC FUNCTION PANEL  STAT  2018    Results for



     8:14 PM CST    this procedure



         are in the



         results



         section.

 

 BASIC METABOLIC PANEL  STAT  2018    Results for



     8:14 PM CST    this procedure



         are in the



         results



         section.

 

 HC COMPLETE BLD COUNT W/AUTO  STAT  2018    Results for



 DIFF    8:14 PM CST    this procedure



         are in the



         results



         section.

 

 CT LUMBAR SPINE WO CONTRAST  STAT  2018    Results for



     5:12 PM CST    this procedure



         are in the



         results



         section.

 

 ESTIMATED GFR  STAT  2018    Results for



     12:29 PM CST    this procedure



         are in the



         results



         section.

 

 LIPASE LEVEL  STAT  2018    Results for



     12:29 PM CST    this procedure



         are in the



         results



         section.

 

 COMPREHENSIVE METABOLIC PANEL  STAT  2018    Results for



     12:29 PM CST    this procedure



         are in the



         results



         section.

 

 PARTIAL THROMBOPLASTIN TIME  STAT  2018    Results for



 (PTT)    12:29 PM CST    this procedure



         are in the



         results



         section.

 

 PROTHROMBIN TIME WITH INR  STAT  2018    Results for



     12:29 PM CST    this procedure



         are in the



         results



         section.

 

 HC COMPLETE BLD COUNT W/AUTO  STAT  2018    Results for



 DIFF    12:29 PM CST    this procedure



         are in the



         results



         section.

 

 POC GLUCOSE  Routine  2018    Results for



     11:06 AM CST    this procedure



         are in the



         results



         section.

 

 POC GLUCOSE  Routine  2018    Results for



     6:41 AM CST    this procedure



         are in the



         results



         section.

 

 ESTIMATED GFR  Routine  2018    Results for



     4:58 AM CST    this procedure



         are in the



         results



         section.

 

 BASIC METABOLIC PANEL  Routine  2018    Results for



     4:58 AM CST    this procedure



         are in the



         results



         section.

 

 HC COMPLETE BLD COUNT W/AUTO  Routine  2018    Results for



 DIFF    4:58 AM CST    this procedure



         are in the



         results



         section.

 

 POC GLUCOSE  Routine  2018    Results for



     9:12 PM CST    this procedure



         are in the



         results



         section.

 

 ESTIMATED GFR  STAT  2018    Results for



     6:29 PM CST    this procedure



         are in the



         results



         section.

 

 CREATINE KINASE, TOTAL (CPK)  STAT  2018    Results for



     6:29 PM CST    this procedure



         are in the



         results



         section.

 

 HC COMPLETE BLD COUNT W/AUTO  STAT  2018    Results for



 DIFF    6:29 PM CST    this procedure



         are in the



         results



         section.

 

 BASIC METABOLIC PANEL  STAT  2018    Results for



     6:29 PM CST    this procedure



         are in the



         results



         section.

 

 CT HEAD WO CONTRAST  STAT  2018    Results for



     6:19 PM CST    this procedure



         are in the



         results



         section.

 

 ECG 12-LEAD  STAT  2018    Results for



     6:18 PM CST    this procedure



         are in the



         results



         section.

 

 ECG ED PRELIMINARY  Routine  2018    Results for



 INTERPRETATION    5:38 PM CST    this procedure



         are in the



         results



         section.

 

 POC GLUCOSE  Routine  2018    Results for



     10:56 AM CST    this procedure



         are in the



         results



         section.

 

 POC GLUCOSE  Routine  2018    Results for



     7:34 AM CST    this procedure



         are in the



         results



         section.

 

 POC GLUCOSE  Routine  2018    Results for



     8:37 PM CST    this procedure



         are in the



         results



         section.

 

 POC GLUCOSE  Routine  2018    Results for



     4:48 PM CST    this procedure



         are in the



         results



         section.

 

 SURGICAL PATHOLOGY REQUEST  Routine  2018    Results for



     1:28 PM CST    this procedure



         are in the



         results



         section.

 

 ESOPHAGOGASTRODUODENOSCOPY    2018  Abdominal pain,  



 (EGD)    12:15 PM CST  unspecified  



       abdominal  



       location  

 

 POC GLUCOSE  Routine  2018    Results for



     6:56 AM CST    this procedure



         are in the



         results



         section.

 

 ESTIMATED GFR  Routine  2018    Results for



     6:03 AM CST    this procedure



         are in the



         results



         section.

 

 BASIC METABOLIC PANEL  Routine  2018    Results for



     6:03 AM CST    this procedure



         are in the



         results



         section.

 

 POC GLUCOSE  Routine  2018    Results for



     9:04 PM CST    this procedure



         are in the



         results



         section.

 

 POC GLUCOSE  Routine  2018    Results for



     3:39 PM CST    this procedure



         are in the



         results



         section.

 

 POC GLUCOSE  Routine  2018    Results for



     11:07 AM CST    this procedure



         are in the



         results



         section.

 

 POC GLUCOSE  Routine  2018    Results for



     7:12 AM CST    this procedure



         are in the



         results



         section.

 

 ESTIMATED GFR  Routine  2018    Results for



     5:59 AM CST    this procedure



         are in the



         results



         section.

 

 MAGNESIUM LEVEL  Routine  2018    Results for



     5:59 AM CST    this procedure



         are in the



         results



         section.

 

 COMPREHENSIVE METABOLIC PANEL  Routine  2018    Results for



     5:59 AM CST    this procedure



         are in the



         results



         section.

 

 CBC WITH PLATELET AND  Routine  2018    Results for



 DIFFERENTIAL    5:59 AM CST    this procedure



         are in the



         results



         section.

 

 POC GLUCOSE  Routine  2018    Results for



     7:54 PM CST    this procedure



         are in the



         results



         section.

 

 ECG ED PRELIMINARY  Routine  2018    Results for



 INTERPRETATION    5:59 PM CST    this procedure



         are in the



         results



         section.

 

 POC GLUCOSE  Routine  2018    Results for



     3:55 PM CST    this procedure



         are in the



         results



         section.

 

 POC GLUCOSE  Routine  2018    Results for



     11:54 AM CST    this procedure



         are in the



         results



         section.

 

 POC GLUCOSE  Routine  2018    Results for



     6:26 AM CST    this procedure



         are in the



         results



         section.

 

 ESTIMATED GFR  Routine  2018    Results for



     6:23 AM CST    this procedure



         are in the



         results



         section.

 

 COMPREHENSIVE METABOLIC PANEL  Routine  2018    Results for



     6:23 AM CST    this procedure



         are in the



         results



         section.

 

 HC COMPLETE BLD COUNT W/AUTO  Routine  2018    Results for



 DIFF    6:23 AM CST    this procedure



         are in the



         results



         section.

 

 POC GLUCOSE  Routine  2017    Results for



     8:54 PM CST    this procedure



         are in the



         results



         section.

 

 POC GLUCOSE  Routine  2017    Results for



     4:46 PM CST    this procedure



         are in the



         results



         section.

 

 POC GLUCOSE  Routine  2017    Results for



     11:33 AM CST    this procedure



         are in the



         results



         section.

 

 ESTIMATED GFR  Routine  2017    Results for



     7:05 AM CST    this procedure



         are in the



         results



         section.

 

 COMPREHENSIVE METABOLIC PANEL  Routine  2017    Results for



     7:05 AM CST    this procedure



         are in the



         results



         section.

 

 HC COMPLETE BLD COUNT W/AUTO  Routine  2017    Results for



 DIFF    7:05 AM CST    this procedure



         are in the



         results



         section.

 

 POC GLUCOSE  Routine  2017    Results for



     6:23 AM CST    this procedure



         are in the



         results



         section.

 

 POC GLUCOSE  Routine  2017    Results for



     8:30 PM CST    this procedure



         are in the



         results



         section.

 

 XR CHEST 2 VW  Routine  2017    Results for



     7:47 PM CST    this procedure



         are in the



         results



         section.

 

 ESTIMATED GFR  Routine  2017    Results for



     7:07 PM CST    this procedure



         are in the



         results



         section.

 

 COMPREHENSIVE METABOLIC PANEL  Routine  2017    Results for



     7:07 PM CST    this procedure



         are in the



         results



         section.

 

 HC COMPLETE BLD COUNT W/AUTO  Routine  2017    Results for



 DIFF    7:07 PM CST    this procedure



         are in the



         results



         section.

 

 POC GLUCOSE  Routine  2017    Results for



     3:58 PM CST    this procedure



         are in the



         results



         section.

 

 POC GLUCOSE  Routine  2017    Results for



     12:15 PM CST    this procedure



         are in the



         results



         section.

 

 POC GLUCOSE  Routine  2017    Results for



     7:03 AM CST    this procedure



         are in the



         results



         section.

 

 POC GLUCOSE  Routine  2017    Results for



     8:17 PM CST    this procedure



         are in the



         results



         section.

 

 POC GLUCOSE  Routine  2017    Results for



     3:34 PM CST    this procedure



         are in the



         results



         section.

 

 POC GLUCOSE  Routine  2017    Results for



     10:52 AM CST    this procedure



         are in the



         results



         section.

 

 URINALYSIS SCREEN AND  STAT  2017    Results for



 MICROSCOPY, WITH REFLEX TO    8:31 AM CST    this procedure



 CULTURE        are in the



         results



         section.

 

 URINE DRUGS OF ABUSE SCREEN  STAT  2017    Results for



     8:31 AM CST    this procedure



         are in the



         results



         section.

 

 URINE CULTURE  STAT  2017    Results for



     8:31 AM CST    this procedure



         are in the



         results



         section.

 

 POC GLUCOSE  Routine  2017    Results for



     7:02 AM CST    this procedure



         are in the



         results



         section.

 

 ESTIMATED GFR  Routine  2017    Results for



     5:34 AM CST    this procedure



         are in the



         results



         section.

 

 BASIC METABOLIC PANEL  Routine  2017    Results for



     5:34 AM CST    this procedure



         are in the



         results



         section.

 

 PROTHROMBIN TIME WITH INR  Routine  2017    Results for



     5:34 AM CST    this procedure



         are in the



         results



         section.

 

 HC COMPLETE BLD COUNT W/AUTO  Routine  2017    Results for



 DIFF    5:34 AM CST    this procedure



         are in the



         results



         section.

 

 POC GLUCOSE  Routine  2017    Results for



     12:44 AM CST    this procedure



         are in the



         results



         section.

 

 TROPONIN  Timed  2017    Results for



     7:33 PM CST    this procedure



         are in the



         results



         section.

 

 CT ABDOMEN PELVIS WO CONTRAST  STAT  2017    Results for



     4:23 PM CST    this procedure



         are in the



         results



         section.

 

 INFLUENZA ANTIGEN  Routine  2017    Results for



     3:58 PM CST    this procedure



         are in the



         results



         section.

 

 ECG 12-LEAD  STAT  2017    Results for



     3:48 PM CST    this procedure



         are in the



         results



         section.

 

 B NATRIURETIC PEPTIDE  STAT  2017    Results for



     3:45 PM CST    this procedure



         are in the



         results



         section.

 

 TROPONIN  STAT  2017    Results for



     3:45 PM CST    this procedure



         are in the



         results



         section.

 

 ALCOHOL LEVEL, BLOOD  STAT  2017    Results for



     3:45 PM CST    this procedure



         are in the



         results



         section.

 

 ESTIMATED GFR  STAT  2017    Results for



     3:45 PM CST    this procedure



         are in the



         results



         section.

 

 LIPASE LEVEL  STAT  2017    Results for



     3:45 PM CST    this procedure



         are in the



         results



         section.

 

 COMPREHENSIVE METABOLIC PANEL  STAT  2017    Results for



     3:45 PM CST    this procedure



         are in the



         results



         section.

 

 HC COMPLETE BLD COUNT W/AUTO  STAT  2017    Results for



 DIFF    3:45 PM CST    this procedure



         are in the



         results



         section.

 

 POC GLUCOSE  Routine  10/13/2017    Results for



     8:01 AM CDT    this procedure



         are in the



         results



         section.

 

 US ABDOMEN COMPLETE  STAT  10/13/2017    Results for



     7:28 AM CDT    this procedure



         are in the



         results



         section.

 

 ESTIMATED GFR  Routine  10/13/2017    Results for



     5:33 AM CDT    this procedure



         are in the



         results



         section.

 

 COMPREHENSIVE METABOLIC PANEL  Routine  10/13/2017    Results for



     5:33 AM CDT    this procedure



         are in the



         results



         section.

 

 HEPATITIS A ANTIBODY IGM  Routine  10/13/2017    Results for



     5:33 AM CDT    this procedure



         are in the



         results



         section.

 

 HEPATITIS B CORE ANTIBODY IGM  Routine  10/13/2017    Results for



     5:33 AM CDT    this procedure



         are in the



         results



         section.

 

 HEPATITIS B SURFACE ANTIBODY  Routine  10/13/2017    Results for



     5:33 AM CDT    this procedure



         are in the



         results



         section.

 

 HEPATITIS B SURFACE ANTIGEN  Routine  10/13/2017    Results for



     5:33 AM CDT    this procedure



         are in the



         results



         section.

 

 PROTHROMBIN TIME WITH INR  Routine  10/13/2017    Results for



     5:33 AM CDT    this procedure



         are in the



         results



         section.

 

 TOTAL IRON BINDING CAPACITY  Routine  10/13/2017    Results for



     5:33 AM CDT    this procedure



         are in the



         results



         section.

 

 FERRITIN LEVEL  Routine  10/13/2017    Results for



     5:33 AM CDT    this procedure



         are in the



         results



         section.

 

 CERULOPLASMIN LEVEL  Routine  10/13/2017    Results for



     5:33 AM CDT    this procedure



         are in the



         results



         section.

 

 HC COMPLETE BLD COUNT W/AUTO  Routine  10/13/2017    Results for



 DIFF    5:33 AM CDT    this procedure



         are in the



         results



         section.

 

 RADHA  Routine  10/13/2017    Results for



     5:33 AM CDT    this procedure



         are in the



         results



         section.

 

 ALPHA-1 ANTITRYPSIN LEVEL  Routine  10/13/2017    Results for



     5:33 AM CDT    this procedure



         are in the



         results



         section.

 

 ALPHA FETOPROTEIN  Routine  10/13/2017    Results for



     5:33 AM CDT    this procedure



         are in the



         results



         section.

 

 HEPATITIS C ANTIBODY  Routine  10/13/2017    Results for



     5:33 AM CDT    this procedure



         are in the



         results



         section.

 

 CT ABDOMEN PELVIS W WO CONTRAST  STAT  10/13/2017    Results for



     1:34 AM CDT    this procedure



         are in the



         results



         section.

 

 POC GLUCOSE  Routine  10/12/2017    Results for



     8:33 PM CDT    this procedure



         are in the



         results



         section.

 

 AMMONIA LEVEL  Routine  10/12/2017    Results for



     5:45 PM CDT    this procedure



         are in the



         results



         section.

 

 POC GLUCOSE  Routine  10/12/2017    Results for



     4:33 PM CDT    this procedure



         are in the



         results



         section.

 

 ECG 12-LEAD  Routine  10/12/2017    Results for



     2:20 PM CDT    this procedure



         are in the



         results



         section.

 

 ANTI SMOOTH MUSCLE AB TITER  Routine  10/12/2017    Results for



     1:27 PM CDT    this procedure



         are in the



         results



         section.

 

 HEPATITIS B CORE ANTIBODY IGM  Routine  10/12/2017    Results for



     1:27 PM CDT    this procedure



         are in the



         results



         section.

 

 HEPATITIS B SURFACE ANTIGEN  Routine  10/12/2017    Results for



     1:27 PM CDT    this procedure



         are in the



         results



         section.

 

 HEPATITIS B SURFACE ANTIBODY  Routine  10/12/2017    Results for



     1:27 PM CDT    this procedure



         are in the



         results



         section.

 

 HEPATITIS C ANTIBODY  Routine  10/12/2017    Results for



     1:27 PM CDT    this procedure



         are in the



         results



         section.

 

 TOTAL IRON BINDING CAPACITY  Routine  10/12/2017    Results for



     1:27 PM CDT    this procedure



         are in the



         results



         section.

 

 FERRITIN LEVEL  Routine  10/12/2017    Results for



     1:27 PM CDT    this procedure



         are in the



         results



         section.

 

 CERULOPLASMIN LEVEL  Routine  10/12/2017    Results for



     1:27 PM CDT    this procedure



         are in the



         results



         section.

 

 ALPHA FETOPROTEIN  Routine  10/12/2017    Results for



     1:27 PM CDT    this procedure



         are in the



         results



         section.

 

 ALPHA-1 ANTITRYPSIN LEVEL  Routine  10/12/2017    Results for



     1:27 PM CDT    this procedure



         are in the



         results



         section.

 

 ANTI SMOOTH MUSCLE AB SCREEN  Routine  10/12/2017    Results for



     1:27 PM CDT    this procedure



         are in the



         results



         section.

 

 RADHA  Routine  10/12/2017    Results for



     1:27 PM CDT    this procedure



         are in the



         results



         section.

 

 HEMOGLOBIN & HEMATOCRIT  Routine  10/12/2017    Results for



     1:27 PM CDT    this procedure



         are in the



         results



         section.

 

 POC GLUCOSE  Routine  10/12/2017    Results for



     11:14 AM CDT    this procedure



         are in the



         results



         section.

 

 POC GLUCOSE  Routine  10/12/2017    Results for



     7:48 AM CDT    this procedure



         are in the



         results



         section.

 

 POC GLUCOSE  Routine  10/12/2017    Results for



     12:36 AM CDT    this procedure



         are in the



         results



         section.

 

 LACTIC ACID LEVEL  Timed  10/11/2017    Results for



     10:17 PM CDT    this procedure



         are in the



         results



         section.

 

 URINALYSIS SCREEN AND  STAT  10/11/2017    Results for



 MICROSCOPY, WITH REFLEX TO    9:05 PM CDT    this procedure



 CULTURE        are in the



         results



         section.

 

 AMMONIA LEVEL  STAT  10/11/2017    Results for



     7:49 PM CDT    this procedure



         are in the



         results



         section.

 

 BILIRUBIN DIRECT  STAT  10/11/2017    Results for



     7:49 PM CDT    this procedure



         are in the



         results



         section.

 

 ESTIMATED GFR  STAT  10/11/2017    Results for



     7:49 PM CDT    this procedure



         are in the



         results



         section.

 

 LACTIC ACID LEVEL  STAT  10/11/2017    Results for



     7:49 PM CDT    this procedure



         are in the



         results



         section.

 

 HC COMPLETE BLD COUNT W/AUTO  STAT  10/11/2017    Results for



 DIFF    7:49 PM CDT    this procedure



         are in the



         results



         section.

 

 LIPASE LEVEL  STAT  10/11/2017    Results for



     7:49 PM CDT    this procedure



         are in the



         results



         section.

 

 AMYLASE LEVEL  STAT  10/11/2017    Results for



     7:49 PM CDT    this procedure



         are in the



         results



         section.

 

 COMPREHENSIVE METABOLIC PANEL  STAT  10/11/2017    Results for



     7:49 PM CDT    this procedure



         are in the



         results



         section.

 

 XR CHEST 2 VW  STAT  10/11/2017    Results for



     7:40 PM CDT    this procedure



         are in the



         results



         section.

 

 CT ABDOMEN PELVIS WO CONTRAST  STAT  10/11/2017    Results for



     7:19 PM CDT    this procedure



         are in the



         results



         section.

 

 URINE CULTURE  STAT  10/11/2017    Results for



     6:51 PM CDT    this procedure



         are in the



         results



         section.

 

 ESTIMATED GFR  STAT  2017    Results for



     3:06 PM CDT    this procedure



         are in the



         results



         section.

 

 COMPREHENSIVE METABOLIC PANEL  STAT  2017    Results for



     3:06 PM CDT    this procedure



         are in the



         results



         section.

 

 HC COMPLETE BLD COUNT W/AUTO  Routine  2017    Results for



 DIFF    3:00 PM CDT    this procedure



         are in the



         results



         section.

 

 URINALYSIS SCREEN AND  STAT  2017    Results for



 MICROSCOPY, WITH REFLEX TO    9:48 PM CDT    this procedure



 CULTURE        are in the



         results



         section.

 

 CT CHEST W CONTRAST ABDOMEN W  STAT  2017    Results for



 CONTRAST PELVIS W CONTRAST    9:31 PM CDT    this procedure



         are in the



         results



         section.

 

 LACTIC ACID LEVEL  Timed  2017    Results for



     5:35 PM CDT    this procedure



         are in the



         results



         section.

 

 ESTIMATED GFR  STAT  2017    Results for



     2:06 PM CDT    this procedure



         are in the



         results



         section.

 

 BILIRUBIN DIRECT  STAT  2017    Results for



     2:06 PM CDT    this procedure



         are in the



         results



         section.

 

 LACTIC ACID LEVEL  STAT  2017    Results for



     2:06 PM CDT    this procedure



         are in the



         results



         section.

 

 HC COMPLETE BLD COUNT W/AUTO  STAT  2017    Results for



 DIFF    2:06 PM CDT    this procedure



         are in the



         results



         section.

 

 LIPASE LEVEL  STAT  2017    Results for



     2:06 PM CDT    this procedure



         are in the



         results



         section.

 

 AMYLASE LEVEL  STAT  2017    Results for



     2:06 PM CDT    this procedure



         are in the



         results



         section.

 

 COMPREHENSIVE METABOLIC PANEL  STAT  2017    Results for



     2:06 PM CDT    this procedure



         are in the



         results



         section.

 

 POC GLUCOSE  Routine  2017    Results for



     6:32 AM CDT    this procedure



         are in the



         results



         section.

 

 POC GLUCOSE  Routine  2017    Results for



     9:43 PM CDT    this procedure



         are in the



         results



         section.

 

 POC GLUCOSE  Routine  2017    Results for



     4:25 PM CDT    this procedure



         are in the



         results



         section.

 

 POC GLUCOSE  Routine  2017    Results for



     12:05 PM CDT    this procedure



         are in the



         results



         section.

 

 POC GLUCOSE  Routine  2017    Results for



     6:44 AM CDT    this procedure



         are in the



         results



         section.

 

 POC GLUCOSE  Routine  2017    Results for



     8:33 PM CDT    this procedure



         are in the



         results



         section.

 

 POC GLUCOSE  Routine  2017    Results for



     4:01 PM CDT    this procedure



         are in the



         results



         section.

 

 POC GLUCOSE  Routine  2017    Results for



     11:06 AM CDT    this procedure



         are in the



         results



         section.

 

 VANCOMYCIN LEVEL, TROUGH  Timed  2017    Results for



     9:07 AM CDT    this procedure



         are in the



         results



         section.

 

 LACTIC ACID LEVEL  Timed  2017    Results for



     9:48 PM CDT    this procedure



         are in the



         results



         section.

 

 URINE DRUGS OF ABUSE SCREEN  Routine  2017    Results for



     6:40 PM CDT    this procedure



         are in the



         results



         section.

 

 URINALYSIS SCREEN AND  STAT  2017    Results for



 MICROSCOPY, WITH REFLEX TO    6:40 PM CDT    this procedure



 CULTURE        are in the



         results



         section.

 

 URINE CULTURE  STAT  2017    Results for



     6:40 PM CDT    this procedure



         are in the



         results



         section.

 

 ECG 12-LEAD  STAT  2017    Results for



     5:33 PM CDT    this procedure



         are in the



         results



         section.

 

 XR CHEST 1 VW PORTABLE  STAT  2017    Results for



     5:27 PM CDT    this procedure



         are in the



         results



         section.

 

 LACTIC ACID LEVEL  STAT  2017    Results for



     5:16 PM CDT    this procedure



         are in the



         results



         section.

 

 BLOOD CULTURE, AEROBIC &  Routine  2017    Results for



 ANAEROBIC    5:16 PM CDT    this procedure



         are in the



         results



         section.

 

 CT HEAD WO CONTRAST  STAT  2017    Results for



     5:12 PM CDT    this procedure



         are in the



         results



         section.

 

 BILIRUBIN DIRECT  STAT  2017    Results for



     4:59 PM CDT    this procedure



         are in the



         results



         section.

 

 ESTIMATED GFR  STAT  2017    Results for



     4:59 PM CDT    this procedure



         are in the



         results



         section.

 

 LIPASE LEVEL  STAT  2017    Results for



     4:59 PM CDT    this procedure



         are in the



         results



         section.

 

 AMYLASE LEVEL  STAT  2017    Results for



     4:59 PM CDT    this procedure



         are in the



         results



         section.

 

 PARTIAL THROMBOPLASTIN TIME  STAT  2017    Results for



 (PTT)    4:59 PM CDT    this procedure



         are in the



         results



         section.

 

 PROTHROMBIN TIME WITH INR  STAT  2017    Results for



     4:59 PM CDT    this procedure



         are in the



         results



         section.

 

 COMPREHENSIVE METABOLIC PANEL  STAT  2017    Results for



     4:59 PM CDT    this procedure



         are in the



         results



         section.

 

 CBC WITH PLATELET AND  STAT  2017    Results for



 DIFFERENTIAL    4:59 PM CDT    this procedure



         are in the



         results



         section.

 

 BLOOD CULTURE, AEROBIC &  Routine  2017    Results for



 ANAEROBIC    4:59 PM CDT    this procedure



         are in the



         results



         section.

 

 POC GLUCOSE  Routine  2017    Results for



     5:08 PM CDT    this procedure



         are in the



         results



         section.

 

 POC GLUCOSE  Routine  2017    Results for



     11:40 AM CDT    this procedure



         are in the



         results



         section.

 

 POC GLUCOSE  Routine  2017    Results for



     6:22 AM CDT    this procedure



         are in the



         results



         section.

 

 POC GLUCOSE  Routine  2017    Results for



     8:32 PM CDT    this procedure



         are in the



         results



         section.

 

 POC GLUCOSE  Routine  2017    Results for



     4:50 PM CDT    this procedure



         are in the



         results



         section.

 

 POC GLUCOSE  Routine  2017    Results for



     11:13 AM CDT    this procedure



         are in the



         results



         section.

 

 POC GLUCOSE  Routine  2017    Results for



     6:17 AM CDT    this procedure



         are in the



         results



         section.

 

 ESTIMATED GFR  Routine  2017    Results for



     6:15 AM CDT    this procedure



         are in the



         results



         section.

 

 BASIC METABOLIC PANEL  Routine  2017    Results for



     6:15 AM CDT    this procedure



         are in the



         results



         section.

 

 HC COMPLETE BLD COUNT W/AUTO  Routine  2017    Results for



 DIFF    6:15 AM CDT    this procedure



         are in the



         results



         section.

 

 POC GLUCOSE  Routine  2017    Results for



     8:23 PM CDT    this procedure



         are in the



         results



         section.

 

 POC GLUCOSE  Routine  2017    Results for



     4:15 PM CDT    this procedure



         are in the



         results



         section.

 

 XR CHEST 1 VW PORTABLE  STAT  2017    Results for



     3:15 PM CDT    this procedure



         are in the



         results



         section.

 

 POC GLUCOSE  Routine  2017    Results for



     12:10 PM CDT    this procedure



         are in the



         results



         section.

 

 POC GLUCOSE  Routine  2017    Results for



     6:37 AM CDT    this procedure



         are in the



         results



         section.

 

 ESTIMATED GFR  Routine  2017    Results for



     5:20 AM CDT    this procedure



         are in the



         results



         section.

 

 BASIC METABOLIC PANEL  Routine  2017    Results for



     5:20 AM CDT    this procedure



         are in the



         results



         section.

 

 HC COMPLETE BLD COUNT W/AUTO  Routine  2017    Results for



 DIFF    5:20 AM CDT    this procedure



         are in the



         results



         section.

 

 POC GLUCOSE  Routine  2017    Results for



     8:36 PM CDT    this procedure



         are in the



         results



         section.

 

 POC GLUCOSE  Routine  2017    Results for



     8:34 PM CDT    this procedure



         are in the



         results



         section.

 

 POC GLUCOSE  Routine  2017    Results for



     6:40 PM CDT    this procedure



         are in the



         results



         section.

 

 HC CATH DUAL LUMEN PICC  Routine  2017    Results for



     12:19 PM CDT    this procedure



         are in the



         results



         section.

 

 HC US GUIDED VASCULAR ACCESS  Routine  2017    Results for



     12:19 PM CDT    this procedure



         are in the



         results



         section.

 

 HC CVL PICC INSERT 5 YRS OR >  Routine  2017    Results for



     12:19 PM CDT    this procedure



         are in the



         results



         section.

 

 POC GLUCOSE  Routine  2017    Results for



     11:12 AM CDT    this procedure



         are in the



         results



         section.

 

 ESTIMATED GFR  Routine  2017    Results for



     6:51 AM CDT    this procedure



         are in the



         results



         section.

 

 BASIC METABOLIC PANEL  Routine  2017    Results for



     6:51 AM CDT    this procedure



         are in the



         results



         section.

 

 HC COMPLETE BLD COUNT W/AUTO  Routine  2017    Results for



 DIFF    6:51 AM CDT    this procedure



         are in the



         results



         section.

 

 POC GLUCOSE  Routine  2017    Results for



     6:33 AM CDT    this procedure



         are in the



         results



         section.

 

 POC GLUCOSE  Routine  2017    Results for



     8:38 PM CDT    this procedure



         are in the



         results



         section.

 

 POC GLUCOSE  Routine  2017    Results for



     4:16 PM CDT    this procedure



         are in the



         results



         section.

 

 CT CHEST WO CONTRAST  Routine  2017    Results for



     1:37 PM CDT    this procedure



         are in the



         results



         section.

 

 POC GLUCOSE  Routine  2017    Results for



     12:04 PM CDT    this procedure



         are in the



         results



         section.

 

 XR CHEST 1 VW PORTABLE  Routine  2017    Results for



     10:40 AM CDT    this procedure



         are in the



         results



         section.

 

 POC GLUCOSE  Routine  2017    Results for



     7:01 AM CDT    this procedure



         are in the



         results



         section.

 

 POC GLUCOSE  Routine  2017    Results for



     8:49 PM CDT    this procedure



         are in the



         results



         section.

 

 POC GLUCOSE  Routine  2017    Results for



     4:14 PM CDT    this procedure



         are in the



         results



         section.

 

 POC GLUCOSE  Routine  2017    Results for



     10:54 AM CDT    this procedure



         are in the



         results



         section.

 

 POC GLUCOSE  Routine  2017    Results for



     6:33 AM CDT    this procedure



         are in the



         results



         section.

 

 ESTIMATED GFR  Routine  2017    Results for



     5:08 AM CDT    this procedure



         are in the



         results



         section.

 

 BASIC METABOLIC PANEL  Routine  2017    Results for



     5:08 AM CDT    this procedure



         are in the



         results



         section.

 

 HC COMPLETE BLD COUNT W/AUTO  Routine  2017    Results for



 DIFF    5:08 AM CDT    this procedure



         are in the



         results



         section.

 

 POC GLUCOSE  Routine  2017    Results for



     8:49 PM CDT    this procedure



         are in the



         results



         section.

 

 POC GLUCOSE  Routine  2017    Results for



     4:43 PM CDT    this procedure



         are in the



         results



         section.

 

 GRAM STAIN  Routine  2017    Results for



     4:40 PM CDT    this procedure



         are in the



         results



         section.

 

 AEROBIC CULTURE  Routine  2017    Results for



     4:40 PM CDT    this procedure



         are in the



         results



         section.

 

 POC GLUCOSE  Routine  2017    Results for



     11:57 AM CDT    this procedure



         are in the



         results



         section.

 

 CT NEEDLE BIOPSY NO CONTRAST  Routine  2017    Results for



     10:00 AM CDT    this procedure



         are in the



         results



         section.

 

 XR CHEST 1 VW PORTABLE  Routine  2017    Results for



     9:59 AM CDT    this procedure



         are in the



         results



         section.

 

 POC GLUCOSE  Routine  2017    Results for



     6:33 AM CDT    this procedure



         are in the



         results



         section.

 

 ESTIMATED GFR  Routine  2017    Results for



     5:28 AM CDT    this procedure



         are in the



         results



         section.

 

 PROTHROMBIN TIME WITH INR  Routine  2017    Results for



     5:28 AM CDT    this procedure



         are in the



         results



         section.

 

 PARTIAL THROMBOPLASTIN TIME  Routine  2017    Results for



 (PTT)    5:28 AM CDT    this procedure



         are in the



         results



         section.

 

 BASIC METABOLIC PANEL  Routine  2017    Results for



     5:28 AM CDT    this procedure



         are in the



         results



         section.

 

 HC COMPLETE BLD COUNT W/AUTO  Routine  2017    Results for



 DIFF    5:28 AM CDT    this procedure



         are in the



         results



         section.

 

 POC GLUCOSE  Routine  2017    Results for



     11:17 PM CDT    this procedure



         are in the



         results



         section.

 

 POC GLUCOSE  Routine  2017    Results for



     8:44 PM CDT    this procedure



         are in the



         results



         section.

 

 POC GLUCOSE  Routine  2017    Results for



     11:26 AM CDT    this procedure



         are in the



         results



         section.

 

 POC GLUCOSE  Routine  2017    Results for



     6:30 AM CDT    this procedure



         are in the



         results



         section.

 

 ESTIMATED GFR  Routine  2017    Results for



     5:45 AM CDT    this procedure



         are in the



         results



         section.

 

 BASIC METABOLIC PANEL  Routine  2017    Results for



     5:45 AM CDT    this procedure



         are in the



         results



         section.

 

 HC COMPLETE BLD COUNT W/AUTO  Routine  2017    Results for



 DIFF    5:45 AM CDT    this procedure



         are in the



         results



         section.

 

 HEMOGLOBIN A1C  Routine  2017    Results for



     5:45 AM CDT    this procedure



         are in the



         results



         section.

 

 POC GLUCOSE  Routine  2017    Results for



     10:39 PM CDT    this procedure



         are in the



         results



         section.

 

 URINE CULTURE  Routine  2017    Results for



     10:20 PM CDT    this procedure



         are in the



         results



         section.

 

 URINALYSIS SCREEN AND  Routine  2017    Results for



 MICROSCOPY, WITH REFLEX TO    10:16 PM CDT    this procedure



 CULTURE        are in the



         results



         section.

 

 POC GLUCOSE  Routine  2017    Results for



     9:10 PM CDT    this procedure



         are in the



         results



         section.

 

 POC GLUCOSE  Routine  2017    Results for



     6:01 PM CDT    this procedure



         are in the



         results



         section.

 

 ECG ED PRELIMINARY  Routine  2017    Results for



 INTERPRETATION    4:52 PM CDT    this procedure



         are in the



         results



         section.

 

 CT ABDOMEN PELVIS W CONTRAST  STAT  2017    Results for



     4:49 PM CDT    this procedure



         are in the



         results



         section.

 

 ECG 12-LEAD  Routine  2017    Results for



     1:56 PM CDT    this procedure



         are in the



         results



         section.

 

 ESTIMATED GFR  Routine  2017    Results for



     12:59 PM CDT    this procedure



         are in the



         results



         section.

 

 LIPASE LEVEL  Routine  2017    Results for



     12:59 PM CDT    this procedure



         are in the



         results



         section.

 

 COMPREHENSIVE METABOLIC PANEL  Routine  2017    Results for



     12:59 PM CDT    this procedure



         are in the



         results



         section.

 

 HC COMPLETE BLD COUNT W/AUTO  Routine  2017    Results for



 DIFF    12:57 PM CDT    this procedure



         are in the



         results



         section.

 

 POC GLUCOSE  Routine  2017    Results for



     6:23 AM CDT    this procedure



         are in the



         results



         section.

 

 POC GLUCOSE  Routine  2017    Results for



     9:55 PM CDT    this procedure



         are in the



         results



         section.

 

 CT CHEST W CONTRAST  STAT  2017    Results for



     7:21 PM CDT    this procedure



         are in the



         results



         section.

 

 BLOOD CULTURE, AEROBIC &  Routine  2017    Results for



 ANAEROBIC    5:18 PM CDT    this procedure



         are in the



         results



         section.

 

 TROPONIN  Timed  2017    Results for



     5:12 PM CDT    this procedure



         are in the



         results



         section.

 

 BLOOD CULTURE, AEROBIC &  Routine  2017    Results for



 ANAEROBIC    5:12 PM CDT    this procedure



         are in the



         results



         section.

 

 XR RIBS 2 VW RIGHT  STAT  2017    Results for



     3:55 PM CDT    this procedure



         are in the



         results



         section.

 

 XR CHEST 2 VW  STAT  2017    Results for



     3:54 PM CDT    this procedure



         are in the



         results



         section.

 

 ESTIMATED GFR  STAT  2017    Results for



     2:32 PM CDT    this procedure



         are in the



         results



         section.

 

 TROPONIN  STAT  2017    Results for



     2:32 PM CDT    this procedure



         are in the



         results



         section.

 

 CREATINE KINASE, TOTAL (CPK)  STAT  2017    Results for



     2:32 PM CDT    this procedure



         are in the



         results



         section.

 

 COMPREHENSIVE METABOLIC PANEL  STAT  2017    Results for



     2:32 PM CDT    this procedure



         are in the



         results



         section.

 

 HC COMPLETE BLD COUNT W/AUTO  STAT  2017    Results for



 DIFF    2:32 PM CDT    this procedure



         are in the



         results



         section.

 

 ECG 12-LEAD  STAT  2017    Results for



     2:09 PM CDT    this procedure



         are in the



         results



         section.

 

 POC GLUCOSE  Routine  07/15/2017    Results for



     11:23 AM CDT    this procedure



         are in the



         results



         section.

 

 XR CHEST 1 VW PORTABLE  Routine  07/15/2017    Results for



     7:20 AM CDT    this procedure



         are in the



         results



         section.

 

 POC GLUCOSE  Routine  07/15/2017    Results for



     6:50 AM CDT    this procedure



         are in the



         results



         section.

 

 POC GLUCOSE  Routine  2017    Results for



     8:59 PM CDT    this procedure



         are in the



         results



         section.

 

 POC GLUCOSE  Routine  2017    Results for



     4:14 PM CDT    this procedure



         are in the



         results



         section.

 

 POC GLUCOSE  Routine  2017    Results for



     11:04 AM CDT    this procedure



         are in the



         results



         section.

 

 POC GLUCOSE  Routine  2017    Results for



     8:02 AM CDT    this procedure



         are in the



         results



         section.

 

 XR CHEST 1 VW PORTABLE  Routine  2017    Results for



     4:50 AM CDT    this procedure



         are in the



         results



         section.

 

 POC GLUCOSE  Routine  2017    Results for



     8:54 PM CDT    this procedure



         are in the



         results



         section.

 

 XR CHEST 1 VW PORTABLE  Routine  2017    Results for



     5:54 AM CDT    this procedure



         are in the



         results



         section.

 

 ESTIMATED GFR  Routine  2017    Results for



     4:55 AM CDT    this procedure



         are in the



         results



         section.

 

 HC COMPLETE BLD COUNT W/AUTO  Routine  2017    Results for



 DIFF    4:55 AM CDT    this procedure



         are in the



         results



         section.

 

 BASIC METABOLIC PANEL  Routine  2017    Results for



     4:55 AM CDT    this procedure



         are in the



         results



         section.

 

 POC GLUCOSE  Routine  2017    Results for



     9:02 PM CDT    this procedure



         are in the



         results



         section.

 

 POC GLUCOSE  Routine  2017    Results for



     11:33 AM CDT    this procedure



         are in the



         results



         section.

 

 XR CHEST 1 VW PORTABLE  STAT  2017    Results for



     11:14 AM CDT    this procedure



         are in the



         results



         section.

 

 SURGICAL PATHOLOGY REQUEST  Routine  2017    Results for



     10:03 AM CDT    this procedure



         are in the



         results



         section.

 

 GRAM STAIN  Timed  2017    Results for



     10:03 AM CDT    this procedure



         are in the



         results



         section.

 

 AFB STAIN  Timed  2017    Results for



     10:03 AM CDT    this procedure



         are in the



         results



         section.

 

 FUNGUS SMEAR  Timed  2017    Results for



     10:03 AM CDT    this procedure



         are in the



         results



         section.

 

 AFB CULTURE  Timed  2017  Bronchitis,  Results for



     10:03 AM CDT  chronic  this procedure



       obstructive, with  are in the



       exacerbation  results



         section.

 

 AEROBIC CULTURE  Timed  2017  Bronchitis,  Results for



     10:03 AM CDT  chronic  this procedure



       obstructive, with  are in the



       exacerbation  results



         section.

 

 FUNGUS CULTURE  Timed  2017  Bronchitis,  Results for



     10:03 AM CDT  chronic  this procedure



       obstructive, with  are in the



       exacerbation  results



         section.

 

 ANAEROBIC CULTURE  Timed  2017  Bronchitis,  Results for



     10:03 AM CDT  chronic  this procedure



       obstructive, with  are in the



       exacerbation  results



         section.

 

 NM AN ELECTIVE ENDOTRACHEAL  Routine  2017    



 AIRWAY    8:51 AM CDT    









   Procedure Note - Sandra Bro MD - 2017  8:50 AM CDT



Airway



   Performed by: SANDRA BRO



   Authorized by: SANDRA BRO



   



   Location:  OR



   Urgency:  Elective



   Difficult Airway: No



   Anesthesiologist:  SANDRA BRO



   Performed by:



      anesthesiologist



   Preoxygenated with 100% O2: Yes



   Mask Ventilation:  Easy mask



   Final Airway Type:  Endotracheal airway



   Final Endotracheal Airway:  ETT - double lumen left



   Cuffed: Yes



   Technique Used:  Direct laryngoscopy



   Insertion Site:  Oral



   Blade Type:  Kraig



   Laryngoscope Blade/Videolaryngoscope Blade Size:  3



   ETT Double Lumen (fr):  37



   Cuff at minimum occlusion pressure: Yes



   Measured from:  Lips



   Placement Verified by: CO2 detection, direct visualization and equal breath 
sounds



   Laryngoscopic view:  Grade IIa - partial view of glottis



   Number of Attempts at Approach:  1









 POC GLUCOSE  Routine  2017  6:02 AM    Results for this



     CDT    procedure are in



         the results



         section.

 

 POC GLUCOSE  Routine  2017  9:10 PM    Results for this



     CDT    procedure are in



         the results



         section.

 

 POC GLUCOSE  Routine  2017  4:08 PM    Results for this



     CDT    procedure are in



         the results



         section.

 

 PREPARE RBC  STAT  2017  2:19 PM    



     CDT    

 

 TYPE AND SCREEN  STAT  2017  2:19 PM    Results for this



     CDT    procedure are in



         the results



         section.

 

 SPIROMETRY PRE AND POST  Routine  2017 12:31 PM  Lung mass  



 WITH BRONCHILATOR    CDT    

 

 POC GLUCOSE  Routine  2017 11:05 AM    Results for this



     CDT    procedure are in



         the results



         section.

 

 POC GLUCOSE  Routine  2017  6:20 AM    Results for this



     CDT    procedure are in



         the results



         section.

 

 ECG ED PRELIMINARY  Routine  07/10/2017  9:25 PM    Results for this



 INTERPRETATION    CDT    procedure are in



         the results



         section.

 

 POC GLUCOSE  Routine  07/10/2017  8:19 PM    Results for this



     CDT    procedure are in



         the results



         section.

 

 XR CHEST 2 VW  STAT  07/10/2017  4:10 PM    Results for this



     CDT    procedure are in



         the results



         section.

 

 ESTIMATED GFR  STAT  07/10/2017  3:39 PM    Results for this



     CDT    procedure are in



         the results



         section.

 

 B NATRIURETIC PEPTIDE  STAT  07/10/2017  3:39 PM    Results for this



     CDT    procedure are in



         the results



         section.

 

 TROPONIN  STAT  07/10/2017  3:39 PM    Results for this



     CDT    procedure are in



         the results



         section.

 

 CREATINE KINASE, TOTAL  STAT  07/10/2017  3:39 PM    Results for this



 (CPK)    CDT    procedure are in



         the results



         section.

 

 COMPREHENSIVE METABOLIC  STAT  07/10/2017  3:39 PM    Results for this



 PANEL    CDT    procedure are in



         the results



         section.

 

 PROTHROMBIN TIME WITH  STAT  07/10/2017  3:39 PM    Results for this



 INR    CDT    procedure are in



         the results



         section.

 

 HC COMPLETE BLD COUNT  STAT  07/10/2017  3:39 PM    Results for this



 W/AUTO DIFF    CDT    procedure are in



         the results



         section.

 

 ECG 12-LEAD  STAT  07/10/2017  3:34 PM    Results for this



     CDT    procedure are in



         the results



         section.

 

 ECHOCARDIOGRAM 2D  Routine  2017  8:37 AM  Shortness of  Results for this



 COMPLETE W MMODE    CDT  breath



  procedure are in



 SPECTRAL COLOR DOPPLER      Mass of lower lobe  the results



 (23140)      of right lung  section.

 

 POC GLUCOSE  Routine  2017  5:20 PM    Results for this



     CDT    procedure are in



         the results



         section.

 

 POC GLUCOSE  Routine  2017  1:59 PM    Results for this



     CDT    procedure are in



         the results



         section.

 

 POC GLUCOSE  Routine  2017 12:14 PM    Results for this



     CDT    procedure are in



         the results



         section.

 

 XR CHEST 1 VW PORTABLE  STAT  2017 10:52 AM    Results for this



     CDT    procedure are in



         the results



         section.

 

 POC GLUCOSE  Routine  2017 10:03 AM    Results for this



     CDT    procedure are in



         the results



         section.

 

 POC GLUCOSE  Routine  2017  8:02 AM    Results for this



     CDT    procedure are in



         the results



         section.

 

 ECG 12-LEAD  Routine  2017  6:14 AM    Results for this



     CDT    procedure are in



         the results



         section.

 

 POC GLUCOSE  Routine  2017  5:48 AM    Results for this



     CDT    procedure are in



         the results



         section.

 

 ESTIMATED GFR  Routine  2017  4:59 AM    Results for this



     CDT    procedure are in



         the results



         section.

 

 MAGNESIUM LEVEL  Routine  2017  4:59 AM    Results for this



     CDT    procedure are in



         the results



         section.

 

 HC COMPLETE BLD COUNT  Routine  2017  4:59 AM    Results for this



 W/AUTO DIFF    CDT    procedure are in



         the results



         section.

 

 BASIC METABOLIC PANEL  Routine  2017  4:59 AM    Results for this



     CDT    procedure are in



         the results



         section.

 

 POC GLUCOSE  Routine  2017  4:00 AM    Results for this



     CDT    procedure are in



         the results



         section.

 

 POC GLUCOSE  Routine  2017  2:08 AM    Results for this



     CDT    procedure are in



         the results



         section.

 

 POC GLUCOSE  Routine  2017 12:04 AM    Results for this



     CDT    procedure are in



         the results



         section.

 

 POC GLUCOSE  Routine  2017  9:58 PM    Results for this



     CDT    procedure are in



         the results



         section.

 

 POC GLUCOSE  Routine  2017  7:57 PM    Results for this



     CDT    procedure are in



         the results



         section.

 

 POC GLUCOSE  Routine  2017  5:58 PM    Results for this



     CDT    procedure are in



         the results



         section.

 

 POC GLUCOSE  Routine  2017  5:14 PM    Results for this



     CDT    procedure are in



         the results



         section.

 

 POC GLUCOSE  Routine  2017  4:15 PM    Results for this



     CDT    procedure are in



         the results



         section.

 

 SURGICAL PATHOLOGY  Routine  2017  3:49 PM    Results for this



 REQUEST    CDT    procedure are in



         the results



         section.

 

 CT NEEDLE BIOPSY NO  Routine  2017  3:45 PM    Results for this



 CONTRAST    CDT    procedure are in



         the results



         section.

 

 XR CHEST 1 VW PORTABLE  STAT  2017  3:44 PM    Results for this



     CDT    procedure are in



         the results



         section.

 

 POC GLUCOSE  Routine  2017  3:04 PM    Results for this



     CDT    procedure are in



         the results



         section.

 

 POC GLUCOSE  Routine  2017  2:06 PM    Results for this



     CDT    procedure are in



         the results



         section.

 

 POC GLUCOSE  Routine  2017 12:57 PM    Results for this



     CDT    procedure are in



         the results



         section.

 

 POC GLUCOSE  Routine  2017 12:13 PM    Results for this



     CDT    procedure are in



         the results



         section.

 

 POC GLUCOSE  Routine  2017 10:48 AM    Results for this



     CDT    procedure are in



         the results



         section.

 

 POC GLUCOSE  Routine  2017  9:03 AM    Results for this



     CDT    procedure are in



         the results



         section.

 

 POC GLUCOSE  Routine  2017  8:05 AM    Results for this



     CDT    procedure are in



         the results



         section.

 

 POC GLUCOSE  Routine  2017  6:55 AM    Results for this



     CDT    procedure are in



         the results



         section.

 

 XR CHEST 1 VW PORTABLE  Routine  2017  6:41 AM    Results for this



     CDT    procedure are in



         the results



         section.

 

 POC GLUCOSE  Routine  2017  6:09 AM    Results for this



     CDT    procedure are in



         the results



         section.

 

 ECG 12-LEAD  Routine  2017  6:04 AM    Results for this



     CDT    procedure are in



         the results



         section.

 

 POC GLUCOSE  Routine  2017  5:09 AM    Results for this



     CDT    procedure are in



         the results



         section.

 

 POC GLUCOSE  Routine  2017  4:19 AM    Results for this



     CDT    procedure are in



         the results



         section.

 

 SMEAR REVIEW  Routine  2017  3:51 AM    Results for this



     CDT    procedure are in



         the results



         section.

 

 ESTIMATED GFR  Routine  2017  3:51 AM    Results for this



     CDT    procedure are in



         the results



         section.

 

 PARTIAL THROMBOPLASTIN  Routine  2017  3:51 AM    Results for this



 TIME (PTT)    CDT    procedure are in



         the results



         section.

 

 PROTHROMBIN TIME WITH  Routine  2017  3:51 AM    Results for this



 INR    CDT    procedure are in



         the results



         section.

 

 PHOSPHORUS LEVEL  Routine  2017  3:51 AM    Results for this



     CDT    procedure are in



         the results



         section.

 

 MAGNESIUM LEVEL  Routine  2017  3:51 AM    Results for this



     CDT    procedure are in



         the results



         section.

 

 COMPREHENSIVE METABOLIC  Routine  2017  3:51 AM    Results for this



 PANEL    CDT    procedure are in



         the results



         section.

 

 HC COMPLETE BLD COUNT  Routine  2017  3:51 AM    Results for this



 W/AUTO DIFF    CDT    procedure are in



         the results



         section.

 

 HEPATITIS ACUTE PANEL  Routine  2017  3:51 AM    Results for this



     CDT    procedure are in



         the results



         section.

 

 POC GLUCOSE  Routine  2017  2:13 AM    Results for this



     CDT    procedure are in



         the results



         section.

 

 POC GLUCOSE  Routine  2017  1:27 AM    Results for this



     CDT    procedure are in



         the results



         section.

 

 POC GLUCOSE  Routine  2017 12:09 AM    Results for this



     CDT    procedure are in



         the results



         section.

 

 POC GLUCOSE  Routine  2017  9:48 PM    Results for this



     CDT    procedure are in



         the results



         section.

 

 POC GLUCOSE  Routine  2017  6:48 PM    Results for this



     CDT    procedure are in



         the results



         section.

 

 POC GLUCOSE  Routine  2017  5:54 PM    Results for this



     CDT    procedure are in



         the results



         section.

 

 POC GLUCOSE  Routine  2017  4:47 PM    Results for this



     CDT    procedure are in



         the results



         section.

 

 POC GLUCOSE  Routine  2017  3:45 PM    Results for this



     CDT    procedure are in



         the results



         section.

 

 POC GLUCOSE  Routine  2017  2:54 PM    Results for this



     CDT    procedure are in



         the results



         section.

 

 POC GLUCOSE  Routine  2017  1:23 PM    Results for this



     CDT    procedure are in



         the results



         section.

 

 POC GLUCOSE  Routine  2017 11:43 AM    Results for this



     CDT    procedure are in



         the results



         section.

 

 POC GLUCOSE  Routine  2017 10:41 AM    Results for this



     CDT    procedure are in



         the results



         section.

 

 POC GLUCOSE  Routine  2017  9:28 AM    Results for this



     CDT    procedure are in



         the results



         section.

 

 POC GLUCOSE  Routine  2017  7:05 AM    Results for this



     CDT    procedure are in



         the results



         section.

 

 ECG 12-LEAD  Routine  2017  5:57 AM    Results for this



     CDT    procedure are in



         the results



         section.

 

 SMEAR REVIEW  Routine  2017  3:54 AM    Results for this



     CDT    procedure are in



         the results



         section.

 

 ESTIMATED GFR  Routine  2017  3:54 AM    Results for this



     CDT    procedure are in



         the results



         section.

 

 PROTHROMBIN TIME WITH  Routine  2017  3:54 AM    Results for this



 INR    CDT    procedure are in



         the results



         section.

 

 PHOSPHORUS LEVEL  Routine  2017  3:54 AM    Results for this



     CDT    procedure are in



         the results



         section.

 

 MAGNESIUM LEVEL  Routine  2017  3:54 AM    Results for this



     CDT    procedure are in



         the results



         section.

 

 COMPREHENSIVE METABOLIC  Routine  2017  3:54 AM    Results for this



 PANEL    CDT    procedure are in



         the results



         section.

 

 HC COMPLETE BLD COUNT  Routine  2017  3:54 AM    Results for this



 W/AUTO DIFF    CDT    procedure are in



         the results



         section.

 

 POC GLUCOSE  Routine  2017 11:20 PM    Results for this



     CDT    procedure are in



         the results



         section.

 

 POC GLUCOSE  Routine  2017  9:29 PM    Results for this



     CDT    procedure are in



         the results



         section.

 

 POC GLUCOSE  Routine  2017  9:21 PM    Results for this



     CDT    procedure are in



         the results



         section.

 

 ECG ED PRELIMINARY  Routine  2017  8:18 PM    Results for this



 INTERPRETATION    CDT    procedure are in



         the results



         section.

 

 NM CRITICAL CARE, E/M  Routine  2017  8:18 PM    Results for this



 30-74 MINUTES    CDT    procedure are in



         the results



         section.

 

 POC GLUCOSE  Routine  2017  5:11 PM    Results for this



     CDT    procedure are in



         the results



         section.

 

 POC GLUCOSE  Routine  2017  3:14 PM    Results for this



     CDT    procedure are in



         the results



         section.

 

 POC GLUCOSE  Routine  2017  1:33 PM    Results for this



     CDT    procedure are in



         the results



         section.

 

 POC GLUCOSE  Routine  2017 11:22 AM    Results for this



     CDT    procedure are in



         the results



         section.

 

 CT HEAD W WO CONTRAST  STAT  2017 11:14 AM    Results for this



     CDT    procedure are in



         the results



         section.

 

 POC GLUCOSE  Routine  2017  7:27 AM    Results for this



     CDT    procedure are in



         the results



         section.

 

 THYROID STIMULATING  Routine  2017  4:23 AM    Results for this



 HORMONE    CDT    procedure are in



         the results



         section.

 

 ESTIMATED GFR  Routine  2017  4:23 AM    Results for this



     CDT    procedure are in



         the results



         section.

 

 COMPREHENSIVE METABOLIC  Routine  2017  4:23 AM    Results for this



 PANEL    CDT    procedure are in



         the results



         section.

 

 HC COMPLETE BLD COUNT  Routine  2017  4:23 AM    Results for this



 W/AUTO DIFF    CDT    procedure are in



         the results



         section.

 

 CT CHEST WO CONTRAST  STAT  2017 10:58 PM    Results for this



     CDT    procedure are in



         the results



         section.

 

 POC GLUCOSE  Routine  2017  9:59 PM    Results for this



     CDT    procedure are in



         the results



         section.

 

 ECG 12-LEAD  Routine  2017  6:25 PM    Results for this



     CDT    procedure are in



         the results



         section.

 

 BLOOD CULTURE, AEROBIC  Routine  2017  3:44 PM    Results for this



 & ANAEROBIC    CDT    procedure are in



         the results



         section.

 

 BLOOD CULTURE, AEROBIC  Routine  2017  3:39 PM    Results for this



 & ANAEROBIC    CDT    procedure are in



         the results



         section.

 

 ESTIMATED GFR  STAT  2017  1:25 PM    Results for this



     CDT    procedure are in



         the results



         section.

 

 COMPREHENSIVE METABOLIC  STAT  2017  1:25 PM    Results for this



 PANEL    CDT    procedure are in



         the results



         section.

 

 HC COMPLETE BLD COUNT  STAT  2017  1:25 PM    Results for this



 W/AUTO DIFF    CDT    procedure are in



         the results



         section.

 

 XR CHEST 1 VW PORTABLE  STAT  2017  1:16 PM    Results for this



     CDT    procedure are in



         the results



         section.

 

 ECG 12-LEAD  STAT  2017 12:35 PM    Results for this



     CDT    procedure are in



         the results



         section.

 

 XR CHEST 1 VW PORTABLE  Routine  2017  4:39 PM    Results for this



     CDT    procedure are in



         the results



         section.

 

 XR CHEST 1 VW PORTABLE  Routine  2017  2:36 PM    Results for this



     CDT    procedure are in



         the results



         section.

 

 CT NEEDLE BIOPSY NO  Routine  2017  2:28 PM  Lung mass  Results for this



 CONTRAST    CDT    procedure are in



         the results



         section.

 

 XR CHEST 1 VW PORTABLE  STAT  2017  2:09 PM    Results for this



     CDT    procedure are in



         the results



         section.

 

 SURGICAL PATHOLOGY  Routine  2017  2:01 PM    Results for this



 REQUEST    CDT    procedure are in



         the results



         section.

 

 ESTIMATED GFR  STAT  2017 11:35 AM    Results for this



     CDT    procedure are in



         the results



         section.

 

 PROTHROMBIN TIME WITH  STAT  2017 11:35 AM    Results for this



 INR    CDT    procedure are in



         the results



         section.

 

 PARTIAL THROMBOPLASTIN  STAT  2017 11:35 AM    Results for this



 TIME (PTT)    CDT    procedure are in



         the results



         section.

 

 HC COMPLETE BLD COUNT  STAT  2017 11:35 AM    Results for this



 W/AUTO DIFF    CDT    procedure are in



         the results



         section.

 

 BASIC METABOLIC PANEL  STAT  2017 11:35 AM    Results for this



     CDT    procedure are in



         the results



         section.



after 2017



Results

Urinalysis screen and microscopy, with reflex to culture (2018  9:16 AM)
Only the most recent of6 resultswithin the time period is included.





 Component  Value  Ref Range  Performed At

 

 Specimen site  Clean catch    [facility]



       AND GENOMIC MEDICINE

 

 Color, UA  Yellow    [facility]



       AND GENOMIC MEDICINE

 

 Appearance, UA  Clear    [facility]



       AND GENOMIC MEDICINE

 

 Specific gravity, UA  1.011  1.001 - 1.035  [facility]



       AND GENOMIC MEDICINE

 

 pH, UA  5.0  5.0 - 8.5  [facility]



       AND GENOMIC MEDICINE

 

 Protein, UA  Negative  Negative  [facility]



       AND GENOMIC MEDICINE

 

 Glucose, UA  Negative  Negative  [facility]



       AND GENOMIC MEDICINE

 

 Ketones, UA  Negative  Negative  [facility]



       AND GENOMIC MEDICINE

 

 Bilirubin, UA  Negative  Negative  [facility]



       AND GENOMIC MEDICINE

 

 Blood, UA  Negative  Negative  [facility]



       AND GENOMIC MEDICINE

 

 Nitrite, UA  Negative  Negative  [facility]



       AND GENOMIC MEDICINE

 

 Urobilinogen, UA  2.0 (A)  <2.0  [facility]



       AND GENOMIC MEDICINE

 

 Leukocyte esterase, UA  Negative  Negative  [facility]



       AND GENOMIC MEDICINE

 

 Epithelial cells, UA  Few  /HPF  [facility]



       AND GENOMIC MEDICINE

 

 WBC, UA  1  0 - 1 /HPF  [facility]



       AND GENOMIC MEDICINE

 

 RBC, UA  <1  0 - 5 /HPF  [facility]



       AND GENOMIC MEDICINE

 

 Bacteria, UA  None seen  None seen  [facility]



       AND GENOMIC MEDICINE

 

 Yeast, UA  None seen    [facility]



       AND GENOMIC MEDICINE

 

 Yeast with pseudohyphae, UA  None seen    [facility]



       AND GENOMIC MEDICINE









 Specimen

 

 Urine









 Performing Organization  Address  City/State/Zipcode  Phone Number

 

 [facility] AND  [facility] [address]  [place] [address]  



 [facility]      



Urine drugs of abuse screen (2018  9:16 AM)Only the most recent of3 
resultswithin the time period is included.





 Component  Value  Ref Range  Performed At

 

 Amphetamine screen, urine  NEG    [facility]



       PATHOLOGY AND GENOMIC



       MEDICINE

 

 Barbiturate screen, urine  NEG    [facility]



       PATHOLOGY AND GENOMIC



       MEDICINE

 

 Benzodiazepine screen,  POS    [facility]



 urine      PATHOLOGY AND GENOMIC



       MEDICINE

 

 Cocaine screen, urine  NEG    [facility]



       PATHOLOGY AND GENOMIC



       MEDICINE

 

 Methadone screen, urine  NEG    [facility]



       PATHOLOGY AND GENOMIC



       MEDICINE

 

 Opiates screen, urine  POS    [facility]



       PATHOLOGY AND GENOMIC



       MEDICINE

 

 Phencyclidine screen, urine  NEG    [facility]



       PATHOLOGY AND GENOMIC



       MEDICINE

 

 Cannabinoid screen, urine  POS    [facility]



   Comment:    PATHOLOGY AND GENOMIC



   Drug screen minimum concentration of detectability    MEDICINE



   [id number] ng/mL    



   Nlwkewsijaulikxh7410 ng/mL    



   Barbiturates 300 ng/mL    



   Xihaztaxtfbmbju888 ng/mL    



   Pqkcayh561 ng/mL    



   Gkbbtzcgd948 ng/mL    



   Shpjpnp980 ng/mL    



   Phencyclidine 25 ng/mL    



   Yngaefdcestt87 ng/mL    



   Kuxntdvvjv6992 ng/mL    



   Negative test results indicates presumptive evidence of lack    



   of clinically significant drug concentration in this urine    



   specimen.    



   Positive test results are presumptive evidence of clinically    



   significant drug concentration in this urine specimen.    



   Testing performed for medical purposes only.    



       









 Specimen

 

 Urine









 Performing Organization  Address  City/State/Zipcode  Phone Number

 

 [facility] AND  [address] [place] [address]  



 [facility]      



Urine culture (2018  9:16 AM)Only the most recent of5 resultswithin the 
time period is included.





 Component  Value  Ref Range  Performed At

 

 Urine culture  SEE COMMENTComment: Bacteriuria    [facility]



   screen negative.    AND GENOMIC MEDICINE









 Specimen

 

 Urine









 Performing Organization  Address  City/[facility]/Zipcode  Phone Number

 

 [facility] AND  [address] [place] [address]  



 GENOMIC MEDICINE      



Estimated GFR (2018  9:02 AM)Only the most recent of35 resultswithin the 
time period is included.





 Component  Value  Ref Range  Performed At

 

 GFR Non Af Amer  49 (A)  mL/min/1.73 m2  [facility]



       PATHOLOGY AND GENOMIC



       MEDICINE

 

 GFR Af Amer  59 (A)  mL/min/1.73 m2  [facility]



   Comment:    PATHOLOGY AND GENOMIC



   Chronic kidney disease: <60 mL/min/1.73m2    MEDICINE



   Kidney failure: <15 mL/min/1.73m2    



   The estimated GFR is calculated from the IDMS-traceable Modification of Diet
    



   in Renal Disease Equation. The accuracy of the calculation is poor when the 
   



   creatinine is normal. Calculated values >90 mL/min/1.73m2 are not reported. 
   



   This equation has not been validated in children (<18 years), pregnant    



   women, the elderly (>70 years), or ethnic groups other than Caucasians and  
  



    Americans.    



       









 Specimen

 

 Plasma specimen









 Performing Organization  Address  City/[facility]/Zipcode  Phone Number

 

 [facility]OLOGY AND  [address] [place] [address]  



 [facility]      



CBC with platelet and differential (2018  9:02 AM)Only the most recent 
of34 resultswithin the time period is included.





 Component  Value  Ref Range  Performed At

 

 WBC  9.1  4.2 - 11.0 k/uL  [facility] AND



       GENOMIC MEDICINE

 

 RBC  4.57  4.04 - 5.86 m/uL  [facility] AND



       GENOMIC MEDICINE

 

 HGB  14.7  13.0 - 17.3 g/dL  [facility] AND



       GENOMIC MEDICINE

 

 HCT  44.6  34.0 - 45.0 %  [facility] AND



       GENOMIC MEDICINE

 

 MCV  97.6  80.0 - 98.0 fL  [facility] AND



       GENOMIC MEDICINE

 

 MCH  32.2  27.0 - 34.0 pg  [facility] PATHOLOGY AND



       GENOMIC MEDICINE

 

 MCHC  33.0  31.5 - 36.5 g/dL  [facility] AND



       GENOMIC MEDICINE

 

 RDW - SD  55.1 (H)  37.0 - 51.0 [facility] DEPARTMENT OF PATHOLOGY AND



       GENOMIC MEDICINE

 

 MPV  10.1  7.4 - 10.4 [facility] DEPARTMENT OF PATHOLOGY AND



       GENOMIC MEDICINE

 

 Platelet count  124 (L)  150 - 400 k/uL  [facility] AND



       GENOMIC MEDICINE

 

 Nucleated RBC  0.00  /100 WBC  [facility]Y AND



       GENOMIC MEDICINE

 

 Neutrophils  80.4 (H)  36.0 - 66.0 %  [facility] AND



       GENOMIC MEDICINE

 

 Lymphocytes  11.6 (L)  24.0 - 44.0 %  [facility]Y AND



       GENOMIC MEDICINE

 

 Monocytes  5.8  0.0 - 6.0 %  [facility]OLOGY AND



       GENOMIC MEDICINE

 

 Eosinophils  1.3  0.0 - 6.0 %  [facility] PATHOLOGY AND



       GENOMIC MEDICINE

 

 Basophils  0.6  0.0 - 1.2 %  [facility]Y AND



       GENOMIC MEDICINE

 

 Immature granulocytes  0.3  0.0 - 1.0 %  [facility] AND



       GENOMIC MEDICINE









 Specimen

 

 Blood









 Performing Organization  Address  City/[facility]  Phone Number

 

 [facility] AND  [address] [place] [address]      



Thyroid stimulating hormone (2018  9:02 AM)Only the most recent of2 
resultswithin the time period is included.





 Component  Value  Ref Range  Performed At

 

 TSH  2.60  0.38 - 4.82 uIU/mL  [facility]Y AND GENOMIC 
MEDICINE









 Specimen

 

 Plasma specimen









 Performing Organization  Address  City/[facility]  Phone Number

 

 [facility] AND  [address] [place] [address]      



T4, free (2018  9:02 AM)





 Component  Value  Ref Range  Performed At

 

 T4, free  1.00  0.70 - 1.61 ng/dL  [facility]Y AND GENOMIC 
MEDICINE









 Specimen

 

 Plasma specimen









 Performing Organization  Address  City/[facility]  Phone Number

 

 [facility] AND  [address] [place] [address]      



Alcohol level, blood (2018  9:02 AM)Only the most recent of2 
resultswithin the time period is included.





 Component  Value  Ref Range  Performed At

 

 Alcohol  None Detected  mg/dL  [facility] PATHOLOGY



   Comment:    AND GENOMIC MEDICINE



   NormalNone Detected    



   Legal Intoxication in [place] 80 mg/dL (0.08%)    



   Toxic Concentration 200 mg/dL (0.2%)    



   Potentially Fatal 350-500 mg/dL (0.35%-0.5%)    



       

 

 Alcohol percent  None Detected  %  [facility]OLOGY



       AND GENOMIC MEDICINE









 Specimen

 

 Blood









 Performing Organization  Address  City/[facility]  Phone Number

 

 [facility] AND  [facility] [address].  [address]      



Acetaminophen level (2018  9:02 AM)





 Component  Value  Ref Range  Performed At

 

 Acetaminophen level  <2.0 (L)  10.0 - 20.0 ug/mL  [facility] OF



   Comment:    PATHOLOGY AND GENOMIC



   Therapeutic 10-30 ug/mL
    MEDICINE



   Possible Toxicity 150-200 ug/mL
    



   Probable Toxicity >200 ug/mL    



       









 Specimen

 

 Blood









 Performing Organization  Address  City/[facility]  Phone Number

 

 [facility] AND  [facility] [address].  [address]      



Salicylate level (2018  9:02 AM)





 Component  Value  Ref Range  Performed At

 

 Salicylate  7.0  mg/dL  [facility]OLOGY AND GENOMIC



   Comment:    MEDICINE



   Therapeutic Range:    



    5 - 30 mg/dL    



       









 Specimen

 

 Blood









 Performing Organization  Address  City/[facility]  Phone Number

 

 [facility]Y AND  [facility] Chepe Hartman.  [address]      



Comprehensive metabolic panel (2018  9:02 AM)Only the most recent of20 
resultswithin the time period is included.





 Component  Value  Ref Range  Performed At

 

 Sodium  140  135 - 150 mEq/L  [facility]OLOGY AND



       GENOMIC MEDICINE

 

 Potassium  3.8  3.5 - 5.0 mEq/L  [facility] OF PATHOLOGY AND



       GENOMIC MEDICINE

 

 Chloride  109  100 - 109 mEq/L  [facility] PATHOLOGY AND



       GENOMIC MEDICINE

 

 CO2  24  24 - 32 mmol/L  [facility] OF PATHOLOGY AND



       GENOMIC MEDICINE

 

 Anion gap  7@ANIO  7 - 15 mEq/L  [facility] PATHOLOGY AND



       GENOMIC MEDICINE

 

 BUN  14  7 - 18 mg/dL  [facility] PATHOLOGY AND



       GENOMIC MEDICINE

 

 Creatinine  1.5  0.8 - 1.5 mg/dL  [facility] PATHOLOGY AND



       GENOMIC MEDICINE

 

 Glucose  103 (H)  65 - 100 mg/dL  [facility] OF PATHOLOGY AND



       GENOMIC MEDICINE

 

 Calcium  8.6  8.6 - 10.7 mg/dL  [facility] OF PATHOLOGY AND



       GENOMIC MEDICINE

 

 Protein  7.9  6.3 - 8.2 g/dL  [facility] OF PATHOLOGY AND



       GENOMIC MEDICINE

 

 Albumin  3.4  3.2 - 5.0 g/dL  [facility] PATHOLOGY AND



       GENOMIC MEDICINE

 

 A/G ratio  0.8  0.7 - 3.8  [facility] OF PATHOLOGY AND



       GENOMIC MEDICINE

 

 Alkaline phosphatase  153 (H)  30 - 120 U/L  [facility] OF PATHOLOGY AND



       GENOMIC MEDICINE

 

 AST  21  15 - 37 U/L  [facility] OF PATHOLOGY AND



       GENOMIC MEDICINE

 

 ALT  17 (L)  30 - 65 U/L  [facility] OF PATHOLOGY AND



       GENOMIC MEDICINE

 

 Total bilirubin  0.2  0.2 - 1.2 mg/dL  [facility] OF PATHOLOGY AND



       GENOMIC MEDICINE









 Specimen

 

 Plasma specimen









 Performing Organization  Address  City/State/Zipcode  Phone Number

 

 [facility]OLOGY AND  [facility] [address] [address]  



 [facility]      



CT Abdomen Pelvis Wo Contrast (2018  3:27 AM)Only the most recent of3 
resultswithin the time period is included.





 Narrative  Performed At

 

 EXAMINATION:CT ABDOMEN PELVIS WO CONTRAST



   [facility]



  



  



 CLINICAL HISTORY:R flank pain



  



  



  



 TECHNIQUE: Multiple axial images of the abdomen and pelvis were obtained  



 without intravenous administration of iodinated contrast. Sagittal and  



 coronal computerized reformatted images were also obtained. The lack of  



 intravenous contrast reduces the 



  



 sensitivity of detecting solid organ disease.



  



  



  



 CT imaging was performed with iterative reconstruction technique and/or  



 automated exposure control to reduce radiation dose.



  



  



  



 COMPARISON:2017



  



  



  



  



  



 IMPRESSION:



  



  



  



 Subsegmental atelectasis is seen of the lung bases.



  



  



  



 Patient is status post cholecystectomy. Pancreas and adrenal glands are  



 normal.



  



  



  



 Cirrhotic liver.



  



  



  



 Spleen is enlarged measuring 14.5 cm in length. 



  



  



  



 Kidneys, ureters and bladder are normal.



  



  



  



 No free intraperitoneal fluid or air. Atherosclerotic vascular  



 calcifications are seen. Some paraesophageal varices are seen,  



 compatible with portal hypertension.



  



  



  



 Diverticulosis is seen without diverticulitis. Appendix cannot be seen.  



 No gastrointestinal tract obstruction.



  



  



  



 No acute osseous abnormalities. Postoperative appearance of the lower  



 lumbar spine. A device is seen of the right gluteal region, with lead  



 entering the thoracic spinal canal.



  



  



  



 CONCLUSION:



  



  



  



 Cirrhotic liver with small paraesophageal varices, compatible with  



 portal hypertension.



  



  



  



  



  



  



  



  



  



  



  



  



  



  



  



  



  



  



  



  



  



 [facility]-[id number]



  



   









 Procedure Note

 

 Hm Interface, Radiology Results Incoming - 2018  4:08 AM CDT



EXAMINATION:  CT ABDOMEN PELVIS WO CONTRAST



 



 CLINICAL HISTORY:  R flank pain



 



 TECHNIQUE: Multiple axial images of the abdomen and pelvis were obtained 
without intravenous administration of iodinated contrast. Sagittal and coronal 
computerized reformatted images were also obtained. The lack of intravenous 
contrast reduces the



 sensitivity of detecting solid organ disease.



 



 CT imaging was performed with iterative reconstruction technique and/or 
automated exposure control to reduce radiation dose.



 



 COMPARISON:  2017



 



 



 IMPRESSION:



 



 Subsegmental atelectasis is seen of the lung bases.



 



 Patient is status post cholecystectomy. Pancreas and adrenal glands are normal.



 



 Cirrhotic liver.



 



 Spleen is enlarged measuring 14.5 cm in length.



 



 Kidneys, ureters and bladder are normal.



 



 No free intraperitoneal fluid or air. Atherosclerotic vascular calcifications 
are seen. Some paraesophageal varices are seen, compatible with portal 
hypertension.



 



 Diverticulosis is seen without diverticulitis. Appendix cannot be seen. No 
gastrointestinal tract obstruction.



 



 No acute osseous abnormalities. Postoperative appearance of the lower lumbar 
spine. A device is seen of the right gluteal region, with lead entering the 
thoracic spinal canal.



 



 CONCLUSION:



 



 Cirrhotic liver with small paraesophageal varices, compatible with portal 
hypertension.



 



 



 



 



 



 



 



 



 



 



 [facility]-[id number]









 Performing Organization  Address  City/State/Zipcode  Phone Number

 

 [facility]  [address] [place] [address]  



Smear review (2018  3:13 AM)Only the most recent of5 resultswithin the 
time period is included.





 Component  Value  Ref Range  Performed At

 

 Smear review  Smear Reviewed    [facility] OF [facility]OLOGY AND GENOMIC



       MEDICINE









 Performing Organization  Address  City/State/Zipcode  Phone Number

 

 [facility] DE[facility] OF [facility]OLOGY AND  [address] [place] [id number]  



 GENOMIC MEDICINE      



Prothrombin time with INR (2018  3:13 AM)Only the most recent of10 
resultswithin the time period is included.





 Component  Value  Ref Range  Performed At

 

 Prothrombin time  14.5  12.0 - 15.0 sec  [facility] OF



       PATHOLOGY AND GENOMIC



       MEDICINE

 

 INR  1.11  0.92 - 1.12  [facility] DE[facility] OF



   Comment:    PATHOLOGY AND GENOMIC



   For patients on anticoagulant therapy, reference ranges below:    MEDICINE



   Indication:INR 
Value    



   Treatment of Venous Thrombosis, 2.0-3.0    



   pulmonary emboli, or prophylaxis    



   of a venous thrombosis, or systemic    



   emboli.    



   High dose, high risk patients 3.0-4.5    



   with mechanical valves.    



   NOTE:INR values over 3.0 are sometimes associated with    



   gastrointestinal hemorrhage, especially values over 4.0.    



       









 Specimen

 

 Blood









 Performing Organization  Address  City/[facility]  Phone Number

 

 [facility]OLOGY AND  [address]  [place], [place] [address] [place] [address]      



Lipase level (2018  3:13 AM)Only the most recent of7 resultswithin the 
time period is included.





 Component  Value  Ref Range  Performed At

 

 Lipase  129  65 - 230 U/L  [facility] OF [facility]OLOGY AND [facility]









 Specimen

 

 Plasma specimen









 Performing Organization  Address  City/[facility]  Phone Number

 

 [facility] OF PATHOLOGY AND  [address]  [place], [place] [address] [place] [address]      



ECG 12 lead (2018  2:43 AM)Only the most recent of13 resultswithin the 
time period is included.





 Component  Value  Ref Range  Performed At

 

 Ventricular rate  77    [facility]

 

 Atrial rate  77    [facility]

 

 NM interval  144    [facility]

 

 QRSD interval  94    [facility]

 

 QT interval  418    [facility]

 

 QTC interval  473    [facility]

 

 P axis 1  32    [facility]

 

 QRS axis 1  94    [facility]

 

 T wave axis  85    [facility]

 

 EKG impression  Normal sinus rhythm-Rightward axis-Borderline    [facility]



   ECG-In automated comparison with ECG of    



   2018 18:18,-No significant change was    



   found-Electronically Signed By Missy Meneses MD    



   ([id number]) on 5/10/2018 7:43:28 AM    









 Performing Organization  Address  City/State/[facility]  Phone Number

 

 [facility]  [address] [place] [address]  



ECG ED Preliminary Interpretation - NOT AN ORDER (2018  2:07 AM)Only the 
most recent of6 resultswithin the time period is included.





 Narrative  Performed At

 

 Raiza Juarez MD 20183:47 AM



  



 ECG ED Preliminary Interpretation - Not an Order



  



 Performed by: RAIZA JUAREZ



  



 Authorized by: RAIZA JUAREZ 



  



  



  



 ECG reviewed by ED Physician in the absence of a cardiologist: yes



  



 Previous ECG: 



  



 Previous ECG:Compared to current



  



 Comparison ECG info:18



  



 Similarity:No change



  



 Interpretation: 



  



 Interpretation: normal



  



 Rate: 



  



 ECG rate:77



  



 ECG rate assessment: normal



  



 Rhythm: 



  



 Rhythm: sinus rhythm



  



 QRS: 



  



 QRS axis:Right



  



 Comments: 



  



  Normal sinus rhythm. Right axis deviation.  



PET/CT Skull Base To Mid Thigh (2018  1:45 PM)





 Narrative  Performed At

 

 EXAMINATION:



  [facility]



 PET CT SKULL BASE TO MID THIGH



  



  



  



 Date and place of service: 3/26/2018 10:00 AM at [facility]



  



  



  



 DOSE: 11.8 mCi of 20-Xjslte-3-Deoxyglucose (FDG) was injected  



 intravenously into left wrist done infiltration at a serum glucose level  



 of90 mg/dl.



  



  



  



 TECHNIQUE:



  



  



  



 PROCEDURE: Following injection of 83-Mzszuz-6-Deoxyglucose (FDG) and a  



 standard uptake., The patient was imaged on a [employer] whole body PET CT  



 scanner. Multiple 6 minute bed position acquisitions were obtained along  



 the length of the patient's body from 



  



 approximately the Skull base to the mid thighs without administration of  



 intravenous or oral contrast. The software fused PET/CT images as well  



 as the PET and CT images could be reviewed separately.



  



  



  



 The dose length product for this procedure was 408 mGy-cm.



  



  



  



 CT imaging was performed with iterative reconstruction technique and/or  



 automated exposure control to reduce radiation dose



  



  



  



 COMPARISON:



  



 2017 done at [facility]. 



  



  



  



 CLINICAL HISTORY:



  



 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 Status  



 post right lower lobectomy . No history of recent chemotherapy  



 or radiation therapy. For restaging.



  



  



  



 FINDINGS:



  



  



  



 [facility]  



 with the CAT scan from the Skull base to the mid thighs to the same time  



 as the PET scan.



  



  



  



 Physiologic uptake is present in the components of Waldeyer's ring in  



 the lateral pharynx. Vocal cord uptake is noted of doubtful  



 significance. Mild tracer uptake is seen in the right and left axillary  



 lymph node with the degree of uptake less than 2.5 



  



 and is of doubtful significance.



  



  



  



 No abnormal radiotracer uptake is demonstrated in the visualized brain,  



 neck, chest, abdomen, pelvis, spine, and visualized proximal upper and  



 lower extremities.



  



  



  



 No abnormal radiotracer uptake is demonstrated in the brain. However,  



 normal intense metabolic uptake of radiotracer in the brain can mask the  



 presence of lesions. Brain lesions are generally better evaluated with  



 an MRI without and with intravenous 



  



 gadolinium, if clinically indicated.



  



  



  



 Physiologic excretion of radiotracer is demonstrated into the kidneys,  



 ureters, and bladder. Physiologic excretion of radiotracer is also  



 demonstrated into the small and large bowel.



  



  



  



 Interrogation of the CT scan of the chest in lung window settings  



 demonstrates the previously noted masses in each lower lobe are not  



 identified on the current examination consistent with right lower  



 lobectomy on the right and posttreatment of the left 



  



 lung lesion.. Dependent atelectasis is noted in the posterior lung bases  



 bilaterally.



  



  



  



 Interrogation of the CT scan of the whole body with soft tissue window  



 settings demonstratessignificant dilatation of the pulmonary outflow  



 tract consistent with pulmonary arterial hypertension. Coronary artery  



 calcifications are present.



  



  



  



 An irregular contoured liver is noted consistent with cirrhosis.  



 Calcified atheromatous plaque formation is seen in the aortoiliac  



 system.



  



  



  



 Interrogation of the CT scan of the whole body and bone window settings  



 demonstrates no osteolytic or osteoblastic lesions. Patient is had a  



 spinal fusion in the lower lumbar spine.



  



  



  



 IMPRESSION:



  



 No evidence of metabolically active tumor.



  



  



  



 Otherwise unchanged .Pulmonary arterial hypertension as before.  



 Cirrhosis as before.



  



  



  



 [facility]-[id number]



  



   









 Procedure Note

 

 Hm Interface, Radiology Results Incoming - 2018  4:30 PM CDT



EXAMINATION:



 PET CT SKULL BASE TO MID THIGH



 



 Date and place of service: 3/26/2018 10:00 AM at [facility]



 



 DOSE: 11.8 mCi of 77-Mvaphk-7-Deoxyglucose (FDG) was injected intravenously 
into left wrist done infiltration at a serum glucose level of  90 mg/dl.



 



 TECHNIQUE:



 



 PROCEDURE: Following injection of 45-Wghret-8-Deoxyglucose (FDG) and a 
standard uptake., The patient was imaged on a [employer] whole body PET CT scanner. 
Multiple 6 minute bed position acquisitions were obtained along the length of 
the patient's body from



 approximately the Skull base to the mid thighs without administration of 
intravenous or oral contrast. The software fused PET/CT images as well as the 
PET and CT images could be reviewed separately.



 



 The dose length product for this procedure was 408 mGy-cm.



 



 CT imaging was performed with iterative reconstruction technique and/or 
automated exposure control to reduce radiation dose



 



 COMPARISON:



 2017 done at [facility].



 



 CLINICAL HISTORY:



 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 Status post 
right lower lobectomy . No history of recent chemotherapy or 
radiation therapy. For restaging.



 



 FINDINGS:



 



 Software fusion was performed of the nuclear medicine  FDG PET scan with the 
CAT scan from the Skull base to the mid thighs to the same time as the PET scan.



 



 Physiologic uptake is present in the components of Waldeyer's ring in the 
lateral pharynx. Vocal cord uptake is noted of doubtful significance. Mild 
tracer uptake is seen in the right and left axillary lymph node with the



 degree of uptake less than 2.5



 and is of doubtful significance.



 



 No abnormal radiotracer uptake is demonstrated in the visualized brain, neck, 
chest, abdomen, pelvis, spine, and visualized proximal upper and lower 
extremities.



 



 No abnormal radiotracer uptake is demonstrated in the brain. However, normal 
intense metabolic uptake of radiotracer in the brain can mask the presence of 
lesions. Brain lesions are generally better evaluated with an MRI without and 
with intravenous



 gadolinium, if clinically indicated.



 



 Physiologic excretion of radiotracer is demonstrated into the kidneys, ureters
, and bladder. Physiologic excretion of radiotracer is also demonstrated into 
the small and large bowel.



 



 Interrogation of the CT scan of the chest in lung window settings demonstrates 
the previously noted masses in each lower lobe are not identified on the 
current examination consistent with right lower



 lobectomy on the right and posttreatment of the left



 lung lesion.. Dependent atelectasis is noted in the posterior lung bases 
bilaterally.



 



 Interrogation of the CT scan of the whole body with soft tissue window 
settings demonstrates  significant dilatation of the pulmonary outflow tract 
consistent with pulmonary arterial hypertension. Coronary artery calcifications 
are present.



 



 An irregular contoured liver is noted consistent with cirrhosis. Calcified 
atheromatous plaque formation is seen in the aortoiliac system.



 



 Interrogation of the CT scan of the whole body and bone window settings 
demonstrates no osteolytic or osteoblastic lesions. Patient is had a spinal 
fusion in the lower lumbar spine.



 



 IMPRESSION:



 No evidence of metabolically active tumor.



 



 Otherwise unchanged .Pulmonary arterial hypertension as before. Cirrhosis as 
before.



 



 [facility]-[id number]









 Performing Organization  Address  City/State/Zipcode  Phone Number

 

 [facility]  [address] [place] [address]  



POC glucose (2018 11:36 AM)Only the most recent of146 resultswithin the 
time period is included.





 Component  Value  Ref Range  Performed At

 

 POC glucose  90  65 - 100 mg/dL  [facility] DEPART[facility] OF PATHOLOGY AND



   Comment:    GENOMIC MEDICINE



   Meter ID: [id number]    



   : Cinthya Mcduffie    



       









 Performing Organization  Address  City/State/Zipcode  Phone Number

 

 [facility]MENT OF PATHOLOGY AND  [facility] [address]  [place] [address]  



 GENOMIC MEDICINE      



Keppra (Levetiracetam) level (2018  5:13 AM)





 Component  Value  Ref Range  Performed At

 

 Levetiracetam  27  12 - 46 ug/mL  [facility]



   Comment:    



   INTERPRETIVE INFORMATION: Keppra (Levetiracetam)    



   Therapeutic Range:12-46 ug/mL    



   Toxic:Not well Established    



   Pharmacokinetics of levetiracetam are affected by renal function.    



   Adverse effects may include somnolence, weakness, headache and    



   vomiting.    



   Performed by [facility],
    



   [address] [place] [address] [phone]
    



   [email or web address], Charlie Balbuena MD - Lab. Director    



       









 Specimen

 

 Serum









 Performing Organization  Address  City/[facility]/Zipcode  Phone Number

 

 [facility]  [address] [place] [address]  



Basic metabolic panel (2018  5:13 AM)Only the most recent of15 
resultswithin the time period is included.





 Component  Value  Ref Range  Performed At

 

 Sodium  134 (L)  135 - 150 mEq/L  [facility] OF



       PATHOLOGY AND [employer]



       MEDICINE

 

 Potassium  4.2  3.5 - 5.0 mEq/L  [facility] OF



       PATHOLOGY AND [employer]



       MEDICINE

 

 Chloride  102  100 - 109 mEq/L  [facility] OF



       PATHOLOGY AND [employer]



       MEDICINE

 

 CO2  22 (L)  24 - 32 mmol/L  [facility] OF



       PATHOLOGY AND [employer]



       MEDICINE

 

 Anion gap  10  7 - 15 mEq/L  [facility] DE[facility] OF



   Comment:    PATHOLOGY AND GENOMIC



   Starting from  , anion gap calculation    MEDICINE



   no longer incorporates potassium. Please note the change.    



       

 

 BUN  17  7 - 18 mg/dL  [facility] OF



       PATHOLOGY AND GENOMIC



       MEDICINE

 

 Creatinine  1.2  0.8 - 1.5 mg/dL  [facility] OF



       PATHOLOGY AND GENOMIC



       MEDICINE

 

 Glucose  139 (H)  65 - 100 mg/dL  [facility] OF



       PATHOLOGY AND GENOMIC



       MEDICINE

 

 Calcium  9.4  8.6 - 10.7 mg/dL  [facility] OF



       PATHOLOGY AND [employer]



       MEDICINE









 Specimen

 

 Plasma specimen









 Performing Organization  Address  City/[facility]/Zipcode  Phone Number

 

 [facility] DE[facility] OF PATHOLOGY AND  [facility] [address]  [place] [address]  



 GENOMIC MEDICINE      



Hepatic function panel (2018  8:14 PM)





 Component  Value  Ref Range  Performed At

 

 Albumin  3.8  3.2 - 5.0 g/dL  [facility] OF PATHOLOGY AND GENOMIC



       MEDICINE

 

 Total bilirubin  0.8  0.2 - 1.2 mg/dL  [facility] OF PATHOLOGY AND GENOMIC



       MEDICINE

 

 Bilirubin direct  0.2  0.0 - 0.4 mg/dL  [facility] DE[facility] OF PATHOLOGY AND 
[employer]



       MEDICINE

 

 Alkaline phosphatase  231 (H)  30 - 120 U/L  [facility] OF PATHOLOGY AND 
[employer]



       MEDICINE

 

 Protein  9.7 (H)  6.3 - 8.2 g/dL  [facility] OF PATHOLOGY AND [employer]



       MEDICINE

 

 ALT  17 (L)  30 - 65 U/L  [facility] OF PATHOLOGY AND GENOMIC



       MEDICINE

 

 AST  29  15 - 37 U/L  [facility] OF PATHOLOGY AND GENOMIC



       MEDICINE









 Specimen

 

 Plasma specimen









 Performing Organization  Address  City/State/Zipcode  Phone Number

 

 [facility] OF PATHOLOGY AND  [employer] [address]  [place] [address]  



 [employer] MEDICINE      



CT Lumbar Spine Wo Contrast (2018  5:12 PM)





 Narrative  Performed At

 

 EXAMINATION: CT LUMBAR SPINE WO CONTRAST



  [facility]



  



  



 CLINICAL HISTORY: pain



  



  



  



 COMPARISON: CT myelogram lumbar spine 2011



  



  



  



 TECHNIQUE: Axial noncontrast enhanced images of lumbar spine was  



 performed with coronal sagittal reconstruction algorithms. CT imaging  



 was performed with iterative reconstruction technique and/or automated  



 exposure control to reduce radiation dose.



  



  



  



  



  



 FINDINGS:



  



  



  



 There are 5 non-rib bearing lumbar type vertebrae. Status post posterior  



 spinous mentation with rods and screws and anterior interbody grafts at  



 L4-5 and L5-S1. Status post decompressive laminectomies at L4-5 and  



 L5-S1. There is partial solid interbody 



  



 osseous fusion at L5-S1, without definite solid interbody osseous fusion  



 at L4-5. There is solid osseous fusion of the posterior elements of  



 L4-S1. No evidence of hardware loosening. Hardware is well positioned.



  



  



  



 No fracture. 1 to 2 mm retrolisthesis L3 on L4. Facet alignment is  



 anatomic.



  



  



  



 Limited dilation of the visualized intracranial contents demonstrates  



 atherosclerosis of the abdominal aorta and branch vessels. No abdominal  



 mass is identified on limited assessment. Osteoarthrosis sacroiliac  



 joints with small vacuum clefts and mild 



  



 sclerosis.



  



  



  



  



  



 Axial images through the disc spaces demonstrate the following:



  



  



  



 L1-L2: Small disc bulge without significant spinal canal or neural  



 foraminal stenosis. Small Schmorl's nodes.



  



  



  



 L2-L3: Small disc bulge contributes to mild subarticular zone stenosis  



 without significant spinal canal or neural foraminal stenosis. Small  



 Schmorl's nodes.



  



  



  



 L3-L4: Small disc bulge, slight prominence of dorsal epidural fat, and  



 mild ligament flavum infolding contribute to mild spinal canal stenosis.  



 There is mild bilateral neural foraminal stenosis secondary to facet  



 arthropathy and small endplate 



  



 osteophytes.



  



  



  



 L4-L5: Status post decompressive laminectomy. Mild neural foraminal  



 stenosis secondary to small endplate osteophytes bilaterally.



  



  



  



 L5-S1: Status post decompressive laminectomy. No significant neural  



 foraminal stenosis.



  



  



  



  



  



  



  



  



  



 IMPRESSION: 



  



  



  



 Postsurgical changes L4-S1 as detailed above.



  



  



  



 No fracture. Multilevel degenerative changes of the lumbar spine,  



 notably with mild spinal canal and neural foraminal stenosis at L3-4.



  



  



  



  



  



  



  



  



  



 [id number]



  



   









 Procedure Note

 

 Hm Interface, Radiology Results Incoming - 2018  5:24 PM CST



EXAMINATION: CT LUMBAR SPINE WO CONTRAST



 



 CLINICAL HISTORY: pain



 



 COMPARISON: CT myelogram lumbar spine 2011



 



 TECHNIQUE: Axial noncontrast enhanced images of lumbar spine was performed 
with coronal sagittal reconstruction algorithms. CT imaging was performed with 
iterative reconstruction technique and/or automated exposure control to reduce 
radiation dose.



 



 



 FINDINGS:



 



 There are 5 non-rib bearing lumbar type vertebrae. Status post posterior 
spinous mentation with rods and screws and anterior interbody grafts at L4-5 
and L5-S1. Status post decompressive laminectomies at L4-5 and L5-S1.



 There is partial solid interbody



 osseous fusion at L5-S1, without definite solid interbody osseous fusion at L4-
5. There is solid osseous fusion of the posterior elements of L4-S1. No 
evidence of hardware loosening. Hardware is well positioned.



 



 No fracture. 1 to 2 mm retrolisthesis L3 on L4. Facet alignment is anatomic.



 



 Limited dilation of the visualized intracranial contents demonstrates 
atherosclerosis of the abdominal aorta and branch vessels. No abdominal mass is 
identified on limited assessment. Osteoarthrosis sacroiliac joints



 with small vacuum clefts and mild



 sclerosis.



 



 



 Axial images through the disc spaces demonstrate the following:



 



 L1-L2: Small disc bulge without significant spinal canal or neural foraminal 
stenosis. Small Schmorl's nodes.



 



 L2-L3: Small disc bulge contributes to mild subarticular zone stenosis without 
significant spinal canal or neural foraminal stenosis. Small Schmorl's nodes.



 



 L3-L4: Small disc bulge, slight prominence of dorsal epidural fat, and mild 
ligament flavum infolding contribute to mild spinal canal stenosis. There is 
mild bilateral neural foraminal stenosis secondary to facet arthropathy and 
small endplate



 osteophytes.



 



 L4-L5: Status post decompressive laminectomy. Mild neural foraminal stenosis 
secondary to small endplate osteophytes bilaterally.



 



 L5-S1: Status post decompressive laminectomy. No significant neural foraminal 
stenosis.



 



 



 



 



 IMPRESSION:



 



 Postsurgical changes L4-S1 as detailed above.



 



 No fracture. Multilevel degenerative changes of the lumbar spine, notably with 
mild spinal canal and neural foraminal stenosis at L3-4.



 



 



 



 



 [id number]









 Performing Organization  Address  City/[facility]/Zipcode  Phone Number

 

  [facility]  [address] [place] [address]  



Partial thromboplastin time, activated (2018 12:29 PM)Only the most 
recent of5 resultswithin the time period is included.





 Component  Value  Ref Range  Performed At

 

 PTT  29.2  23.0 - 36.0 sec  [facility]



   Comment:    PATHOLOGY AND GENOMIC



   PTT therapeutic range for unfractionated heparin is    MEDICINE



   61.0-112.0 seconds which corresponds to Anti-Xa    



   0.3-0.7 U/ml.    



   Note:Change in Panic Value    



   The PTT Panic Value is changing from 110 sec. to 100 sec.    



   due to new instrumentation and reagents.    



   Correlation studies have been performed to validate this result.    



       









 Specimen

 

 Blood









 Performing Organization  Address  City/[facility]  Phone Number

 

 [facility]OLOGY AND  [address] [place] [address]  



 GENOMIC MEDICINE      



Creatine kinase, total (CPK) (2018  6:29 PM)Only the most recent of3 
resultswithin the time period is included.





 Component  Value  Ref Range  Performed At

 

 Creatine kinase  134  61 - 224 U/L  [facility] PATHOLOGY AND GENOMIC 
MEDICINE









 Specimen

 

 Plasma specimen









 Performing Organization  Address  City/[facility]  Phone Number

 

 [facility]OLOGY AND  [address] [place] [address]  



 [facility]      



CT Head Wo Contrast (2018  6:19 PM)Only the most recent of2 resultswithin 
the time period is included.





 Narrative  Performed At

 

 EXAMINATION: CT HEAD WO CONTRAST



   [facility]



  



  



 CLINICAL HISTORY: seizure syncope



  



  



  



 COMPARISON:Brain CT dated 2017



  



  



  



 TECHNIQUE: Noncontrast enhanced images of the brain were obtained from  



 the skull base to the vertex. Both soft tissue and bone reconstruction  



 algorithms were performed.CT imaging was performed with iterative  



 reconstruction technique and/or automated 



  



 exposure control to reduce radiation dose.



  



  



  



  



  



 FINDINGS:



  



  



  



 There is no evidence of acute intracranial hemorrhage, intracranial  



 mass, mass effect, midline shift or focal extra-axial collection. The  



 gray-white matter differentiation is preserved, and I do not see a  



 confluent area of acute transcortical ischemia. 



  



 No discretely hyperdense vessel is identified. 



  



  



  



 The ventricles and extra-axial spaces are mildly prominent bilaterally.  



 The ventricles have remained stable in size and configuration compared  



 to the previous head CT.



  



  



  



 Minimal foci of decreased attenuation are noted mainly along the  



 periventricular white matter at the level of the corona radiata. These  



 are nonspecific, but most commonly related to chronic microvascular  



 ischemic change.



  



  



  



 Beam hardening artifact obscures details at the level of the skull base,  



 including portions of the supraorbital frontal lobes, inferior temporal  



 lobes, brainstem and cerebellum.



  



  



  



 The mastoid air cells and middle ear cavities are clear. There is soft  



 tissue density in the external auditory canals bilaterally, nonspecific,  



 but most commonly representing cerumen.



  



  



  



 The calvarium shows no aggressive bone lesion or evidence of fracture.  



 No fluid levels are seen in the visualized paranasal sinuses.



  



  



  



 IMPRESSION:



  



  



  



 No acute intracranial abnormality identified.



  



  



  



  



  



  



  



  



  



  



  



  



  



 [id number]



  



   









 Procedure Note

 

  Interface, Radiology Results Incoming - 2018  6:27 PM CST



EXAMINATION: CT HEAD WO CONTRAST



 



 CLINICAL HISTORY: seizure syncope



 



 COMPARISON:  Brain CT dated 2017



 



 TECHNIQUE: Noncontrast enhanced images of the brain were obtained from the 
skull base to the vertex. Both soft tissue and bone reconstruction algorithms 
were performed.  CT imaging was performed with iterative



 reconstruction technique and/or automated



 exposure control to reduce radiation dose.



 



 



 FINDINGS:



 



 There is no evidence of acute intracranial hemorrhage, intracranial mass, mass 
effect, midline shift or focal extra-axial collection. The gray-white matter 
differentiation is preserved, and I do not see a confluent area of



 acute transcortical ischemia.



 No discretely hyperdense vessel is identified.



 



 The ventricles and extra-axial spaces are mildly prominent bilaterally. The 
ventricles have remained stable in size and configuration compared to the 
previous head CT.



 



 Minimal foci of decreased attenuation are noted mainly along the 
periventricular white matter at the level of the corona radiata. These are 
nonspecific, but most commonly related to chronic microvascular ischemic change.



 



 Beam hardening artifact obscures details at the level of the skull base, 
including portions of the supraorbital frontal lobes, inferior temporal lobes, 
brainstem and cerebellum.



 



 The mastoid air cells and middle ear cavities are clear. There is soft tissue 
density in the external auditory canals bilaterally, nonspecific, but most 
commonly representing cerumen.



 



 The calvarium shows no aggressive bone lesion or evidence of fracture. No 
fluid levels are seen in the visualized paranasal sinuses.



 



 IMPRESSION:



 



 No acute intracranial abnormality identified.



 



 



 



 



 



 



 [id number]









 Performing Organization  Address  City/State/Zipcode  Phone Number

 

 [facility]  [address] [place] [address]  



Surgical pathology request (2018  1:28 PM)Only the most recent of4 
resultswithin the time period is included.





 Component  Value  Ref Range  Performed At

 

 Case number  [id number]    [facility]



       PATHOLOGY AND GENOMIC



       MEDICINE

 

 Surgical pathology report  See link below for PDF    [facility]



   Lab Report    PATHOLOGY AND GENOMIC



       MEDICINE

 

 Result status  This is Final Report to    [facility]



   [id number]    PATHOLOGY AND GENOMIC



       MEDICINE









 Performing Organization  Address  City/State/Zipcode  Phone Number

 

 [facility]OLOGY AND  [address] [place] [address]  



 GENOMIC MEDICINE      



Magnesium level (2018  5:59 AM)Only the most recent of4 resultswithin the 
time period is included.





 Component  Value  Ref Range  Performed At

 

 Magnesium  2.00  1.60 - 2.40 mg/dL  [facility] PATHOLOGY AND GENOMIC 
MEDICINE









 Specimen

 

 Plasma specimen









 Performing Organization  Address  City/[facility]  Phone Number

 

 [facility]OLOGY AND  [address] [place] [address]  



 GENOMIC MEDICINE      



XR Chest 2 Vw (2017  7:47 PM)Only the most recent of4 resultswithin the 
time period is included.





 Narrative  Performed At

 

 EXAMINATION:XR CHEST 2 VW



   [facility]



  



  



 CLINICAL HISTORY:sob cough todayleukocytosisR basilar  



 crackles



  



  



  



  



  



  



  



 IMPRESSION:



  



  



  



 Aortic arch calcified. Heart size is normal. Lungs are clear of acute  



 infiltrate. There has been a prior partial pulmonary resection in the  



 right side. There are no effusions. There is an intraspinal catheter  



 present, and there is hardware in cervical 



  



 spine.



  



  



  



  



  



  



  



 [facility]-[id number]



  



   









 Procedure Note

 

 Hm Interface, Radiology Results Incoming - 2017  8:23 PM CST



EXAMINATION:  XR CHEST 2 VW



 



 CLINICAL HISTORY:  sob cough today  leukocytosis  R basilar crackles



 



 



 



 IMPRESSION:



 



 Aortic arch calcified. Heart size is normal. Lungs are clear of acute 
infiltrate. There has been a prior partial pulmonary resection in the right 
side. There are no effusions. There is an intraspinal catheter



 present, and there is hardware in cervical



 spine.



 



 



 



 [facility]-[id number]









 Performing Organization  Address  City/State/Zipcode  Phone Number

 

 [facility]  [address] [place] [address]  



Troponin (2017  7:33 PM)Only the most recent of5 resultswithin the time 
period is included.





 Component  Value  Ref Range  Performed At

 

 Troponin  <0.01  0.00 - 0.60 ng/mL  [facility]OLOGY



   Comment:    AND [employer] MEDICINE



   0.11 - 1.49 ng/mlMay indicate increased risk of acute
    



    coronary syndrome.
    



   >=1.5 ng/mlConsistent with acute myocardial    



    infarction.    



   
    



   The diagnostic value of a single normal or non-diagnostic    



   result is questionable.Serial samples at 2-6 hour intervals    



   are required to rule out acute myocardial injury.
    



       



       









 Specimen

 

 Plasma specimen









 Performing Organization  Address  City/[facility]/Zipcode  Phone Number

 

 [facility]Y AND  [address]  [place] [id number]  



 [employer] MEDICINE      



Influenza antigen (2017  3:58 PM)





 Component  Value  Ref Range  Performed At

 

 Influenza antigen  Negative for Influenza A/B antigen.    [facility] 
PATHOLOGY



   Comment:    AND GENOMIC MEDICINE



   Specimen Information    



   Specimen Source: Nares    



   Specimen Site: Right    



       









 Specimen

 

 Nares - Right









 Performing Organization  Address  City/[facility]  Phone Number

 

 [facility]OLOGY AND  [address] [place] [address]  



 [employer] MEDICINE      



B natriuretic peptide (2017  3:45 PM)Only the most recent of2 
resultswithin the time period is included.





 Component  Value  Ref Range  Performed At

 

 BNP  43  0 - 100 pg/mL  [facility] PATHOLOGY AND [employer] [facility]









 Specimen

 

 Blood









 Performing Organization  Address  City/[facility]  Phone Number

 

 [facility] PATHOLOGY AND  [address] [place] [address]  



 GENOMIC MEDICINE      



US Abdomen Complete (10/13/2017  7:28 AM)





 Narrative  Performed At

 

 EXAM: US ABDOMEN COMPLETE



   [facility]



  



  



 CLINICAL DATA:ABDOMINAL PAIN



  



  



  



 COMPARISON: 2016



  



  



  



 FINDINGS:



  



  



  



 LIVER:The liver is slightly heterogeneous in echogenicity and has an  



 irregular contour suggestive of cirrhosis. No discrete mass is  



 identified with ultrasound.



  



  



  



 MPV:Doppler evaluation of the portal vein demonstrates normal  



 hepatopetal flow. 



  



  



  



 GALLBLADDER:The gallbladder has been surgically removed.



  



  



  



 CBD: Common bile duct is not dilated measuring 6 mm.



  



  



  



 PANCREAS:The visualized portions of the pancreas are within normal  



 limits.



  



  



  



 SPLEEN:Spleen is enlarged measuring 15.3 x 7 x 6.7 cm. 



  



  



  



 KIDNEYS:The kidneys are normal in size and echogenicity. There is no  



 evidence of hydronephrosis.



  



  



  



 AORTA:The visualized upper abdominal aorta demonstrates no evidence  



 of ectasia or aneurysm.



  



  



  



 IVC:The visualized portions of the inferior vena cava are  



 unremarkable.



  



  



  



 ASCITES: No abnormal abdominal fluid collections are visualized. There  



 is no evidence of ascites.



  



  



  



 PLEURAL EFFUSION:There are no pleural effusions.



  



  



  



  



  



 IMPRESSION:



  



 1. Cirrhosis with splenomegaly.



  



  



  



 2.Status post cholecystectomy.



  



  



  



  



  



 [facility]-[id number]



  



   









 Procedure Note

 

  Interface, Radiology Results Incoming - 10/13/2017  7:34 AM CDT



EXAM: US ABDOMEN COMPLETE



 



 CLINICAL DATA:  ABDOMINAL PAIN



 



 COMPARISON: 2016



 



 FINDINGS:



 



 LIVER:  The liver is slightly heterogeneous in echogenicity and has an 
irregular contour suggestive of cirrhosis. No discrete mass is identified with 
ultrasound.



 



 MPV:  Doppler evaluation of the portal vein demonstrates normal hepatopetal 
flow.



 



 GALLBLADDER:  The gallbladder has been surgically removed.



 



 CBD: Common bile duct is not dilated measuring 6 mm.



 



 PANCREAS:  The visualized portions of the pancreas are within normal limits.



 



 SPLEEN:  Spleen is enlarged measuring 15.3 x 7 x 6.7 cm.



 



 KIDNEYS:  The kidneys are normal in size and echogenicity. There is no 
evidence of hydronephrosis.



 



 AORTA:  The visualized upper abdominal aorta demonstrates no evidence of 
ectasia or aneurysm.



 



 IVC:  The visualized portions of the inferior vena cava are unremarkable.



 



 ASCITES: No abnormal abdominal fluid collections are visualized. There is no 
evidence of ascites.



 



 PLEURAL EFFUSION:  There are no pleural effusions.



 



 



 IMPRESSION:



 1.   Cirrhosis with splenomegaly.



 



 2.  Status post cholecystectomy.



 



 



 [facility]-[id number]









 Performing Organization  Address  City/[facility]/Zipcode  Phone Number

 

  [facility]  [address] [place] [address]  



Total iron binding capacity (10/13/2017  5:33 AM)Only the most recent of2 
resultswithin the time period is included.





 Component  Value  Ref Range  Performed At

 

 Iron level  86  76 - 198 ug/dL  [facility] PATHOLOGY AND



       GENOMIC MEDICINE

 

 Iron binding capacity  252 (L)  271 - 474 ug/dL  [facility] PATHOLOGY 
AND



       GENOMIC MEDICINE

 

 % Saturation  34.1  20.0 - 40.0 %  [facility] PATHOLOGY AND



       GENOMIC MEDICINE









 Specimen

 

 Blood









 Performing Organization  Address  City/[facility]/Zipcode  Phone Number

 

 [facility]Y AND  [address]  [place] [address]  



 [facility]      



Hepatitis C antibody (10/13/2017  5:33 AM)Only the most recent of2 
resultswithin the time period is included.





 Component  Value  Ref Range  Performed At

 

 Hepatitis C Ab  Non-reactive  Non-reactive  [facility] PATHOLOGY AND 
GENOMIC



       MEDICINE









 Specimen

 

 Blood









 Performing Organization  Address  City/[facility]/Zipcode  Phone Number

 

 [facility]OLOGY AND  [address]  [address]      



Alpha-1 antitrypsin level (10/13/2017  5:33 AM)Only the most recent of2 
resultswithin the time period is included.





 Component  Value  Ref Range  Performed At

 

 Alpha-1 antitrypsin  134  90 - 200 mg/dL  [facility] DEPARTMENT OF PATHOLOGY AND 
GENOMIC



       MEDICINE









 Specimen

 

 Plasma specimen









 Performing Organization  Address  City/[facility]  Phone Number

 

 [facility] DEPARTMENT OF PATHOLOGY AND  [address] [place] [address]      



Hepatitis A antibody IgM (10/13/2017  5:33 AM)





 Component  Value  Ref Range  Performed At

 

 Hepatitis A IgM  Non-reactive  Non-reactive  [facility] PATHOLOGY AND 
[facility]



       MEDICINE









 Specimen

 

 Blood









 Performing Organization  Address  City/[facility]  Phone Number

 

 [facility]OLOGY AND  [address] [place] [address].  [address]      



Ceruloplasmin level (10/13/2017  5:33 AM)Only the most recent of2 resultswithin 
the time period is included.





 Component  Value  Ref Range  Performed At

 

 Ceruloplasmin  26  15 - 30 mg/dL  [facility] DEPARTMENT OF PATHOLOGY AND GENOMIC 
MEDICINE









 Specimen

 

 Plasma specimen









 Performing Organization  Address  City/[facility]  Phone Number

 

 [facility] DEPARTMENT OF PATHOLOGY AND  [address] [place] [address]      



Alpha fetoprotein (10/13/2017  5:33 AM)Only the most recent of2 resultswithin 
the time period is included.





 Component  Value  Ref Range  Performed At

 

 Alpha fetoprotein  3.5  0.0 - 8.3 ng/mL  [facility] DEPARTMENT OF



   Comment:    PATHOLOGY AND GENOMIC



   The Tru 8000 AFP immunoassay was used.    MEDICINE



   Results obtained with different assay methods or kits    



   should not be used interchangeably and may be different.    



       









 Specimen

 

 Serum









 Performing Organization  Address  City/[facility]  Phone Number

 

 [facility] DEPARTMENT OF PATHOLOGY AND  [address] [place] [address]      



Hepatitis B core antibody IgM (10/13/2017  5:33 AM)Only the most recent of2 
resultswithin the time period is included.





 Component  Value  Ref Range  Performed At

 

 Hepatitis B core IgM  Non-reactive  Non-reactive  [facility]OLOGY 
AND



       GENOMIC MEDICINE









 Specimen

 

 Blood









 Performing Organization  Address  City/[facility]  Phone Number

 

 [facility]OLOGY AND  [address] [place] [address].  [address]      



Hepatitis B surface antibody (10/13/2017  5:33 AM)Only the most recent of2 
resultswithin the time period is included.





 Component  Value  Ref Range  Performed At

 

 Hepatitis B surface Ab  Non-reactive  Non-reactive  [facility] 
PATHOLOGY AND



       GENOMIC MEDICINE









 Specimen

 

 Blood









 Performing Organization  Address  City/[facility]  Phone Number

 

 [facility]OLOGY AND  [address]  [place], [place] [address] [place] [address]      



Hepatitis B surface antigen (10/13/2017  5:33 AM)Only the most recent of2 
resultswithin the time period is included.





 Component  Value  Ref Range  Performed At

 

 Hepatitis B surface Ag  Non-reactive  Non-reactive  [facility] 
PATHOLOGY AND



       GENOMIC MEDICINE









 Specimen

 

 Blood









 Performing Organization  Address  City/[facility]  Phone Number

 

 [facility]OLOGY AND  [address]  [address]      



RADHA (10/13/2017  5:33 AM)Only the most recent of2 resultswithin the time period 
is included.





 Component  Value  Ref Range  Performed At

 

 RADHA screen  Not Detected  Not-Detected  [facility] DEPARTMENT OF PATHOLOGY AND 
GENOMIC MEDICINE









 Specimen

 

 Blood









 Performing Organization  Address  City/[facility]  Phone Number

 

 [facility] DEPARTMENT OF PATHOLOGY AND  [address] [place] [address]  



 GENOMIC MEDICINE      



Ferritin level (10/13/2017  5:33 AM)Only the most recent of2 resultswithin the 
time period is included.





 Component  Value  Ref Range  Performed At

 

 Ferritin level  143  18 - 158 ng/mL  [facility] OF PATHOLOGY AND GENOMIC 
MEDICINE









 Specimen

 

 Serum









 Performing Organization  Address  City/[facility]  Phone Number

 

 [facility]OLOGY AND  [address]  [place], [place] [address] [place] [address]      



CT Abdomen Pelvis W Wo Contrast (10/13/2017  1:34 AM)





 Narrative  Performed At

 

 CT ABDOMEN PELVIS W WO CONTRAST



   [facility]



  



  



 CLINICAL INDICATION:cirrhosis



  



  



  



 TECHNIQUE: Multidetector CT of the abdomen and pelvis was performed  



 before and then following intravenous administration of iodinated  



 contrast with multiplanar reformats.



  



  



  



 CT scans are performed using radiation dose reduction techniques  



 (iterative reconstruction and/or automated exposure control). Technical  



 factors are evaluated and adjusted to ensure appropriate moderation of  



 exposure. Automated dose management technology



  



  is applied to adjust radiation exposure while achieving a diagnostic  



 quality image.



  



  



  



 COMPARISON:None. 



  



  



  



 FINDINGS:



  



  



  



 Lung bases:Dependent subsegmental atelectasis/scarring.



  



  



  



 Liver:Cirrhotic morphology with underlying hepatic steatosis. No  



 arterial hyperenhancing lesions are visualized.



  



  



  



 Gallbladder and biliary:The gallbladder is absent. Clips within the  



 gallbladder fossa. 



  



  



  



 Pancreas:Moderately atrophic with fatty replacement.



  



  



  



 Spleen: Enlarged measuring up to 15.5 cm.



  



  



  



 Gastrointestinal:Mild sigmoid diverticulosis. Large and small bowel  



 are normal in caliber. Appendix is not visualized. No focal inflammatory  



 changes within the right lower quadrant of the abdomen.



  



  



  



 Adrenals:Normal. 



  



  



  



 Kidneys and ureters:No mass or hydronephrosis.



  



  



  



 Urinary bladder: Decompressed.



  



  



  



 Lymph nodes:No enlarged lymph nodes in the abdomen or pelvis.



  



  



  



 Peritoneum:No ascites or free air.



  



  



  



 Vascular:Moderate-extensive atherosclerotic changes of the abdominal  



 aorta and major branch vessels.



  



  



  



 Reproductive organs:Normal prostate gland and seminal vesicles.



  



  



  



 Abdominal wall: Spinal stimulator device in the right lower back  



 subcutaneous soft tissues with partially visualized electrodes.



  



  



  



 Bones:Posterior spinal instrumentation at L4-S1.



  



  



  



 IMPRESSION:



  



  



  



 1. Cirrhosis. No focal or suspicious hepatic lesions visualized on liver  



 protocol CT.



  



  



  



 2. Splenomegaly.



  



  



  



  



  



 [facility]-[id number]



  



   









 Procedure Note

 

 [facility], Radiology Results Incoming - 10/13/2017  1:55 AM CDT



CT ABDOMEN PELVIS W WO CONTRAST



 



 CLINICAL INDICATION:  cirrhosis



 



 TECHNIQUE: Multidetector CT of the abdomen and pelvis was performed before and 
then following intravenous administration of iodinated contrast with 
multiplanar reformats.



 



 CT scans are performed using radiation dose reduction techniques (iterative 
reconstruction and/or automated exposure control). Technical factors are 
evaluated and adjusted to ensure appropriate moderation of



 exposure. Automated dose management technology



  is applied to adjust radiation exposure while achieving a diagnostic quality 
image.



 



 COMPARISON:  None.



 



 FINDINGS:



 



 Lung bases:  Dependent subsegmental atelectasis/scarring.



 



 Liver:  Cirrhotic morphology with underlying hepatic steatosis. No arterial 
hyperenhancing lesions are visualized.



 



 Gallbladder and biliary:  The gallbladder is absent. Clips within the 
gallbladder fossa.



 



 Pancreas:  Moderately atrophic with fatty replacement.



 



 Spleen: Enlarged measuring up to 15.5 cm.



 



 Gastrointestinal:  Mild sigmoid diverticulosis. Large and small bowel are 
normal in caliber. Appendix is not visualized. No focal inflammatory changes 
within the right lower quadrant of the abdomen.



 



 Adrenals:  Normal.



 



 Kidneys and ureters:  No mass or hydronephrosis.



 



 Urinary bladder: Decompressed.



 



 Lymph nodes:  No enlarged lymph nodes in the abdomen or pelvis.



 



 Peritoneum:  No ascites or free air.



 



 Vascular:  Moderate-extensive atherosclerotic changes of the abdominal aorta 
and major branch vessels.



 



 Reproductive organs:  Normal prostate gland and seminal vesicles.



 



 Abdominal wall: Spinal stimulator device in the right lower back subcutaneous 
soft tissues with partially visualized electrodes.



 



 Bones:  Posterior spinal instrumentation at L4-S1.



 



 IMPRESSION:



 



 1. Cirrhosis. No focal or suspicious hepatic lesions visualized on liver 
protocol CT.



 



 2. Splenomegaly.



 



 



 [facility]-[id number]









 Performing Organization  Address  City/[facility]/Zipcode  Phone Number

 

 [facility]  [address] [place] [id number]  



Ammonia level (10/12/2017  5:45 PM)Only the most recent of2 resultswithin the 
time period is included.





 Component  Value  Ref Range  Performed At

 

 Ammonia  33  3 - 37 umol/L  [facility]OMIC MEDIC[facility]









 Specimen

 

 Plasma specimen









 Performing Organization  Address  City/[facility]  Phone Number

 

 [facility]Y AND  [address]  [place], [place] [id number] [place] [address]      



Anti smooth muscle Ab titer (10/12/2017  1:27 PM)





 Component  Value  Ref Range  Performed At

 

 Anti smooth muscle Ab titer  1:80 (A)  Not-Detected  [facility] DEPARTMENT OF 
PATHOLOGY AND



       GENOMIC MEDICINE









 Specimen

 

 Blood









 Performing Organization  Address  City/[facility]  Phone Number

 

 [facility] DEPARTMENT OF PATHOLOGY AND  [address] [place] [address]  



 [facility]      



Anti smooth muscle Ab screen (10/12/2017  1:27 PM)





 Component  Value  Ref Range  Performed At

 

 Anti smooth muscle Ab screen  Detected (A)  Not-Detected  [facility] DEPARTMENT OF 
PATHOLOGY



       AND GENOMIC MEDICINE









 Specimen

 

 Blood









 Performing Organization  Address  City/State/[facility]  Phone Number

 

 [facility] DEPARTMENT OF PATHOLOGY AND  [address] [place] [id number]  



 GENOMIC MEDICINE      



Hemoglobin &amp; hematocrit (10/12/2017  1:27 PM)





 Component  Value  Ref Range  Performed At

 

 HGB  14.4  13.0 - 17.3 g/dL  [facility]OLOGY AND GENOMIC MEDICINE

 

 HCT  44.0  34.0 - 45.0 %  [facility] OF PATHOLOGY AND GENOMIC MEDICINE









 Specimen

 

 Blood









 Performing Organization  Address  City/[facility]  Phone Number

 

 [facility]OLOGY AND  [address]  [place], [place] [id number] [place] [address]      



Lactic acid level (10/11/2017 10:17 PM)Only the most recent of6 resultswithin 
the time period is included.





 Component  Value  Ref Range  Performed At

 

 Lactic acid  1.4  0.5 - 2.2 mmol/L  [facility] PATHOLOGY AND GENOMIC 
MEDICINE









 Specimen

 

 Blood









 Performing Organization  Address  City/[facility]/Zipcode  Phone Number

 

 [facility]OLOGY AND  [address]  [place], [place] [address] [place] [address]      



Bilirubin direct (10/11/2017  7:49 PM)Only the most recent of3 resultswithin 
the time period is included.





 Component  Value  Ref Range  Performed At

 

 Bilirubin direct  0.1  0.0 - 0.4 mg/dL  [facility] OF PATHOLOGY AND 
GENOMIC



       MEDICINE









 Specimen

 

 Plasma specimen









 Narrative  Performed At

 

 LA RESULT CALLED TO DAHLIA SUMNER  [facility] OF PATHOLOGY AND GENOMIC



 NP10/11/[id number]:25 BY DJ  MEDICINE









 Performing Organization  Address  City/[facility]  Phone Number

 

 [facility] OF PATHOLOGY AND  [address] [facility].  [place] [id number] [place] [address]      



Amylase level (10/11/2017  7:49 PM)Only the most recent of3 resultswithin the 
time period is included.





 Component  Value  Ref Range  Performed At

 

 Amylase  59  34 - 122 U/L  [facility] OF PATHOLOGY AND GENOMIC MEDICINE









 Specimen

 

 Plasma specimen









 Performing Organization  Address  City/[facility]  Phone Number

 

 [facility] OF PATHOLOGY AND  [address] [facility].  [address]      



CT Chest W Contrast Abdomen W Contrast Pelvis W Contrast (2017  9:31 PM)





 Narrative  Performed At

 

 EXAMINATION:CT CHEST W CONTRAST ABDOMEN W CONTRAST PELVIS W CONTRAST



   [facility]



  



  



 CLINICAL HISTORY:RLL chest pain



  



  



  



 TECHNIQUE: Multiple axial images of the chest, abdomen, and pelvis were  



 obtained following intravenous administration of iodinated contrast.  



 Sagittal and coronal computerized reformatted images were  



 obtained.Automatic exposure control and iterative 



  



 reconstruction techniques used to reduce dose.



  



  



  



 COMPARISON:2017



  



  



  



 FINDINGS:



  



  



  



 Chest: 



  



  Mild diffuse emphysematous changes throughout the lungs.



  



  



  



 Mild atelectasis in the lingula postoperative changes in the right  



 middle lobe. Mild bronchiectasis in the bilateral upper and lower lobes.  



 No suspicious focal pulmonary nodules or infiltrates present



  



  



  



 Small nonspecific subcentimeter mediastinal and hilar lymph nodes



  



  



  



 Diffuse calcified atherosclerotic vascular disease throughout the  



 arterial structures.



  



  



  



 No pleural or pericardial effusions are present.



  



  



  



 Degenerative changes are present throughout the bony structures without  



 evidence of a suspicious focal lesion.



  



  



  



 Abdomen/Pelvis:



  



  Diffuse cirrhotic changes are present throughout the liver without  



 evidence of focal mass



  



  



  



 The gallbladder has been removed



  



  



  



 The spleen is enlarged



  



  



  



 Portal vein is patent



  



  



  



 Pancreas is unremarkable



  



  



  



 Subcentimeter adenoma in the left adrenal gland



  



  



  



 Kidneys are within normal limits



  



  



  



 No significant lymphadenopathy free fluid present



  



  



  



 Diffuse calcified atherosclerotic vascular disease throughout the  



 arterial structures.



  



  



  



 No evidence of appendicitis



  



  



  



 Diffuse diverticulosis of colon without evidence of diverticulitis. The  



 small bowel is unremarkable



  



  



  



 Diffuse calcified atherosclerotic vascular disease throughout the  



 arterial structures.



  



  



  



 Pelvis:



  



  



  



 No significant lymphadenopathy, solid masses or free fluid present



  



  



  



 Degenerative changes are present throughout the bony structures without  



 evidence of a suspicious focal lesion.



  



  



  



  



  



 IMPRESSION:



  



  



  



 Diffuse emphysematous changes throughout the lungs. No suspicious focal  



 pulmonary nodules or infiltrates present



  



  



  



 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



  



  



  



 Diverticulosis without evidence of diverticulitis



  



  



  



 [facility]-[id number]



  



   









 Procedure Note

 

  Interface, Radiology Results 2017  9:42 PM CDT



EXAMINATION:  CT CHEST W CONTRAST ABDOMEN W CONTRAST PELVIS W CONTRAST



 



 CLINICAL HISTORY:  RLL chest pain



 



 TECHNIQUE: Multiple axial images of the chest, abdomen, and pelvis were 
obtained following intravenous administration of iodinated contrast. Sagittal 
and coronal computerized reformatted images were obtained.Automatic exposure 
control and iterative



 reconstruction techniques used to reduce dose.



 



 COMPARISON:  2017



 



 FINDINGS:



 



 Chest:



  Mild diffuse emphysematous changes throughout the lungs.



 



 Mild atelectasis in the lingula postoperative changes in the right middle 
lobe. Mild bronchiectasis in the bilateral upper and lower lobes. No suspicious 
focal pulmonary nodules or infiltrates present



 



 Small nonspecific subcentimeter mediastinal and hilar lymph nodes



 



 Diffuse calcified atherosclerotic vascular disease throughout the arterial 
structures.



 



 No pleural or pericardial effusions are present.



 



 Degenerative changes are present throughout the bony structures without 
evidence of a suspicious focal lesion.



 



 Abdomen/Pelvis:



  Diffuse cirrhotic changes are present throughout the liver without evidence 
of focal mass



 



 The gallbladder has been removed



 



 The spleen is enlarged



 



 Portal vein is patent



 



 Pancreas is unremarkable



 



 Subcentimeter adenoma in the left adrenal gland



 



 Kidneys are within normal limits



 



 No significant lymphadenopathy free fluid present



 



 Diffuse calcified atherosclerotic vascular disease throughout the arterial 
structures.



 



 No evidence of appendicitis



 



 Diffuse diverticulosis of colon without evidence of diverticulitis. The small 
bowel is unremarkable



 



 Diffuse calcified atherosclerotic vascular disease throughout the arterial 
structures.



 



 Pelvis:



 



 No significant lymphadenopathy, solid masses or free fluid present



 



 Degenerative changes are present throughout the bony structures without 
evidence of a suspicious focal lesion.



 



 



 IMPRESSION:



 



 Diffuse emphysematous changes throughout the lungs. No suspicious focal 
pulmonary nodules or infiltrates present



 



 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



 



 Diverticulosis without evidence of diverticulitis



 



 [facility]-[id number]









 Performing Organization  Address  City/State/Zipcode  Phone Number

 

  [facility]  [address] [place] [address]  



Vancomycin level, trough (2017  9:07 AM)





 Component  Value  Ref Range  Performed At

 

 Vancomycin, trough  18.9  10.0 - 20.0 ug/mL  [facility] OF



   Comment:    PATHOLOGY AND GENOMIC



   Therapeutic Ranges:    MEDICINE



    Peak30.0 - 40.0 ug/mL    



    Ggwjal75.0 - 20.0 ug/mL    



       









 Specimen

 

 Blood









 Performing Organization  Address  City/State/Zipcode  Phone Number

 

 [facility]Y AND  [address] [place] [address]  



 GENOMIC MEDICINE      



XR Chest 1 Vw Portable (2017  5:27 PM)Only the most recent of15 
resultswithin the time period is included.





 Narrative  Performed At

 

 EXAMINATION:[facility] CHE[facility] 1 VW PORTABLE



   [facility]



  



  



 CLINICAL HISTORY: PICC Line Insertion



  



  



  



 COMPARISON:2017



  



  



  



 IMPRESSION:



  



  



  



 Redemonstration of a right sided PICC with the catheter tip at the  



 cavoatrial junction. Spinal canal stimulator leads are again noted.  



 Furthermore, partial residual aeration of the lower cervical ACDF.



  



  



  



 Cardiomediastinal silhouette is within normal limits of size.  



 Calcifications are identified within the aortic arch.



  



  



  



 Perihilar vascular congestion is present, not significant change from  



 prior examination. Lungs are otherwise clear. Trace right-sided pleural  



 effusion is suspected. No significant left-sided pleural effusion or  



 pneumothorax identified. 



  



  



  



 Visualized osseous structures are intact.



  



  



  



  



  



 [facility]-[id number]



  



   









 Procedure Note

 

  Interface, Radiology Results Incoming - 2017  5:33 PM CDT



EXAMINATION:  XR CHEST 1 VW PORTABLE



 



 CLINICAL HISTORY: PICC Line Insertion



 



 COMPARISON:  2017



 



 IMPRESSION:



 



 Redemonstration of a right sided PICC with the catheter tip at the cavoatrial 
junction. Spinal canal stimulator leads are again noted. Furthermore, partial 
residual aeration of the lower cervical ACDF.



 



 Cardiomediastinal silhouette is within normal limits of size. Calcifications 
are identified within the aortic arch.



 



 Perihilar vascular congestion is present, not significant change from prior 
examination. Lungs are otherwise clear. Trace right-sided pleural effusion is 
suspected. No significant left-sided pleural effusion or pneumothorax 
identified.



 



 Visualized osseous structures are intact.



 



 



 [facility]-[id number]









 Performing Organization  Address  City/State/Zipcode  Phone Number

 

 [facility]  [address] [place] [address]  



Blood culture, aerobic &amp; anaerobic (2017  5:16 PM)Only the most 
recent of6 resultswithin the time period is included.





 Component  Value  Ref Range  Performed At

 

 Blood culture isolate  No growth after 5 days of incubation.    [facility] DEPARTMENT 
OF



   Comment:    PATHOLOGY AND GENOMIC



   Specimen Information    MEDICINE



   Specimen Source: Blood    



   Specimen Site: Peripheral Hand Left    



       









 Specimen

 

 Blood









 Performing Organization  Address  City/State/Zipcode  Phone Number

 

 [facility] DEPARTMENT OF PATHOLOGY AND  [address] [place] [address]  



 GENOMIC MEDICINE      



PICC INSERTION (2017 12:19 PM)





 Narrative  Performed At

 

 Cinthya Whitt 2017 12:19 PM



  



 PICC insertion



  



 Date/Time: 2017 12:16 PM



  



 Performed by: CINTHYA WHITT



  



 Authorized by: POPEYE STRICKLAND 



  



  



  



 Consent: 



  



 Consent obtained:Written



  



 Consent given by:Patient



  



 Risks discussed: arterial puncture, incorrect placement, nerve  



 damage, 



  



 pneumothorax, infection, bleeding, superficial thrombus and deep vein 



  



 thrombus



  



 Universal protocol: 



  



 Procedure explained and questions answered to patient or proxy's 



  



 satisfaction: yes



  



 Relevant documents present and verified: yes



  



 Test results available and properly labeled: yes



  



 Imaging studies available: yes



  



 Required blood products, implants, devices, and special equipment 



  



 available: yes



  



 Site/side marked: yes



  



 Immediately prior to procedure, a time out was called: yes



  



 Patient identity confirmed:Verbally with patient,  



 hospital-assigned 



  



 identification number and arm band



  



 Pre-procedure details: 



  



 Hand hygiene: Hand hygiene performed prior to insertion



  



 Sterile barrier technique: All elements of maximal sterile technique  



 



  



 followed



  



 Skin preparation:ChloraPrep



  



 Skin preparation agent: Skin preparation agent completely dried  



 prior to 



  



 procedure



  



 Anesthesia (see MAR for exact dosages): 



  



 Anesthesia method:Local infiltration



  



 Local anesthetic:Lidocaine 1% w/o epi



  



 Route of administration:Subcutaneous



  



 PICC Line Placement Details (Will create an LDA): 



  



 Extremity Cirumference Upper (cm):30



  



 Extremity Circumference Forearm (cm):23



  



 Extremity Circumference Site:27



  



 Patient position:Flat



  



 Vessel Size (mm):5



  



 Indication:Known long term IV therapy



  



 Location:Right basilic



  



 Device Type:Valved



  



 Catheter size:5 Fr



  



 PICC Characteristics: 



  



 Catheter Brand:Power PICC solo catheter with sherlock 3CG tip 



  



 positioning system stylet



  



 External Catheter Length (cm):0



  



 Internal Catheter Length (cm):40



  



 Total Catheter Length (cm):40



  



 Catheter Lot Number:[id number]



  



 Catheter Expiration Date:3/31/2018



  



 Micro-Introducer Lot Number:[id number]



  



 Micro-Introducer Expiration Date:3/31/2018



  



 Procedure Details: 



  



 Landmarks identified: yes



  



 Ultrasound guidance: yes



  



 Sterile ultrasound techniques: Sterile gel and sterile probe covers  



 were 



  



 used



  



 Number of attempts:1



  



 Number of PICC kits used during procedure:1



  



 Purpose of procedure:PICC Placement



  



 Successful PICC Placement: Yes



  



 Patency/Placement:Flushes without difficulty, flushed with 10 mL  



 



  



 normal saline and positive blood return



  



 PICC placed utlizing ultrasound-guided Modified Seldinger Technique:  



 Yes 



  



  



  



 Dressing/Securement:Gauze applied, transparent semipermeable  



 dressing 



  



 and catheter securement device



  



 Blood Loss Amount:Less than 20 mL



  



 Post-Procedure Details: 



  



 Post-procedure:Dressing applied



  



 Patient tolerance of procedure:Tolerated well, no immediate 



  



 complications



  



 Comments: 



  



  3CG confirmation obtained and placed in chart. Report given to  



 Anay Arcos RN  



CT Chest Wo Contrast (2017  1:37 PM)Only the most recent of2 
resultswithin the time period is included.





 Narrative  Performed At

 

 EXAMINATION: CT CHEST WO CONTRAST



  [facility]



  



  



 CLINICAL HISTORY: chest tubeempyema



  



  



  



 TECHNIQUE:Axial images of the chest were obtained without  



 intravenous contrast. The lack of intravenous contrast reduces the  



 sensitivity of the exam and evaluating vasculature. CT imaging was  



 performed with iterative reconstruction technique and/or 



  



 automated exposure control to reduce radiation dose.



  



  



  



 COMPARISON:CT chest dated 2017.



  



  



  



 FINDINGS:



  



  



  



 CHEST:



  



  



  



 1. Aorta: The thoracic aorta is nonaneurysmal with atherosclerotic  



 disease. Main pulmonary artery is dilated measuring up to 4.3 cm  



 consistent with pulmonary arterial hypertension.



  



  



  



 2. Heart: Heart is mildly enlarged with coronary artery calcification.



  



  



  



 3. Pericardial Fluid: Small pericardial effusion.



  



  



  



 4. Mediastinum: Multiple subcentimeter mediastinal lymph nodes are  



 unchanged compared to previous examination and likely reactive. Limited  



 evaluation of the hilum the absence of intravenous contrast material.



  



  



  



 5. Airways: Central airways are patent.



  



  



  



 6. Lungs: Slightly more prominent focus of nodular consolidation in the  



 lingula on series 3, slice 69 compared to previous examination. Minimal  



 left lower lobe and lingular atelectasis. Postoperative opacity  



 extending along the major fissure with 



  



 associated calcification and surgical material is again unchanged.



  



  



  



 7. Pleural Fluid: Interval placement of a right-sided pleural catheter  



 into the previously identified right sided empyema which has  



 significantly decreased in size. Small amount of fluid and air persists.



  



  



  



 8. Bones: Osseous structures intact. No destructive bony lesions.



  



  



  



 9. Upper Abdomen: Nodular appearance of the liver consistent with  



 cirrhosis. Spleen is at the upper limits of normal in size. Fatty  



 infiltration of the pancreas. Gallbladder surgically absent.



  



  



  



 10. Other Findings: None



  



  



  



 IMPRESSION:



  



  



  



  



  



 1. Right pleural drainage catheter in place with significant decrease in  



 size of a loculated right pleural effusion. Only a small amount of  



 pleural fluid and air persists.



  



 2. Small nodular consolidation in the lingula is slightly more prominent  



 compared to prior examination, likely infectious in etiology.



  



 3. Enlarged main pulmonary artery which can be seen with pulmonary  



 arterial hypertension.



  



  



  



  



  



 [facility]-[id number]



  



   









 Procedure Note

 

 Hm Interface, Radiology Results Incoming - 2017  2:01 PM CDT



EXAMINATION: CT CHEST WO CONTRAST



 



 CLINICAL HISTORY: chest tube  empyema



 



 TECHNIQUE:  Axial images of the chest were obtained without intravenous 
contrast. The lack of intravenous contrast reduces the sensitivity of the exam 
and evaluating vasculature. CT imaging was performed with iterative



 reconstruction technique and/or



 automated exposure control to reduce radiation dose.



 



 COMPARISON:  CT chest dated 2017.



 



 FINDINGS:



 



 CHEST:



 



 1. Aorta: The thoracic aorta is nonaneurysmal with atherosclerotic disease. 
Main pulmonary artery is dilated measuring up to 4.3 cm consistent with 
pulmonary arterial hypertension.



 



 2. Heart: Heart is mildly enlarged with coronary artery calcification.



 



 3. Pericardial Fluid: Small pericardial effusion.



 



 4. Mediastinum: Multiple subcentimeter mediastinal lymph nodes are unchanged 
compared to previous examination and likely reactive. Limited evaluation of the 
hilum the absence of intravenous contrast material.



 



 5. Airways: Central airways are patent.



 



 6. Lungs: Slightly more prominent focus of nodular consolidation in the 
lingula on series 3, slice 69 compared to previous examination. Minimal left 
lower lobe and lingular atelectasis. Postoperative opacity extending along the 
major fissure with



 associated calcification and surgical material is again unchanged.



 



 7. Pleural Fluid: Interval placement of a right-sided pleural catheter into 
the previously identified right sided empyema which has significantly decreased 
in size. Small amount of fluid and air persists.



 



 8. Bones: Osseous structures intact. No destructive bony lesions.



 



 9. Upper Abdomen: Nodular appearance of the liver consistent with cirrhosis. 
Spleen is at the upper limits of normal in size. Fatty infiltration of the 
pancreas. Gallbladder surgically absent.



 



 10. Other Findings: None



 



 IMPRESSION:



 



 



 1. Right pleural drainage catheter in place with significant decrease in size 
of a loculated right pleural effusion. Only a small amount of pleural fluid and 
air persists.



 2. Small nodular consolidation in the lingula is slightly more prominent 
compared to prior examination, likely infectious in etiology.



 3. Enlarged main pulmonary artery which can be seen with pulmonary arterial 
hypertension.



 



 



 [facility]-[id number]









 Performing Organization  Address  City/[facility]  Phone Number

 

  [facility]  [address] [place] [address]  



Aerobic culture (2017  4:40 PM)Only the most recent of2 resultswithin the 
time period is included.





 Component  Value  Ref Range  Performed At

 

 Aerobic culture isolate  Streptococcus anginosus    [facility] DEPARTMENT OF



   Recovered in Broth only:    PATHOLOGY AND GENOMIC



   The performance characteristics of this assay on this isolate    MEDICINE



   were validated by the Microbiology Laboratory at [facility].This source has not been approved by the U.S. Food    



   and Drug Administration.The results are not intended to be    



   used as the sole means for clinical diagnosis or patient    



   management.The Microbiology Laboratory is authorized under    



   the clinical Laboratory Improvement Amendments of 1988    



   (CLIA-88) to perform high complexity testing.    



     (A)    



   Comment:    



   Specimen Information    



   Specimen Source: Pleural fluid    



   Specimen Site: Right Pleural Fluid    



       









 Specimen

 

 Pleural fluid









 Organism  Antibiotic  Method  Susceptibility

 

 Streptococcus anginosus  Clindamycin  RACHEL  >2 mcg/mL: Resistant

 

 Streptococcus anginosus  Ceftriaxone  RACHEL  <=0.0625 mcg/mL: Susceptible

 

 Streptococcus anginosus  Cefotaxime  RACHEL  <=0.0625 mcg/mL: Susceptible

 

 Streptococcus anginosus  Penicillin G  RACHEL  <=0.98262 mcg/mL: Susceptible

 

 Streptococcus anginosus  Vancomycin  RACHEL  1 mcg/mL: Susceptible









 Performing Organization  Address  City/[facility]  Phone Number

 

 [facility] DEPARTMENT OF PATHOLOGY AND  [address] [place] [address]  



 [facility] MEDICINE      



Gram stain (2017  4:40 PM)Only the most recent of2 resultswithin the time 
period is included.





 Component  Value  Ref Range  Performed At

 

 Gram stain isolate  Many WBC's    [facility] DEPARTMENT OF PATHOLOGY



   Many Gram positive cocci in pairs    AND GENOMIC MEDICINE



       



   Comment:    



   Specimen Information    



   Specimen Source: Pleural fluid    



   Specimen Site: Right Pleural Fluid    



       









 Specimen

 

 Pleural fluid









 Performing Organization  Address  City/[facility]  Phone Number

 

 [facility] DEPARTMENT OF PATHOLOGY AND  [address] [place] [address]  



 [facility]      



CT Needle Biopsy No Contrast (2017 10:00 AM)Only the most recent of3 
resultswithin the time period is included.





 Narrative  Performed At

 

 Clinical history: Empyema



  [facility]



  



  



 drainage , right pleural effusion



  



  



  



 TECHNIQUE:



  



 DLP:Technical factors are evaluated and adjusted to ensure  



 appropriate moderation of exposure. Automated dose management technology  



 is supplied to adjust the radiation dose to minimize exposure while  



 achieving a diagnostic quality image.



  



  



  



  



  



 PROCEDURE: CT-GUIDED PERCUTANEOUS CATHETER ABSCESS DRAINAGE.



  



  



  



 Informed consent was obtained. The procedure and risks as well as other  



 options were discussed.



  



 The patient was fully monitored and given moderate sedation.



  



 Doctor-patient face-to-face time was 30 minutes..



  



 The patient was prepped and draped in a sterile fashion. Measurements  



 were obtained on the CT computer showing abscess outline and adjacent  



 anatomic structures. Angle and depth of abscess drain placement was  



 determined.



  



 Local anesthetic was given.



  



 A Yueh needle was placed into the abscess.



  



  



  



 Needle placement was documented with CT images.



  



 An Amplatzguidewire was inserted into the Yueh sheath after the Yueh  



 needle was removed. Fascial dilators were placed over the guidewire that  



 were sized to match the catheter size. An 8.5 French pigtail all-purpose  



 drainage catheter was inserted over 



  



 the guidewire.



  



  



  



 The drainage catheter position was documented with additional CT images.



  



 The pigtail catheter was locked and abscess fluid was removed with a  



 syringe and sent for culture and sensitivity..



  



  



  



 The catheter was fastened with monofilament suture and left to gravity  



 drainage.



  



 The patient tolerated the procedure well and left the radiology  



 department in good condition.



  



  



  



 IMPRESSION:



  



  



  



 There were no complications 



  



 150 cc of purulent material were sent to the laboratory for culture  



 sensitivity and Gram stain.



  



  



  



 [id number] 



  



   









 Procedure Note

 

 Hm Interface, Radiology Results Incoming - 2017  3:21 PM CDT



Clinical history: Empyema



 



 drainage , right pleural effusion



 



 TECHNIQUE:



 DLP:    Technical factors are evaluated and adjusted to ensure appropriate 
moderation of exposure. Automated dose management technology is supplied to 
adjust the radiation dose to minimize exposure while achieving a diagnostic 
quality image.



 



 



 PROCEDURE: CT-GUIDED PERCUTANEOUS CATHETER ABSCESS DRAINAGE.



 



 Informed consent was obtained. The procedure and risks as well as other 
options were discussed.



 The patient was fully monitored and given moderate sedation.



 Doctor-patient face-to-face time was 30 minutes..



 The patient was prepped and draped in a sterile fashion. Measurements were 
obtained on the CT computer showing abscess outline and adjacent anatomic 
structures. Angle and depth of abscess drain placement was determined.



 Local anesthetic was given.



 A Yueh needle was placed into the abscess.



 



 Needle placement was documented with CT images.



 An Amplatz  guidewire was inserted into the Yueh sheath after the Yueh needle 
was removed. Fascial dilators were placed over the guidewire that were sized to 
match the catheter size. An 8.5 French pigtail all-purpose



 drainage catheter was inserted over



 the guidewire.



 



 The drainage catheter position was documented with additional CT images.



 The pigtail catheter was locked and abscess fluid was removed with a syringe 
and sent for culture and sensitivity..



 



 The catheter was fastened with monofilament suture and left to gravity 
drainage.



 The patient tolerated the procedure well and left the radiology department in 
good condition.



 



 IMPRESSION:



 



 There were no complications



 150 cc of purulent material were sent to the laboratory for culture 
sensitivity and Gram stain.



 



 [id number]









 Performing Organization  Address  City/[facility]  Phone Number

 

 [facility]  [address] [place] [address]  



Hemoglobin A1c (2017  5:45 AM)





 Component  Value  Ref Range  Performed At

 

 Hemoglobin A1C  7.5 (H)  4.0 - 6.0 %  [facility] DE[facility] OF PATHOLOGY



   Comment:    AND GENOMIC MEDICINE



   *****************************************************    



   Less than 6% - Goal of therapy for Type II Diabetes    



   Less than 7%-Goal of therapy for Type I Diabetes    



   Less than 8%-Acceptable control for Type I or Type    



   II Diabetes    



   Greater than 8%-Unacceptable control; action indicated.    



   (ADA94)    



       









 Specimen

 

 Blood









 Performing Organization  Address  City/[facility]  Phone Number

 

 [facility] DE[facility] OF [facility]OLOGY AND  [address] [place] [address]  



 [employer]      



CT Abdomen Pelvis W Contrast (2017  4:49 PM)





 Narrative  Performed At

 

 EXAMINATION:CT ABDOMEN PELVIS W CONTRAST



   [facility]



  



  



 CLINICAL HISTORY:ABDOMINAL PAIN



  



  



  



 TECHNIQUE:



  



  



  



  Multiple axial images of the abdomen and pelvis were obtained following  



 intravenous administration of iodinated contrast. Sagittal and coronal  



 computerized reformatted images were also obtained. Approximately 80 cc  



 of Omnipaque 300 was used.



  



  



  



 All CT scan performed using radiation dose reduction techniques.  



 Technical factors are evaluated and adjusted to ensure appropriate  



 moderation of exposure. Automated dose management technology is applied  



 to adjust the radiation dose to minimize expose 



  



 whileachieving a diagnostic quality image.



  



  



  



  



  



 COMPARISON:CT chest 2017..



  



  



  



 FINDINGS:



  



  



  



 Lung bases: A loculated wall enhancing moderate pleural effusion within  



 the posterior aspect of the right lung base is again seen, suggesting of  



 empyema.. A right middle lobe and right lower lobe masslike density with  



 curvilinear calcification is again 



  



 noted..



  



  



  



 Liver: Hepatic cirrhosis is seen. There is no intrahepatic biliary  



 dilatation or enhancing mass.



  



  



  



 Gallbladder: Surgically absent.



  



  



  



 Pancreas: The pancreas is normal in caliber and attenuation. No  



 inflammatory process. The pancreatic duct is within normal limits.



  



  



  



 Spleen: Splenomegaly is seen. The spleen is otherwise unremarkable...



  



  



  



 Kidneys and ureters: The kidneys function symmetrically. There is no  



 enhancing renal lesion. No hydronephrosis or renal stone. The ureters  



 are normal in course and caliber.



  



  



  



 Adrenal glands: Unremarkable.



  



  



  



 GI tract: The small bowel is normal in course and caliber. The colon is  



 unremarkable. No bowel wall thickening is identified. There is no acute  



 inflammatory process. The appendix is not seen. No right lower quadrant  



 inflammation is seen. The stomach is 



  



 unremarkable



  



  



  



 Ascites: None.



  



  



  



 Pelvis:



  



  



  



 The urinary bladder is within normal limits.



  



  



  



 Limited evaluation of the prostate gland is unremarkable. Incidental  



 note is made of right hydrocele..



  



  



  



 Bones: Unremarkable.



  



  



  



 Retroperitoneum: No retroperitoneal or mesenteric lymphadenopathy is  



 seen. Scattered atherosclerotic disease is seen.No abnormal aortic  



 aneurysm is seen.. The visceral and mesenteric vascular branches are  



 patent.



  



  



  



 Abdominal wall: Unremarkable.



  



  



  



  



  



 IMPRESSION:



  



  



  



 Hepatitic cirrhosis with splenomegaly. No CT evidence of hepatitic mass.



  



  



  



 No CT evidence of colitis or bowel obstruction.



  



  



  



 Findings again suggesting of a moderate right empyema.



  



  



  



  



  



 [id number]



  



   









 Procedure Note

 

 Hm Interface, Radiology Results Incoming - 2017  5:06 PM CDT



EXAMINATION:  CT ABDOMEN PELVIS W CONTRAST



 



 CLINICAL HISTORY:      ABDOMINAL PAIN



 



 TECHNIQUE:



 



  Multiple axial images of the abdomen and pelvis were obtained following 
intravenous administration of iodinated contrast. Sagittal and coronal 
computerized reformatted images were also obtained. Approximately 80 cc of 
Omnipaque 300 was used.



 



 All CT scan performed using radiation dose reduction techniques. Technical 
factors are evaluated and adjusted to ensure appropriate moderation of 
exposure. Automated dose management technology is applied to adjust the



 radiation dose to minimize expose



 while  achieving a diagnostic quality image.



 



 



 COMPARISON:  CT chest 2017..



 



 FINDINGS:



 



 Lung bases: A loculated wall enhancing moderate pleural effusion within the 
posterior aspect of the right lung base is again seen, suggesting of empyema.. 
A right middle lobe and right lower lobe masslike density with



 curvilinear calcification is again



 noted..



 



 Liver: Hepatic cirrhosis is seen. There is no intrahepatic biliary dilatation 
or enhancing mass.



 



 Gallbladder: Surgically absent.



 



 Pancreas: The pancreas is normal in caliber and attenuation. No inflammatory 
process. The pancreatic duct is within normal limits.



 



 Spleen: Splenomegaly is seen. The spleen is otherwise unremarkable...



 



 Kidneys and ureters: The kidneys function symmetrically. There is no enhancing 
renal lesion. No hydronephrosis or renal stone. The ureters are normal in 
course and caliber.



 



 Adrenal glands: Unremarkable.



 



 GI tract: The small bowel is normal in course and caliber. The colon is 
unremarkable. No bowel wall thickening is identified. There is no acute 
inflammatory process. The appendix is not seen. No right lower quadrant



 inflammation is seen. The stomach is



 unremarkable



 



 Ascites: None.



 



 Pelvis:



 



 The urinary bladder is within normal limits.



 



 Limited evaluation of the prostate gland is unremarkable. Incidental note is 
made of right hydrocele..



 



 Bones: Unremarkable.



 



 Retroperitoneum: No retroperitoneal or mesenteric lymphadenopathy is seen. 
Scattered atherosclerotic disease is seen.  No abnormal aortic aneurysm is 
seen.. The visceral and mesenteric vascular branches are patent.



 



 Abdominal wall: Unremarkable.



 



 



 IMPRESSION:



 



 Hepatitic cirrhosis with splenomegaly. No CT evidence of hepatitic mass.



 



 No CT evidence of colitis or bowel obstruction.



 



 Findings again suggesting of a moderate right empyema.



 



 



 [id number]









 Performing Organization  Address  City/State/Zipcode  Phone Number

 

  [facility]  [address] [place] [address]  



CT Chest W Contrast (2017  7:21 PM)





 Narrative  Performed At

 

 Study:CT CHEST W CONTRAST



   [facility]



  



  



 History: R chest wall tendernessrecent biopsy



  



  



  



 COMPARISON: 2017, 2017



  



  



  



 TECHNIQUE: Multiple axial CT images of the chest performed With IV  



 contrast.. Coronal and sagittal reconstructions were done. 



  



  



  



 CT imaging was performed with iterative reconstruction technique and/or  



 automated exposure control to reduce radiation dose.



  



  



  



 FINDINGS:



  



  



  



 LUNGS: The opacity in the right lung is mainly right middle lobe and  



 right lower lobe and is branching straddles the right major fissure  



 difficult to measure but is approximately 3.6 x 1.7 x 2.8 cm associated  



 with some linear calcification. This appears 



  



 more lobulated when compared to prior exam but is similar in size. The  



 right lower lobe posterior and medial loculated pleural collection has  



 become more lobulated when compared to the prior exam measuring up to 8  



 cm in greatest dimension with some 



  



 surrounding lung parenchymal thickening. Focal opacities in the lingula  



 from the prior exams have resolved. Some residual tiny nodular opacities  



 in the lingula measure 6 mm in image 72 series 3. No pneumothorax.



  



  



  



 AIRWAYS: No central endobronchial or endotracheal lesions are seen.



  



  



  



 MEDIASTINUM: The thoracic aorta is without aneurysm or dissection. The  



 main pulmonary artery is dilated to about 24.4 cm in diameter indicating  



 pulmonary arterial hypertension similar to the prior exams. Heart is  



 upper limits of normal in size with 



  



 atherosclerotic calcification of the coronary arteries. No significant  



 pericardial effusion is present. Small mediastinal lymph nodes are  



 similar to the prior exam. Partially calcified right hilar lymph node  



 measuring up to 2 cm also stable. The 



  



 esophagus is unremarkable.



  



  



  



 BONES AND OVERLYING SOFT TISSUES: The visualized bones are without  



 destructive lesions. No enlarged axillary lymph nodes present. There is  



 no acute abnormality of the chest wall.



  



  



  



 VISUALIZED LOWER NECK AND UPPER ABDOMEN: Liver is cirrhotic. Visualized  



 lower



  



 Unremarkable.



  



  



  



 IMPRESSION:



  



  



  



 No acute abnormality of the right chest wall.



  



  



  



 Branching lobulated right middle and right lower lobe opacity straddling  



 the right major fissure associated with some linear calcification  



 probably postoperative. Previous opacities in the lingula have resolved.



  



  



  



 A pleural collection in the posterior medial right lower lobe has become  



 more loculated.



  



  



  



  



  



 [id number] 



  



   









 Procedure Note

 

 Hm Interface, Radiology Results  - 2017  7:50 PM CDT



Study:CT CHEST W CONTRAST



 



 History: R chest wall tenderness  recent biopsy



 



 COMPARISON: 2017, 2017



 



 TECHNIQUE: Multiple axial CT images of the chest performed With IV contrast.. 
Coronal and sagittal reconstructions were done.



 



 CT imaging was performed with iterative reconstruction technique and/or 
automated exposure control to reduce radiation dose.



 



 FINDINGS:



 



 LUNGS: The opacity in the right lung is mainly right middle lobe and right 
lower lobe and is branching straddles the right major fissure difficult to 
measure but is approximately 3.6 x 1.7 x 2.8 cm associated with some



 linear calcification. This appears



 more lobulated when compared to prior exam but is similar in size. The right 
lower lobe posterior and medial loculated pleural collection has become more 
lobulated when compared to the prior exam measuring up to 8 cm in greatest 
dimension with some



 surrounding lung parenchymal thickening. Focal opacities in the lingula from 
the prior exams have resolved. Some residual tiny nodular opacities in the 
lingula measure 6 mm in image 72 series 3. No pneumothorax.



 



 AIRWAYS: No central endobronchial or endotracheal lesions are seen.



 



 MEDIASTINUM: The thoracic aorta is without aneurysm or dissection. The main 
pulmonary artery is dilated to about 24.4 cm in diameter indicating pulmonary 
arterial hypertension similar to the prior exams. Heart is upper limits of 
normal in size with



 atherosclerotic calcification of the coronary arteries. No significant 
pericardial effusion is present. Small mediastinal lymph nodes are similar to 
the prior exam. Partially calcified right hilar lymph node measuring up to 2 cm 
also stable. The



 esophagus is unremarkable.



 



 BONES AND OVERLYING SOFT TISSUES: The visualized bones are without destructive 
lesions. No enlarged axillary lymph nodes present. There is no acute 
abnormality of the chest wall.



 



 VISUALIZED LOWER NECK AND UPPER ABDOMEN: Liver is cirrhotic. Visualized lower



 Unremarkable.



 



 IMPRESSION:



 



 No acute abnormality of the right chest wall.



 



 Branching lobulated right middle and right lower lobe opacity straddling the 
right major fissure associated with some linear calcification probably 
postoperative. Previous opacities in the lingula have resolved.



 



 A pleural collection in the posterior medial right lower lobe has become more 
loculated.



 



 



 [id number]









 Performing Organization  Address  City/State/Zipcode  Phone Number

 

  [facility]  [address] [place] [address]  



XR Ribs 2 Vw Right (2017  3:55 PM)





 Narrative  Performed At

 

 EXAM:XR RIBS 2 VW RIGHT



   [facility]



  



  



 HISTORY:CHEST PAINNORMAL EKG



  



  



  



 COMPARISON:None available



  



  



  



 IMPRESSION:



  



  



  



  



  



 1. No displaced rib fracture or acute osseous abnormality.



  



 2. Postsurgical changes are in the right middle lobe. There is  



 atelectasis or scarring in the right middle lobe and right lower lobe.  



 There is a small right pleural effusion or pleural thickening.



  



 3. The cardiac silhouette is not enlarged. The thoracic aorta is  



 atherosclerotic.



  



 4. A spinal stimulator device projects over the lower thoracic spine.  



 Cervical spine fusion hardware is partially visualized.



  



  



  



  



  



  



  



  



  



 [id number]



  



   









 Procedure Note

 

  Interface, Radiology Results Incoming - 2017  4:50 PM CDT



EXAM:  XR RIBS 2 VW RIGHT



 



 HISTORY:  CHEST PAIN  NORMAL EKG



 



 COMPARISON:  None available



 



 IMPRESSION:



 



 



 1. No displaced rib fracture or acute osseous abnormality.



 2. Postsurgical changes are in the right middle lobe. There is atelectasis or 
scarring in the right middle lobe and right lower lobe. There is a small right 
pleural effusion or pleural thickening.



 3. The cardiac silhouette is not enlarged. The thoracic aorta is 
atherosclerotic.



 4. A spinal stimulator device projects over the lower thoracic spine. Cervical 
spine fusion hardware is partially visualized.



 



 



 



 



 [id number]









 Performing Organization  Address  City/State/Zipcode  Phone Number

 

 [address] [address]  



Fungus smear (2017 10:03 AM)





 Component  Value  Ref Range  Performed At

 

 Fungus smear  No fungi observed.    [facility] DEPARTMENT OF PATHOLOGY



   Comment:    AND GENOMIC MEDICINE



   Specimen Information    



   Specimen Source: Tissue    



   Specimen Site: Lung, right middle lobe    



       









 Specimen

 

 Tissue - Lung, right middle lobe









 Performing Organization  Address  City/[facility]/Zipcode  Phone Number

 

 [facility] DEPARTMENT OF PATHOLOGY AND  [address] [place] [address]  



 GENOMIC MEDICINE      



AFB culture (2017 10:03 AM)





 Component  Value  Ref Range  Performed At

 

 AFB culture isolate  No growth after 6 weeks of incubation.    [facility] DEPARTMENT 
OF PATHOLOGY



   Comment:    AND GENOMIC MEDICINE



   Specimen Information    



   Specimen Source: Tissue    



   Specimen Site: Lung, right middle lobe    



       









 Specimen

 

 Tissue - Lung, right middle lobe









 Performing Organization  Address  City/[facility]  Phone Number

 

 [facility] DEPARTMENT OF PATHOLOGY AND  [address] [place] [address]  



 GENOMIC MEDICINE      



AFB stain (2017 10:03 AM)





 Component  Value  Ref Range  Performed At

 

 AFB stain  No acid fast bacilli (AFB) seen.    [facility] DEPARTMENT OF PATHOLOGY AND



   Comment:    GENOMIC MEDICINE



   Specimen Information    



   Specimen Source: Tissue    



   Specimen Site: Lung, right middle lobe    



       









 Specimen

 

 Tissue - Lung, right middle lobe









 Performing Organization  Address  City/[facility]  Phone Number

 

 [facility] DEPARTMENT OF PATHOLOGY AND  [address] [place] [id number]  



 GENOMIC MEDICINE      



Fungus culture (2017 10:03 AM)





 Component  Value  Ref Range  Performed At

 

 Fungus culture isolate  No growth after 4 weeks of incubation.    [facility] 
DEPARTMENT OF



   Comment:    PATHOLOGY AND GENOMIC



   Specimen Information    MEDICINE



   Specimen Source: Tissue    



   Specimen Site: Lung, right middle lobe    



       









 Specimen

 

 Tissue - Lung, right middle lobe









 Performing Organization  Address  City/[facility]/Zipcode  Phone Number

 

 [facility] DEPARTMENT OF PATHOLOGY AND  [address] [place], [place] [id number]  



 GENOMIC MEDICINE      



Anaerobic culture (2017 10:03 AM)





 Component  Value  Ref Range  Performed At

 

 Anaerobic culture  No anaerobic organisms isolated.    [facility] DEPARTMENT OF



 isolate  Comment:    PATHOLOGY AND GENOMIC



   Specimen Information    MEDICINE



   Specimen Source: Tissue    



   Specimen Site: Lung, right middle lobe    



       









 Specimen

 

 Tissue - Lung, right middle lobe









 Performing Organization  Address  City/[facility]/Zipcode  Phone Number

 

 [facility] DEPARTMENT OF PATHOLOGY AND  [address] [place] [address], [place] [address]  



 GENOMIC MEDICINE      



Type and screen (2017  2:19 PM)





 Component  Value  Ref Range  Performed At

 

 ABO grouping  A    [facility] DE[facility] OF PATHOLOGY AND GENOMIC



       MEDICINE

 

 Rh type  POS    [facility] DEPARTMENT OF PATHOLOGY AND GENOMIC



       MEDICINE

 

 Antibody screen (gel)  NEG    [facility] DEPARTMENT OF PATHOLOGY AND GENOMIC



       MEDICINE









 Specimen

 

 Blood









 Performing Organization  Address  City/State/Zipcode  Phone Number

 

 [facility] DEPARTMENT OF PATHOLOGY AND  [address]  [place] [address]  



 GENOMIC MEDICINE      



Spirometry pre &amp; post w/ bronchodilator (2017 12:31 PM)





 Narrative  Performed At

 

 This result has an attachment that is not available.



  



Echocardiogram complete w contrast and 3D if needed (2017  8:37 AM)





 Component  Value  Ref Range  Performed At

 

 Velocity Ratio (V1/V2)  0.78  m/s  [facility]

 

 IVS,d  1.10  0.6 - 1.2 cm  [facility]

 

 EF  66.08  %  [facility]

 

 LVPWD,d  1.06  cm  [facility]

 

 AoV Mean PG  3.27  mmHg  [facility]

 

 AV LVOT peak gradient  4.37  mmHg  [facility]

 

 MV mean gradient  1.22  mmHg  [facility]

 

 MV valve area p 1/2 method  4.51  cm2  [facility]

 

 PV Pk Grad  3.08  mmHg  [facility]

 

 E/A ratio  0.88    [facility]

 

 E wave decelartion time  168.22  msec  [facility]

 

 LVOT Diam,S  1.81  cm  [facility]

 

 LVOT area  2.57  cm2  [facility]

 

 LVOT Vmax  1.05  m/s  [facility]

 

 LVOT VTI  0.18  m  [facility]

 

 AoV Peak PG  5.94  mmHg  [facility]

 

 MV Peak E Tristan  0.67  m/s  [facility]

 

 MV stenosis pressure 1/2 time  48.78  ms  [facility]

 

 MV Peak A Tristan  0.76  m/s  [facility]

 

 BSA  2.04  m2  [facility]

 

 AoV Area, Vmax  2.00  cm2  [facility]

 

 AoV Area, VTI  2.53  cm2  [facility]

 

 AoV Vmax  1.34  m/s  [facility]

 

 BSA [place]  2.04  m2  [facility]

 

 BSA [place]  2.04  m2  [facility]

 

 IVS/LVPW,2D  1.04    [facility]

 

 Left Atrium Dimension Anterior  2.77  cm  [facility]

 

 LV,d  4.02  cm  [facility]

 

 LV,s  2.57  cm  [facility]

 

 PV VMAX  0.88  m/s  [facility]

 

 TR Vpeak  3.98  mm/s  [facility]

 

 BMI  24.41  kg/m2  [facility]

 

 MV E A ratio  0.88  mmHg  [facility]

 

 TR pk grad  45.78  mmHg  [facility]

 

 AoV area i VTI BSA [place]  1.26  cm2/m2  [facility]

 

 MR peak grad  3.46  mmHg  [facility]

 

 Ao Root Diameter  3.31  cm  [facility]

 

 LV SYS VOL  23.99  ml  [facility]

 

 LV FALK VOL  70.73  ml  [facility]

 

 LV SI Teich 2D  22.95  ml/m2  [facility]

 

 LV SV Teich 2D  46.75  ml  [facility]

 

 LV Vol s Teich PSAX  23.99  ml  [facility]

 

 LVOT CI  2.02  l/min/m2  [facility]

 

 LVOT CO  4.13  l/min  [facility]

 

 LVOT HR for LVOT CO  89.07  bpm  [facility]

 

 LVOT SI  22.70  ml/m2  [facility]

 

 MV Vmax  0.93  m  [facility]

 

 MV VTI Tips  0.15  m  [facility]

 

 AoV Vmn  0.84    [facility]

 

 IVS s 2D  1.72    [facility]

 

 LV FS Cube 2D  35.95    [facility]

 

 LV FS Teich 2D  35.95    [facility]

 

 Ao Root Diameter  3.31  cm  [facility]

 

 AoV VTI  0.18  m  [facility]

 

 LV EF,2D  73.72  %  [facility]

 

 MV AE ratio  1.14    [facility]

 

 LVOT Vmn  0.79    [facility]

 

 Pt Size  182.88    [facility]

 

 Pt Wt  81.65    [facility]

 

 Aov area Vmn  2.36  cm2  [facility]

 

 LVOT mean grad  2.69  mmHg  [facility]

 

 MAX Pred HR  166.64    [facility]

 

 85 of MPHR  141.64    [facility]

 

 AoV area I VMN bsa  1.16  cm2/m2  [facility]

 

 Calc MPHR  166.64  bpm  [facility]

 

 IVS pct thck PLAX  55.63  %  [facility]

 

 LV SI Cube 2D  23.47  ml/m2  [facility]

 

 LV SV Cube 2D  47.81  ml  [facility]

 

 LV vol d cube 2D  64.85  ml  [facility]

 

 LV vol s cube 2D  17.04  ml  [facility]

 

 LVPW pct thck PLAX  56.42  %  [facility]

 

 LVPW s PLAX  1.66  cm  [facility]

 

 MV Decel slope  3.98  m/s2  [facility]

 

 Pred Exer Dur R1  9.73    [facility]

 

 Pred METS R1  10.00    [facility]









 Narrative  Performed At

 

 This result has an attachment that is not available.



 Left Ventricle: Left Ventricular ejection fraction is 65 - 70%. The left  [facility]



 ventricular chamber size is  



  Trace mitral valve regurgitation  



  Pulmonic Valve: Mild pulmonary valve regurgitation.  



  Pericardium: No pericardial effusion seen.  



   









 Performing Organization  Address  City/[facility]/Zipcode  Phone Number

 

  [facility]  [address] [place] [address]  



Hepatitis acute panel (2017  3:51 AM)





 Component  Value  Ref Range  Performed At

 

 Hepatitis A IgM  Non-reactive  Non-reactive  [facility] DEPARTMENT OF PATHOLOGY AND



       GENOMIC MEDICINE

 

 Hepatitis B core IgM  Non-reactive  Non-reactive  [facility] DEPARTMENT OF PATHOLOGY 
AND



       GENOMIC MEDICINE

 

 Hepatitis B surface Ag  Non-reactive  Non-reactive  [facility] DEPARTMENT OF 
PATHOLOGY AND



       GENOMIC MEDICINE

 

 Hepatitis C Ab  Non-reactive  Non-reactive  [facility] DEPARTMENT OF PATHOLOGY AND



       GENOMIC MEDICINE









 Specimen

 

 Serum









 Performing Organization  Address  City/[facility]  Phone Number

 

 [facility] DEPARTMENT OF PATHOLOGY AND  [address] [facility] [address]  [address]      



Phosphorus level (2017  3:51 AM)Only the most recent of2 resultswithin 
the time period is included.





 Component  Value  Ref Range  Performed At

 

 Phosphorus  4.9 (H)  2.5 - 4.5 mg/dL  [facility] DEPARTMENT OF PATHOLOGY AND GENOMIC 
MEDICINE









 Specimen

 

 Plasma specimen









 Performing Organization  Address  City/[facility]  Phone Number

 

 [facility] DEPARTMENT OF PATHOLOGY AND  [address] [facility] [place] [address]  



 [facility] MEDICINE      



CRITICAL CARE (2017  8:18 PM)





 Narrative  Performed At

 

 Soledad Emanuel DO 20178:18 PM



  



 Critical Care



  



 Performed by: SOLEDAD EMANUEL



  



 Authorized by: SOLEDAD EMANUEL 



  



  



  



 Critical care provider statement: 



  



 Critical care time (minutes):40



  



 Critical care time was exclusive of:Separately billable  



 procedures and 



  



 treating other patients



  



 Critical care was necessary to treat or prevent imminent or 



  



 life-threatening deterioration of the following  



 conditions:Circulatory 



  



 failure and respiratory failure



  



 Critical care was time spent personally by me on the following 



  



 activities:Development of treatment plan with patient or surrogate, 



  



 discussions with consultants, discussions with primary provider, 



  



 evaluation of patient's response to treatment, examination of patient, 



  



 obtaining history from patient or surrogate, interpretation of cardiac 



  



 output measurements, ordering and performing treatments and  



 interventions, 



  



 ordering and review of laboratory studies, ordering and review of 



  



 radiographic studies, pulse oximetry, re-evaluation of patient's  



 condition 



  



 and review of old charts  



CT Head W Wo Contrast (2017 11:14 AM)





 Narrative  Performed At

 

 EXAMINATION:CT HEAD W WO CONTRAST



   RADIANT



  



  



 CLINICAL HISTORY:LUNG CANCERUNSPECIFIED



  



  



  



 COMPARISON:2015



  



  



  



  



  



 FINDINGS:



  



  



  



  



  



  



  



 Axial images were obtained before and after intravenous contrast  



 infusion.



  



  



  



  CT scans are performed using radiation dose reduction techniques.  



 Technical factors are evaluated and adjusted to ensure appropriate  



 moderation of exposure. Automated dose management technology is applied  



 to adjust radiation exposure while achieving a 



  



 highly diagnostic quality image..



  



  



  



 The ventricular system and subarachnoid spaces are dilated consistent  



 with age-related atrophic changes. There are minimal white matter  



 lucencies in the centrum semiovale bilaterally consistent with minimal  



 nonspecific chronic microvascular ischemic 



  



 changes appropriate for the patient's age.



  



  



  



 After intravenous contrast infusion, there was no evidence of abnormal  



 enhancement of the brain parenchyma or the leptomeninges to suggest  



 metastatic disease. There is visualization of a normal vascular  



 enhancement pattern within the arterial venous 



  



 structures.



  



  



  



 There is no gross bone destruction.



  



  



  



  



  



 IMPRESSION:



  



  



  



 Stable mild age-appropriate involutional changes.



  



  



  



 No enhancing lesions to suggest intracranial metastatic disease.



  



  



  



 No gross bone destruction.



  



  



  



  



  



 [facility]-[id number]



  



   









 Procedure Note

 

  Interface, Radiology Results [facility] - 2017 11:22 AM CDT



EXAMINATION:  CT HEAD W WO CONTRAST



 



 CLINICAL HISTORY:  LUNG CANCER  UNSPECIFIED



 



 COMPARISON:  2015



 



 



 FINDINGS:



 



 



 



 Axial images were obtained before and after intravenous contrast infusion.



 



  CT scans are performed using radiation dose reduction techniques. Technical 
factors are evaluated and adjusted to ensure appropriate moderation of 
exposure. Automated dose management technology is applied to



 adjust radiation exposure while achieving a



 highly diagnostic quality image..



 



 The ventricular system and subarachnoid spaces are dilated consistent with age-
related atrophic changes. There are minimal white matter lucencies in the 
centrum semiovale bilaterally consistent with minimal nonspecific chronic 
microvascular ischemic



 changes appropriate for the patient's age.



 



 After intravenous contrast infusion, there was no evidence of abnormal 
enhancement of the brain parenchyma or the leptomeninges to suggest metastatic 
disease. There is visualization of a normal vascular enhancement pattern within 
the arterial venous



 structures.



 



 There is no gross bone destruction.



 



 



 IMPRESSION:



 



 Stable mild age-appropriate involutional changes.



 



 No enhancing lesions to suggest intracranial metastatic disease.



 



 No gross bone destruction.



 



 



 [facility]-[id number]









 Performing Organization  Address  City/State/Zipcode  Phone Number

 

  [facility]  [address] [place] [address]  



after 2017



Insurance







 Payer  Benefit Plan / Group  Subscriber ID  Type  Phone  Address

 

 [employer] MEDICARE  [employer] MEDICARE HMO/PPO [facility]  xxxxxxxx  HMO    









 Guarantor Name  Account Type  Relation to  Date of  Phone  Billing Address



     Patient  Birth    

 

 B[place]  Personal/Famil  Self  1965  Home:  [address]   y      [phone]  [address]







         [address]  







           [place]



           [address]

## 2018-06-19 NOTE — RAD REPORT
EXAM DESCRIPTION:

RAD - Abdomen Acute Series - 6/19/2018 8:36 pm

 

CLINICAL HISTORY:  Abdominal pain

 

FINDINGS:  Subcentimeter nodular density overlies the mid to lower lung bilaterally. Most likely thes
e represent nipple shadows. Follow-up chest film in 3 months with nipple marker is recommended

 

Free air is not seen beneath the diaphragm

 

Neurostimulator device is in place.

 

A loop of small bowel is mildly dilated within the left abdomen which may represent a focal ileus or 
enteritis.

 

Postsurgical changes involve the spine. Phleboliths are present within the pelvis

## 2018-08-24 ENCOUNTER — HOSPITAL ENCOUNTER (EMERGENCY)
Dept: HOSPITAL 97 - ER | Age: 53
Discharge: HOME | End: 2018-08-24
Payer: COMMERCIAL

## 2018-08-24 VITALS — DIASTOLIC BLOOD PRESSURE: 91 MMHG | SYSTOLIC BLOOD PRESSURE: 130 MMHG

## 2018-08-24 VITALS — TEMPERATURE: 96.8 F

## 2018-08-24 VITALS — OXYGEN SATURATION: 99 %

## 2018-08-24 DIAGNOSIS — Z98.1: ICD-10-CM

## 2018-08-24 DIAGNOSIS — R07.89: ICD-10-CM

## 2018-08-24 DIAGNOSIS — Z85.118: ICD-10-CM

## 2018-08-24 DIAGNOSIS — F17.210: ICD-10-CM

## 2018-08-24 DIAGNOSIS — J44.9: ICD-10-CM

## 2018-08-24 DIAGNOSIS — R05: ICD-10-CM

## 2018-08-24 DIAGNOSIS — R06.00: Primary | ICD-10-CM

## 2018-08-24 DIAGNOSIS — F43.10: ICD-10-CM

## 2018-08-24 DIAGNOSIS — I10: ICD-10-CM

## 2018-08-24 LAB
ALBUMIN SERPL BCP-MCNC: 3.5 G/DL (ref 3.4–5)
ALP SERPL-CCNC: 193 U/L (ref 45–117)
ALT SERPL W P-5'-P-CCNC: 27 U/L (ref 12–78)
AST SERPL W P-5'-P-CCNC: 27 U/L (ref 15–37)
BUN BLD-MCNC: 15 MG/DL (ref 7–18)
GLUCOSE SERPLBLD-MCNC: 96 MG/DL (ref 74–106)
HCT VFR BLD CALC: 42.9 % (ref 39.6–49)
INR BLD: 1.03
LIPASE SERPL-CCNC: 181 U/L (ref 73–393)
LYMPHOCYTES # SPEC AUTO: 1.2 K/UL (ref 0.7–4.9)
MAGNESIUM SERPL-MCNC: 2 MG/DL (ref 1.8–2.4)
MCH RBC QN AUTO: 33.4 PG (ref 27–35)
MCV RBC: 97.7 FL (ref 80–100)
NT-PROBNP SERPL-MCNC: 93 PG/ML (ref ?–125)
PMV BLD: 9.5 FL (ref 7.6–11.3)
POTASSIUM SERPL-SCNC: 3.7 MMOL/L (ref 3.5–5.1)
RBC # BLD: 4.39 M/UL (ref 4.33–5.43)

## 2018-08-24 PROCEDURE — 87040 BLOOD CULTURE FOR BACTERIA: CPT

## 2018-08-24 PROCEDURE — 83735 ASSAY OF MAGNESIUM: CPT

## 2018-08-24 PROCEDURE — 84484 ASSAY OF TROPONIN QUANT: CPT

## 2018-08-24 PROCEDURE — 71046 X-RAY EXAM CHEST 2 VIEWS: CPT

## 2018-08-24 PROCEDURE — 80048 BASIC METABOLIC PNL TOTAL CA: CPT

## 2018-08-24 PROCEDURE — 99285 EMERGENCY DEPT VISIT HI MDM: CPT

## 2018-08-24 PROCEDURE — 83690 ASSAY OF LIPASE: CPT

## 2018-08-24 PROCEDURE — 80076 HEPATIC FUNCTION PANEL: CPT

## 2018-08-24 PROCEDURE — 93005 ELECTROCARDIOGRAM TRACING: CPT

## 2018-08-24 PROCEDURE — 36415 COLL VENOUS BLD VENIPUNCTURE: CPT

## 2018-08-24 PROCEDURE — 83880 ASSAY OF NATRIURETIC PEPTIDE: CPT

## 2018-08-24 PROCEDURE — 96375 TX/PRO/DX INJ NEW DRUG ADDON: CPT

## 2018-08-24 PROCEDURE — 85610 PROTHROMBIN TIME: CPT

## 2018-08-24 PROCEDURE — 96374 THER/PROPH/DIAG INJ IV PUSH: CPT

## 2018-08-24 PROCEDURE — 81003 URINALYSIS AUTO W/O SCOPE: CPT

## 2018-08-24 PROCEDURE — 85025 COMPLETE CBC W/AUTO DIFF WBC: CPT

## 2018-08-24 NOTE — XMS REPORT
Clinical Summary

 Created on:2018



Patient:Carlos Barkley Jr

Sex:Male

:1965

External Reference #:[id number]





Demographics







 Address  [address]



   



   [place] [address]

 

 Home Phone  [phone]

 

 Home Phone  [phone]

 

 Email Address  [email or web address]

 

 Preferred Language  English

 

 Marital Status  

 

 Bahai Affiliation  Unknown

 

 Race  White

 

 Ethnic Group  Not  or 









Author







 Organization  [facility]

 

 Address  [address] [place] [address]









Support







 Name  Relationship  Address  Phone

 

 Sabrina Greer  Unavailable  Unavailable  [phone]









Care Team Providers







 Name  Role  Phone

 

 Asked, No Pcp  Primary Care Provider  Unavailable









Allergies

No Known Allergies



Current Medications







 Prescription  Sig.  Disp.  Refills  Start  End  Status



         Date  Date  

 

 metFORMIN XR  Take 1,000 mg      20    Active



 (GLUCOPHAGE-XR) 500  by mouth daily.      17    



 mg 24 hr tablet            

 

 insulin GLARGINE  Inject 20 Units          Active



 (LANTUS) 100  under the skin          



 unit/mL injection  nightly.          



 (vial)            

 

 levETIRAcetam  Take 1,000 mg          Active



 (KEPPRA) 1000 MG  by mouth 2          



 tablet  (two) times a          



   day.          

 

 venlafaxine XR  Take 150 mg by          Active



 (EFFEXOR-XR) 150 MG  mouth daily.          



 24 hr capsule            

 

 diazePAM (VALIUM)  Take 10 mg by      20    Active



 10 MG tablet  mouth 3 (three)      18    



   times a day.          

 

 QUEtiapine  Take 600 mg by      20    Active



 (SEROquel) 300 MG  mouth nightly.      18    



 tablet            

 

 traMADol (ULTRAM)  Take 50 mg by          Active



 50 mg tablet  mouth every 6          



   (six) hours as          



   needed for          



   moderate pain.          

 

 gabapentin  Take 800 mg by    2  20  Discontinued



 (NEURONTIN) 800 mg  mouth 4 (four)      17  018  



 tablet  times a day.          

 

 QUEtiapine  Take 800 mg by    2  05/15/20  02/22/2  Discontinued



 (SEROquel) 400 MG  mouth nightly.      17  018  



 tablet  Take 2 tablets          



   at bedtime          

 

 busPIRone (BUSPAR)  Take 10 mg by          Discontinued



 10 MG tablet  mouth 3 (three)        018  



   times a day.          

 

 acetaminophen-codei  TK 1 TO 2 TS PO    0  07/15/20  03/03/2  Discontinued



 ne (TYLENOL WITH  Q 6 H PRN P      17  018  



 CODEINE #3) 300-30            



 mg per tablet            

 

 HYDROcodone-acetami  Take 1 tablet  10 tablet  0  20  



 nophen (NORCO)  by mouth every      17  017  



  mg per  6 (six) hours          



 tablet  as needed for          



   moderate pain          



   for up to 10          



   doses. Max          



   Daily Amount: 4          



   tablets          

 

 gabapentin  Take 1 tablet  90 tablet  0  20  



 (NEURONTIN) 800 mg  (800 mg total)      18  018  



 tablet  by mouth 3          



   (three) times a          



   day for 30          



   days.          

 

 metoprolol  Take 0.5  15 tablet  0  20  



 succinate XL  tablets (12.5      18  018  



 (TOPROL-XL) 25 mg  mg total) by          



 24 hr tablet  mouth daily for          



   30 days.          

 

 HYDROcodone-acetami  Take 1 tablet  15 tablet  0  20  



 nophen (NORCO)  by mouth every      18  018  



 7.5-325 mg per  6 (six) hours          



 tablet  as needed for          



   severe pain for          



   up to 14 days.          



   Max Daily          



   Amount: 4          



   tablets          

 

 pantoprazole  Take 1 tablet  30 tablet  0  20  



 (PROTONIX) 40 MG EC  (40 mg total)      18  018  



 tablet  by mouth daily          



   for 30 days.          

 

 ondansetron ODT  Take 1 tablet  15 tablet  0  20  Discontinued



 (ZOFRAN ODT) 4 MG  (4 mg total) by      18  018  



 disintegrating  mouth every 8          



 tablet  (eight) hours          



   as needed for          



   nausea or          



   vomiting for up          



   to 15 doses.          

 

 promethazine  Insert 1  12 suppository  0  20  Discontinued



 (PHENERGAN) 25 MG  suppository (25      18  018  



 suppository  mg total) into          



   the rectum          



   every 6 (six)          



   hours as needed          



   for nausea or          



   vomiting for up          



   to 12 doses.          

 

 hydromorPHONE  Take 1 tablet  10 tablet  0  03/03/20  03/10/2  



 (DILAUDID) 2 MG  (2 mg total) by      18  018  



 tablet  mouth every 8          



   (eight) hours          



   as needed for          



   severe pain          



   (score 7-10)          



   for up to 7          



   days. Max Daily          



   Amount: 6 mg          

 

 promethazine  Take 1 tablet  20 tablet  0  20  



 (PHENERGAN) 12.5 MG  (12.5 mg total)      18  018  



 tablet  by mouth every          



   8 (eight) hours          



   as needed for          



   nausea or          



   vomiting for up          



   to 30 days.          

 

 HYDROcodone-acetami  Take 1 tablet  20 tablet  0  03/03/20  03/10/2  



 nophen (NORCO)  by mouth every      18  018  



 7.5-325 mg per  8 (eight) hours          



 tablet  as needed for          



   moderate pain          



   for up to 7          



   days. Max Daily          



   Amount: 3          



   tablets          

 

 naproxen (NAPROSYN)  Take 1 tablet  28 tablet  0  20  



 500 MG tablet  (500 mg total)      18  018  



   by mouth 2          



   (two) times a          



   day with meals          



   for 14 days.          

 

 omeprazole  Take 2 capsules  60 capsule  0  20  



 (PriLOSEC) 20 MG  (40 mg total)      18  018  



 capsule  by mouth daily          



   for 30 days.          

 

 cyclobenzaprine  Take 1 tablet  20 tablet  0  20  



 (FLEXERIL) 10 mg  (10 mg total)      18  018  



 tablet  by mouth 3          



   (three) times a          



   day as needed          



   for muscle          



   spasms for up          



   to 10 days.          







Active Problems







 Problem  Noted Date

 

 Sciatica of left side  2018

 

 Syncope  2018

 

 Essential hypertension  2017

 

 Type 2 diabetes mellitus with hyperglycemia  2017

 

 PRIMITIVO (acute kidney injury)  2017

 

 Seizure disorder  2017

 

 Intractable vomiting with nausea  2017

 

 Abdominal pain  2017









 Overview: 







 Added automatically from request for surgery [id number]









 Gastrointestinal hemorrhage  10/11/2017

 

 Gastrointestinal hemorrhage with hematemesis  10/11/2017









 Overview: 







 Added automatically from request for surgery [id number]









 Vertigo  2017

 

 Pneumonia due to infectious organism  2017

 

 COPD with acute bronchitis  2017

 

 COPD (chronic obstructive pulmonary disease)  2017

 

 COPD exacerbation  2017

 

 Lung mass  2017

 

 Tobacco dependence  2017







Resolved Problems







 Problem  Noted Date  Resolved Date

 

 Leukocytosis  2017







Encounters







 Date  Type  Specialty  Care Team  Description

 

 20  Emergency  Emergency Medicine  Cait,  Suicidal ideation (Primary Dx
)



 18      Chago Olivas MD  

 

 20  Emergency  Emergency Medicine  [place]  Chronic right-sided thoracic 
back pain (Primary Dx);



 18      Meulen,  Narcotic abuse;



       Raiza  Drug-seeking behavior



       MD Bryn  

 

 20  Hospital  Radiology  [facility] (bronchogenic carcinoma)



 18  Encounter    MD Milagros  

 

 20  Transcribe  Access  [facility] (bronchogenic carcinoma)



 18  Orders    MD Milagros  (Primary Dx)

 

 20  Emergency  General Internal  Landaverde, Drew  Sciatica of left side (
Primary Dx);



 18 -    Gary GRECO MD



  Intractable pain



 20      Travon,  



 18      MD Jessenia De La Vega, Princess Guerin MD  

 

 20  Emergency  Emergency Medicine  Lelia,  Other chronic pain (Primary



 18      Chago FORTUNE,  Dx)



       DO  

 

 20  Emergency  General Internal  Lelia,  Syncope, unspecified syncope 
type (Primary Dx);



 18 -    Medicine  Chago FORTUNE,  Other chronic pain



 20      DO



  



 18      Marianela Turner MD  

 

 20  Anesthesia  Gastroenterology  Natali,  



 Ros  Event    Svitlana Villalta MD  

 

 20  Procedure Pass  Gastroenterology    



 18        

 

 20  Surgery  Gastroenterology  Con Knowles  ESOPHAGOGASTRODUODENOSCOPY



 18      MD Lang  (EGD) with bx

 

 20  [facility]  General Internal  [facility]  Intractable vomiting with 
nausea, unspecified vomiting type (Primary Dx);



 17 -  Encounter  Medicine  MD Ariel



  Pain;



 20      Bavare,  Abdominal pain, unspecified abdominal location;



 18      Jm Cabrera,  Essential hypertension;



       MD



  Type 2 diabetes mellitus with hyperglycemia, with long-term current use of 
insulin



       Heber Orourke DO  

 

 10/24/20  Telephone  Family Medicine  Jordy Lyles MD  

 

 10/19/20  Telephone  Gastroenterology  Jordy Lyles MD  

 

 10/13/20  Telephone  Gastroenterology  Jordy Lyles MD  

 

 10/13/20  Procedure Pass  Gastroenterology    



 17        

 

 10/13/20  Procedure Pass  Gastroenterology    



         

 

 10/11/20  Emergency  General Internal  Reichl, Ricco  Gastrointestinal 
hemorrhage with hematemesis (Primary Dx);



 17 -    Medicine  MD Ariel



  Gastrointestinal hemorrhage, unspecified gastrointestinal hemorrhage type;



 10/13/20      Gandhi, Izabel  Pain of upper abdomen;



 Umair Snow MD  Nausea and vomiting, intractability of vomiting not specified
, unspecified vomiting type

 

 20  Emergency  Emergency Medicine  Nile Lilly,  Vision blurred (Primary 
Dx)



 17      MD  

 

 20  Emergency  Emergency Medicine  Tirso Emanuel  Right upper quadrant 
abdominal pain (Primary Dx);



 17      Sung,   Other chronic pain

 

 20  Emergency  Emergency Medicine    



 17        

 

 20  Transcribe  Access  Gandhi, Izabel  Abnormal renal function test



 17  Juli Snow MD  (Primary Dx)



after 2017



Immunizations







 Name  Dates Previously Given  Next Due

 

 FLUCELVAX QUAD PF (0.5mL syringe)  2018  







Family History







 Medical History  Relation  Name  Comments

 

 Cerebral aneurysm  Father    

 

 Heart disease  Father    

 

 Kidney cancer  Father    

 

 Lung cancer  Father    

 

 Stroke  Father    

 

 Diabetes  Mother    

 

 Heart attack  Mother    

 

 Heart disease  Mother    

 

 Hyperlipidemia  Sister    

 

 Hypertension  Sister    









 Relation  Name  Status  Comments

 

 Father      

 

 Mother      

 

 Sister      







Social History







 Tobacco Use  Types  Packs/Day  Years Used  Date

 

 Current Every Day Smoker  Cigarettes  0.5  35  









 Smokeless Tobacco: Former User      









 Tobacco Cessation: Counseling Given: Yes



 Comments: per patient now only smoking 4-5 cigarette/day









 Alcohol Use  Drinks/Week  oz/Week  Comments

 

 No      stopped for 1.5 yrs









 Sex Assigned at Birth  Date Recorded

 

 Not on file  







Last Filed Vital Signs







 Vital Sign  Reading  Time Taken

 

 Blood Pressure  120/77  2018  1:15 PM CDT

 

 Pulse  76  2018  1:15 PM CDT

 

 Temperature  35.8 C (96.5 F)  2018  1:15 PM CDT

 

 Respiratory Rate  16  2018  1:15 PM CDT

 

 Oxygen Saturation  97%  2018  1:15 PM CDT

 

 Inhaled Oxygen Concentration  -  -

 

 Weight  73.9 kg (163 lb)  2018  8:09 AM CDT

 

 Height  185.4 cm (6' 1")  2018  8:09 AM CDT

 

 Body Mass Index  21.51  2018  8:09 AM CDT







Plan of Treatment







 Health Maintenance  Due Date  Last Done  Comments

 

 DIABETIC FOOT EXAM  1974    

 

 DIABETIC RETINAL EYE EXAM  1974    

 

 COLON CANCER SCREENING  2014    

 

 SHINGRIX VACCINE (#1)  2014    

 

 INFLUENZA VACCINE  2018  







Implants







 Implanted  Type  Area    Device  Expiration  Model /



         Identifier  Date  Serial /



             Lot

 

 Stimulator  Stimulator          

 

 Stimulator-2007  Stimulator  Hip        



 Implanted: 2007 (Quantity not on file)            

 

 Kit [employer] Air Leak 4ml Strl Progel - [id number]  Surgical  N/A: N/A  
[employer]      [id number] /



 Implanted: Qty: 1 on 2017 by Jonathan Fuentes Jr., MD  Implants;       
    /



   Expanders;          



   Extenders;          



   Surgical Wires          

 

 Dorsal Colum            



 Stimulator            

 

 Dorsal Column            



 Stimulator            

 

 Dorsal Colum Stimulator-2007    Lower:        



 Implanted: 2007 (Quantity not on file)    Back,        



     Other        



     than        



     Spine        







Procedures







 Procedure Name  Priority  Date/Time  Associated  Comments



       Diagnosis  

 

 URINALYSIS SCREEN AND  STAT  2018    Results for



 MICROSCOPY, WITH REFLEX TO    9:16 AM CDT    this procedure



 CULTURE        are in the



         results



         section.

 

 URINE DRUGS OF ABUSE SCREEN  STAT  2018    Results for



     9:16 AM CDT    this procedure



         are in the



         results



         section.

 

 URINE CULTURE  STAT  2018    Results for



     9:16 AM CDT    this procedure



         are in the



         results



         section.

 

 ESTIMATED GFR  STAT  2018    Results for



     9:02 AM CDT    this procedure



         are in the



         results



         section.

 

 SALICYLATE LEVEL  STAT  2018    Results for



     9:02 AM CDT    this procedure



         are in the



         results



         section.

 

 ACETAMINOPHEN LEVEL  STAT  2018    Results for



     9:02 AM CDT    this procedure



         are in the



         results



         section.

 

 ALCOHOL LEVEL, BLOOD  STAT  2018    Results for



     9:02 AM CDT    this procedure



         are in the



         results



         section.

 

 HC COMPLETE BLD COUNT W/AUTO  STAT  2018    Results for



 DIFF    9:02 AM CDT    this procedure



         are in the



         results



         section.

 

 THYROID STIMULATING HORMONE  STAT  2018    Results for



     9:02 AM CDT    this procedure



         are in the



         results



         section.

 

 T4, FREE  STAT  2018    Results for



     9:02 AM CDT    this procedure



         are in the



         results



         section.

 

 COMPREHENSIVE METABOLIC PANEL  STAT  2018    Results for



     9:02 AM CDT    this procedure



         are in the



         results



         section.

 

 CT ABDOMEN PELVIS WO CONTRAST  STAT  2018    Results for



     3:27 AM CDT    this procedure



         are in the



         results



         section.

 

 SMEAR REVIEW  STAT  2018    Results for



     3:13 AM CDT    this procedure



         are in the



         results



         section.

 

 ESTIMATED GFR  STAT  2018    Results for



     3:13 AM CDT    this procedure



         are in the



         results



         section.

 

 LIPASE LEVEL  STAT  2018    Results for



     3:13 AM CDT    this procedure



         are in the



         results



         section.

 

 COMPREHENSIVE METABOLIC PANEL  STAT  2018    Results for



     3:13 AM CDT    this procedure



         are in the



         results



         section.

 

 PROTHROMBIN TIME WITH INR  STAT  2018    Results for



     3:13 AM CDT    this procedure



         are in the



         results



         section.

 

 HC COMPLETE BLD COUNT W/AUTO  STAT  2018    Results for



 DIFF    3:13 AM CDT    this procedure



         are in the



         results



         section.

 

 ECG 12-LEAD  STAT  2018    Results for



     2:43 AM CDT    this procedure



         are in the



         results



         section.

 

 ECG ED PRELIMINARY  Routine  2018    Results for



 INTERPRETATION    2:07 AM CDT    this procedure



         are in the



         results



         section.

 

 PET CT SKULL BASE TO MID THIGH  Routine  2018  BC (bronchogenic  Results 
for



     1:45 PM CDT  carcinoma)  this procedure



         are in the



         results



         section.

 

 POC GLUCOSE  Routine  2018    Results for



     11:36 AM CDT    this procedure



         are in the



         results



         section.

 

 POC GLUCOSE  Routine  2018    Results for



     11:29 AM CST    this procedure



         are in the



         results



         section.

 

 POC GLUCOSE  Routine  2018    Results for



     6:52 AM CST    this procedure



         are in the



         results



         section.

 

 SMEAR REVIEW  Routine  2018    Results for



     5:13 AM CST    this procedure



         are in the



         results



         section.

 

 ESTIMATED GFR  Routine  2018    Results for



     5:13 AM CST    this procedure



         are in the



         results



         section.

 

 BASIC METABOLIC PANEL  Routine  2018    Results for



     5:13 AM CST    this procedure



         are in the



         results



         section.

 

 HC COMPLETE BLD COUNT W/AUTO  Routine  2018    Results for



 DIFF    5:13 AM CST    this procedure



         are in the



         results



         section.

 

 KEPPRA (LEVETIRACETAM) LEVEL  Routine  2018    Results for



     5:13 AM CST    this procedure



         are in the



         results



         section.

 

 SMEAR REVIEW  STAT  2018    Results for



     8:14 PM CST    this procedure



         are in the



         results



         section.

 

 ESTIMATED GFR  STAT  2018    Results for



     8:14 PM CST    this procedure



         are in the



         results



         section.

 

 HEPATIC FUNCTION PANEL  STAT  2018    Results for



     8:14 PM CST    this procedure



         are in the



         results



         section.

 

 BASIC METABOLIC PANEL  STAT  2018    Results for



     8:14 PM CST    this procedure



         are in the



         results



         section.

 

 HC COMPLETE BLD COUNT W/AUTO  STAT  2018    Results for



 DIFF    8:14 PM CST    this procedure



         are in the



         results



         section.

 

 CT LUMBAR SPINE WO CONTRAST  STAT  2018    Results for



     5:12 PM CST    this procedure



         are in the



         results



         section.

 

 ESTIMATED GFR  STAT  2018    Results for



     12:29 PM CST    this procedure



         are in the



         results



         section.

 

 LIPASE LEVEL  STAT  2018    Results for



     12:29 PM CST    this procedure



         are in the



         results



         section.

 

 COMPREHENSIVE METABOLIC PANEL  STAT  2018    Results for



     12:29 PM CST    this procedure



         are in the



         results



         section.

 

 PARTIAL THROMBOPLASTIN TIME  STAT  2018    Results for



 (PTT)    12:29 PM CST    this procedure



         are in the



         results



         section.

 

 PROTHROMBIN TIME WITH INR  STAT  2018    Results for



     12:29 PM CST    this procedure



         are in the



         results



         section.

 

 HC COMPLETE BLD COUNT W/AUTO  STAT  2018    Results for



 DIFF    12:29 PM CST    this procedure



         are in the



         results



         section.

 

 POC GLUCOSE  Routine  2018    Results for



     11:06 AM CST    this procedure



         are in the



         results



         section.

 

 POC GLUCOSE  Routine  2018    Results for



     6:41 AM CST    this procedure



         are in the



         results



         section.

 

 ESTIMATED GFR  Routine  2018    Results for



     4:58 AM CST    this procedure



         are in the



         results



         section.

 

 BASIC METABOLIC PANEL  Routine  2018    Results for



     4:58 AM CST    this procedure



         are in the



         results



         section.

 

 HC COMPLETE BLD COUNT W/AUTO  Routine  2018    Results for



 DIFF    4:58 AM CST    this procedure



         are in the



         results



         section.

 

 POC GLUCOSE  Routine  2018    Results for



     9:12 PM CST    this procedure



         are in the



         results



         section.

 

 ESTIMATED GFR  STAT  2018    Results for



     6:29 PM CST    this procedure



         are in the



         results



         section.

 

 CREATINE KINASE, TOTAL (CPK)  STAT  2018    Results for



     6:29 PM CST    this procedure



         are in the



         results



         section.

 

 HC COMPLETE BLD COUNT W/AUTO  STAT  2018    Results for



 DIFF    6:29 PM CST    this procedure



         are in the



         results



         section.

 

 BASIC METABOLIC PANEL  STAT  2018    Results for



     6:29 PM CST    this procedure



         are in the



         results



         section.

 

 CT HEAD WO CONTRAST  STAT  2018    Results for



     6:19 PM CST    this procedure



         are in the



         results



         section.

 

 ECG 12-LEAD  STAT  2018    Results for



     6:18 PM CST    this procedure



         are in the



         results



         section.

 

 ECG ED PRELIMINARY  Routine  2018    Results for



 INTERPRETATION    5:38 PM CST    this procedure



         are in the



         results



         section.

 

 POC GLUCOSE  Routine  2018    Results for



     10:56 AM CST    this procedure



         are in the



         results



         section.

 

 POC GLUCOSE  Routine  2018    Results for



     7:34 AM CST    this procedure



         are in the



         results



         section.

 

 POC GLUCOSE  Routine  2018    Results for



     8:37 PM CST    this procedure



         are in the



         results



         section.

 

 POC GLUCOSE  Routine  2018    Results for



     4:48 PM CST    this procedure



         are in the



         results



         section.

 

 SURGICAL PATHOLOGY REQUEST  Routine  2018    Results for



     1:28 PM CST    this procedure



         are in the



         results



         section.

 

 ESOPHAGOGASTRODUODENOSCOPY (EGD)    2018  Abdominal pain,  



     12:15 PM CST  unspecified  



       abdominal  



       location  

 

 POC GLUCOSE  Routine  2018    Results for



     6:56 AM CST    this procedure



         are in the



         results



         section.

 

 ESTIMATED GFR  Routine  2018    Results for



     6:03 AM CST    this procedure



         are in the



         results



         section.

 

 BASIC METABOLIC PANEL  Routine  2018    Results for



     6:03 AM CST    this procedure



         are in the



         results



         section.

 

 POC GLUCOSE  Routine  2018    Results for



     9:04 PM CST    this procedure



         are in the



         results



         section.

 

 POC GLUCOSE  Routine  2018    Results for



     3:39 PM CST    this procedure



         are in the



         results



         section.

 

 POC GLUCOSE  Routine  2018    Results for



     11:07 AM CST    this procedure



         are in the



         results



         section.

 

 POC GLUCOSE  Routine  2018    Results for



     7:12 AM CST    this procedure



         are in the



         results



         section.

 

 ESTIMATED GFR  Routine  2018    Results for



     5:59 AM CST    this procedure



         are in the



         results



         section.

 

 MAGNESIUM LEVEL  Routine  2018    Results for



     5:59 AM CST    this procedure



         are in the



         results



         section.

 

 COMPREHENSIVE METABOLIC PANEL  Routine  2018    Results for



     5:59 AM CST    this procedure



         are in the



         results



         section.

 

 CBC WITH PLATELET AND  Routine  2018    Results for



 DIFFERENTIAL    5:59 AM CST    this procedure



         are in the



         results



         section.

 

 POC GLUCOSE  Routine  2018    Results for



     7:54 PM CST    this procedure



         are in the



         results



         section.

 

 ECG ED PRELIMINARY  Routine  2018    Results for



 INTERPRETATION    5:59 PM CST    this procedure



         are in the



         results



         section.

 

 POC GLUCOSE  Routine  2018    Results for



     3:55 PM CST    this procedure



         are in the



         results



         section.

 

 POC GLUCOSE  Routine  2018    Results for



     11:54 AM CST    this procedure



         are in the



         results



         section.

 

 POC GLUCOSE  Routine  2018    Results for



     6:26 AM CST    this procedure



         are in the



         results



         section.

 

 ESTIMATED GFR  Routine  2018    Results for



     6:23 AM CST    this procedure



         are in the



         results



         section.

 

 COMPREHENSIVE METABOLIC PANEL  Routine  2018    Results for



     6:23 AM CST    this procedure



         are in the



         results



         section.

 

 HC COMPLETE BLD COUNT W/AUTO  Routine  2018    Results for



 DIFF    6:23 AM CST    this procedure



         are in the



         results



         section.

 

 POC GLUCOSE  Routine  2017    Results for



     8:54 PM CST    this procedure



         are in the



         results



         section.

 

 POC GLUCOSE  Routine  2017    Results for



     4:46 PM CST    this procedure



         are in the



         results



         section.

 

 POC GLUCOSE  Routine  2017    Results for



     11:33 AM CST    this procedure



         are in the



         results



         section.

 

 ESTIMATED GFR  Routine  2017    Results for



     7:05 AM CST    this procedure



         are in the



         results



         section.

 

 COMPREHENSIVE METABOLIC PANEL  Routine  2017    Results for



     7:05 AM CST    this procedure



         are in the



         results



         section.

 

 HC COMPLETE BLD COUNT W/AUTO  Routine  2017    Results for



 DIFF    7:05 AM CST    this procedure



         are in the



         results



         section.

 

 POC GLUCOSE  Routine  2017    Results for



     6:23 AM CST    this procedure



         are in the



         results



         section.

 

 POC GLUCOSE  Routine  2017    Results for



     8:30 PM CST    this procedure



         are in the



         results



         section.

 

 XR CHEST 2 VW  Routine  2017    Results for



     7:47 PM CST    this procedure



         are in the



         results



         section.

 

 ESTIMATED GFR  Routine  2017    Results for



     7:07 PM CST    this procedure



         are in the



         results



         section.

 

 COMPREHENSIVE METABOLIC PANEL  Routine  2017    Results for



     7:07 PM CST    this procedure



         are in the



         results



         section.

 

 HC COMPLETE BLD COUNT W/AUTO  Routine  2017    Results for



 DIFF    7:07 PM CST    this procedure



         are in the



         results



         section.

 

 POC GLUCOSE  Routine  2017    Results for



     3:58 PM CST    this procedure



         are in the



         results



         section.

 

 POC GLUCOSE  Routine  2017    Results for



     12:15 PM CST    this procedure



         are in the



         results



         section.

 

 POC GLUCOSE  Routine  2017    Results for



     7:03 AM CST    this procedure



         are in the



         results



         section.

 

 POC GLUCOSE  Routine  2017    Results for



     8:17 PM CST    this procedure



         are in the



         results



         section.

 

 POC GLUCOSE  Routine  2017    Results for



     3:34 PM CST    this procedure



         are in the



         results



         section.

 

 POC GLUCOSE  Routine  2017    Results for



     10:52 AM CST    this procedure



         are in the



         results



         section.

 

 URINALYSIS SCREEN AND  STAT  2017    Results for



 MICROSCOPY, WITH REFLEX TO    8:31 AM CST    this procedure



 CULTURE        are in the



         results



         section.

 

 URINE DRUGS OF ABUSE SCREEN  STAT  2017    Results for



     8:31 AM CST    this procedure



         are in the



         results



         section.

 

 URINE CULTURE  STAT  2017    Results for



     8:31 AM CST    this procedure



         are in the



         results



         section.

 

 POC GLUCOSE  Routine  2017    Results for



     7:02 AM CST    this procedure



         are in the



         results



         section.

 

 ESTIMATED GFR  Routine  2017    Results for



     5:34 AM CST    this procedure



         are in the



         results



         section.

 

 BASIC METABOLIC PANEL  Routine  2017    Results for



     5:34 AM CST    this procedure



         are in the



         results



         section.

 

 PROTHROMBIN TIME WITH INR  Routine  2017    Results for



     5:34 AM CST    this procedure



         are in the



         results



         section.

 

 HC COMPLETE BLD COUNT W/AUTO  Routine  2017    Results for



 DIFF    5:34 AM CST    this procedure



         are in the



         results



         section.

 

 POC GLUCOSE  Routine  2017    Results for



     12:44 AM CST    this procedure



         are in the



         results



         section.

 

 TROPONIN  Timed  2017    Results for



     7:33 PM CST    this procedure



         are in the



         results



         section.

 

 CT ABDOMEN PELVIS WO CONTRAST  STAT  2017    Results for



     4:23 PM CST    this procedure



         are in the



         results



         section.

 

 INFLUENZA ANTIGEN  Routine  2017    Results for



     3:58 PM CST    this procedure



         are in the



         results



         section.

 

 ECG 12-LEAD  STAT  2017    Results for



     3:48 PM CST    this procedure



         are in the



         results



         section.

 

 B NATRIURETIC PEPTIDE  STAT  2017    Results for



     3:45 PM CST    this procedure



         are in the



         results



         section.

 

 TROPONIN  STAT  2017    Results for



     3:45 PM CST    this procedure



         are in the



         results



         section.

 

 ALCOHOL LEVEL, BLOOD  STAT  2017    Results for



     3:45 PM CST    this procedure



         are in the



         results



         section.

 

 ESTIMATED GFR  STAT  2017    Results for



     3:45 PM CST    this procedure



         are in the



         results



         section.

 

 LIPASE LEVEL  STAT  2017    Results for



     3:45 PM CST    this procedure



         are in the



         results



         section.

 

 COMPREHENSIVE METABOLIC PANEL  STAT  2017    Results for



     3:45 PM CST    this procedure



         are in the



         results



         section.

 

 HC COMPLETE BLD COUNT W/AUTO  STAT  2017    Results for



 DIFF    3:45 PM CST    this procedure



         are in the



         results



         section.

 

 POC GLUCOSE  Routine  10/13/2017    Results for



     8:01 AM CDT    this procedure



         are in the



         results



         section.

 

 US ABDOMEN COMPLETE  STAT  10/13/2017    Results for



     7:28 AM CDT    this procedure



         are in the



         results



         section.

 

 ESTIMATED GFR  Routine  10/13/2017    Results for



     5:33 AM CDT    this procedure



         are in the



         results



         section.

 

 COMPREHENSIVE METABOLIC PANEL  Routine  10/13/2017    Results for



     5:33 AM CDT    this procedure



         are in the



         results



         section.

 

 HEPATITIS A ANTIBODY IGM  Routine  10/13/2017    Results for



     5:33 AM CDT    this procedure



         are in the



         results



         section.

 

 HEPATITIS B CORE ANTIBODY IGM  Routine  10/13/2017    Results for



     5:33 AM CDT    this procedure



         are in the



         results



         section.

 

 HEPATITIS B SURFACE ANTIBODY  Routine  10/13/2017    Results for



     5:33 AM CDT    this procedure



         are in the



         results



         section.

 

 HEPATITIS B SURFACE ANTIGEN  Routine  10/13/2017    Results for



     5:33 AM CDT    this procedure



         are in the



         results



         section.

 

 PROTHROMBIN TIME WITH INR  Routine  10/13/2017    Results for



     5:33 AM CDT    this procedure



         are in the



         results



         section.

 

 TOTAL IRON BINDING CAPACITY  Routine  10/13/2017    Results for



     5:33 AM CDT    this procedure



         are in the



         results



         section.

 

 FERRITIN LEVEL  Routine  10/13/2017    Results for



     5:33 AM CDT    this procedure



         are in the



         results



         section.

 

 CERULOPLASMIN LEVEL  Routine  10/13/2017    Results for



     5:33 AM CDT    this procedure



         are in the



         results



         section.

 

 HC COMPLETE BLD COUNT W/AUTO  Routine  10/13/2017    Results for



 DIFF    5:33 AM CDT    this procedure



         are in the



         results



         section.

 

 RADHA  Routine  10/13/2017    Results for



     5:33 AM CDT    this procedure



         are in the



         results



         section.

 

 ALPHA-1 ANTITRYPSIN LEVEL  Routine  10/13/2017    Results for



     5:33 AM CDT    this procedure



         are in the



         results



         section.

 

 ALPHA FETOPROTEIN  Routine  10/13/2017    Results for



     5:33 AM CDT    this procedure



         are in the



         results



         section.

 

 HEPATITIS C ANTIBODY  Routine  10/13/2017    Results for



     5:33 AM CDT    this procedure



         are in the



         results



         section.

 

 CT ABDOMEN PELVIS W WO CONTRAST  STAT  10/13/2017    Results for



     1:34 AM CDT    this procedure



         are in the



         results



         section.

 

 POC GLUCOSE  Routine  10/12/2017    Results for



     8:33 PM CDT    this procedure



         are in the



         results



         section.

 

 AMMONIA LEVEL  Routine  10/12/2017    Results for



     5:45 PM CDT    this procedure



         are in the



         results



         section.

 

 POC GLUCOSE  Routine  10/12/2017    Results for



     4:33 PM CDT    this procedure



         are in the



         results



         section.

 

 ECG 12-LEAD  Routine  10/12/2017    Results for



     2:20 PM CDT    this procedure



         are in the



         results



         section.

 

 ANTI SMOOTH MUSCLE AB TITER  Routine  10/12/2017    Results for



     1:27 PM CDT    this procedure



         are in the



         results



         section.

 

 HEPATITIS B CORE ANTIBODY IGM  Routine  10/12/2017    Results for



     1:27 PM CDT    this procedure



         are in the



         results



         section.

 

 HEPATITIS B SURFACE ANTIGEN  Routine  10/12/2017    Results for



     1:27 PM CDT    this procedure



         are in the



         results



         section.

 

 HEPATITIS B SURFACE ANTIBODY  Routine  10/12/2017    Results for



     1:27 PM CDT    this procedure



         are in the



         results



         section.

 

 HEPATITIS C ANTIBODY  Routine  10/12/2017    Results for



     1:27 PM CDT    this procedure



         are in the



         results



         section.

 

 TOTAL IRON BINDING CAPACITY  Routine  10/12/2017    Results for



     1:27 PM CDT    this procedure



         are in the



         results



         section.

 

 FERRITIN LEVEL  Routine  10/12/2017    Results for



     1:27 PM CDT    this procedure



         are in the



         results



         section.

 

 CERULOPLASMIN LEVEL  Routine  10/12/2017    Results for



     1:27 PM CDT    this procedure



         are in the



         results



         section.

 

 ALPHA FETOPROTEIN  Routine  10/12/2017    Results for



     1:27 PM CDT    this procedure



         are in the



         results



         section.

 

 ALPHA-1 ANTITRYPSIN LEVEL  Routine  10/12/2017    Results for



     1:27 PM CDT    this procedure



         are in the



         results



         section.

 

 ANTI SMOOTH MUSCLE AB SCREEN  Routine  10/12/2017    Results for



     1:27 PM CDT    this procedure



         are in the



         results



         section.

 

 RADHA  Routine  10/12/2017    Results for



     1:27 PM CDT    this procedure



         are in the



         results



         section.

 

 HEMOGLOBIN & HEMATOCRIT  Routine  10/12/2017    Results for



     1:27 PM CDT    this procedure



         are in the



         results



         section.

 

 POC GLUCOSE  Routine  10/12/2017    Results for



     11:14 AM CDT    this procedure



         are in the



         results



         section.

 

 POC GLUCOSE  Routine  10/12/2017    Results for



     7:48 AM CDT    this procedure



         are in the



         results



         section.

 

 POC GLUCOSE  Routine  10/12/2017    Results for



     12:36 AM CDT    this procedure



         are in the



         results



         section.

 

 LACTIC ACID LEVEL  Timed  10/11/2017    Results for



     10:17 PM CDT    this procedure



         are in the



         results



         section.

 

 URINALYSIS SCREEN AND  STAT  10/11/2017    Results for



 MICROSCOPY, WITH REFLEX TO    9:05 PM CDT    this procedure



 CULTURE        are in the



         results



         section.

 

 AMMONIA LEVEL  STAT  10/11/2017    Results for



     7:49 PM CDT    this procedure



         are in the



         results



         section.

 

 BILIRUBIN DIRECT  STAT  10/11/2017    Results for



     7:49 PM CDT    this procedure



         are in the



         results



         section.

 

 ESTIMATED GFR  STAT  10/11/2017    Results for



     7:49 PM CDT    this procedure



         are in the



         results



         section.

 

 LACTIC ACID LEVEL  STAT  10/11/2017    Results for



     7:49 PM CDT    this procedure



         are in the



         results



         section.

 

 HC COMPLETE BLD COUNT W/AUTO  STAT  10/11/2017    Results for



 DIFF    7:49 PM CDT    this procedure



         are in the



         results



         section.

 

 LIPASE LEVEL  STAT  10/11/2017    Results for



     7:49 PM CDT    this procedure



         are in the



         results



         section.

 

 AMYLASE LEVEL  STAT  10/11/2017    Results for



     7:49 PM CDT    this procedure



         are in the



         results



         section.

 

 COMPREHENSIVE METABOLIC PANEL  STAT  10/11/2017    Results for



     7:49 PM CDT    this procedure



         are in the



         results



         section.

 

 XR CHEST 2 VW  STAT  10/11/2017    Results for



     7:40 PM CDT    this procedure



         are in the



         results



         section.

 

 CT ABDOMEN PELVIS WO CONTRAST  STAT  10/11/2017    Results for



     7:19 PM CDT    this procedure



         are in the



         results



         section.

 

 URINE CULTURE  STAT  10/11/2017    Results for



     6:51 PM CDT    this procedure



         are in the



         results



         section.

 

 ESTIMATED GFR  STAT  2017    Results for



     3:06 PM CDT    this procedure



         are in the



         results



         section.

 

 COMPREHENSIVE METABOLIC PANEL  STAT  2017    Results for



     3:06 PM CDT    this procedure



         are in the



         results



         section.

 

 HC COMPLETE BLD COUNT W/AUTO  Routine  2017    Results for



 DIFF    3:00 PM CDT    this procedure



         are in the



         results



         section.

 

 URINALYSIS SCREEN AND  STAT  2017    Results for



 MICROSCOPY, WITH REFLEX TO    9:48 PM CDT    this procedure



 CULTURE        are in the



         results



         section.

 

 CT CHEST W CONTRAST ABDOMEN W  STAT  2017    Results for



 CONTRAST PELVIS W CONTRAST    9:31 PM CDT    this procedure



         are in the



         results



         section.

 

 LACTIC ACID LEVEL  Timed  2017    Results for



     5:35 PM CDT    this procedure



         are in the



         results



         section.

 

 ESTIMATED GFR  STAT  2017    Results for



     2:06 PM CDT    this procedure



         are in the



         results



         section.

 

 BILIRUBIN DIRECT  STAT  2017    Results for



     2:06 PM CDT    this procedure



         are in the



         results



         section.

 

 LACTIC ACID LEVEL  STAT  2017    Results for



     2:06 PM CDT    this procedure



         are in the



         results



         section.

 

 HC COMPLETE BLD COUNT W/AUTO  STAT  2017    Results for



 DIFF    2:06 PM CDT    this procedure



         are in the



         results



         section.

 

 LIPASE LEVEL  STAT  2017    Results for



     2:06 PM CDT    this procedure



         are in the



         results



         section.

 

 AMYLASE LEVEL  STAT  2017    Results for



     2:06 PM CDT    this procedure



         are in the



         results



         section.

 

 COMPREHENSIVE METABOLIC PANEL  STAT  2017    Results for



     2:06 PM CDT    this procedure



         are in the



         results



         section.



after 2017



Results

Urinalysis screen and microscopy, with reflex to culture (2018  9:16 AM)
Only the most recent of4 resultswithin the time period is included.





 Component  Value  Ref Range  Performed At

 

 Specimen [place]  Clean catch    [facility] [place]



       AND GENOMIC MEDICINE

 

 Color, UA  Yellow    [facility] [place] [facility]



       AND GENOMIC MEDICINE

 

 Appearance, UA  Clear    [facility] [place] [facility]



       AND GENOMIC MEDICINE

 

 Specific gravity, UA  1.011  1.001 - 1.035  [facility]



       AND GENOMIC MEDICINE

 

 pH, UA  5.0  5.0 - 8.5  [facility] [place] [facility]



       AND GENOMIC MEDICINE

 

 Protein, UA  Negative  Negative  [facility] [place] [facility]



       AND GENOMIC MEDICINE

 

 Glucose, UA  Negative  Negative  [facility] [place] [facility]



       AND GENOMIC MEDICINE

 

 Ketones, UA  Negative  Negative  [facility] [place] [facility]



       AND GENOMIC MEDICINE

 

 Bilirubin, UA  Negative  Negative  [facility] [place] [facility]



       AND GENOMIC MEDICINE

 

 Blood, UA  Negative  Negative  [facility] [place] [facility]



       AND GENOMIC MEDICINE

 

 Nitrite, UA  Negative  Negative  [facility] [place] [facility]



       AND GENOMIC MEDICINE

 

 Urobilinogen, UA  2.0 (A)  <2.0  [facility] [place] [facility]



       AND GENOMIC MEDICINE

 

 Leukocyte esterase, UA  Negative  Negative  [facility] [place] [facility]



       AND GENOMIC MEDICINE

 

 Epithelial cells, UA  Few  /HPF  [facility]



       AND GENOMIC MEDICINE

 

 WBC, UA  1  0 - 1 /HPF  [facility] [place] [facility]



       AND GENOMIC MEDICINE

 

 RBC, UA  <1  0 - 5 /HPF  [facility] [place] [facility]



       AND GENOMIC MEDICINE

 

 Bacteria, UA  None seen  None seen  [facility] [place] [facility]



       AND GENOMIC MEDICINE

 

 Yeast, UA  None seen    [facility] [place] [facility]



       AND GENOMIC MEDICINE

 

 Yeast with pseudohyphae, UA  None seen    [facility] [place] [facility]



       AND GENOMIC MEDICINE









 Specimen

 

 Urine









 Performing Organization  Address  City/State/Zipcode  Phone Number

 

 [facility] [place] [facility]  [address] [place] [address]  



 [facility]      



Urine drugs of abuse screen (2018  9:16 AM)Only the most recent of2 
resultswithin the time period is included.





 Component  Value  Ref Range  Performed At

 

 Amphetamine screen, urine  NEG    [facility]



       [facility] AND GENOMIC



       MEDICINE

 

 Barbiturate screen, urine  NEG    [facility]



       [facility]OLOGY AND GENOMIC



       MEDICINE

 

 Benzodiazepine screen,  POS    [facility]



 urine      PATHOLOGY AND GENOMIC



       MEDICINE

 

 Cocaine screen, urine  NEG    [facility]



       [facility]OLOGY AND GENOMIC



       MEDICINE

 

 Methadone screen, urine  NEG    [facility] [place] [facility]



       [facility]OLOGY AND GENOMIC



       MEDICINE

 

 Opiates screen, urine  POS    [facility]



       PATHOLOGY AND GENOMIC



       MEDICINE

 

 Phencyclidine screen, urine  NEG    [facility]



       PATHOLOGY AND GENOMIC



       MEDICINE

 

 Cannabinoid screen, urine  POS    [facility]



   Comment:    PATHOLOGY AND GENOMIC



   Drug screen minimum concentration of detectability    MEDICINE



   [id number] ng/mL    



   Nmcfzjvrtqswazvm4592 ng/mL    



   Barbiturates 300 ng/mL    



   Iitbxgjoepdoysb085 ng/mL    



   Bfbwpie062 ng/mL    



   Uoofzhvxq135 ng/mL    



   Ssgjlcc899 ng/mL    



   Phencyclidine 25 ng/mL    



   Wmiljpmaoszs91 ng/mL    



   Ihbphhyhrh0593 ng/mL    



   Negative test results indicates presumptive evidence of lack    



   of clinically significant drug concentration in this urine    



   specimen.    



   Positive test results are presumptive evidence of clinically    



   significant drug concentration in this urine specimen.    



   Testing performed for medical purposes only.    



       









 Specimen

 

 Urine









 Performing Organization  Address  City/[facility]  Phone Number

 

 [facility] [place] [facility]  [address] [place] [address]  



 [facility]      



Urine culture (2018  9:16 AM)Only the most recent of3 resultswithin the 
time period is included.





 Component  Value  Ref Range  Performed At

 

 Urine culture  SEE COMMENTComment: Bacteriuria    [facility]



   screen negative.    AND GENOMIC MEDICINE









 Specimen

 

 Urine









 Performing Organization  Address  City/[facility]/Zipcode  Phone Number

 

 [facility] [place] [facility]  [place] [address]  



 [facility]      



Estimated GFR (2018  9:02 AM)Only the most recent of18 resultswithin the 
time period is included.





 Component  Value  Ref Range  Performed At

 

 GFR Non Af Amer  49 (A)  mL/min/1.73 m2  [facility]



       [facility]OLOGY AND GENOMIC



       MEDICINE

 

 GFR Af Amer  59 (A)  mL/min/1.73 m2  [facility]



   Comment:    PATHOLOGY AND GENOMIC



   Chronic kidney disease: <60 mL/min/1.73m2    MEDICINE



   Kidney failure: <15 mL/min/1.73m2    



   The estimated GFR is calculated from the [employer]MS-traceable Modification of Diet
    



   in Renal Disease Equation. The accuracy of the calculation is poor when the 
   



   creatinine is normal. Calculated values >90 mL/min/1.73m2 are not reported. 
   



   This equation has not been validated in children (<18 years), pregnant    



   women, the elderly (>70 years), or ethnic groups other than Caucasians and  
  



    Americans.    



       









 Specimen

 

 Plasma specimen









 Performing Organization  Address  City/State/Zipcode  Phone Number

 

 [facility] [place] [facility]  [address]  [place] [address]  



 GENOMIC [facility]      



CBC with platelet and differential (2018  9:02 AM)Only the most recent 
of17 resultswithin the time period is included.





 Component  Value  Ref Range  Performed At

 

 WBC  9.1  4.2 - 11.0 k/uL  [facility] [place] [facility] AND



       GENOMIC MEDICINE

 

 RBC  4.57  4.04 - 5.86 m/uL  [facility] [place] [facility] AND



       GENOMIC MEDICINE

 

 HGB  14.7  13.0 - 17.3 g/dL  [facility] [place] [facility] AND



       GENOMIC MEDICINE

 

 HCT  44.6  34.0 - 45.0 %  [facility] [place] [facility] AND



       GENOMIC MEDICINE

 

 MCV  97.6  80.0 - 98.0 fL  [facility] [place] [facility] AND



       GENOMIC MEDICINE

 

 MCH  32.2  27.0 - 34.0 pg  [facility] [place] [facility] AND



       GENOMIC MEDICINE

 

 MCHC  33.0  31.5 - 36.5 g/dL  [facility] [place] [facility] AND



       GENOMIC MEDICINE

 

 RDW - SD  55.1 (H)  37.0 - 51.0 fL  [facility] AND



       GENOMIC MEDICINE

 

 MPV  10.1  7.4 - 10.4 fL  [facility] [place] [facility]



       GENOMIC MEDICINE

 

 Platelet count  124 (L)  150 - 400 k/uL  [facility] AND



       GENOMIC MEDICINE

 

 Nucleated RBC  0.00  /100 WBC  [facility] AND



       GENOMIC MEDICINE

 

 Neutrophils  80.4 (H)  36.0 - 66.0 %  [facility] AND



       GENOMIC MEDICINE

 

 Lymphocytes  11.6 (L)  24.0 - 44.0 %  [facility]



       GENOMIC MEDICINE

 

 Monocytes  5.8  0.0 - 6.0 %  [facility] AND



       GENOMIC MEDICINE

 

 Eosinophils  1.3  0.0 - 6.0 %  [facility]



       GENOMIC MEDICINE

 

 Basophils  0.6  0.0 - 1.2 %  [facility] AND



       GENOMIC MEDICINE

 

 Immature granulocytes  0.3  0.0 - 1.0 %  [facility] AND



       GENOMIC MEDICINE









 Specimen

 

 Blood









 Performing Organization  Address  City/[facility] Centercode  Phone Number

 

 [facility]  [address] [place] [address].  [address]      



Thyroid stimulating hormone (2018  9:02 AM)





 Component  Value  Ref Range  Performed At

 

 TSH  2.60  0.38 - 4.82 uIU/mL  [facility] AND GENOMIC 
MEDICINE









 Specimen

 

 Plasma specimen









 Performing Organization  Address  City/[facility]  Phone Number

 

 [facility] AND  [address] [place] [address]      



T4, free (2018  9:02 AM)





 Component  Value  Ref Range  Performed At

 

 T4, free  1.00  0.70 - 1.61 ng/dL  [facility] AND GENOMIC 
MEDICINE









 Specimen

 

 Plasma specimen









 Performing Organization  Address  City/[facility]  Phone Number

 

 [facility] AND  [address] [place] [address]      



Alcohol level, blood (2018  9:02 AM)Only the most recent of2 
resultswithin the time period is included.





 Component  Value  Ref Range  Performed At

 

 Alcohol  None Detected  mg/dL  [facility]Y



   Comment:    AND [facility] MEDICINE



   NormalNone Detected    



   Legal Intoxication in [place] 80 mg/dL (0.08%)    



   Toxic Concentration 200 mg/dL (0.2%)    



   Potentially Fatal 350-500 mg/dL (0.35%-0.5%)    



       

 

 Alcohol percent  None Detected  %  [facility]Y



       AND GENOMIC MEDICINE









 Specimen

 

 Blood









 Performing Organization  Address  City/[facility] Centercode  Phone Number

 

 [facility] AND  [address] [place] [address]      



Acetaminophen level (2018  9:02 AM)





 Component  Value  Ref Range  Performed At

 

 Acetaminophen level  <2.0 (L)  10.0 - 20.0 ug/mL  [facility]



   Comment:    PATHOLOGY AND GENOMIC



   Therapeutic 10-30 ug/mL
    MEDICINE



   Possible Toxicity 150-200 ug/mL
    



   Probable Toxicity >200 ug/mL    



       









 Specimen

 

 Blood









 Performing Organization  Address  City/[facility] Centercode  Phone Number

 

 [facility] AND  [address]  [place] [address]  



 GENOMIC MEDICINE      



Salicylate level (2018  9:02 AM)





 Component  Value  Ref Range  Performed At

 

 Salicylate  7.0  mg/dL  [facility] [place]Y AND GENOMIC



   Comment:    MEDICINE



   Therapeutic Range:    



    5 - 30 mg/dL    



       









 Specimen

 

 Blood









 Performing Organization  Address  City/[facility] Centercode  Phone Number

 

 [facility] [place] [place]  [place] [address]  



 [facility]      



Comprehensive metabolic panel (2018  9:02 AM)Only the most recent of12 
resultswithin the time period is included.





 Component  Value  Ref Range  Performed At

 

 Sodium  140  135 - 150 mEq/L  [facility] AND



       GENOMIC MEDICINE

 

 Potassium  3.8  3.5 - 5.0 mEq/L  [facility]Y AND



       GENOMIC MEDICINE

 

 Chloride  109  100 - 109 mEq/L  [facility]OLOGY AND



       GENOMIC MEDICINE

 

 CO2  24  24 - 32 mmol/L  [facility]OLOGY AND



       GENOMIC MEDICINE

 

 Anion gap  7@ANIO  7 - 15 mEq/L  [facility]OLOGY AND



       GENOMIC MEDICINE

 

 BUN  14  7 - 18 mg/dL  [facility]Y AND



       GENOMIC MEDICINE

 

 Creatinine  1.5  0.8 - 1.5 mg/dL  [facility]Y AND



       GENOMIC MEDICINE

 

 Glucose  103 (H)  65 - 100 mg/dL  [facility]Y AND



       GENOMIC MEDICINE

 

 Calcium  8.6  8.6 - 10.7 mg/dL  [facility]Y AND



       GENOMIC MEDICINE

 

 Protein  7.9  6.3 - 8.2 g/dL  [facility]Y AND



       GENOMIC MEDICINE

 

 Albumin  3.4  3.2 - 5.0 g/dL  [facility]Y AND



       GENOMIC MEDICINE

 

 A/G ratio  0.8  0.7 - 3.8  [facility] AND



       GENOMIC MEDICINE

 

 Alkaline phosphatase  153 (H)  30 - 120 U/L  [facility]Y AND



       GENOMIC MEDICINE

 

 AST  21  15 - 37 U/L  [facility]Y AND



       GENOMIC MEDICINE

 

 ALT  17 (L)  30 - 65 U/L  [facility]Y AND



       GENOMIC MEDICINE

 

 Total bilirubin  0.2  0.2 - 1.2 mg/dL  [facility]Y AND



       GENOMIC MEDICINE









 Specimen

 

 Plasma specimen









 Performing Organization  Address  City/[facility] Centercode  Phone Number

 

 [facility] AND  [place] [address]  



 [facility]      



CT Abdomen Pelvis Wo Contrast (2018  3:27 AM)Only the most recent of3 
resultswithin the time period is included.





 Narrative  Performed At

 

 EXAMINATION:CT ABDOMEN PELVIS WO CONTRAST



   [facility]



  



  



 CLINICAL HISTORY:R flank pain



  



  



  



 TECHNIQUE: Multiple axial images of the abdomen and pelvis were obtained  



 without intravenous administration of iodinated contrast. Sagittal and  



 coronal computerized reformatted images were also obtained. The lack of  



 intravenous contrast reduces the 



  



 sensitivity of detecting solid organ disease.



  



  



  



 CT imaging was performed with iterative reconstruction technique and/or  



 automated exposure control to reduce radiation dose.



  



  



  



 COMPARISON:2017



  



  



  



  



  



 IMPRESSION:



  



  



  



 Subsegmental atelectasis is seen of the lung bases.



  



  



  



 Patient is status post cholecystectomy. Pancreas and adrenal glands are  



 normal.



  



  



  



 Cirrhotic liver.



  



  



  



 Spleen is enlarged measuring 14.5 cm in length. 



  



  



  



 Kidneys, ureters and bladder are normal.



  



  



  



 No free intraperitoneal fluid or air. Atherosclerotic vascular  



 calcifications are seen. Some paraesophageal varices are seen,  



 compatible with portal hypertension.



  



  



  



 Diverticulosis is seen without diverticulitis. Appendix cannot be seen.  



 No gastrointestinal tract obstruction.



  



  



  



 No acute osseous abnormalities. Postoperative appearance of the lower  



 lumbar spine. A device is seen of the right gluteal region, with lead  



 entering the thoracic spinal canal.



  



  



  



 CONCLUSION:



  



  



  



 Cirrhotic liver with small paraesophageal varices, compatible with  



 portal hypertension.



  



  



  



  



  



  



  



  



  



  



  



  



  



  



  



  



  



  



  



  



  



 [facility]-[id number]



  



   









 Procedure Note

 

  Interface, Radiology Results Incoming - 2018  4:08 AM CDT



EXAMINATION:  CT ABDOMEN PELVIS WO CONTRAST



 



 CLINICAL HISTORY:  R flank pain



 



 TECHNIQUE: Multiple axial images of the abdomen and pelvis were obtained 
without intravenous administration of iodinated contrast. Sagittal and coronal 
computerized reformatted images were also obtained. The lack of intravenous 
contrast reduces the



 sensitivity of detecting solid organ disease.



 



 CT imaging was performed with iterative reconstruction technique and/or 
automated exposure control to reduce radiation dose.



 



 COMPARISON:  2017



 



 



 IMPRESSION:



 



 Subsegmental atelectasis is seen of the lung bases.



 



 Patient is status post cholecystectomy. Pancreas and adrenal glands are normal.



 



 Cirrhotic liver.



 



 Spleen is enlarged measuring 14.5 cm in length.



 



 Kidneys, ureters and bladder are normal.



 



 No free intraperitoneal fluid or air. Atherosclerotic vascular calcifications 
are seen. Some paraesophageal varices are seen, compatible with portal 
hypertension.



 



 Diverticulosis is seen without diverticulitis. Appendix cannot be seen. No 
gastrointestinal tract obstruction.



 



 No acute osseous abnormalities. Postoperative appearance of the lower lumbar 
spine. A device is seen of the right gluteal region, with lead entering the 
thoracic spinal canal.



 



 CONCLUSION:



 



 Cirrhotic liver with small paraesophageal varices, compatible with portal 
hypertension.



 



 



 



 



 



 



 



 



 



 



 [facility]-[id number]









 Performing Organization  Address  City/State/Zipcode  Phone Number

 

  [facility]  [address] [place] [address]  



Smear review (2018  3:13 AM)Only the most recent of3 resultswithin the 
time period is included.





 Component  Value  Ref Range  Performed At

 

 Smear review  Smear Reviewed    [facility] [place] GENOMIC



       MEDICINE









 Performing Organization  Address  City/[facility] Centercode  Phone Number

 

 [facility] AND  [address] [place] [address].  [place], [place] [address] [place] [address]      



Prothrombin time with INR (2018  3:13 AM)Only the most recent of4 
resultswithin the time period is included.





 Component  Value  Ref Range  Performed At

 

 Prothrombin time  14.5  12.0 - 15.0 sec  [facility]



       PATHOLOGY AND GENOMIC



       MEDICINE

 

 INR  1.11  0.92 - 1.12  [facility]



   Comment:    PATHOLOGY AND GENOMIC



   For patients on anticoagulant therapy, reference ranges below:    MEDICINE



   Indication:INR 
Value    



   Treatment of Venous Thrombosis, 2.0-3.0    



   pulmonary emboli, or prophylaxis    



   of a venous thrombosis, or systemic    



   emboli.    



   High dose, high risk patients 3.0-4.5    



   with mechanical valves.    



   NOTE:INR values over 3.0 are sometimes associated with    



   gastrointestinal hemorrhage, especially values over 4.0.    



       









 Specimen

 

 Blood









 Performing Organization  Address  City/[facility] Centercode  Phone Number

 

 [facility] AND  [address] [facility]  [place], [place] [address] [place] [address]      



Lipase level (2018  3:13 AM)Only the most recent of5 resultswithin the 
time period is included.





 Component  Value  Ref Range  Performed At

 

 Lipase  129  65 - 230 U/L  [facility]Y AND GENOMIC MEDICINE









 Specimen

 

 Plasma specimen









 Performing Organization  Address  City/[facility] Centercode  Phone Number

 

 [facility] AND  [address] [facility]  [place], [place] [address] [place] [address]      



ECG 12 lead (2018  2:43 AM)Only the most recent of4 resultswithin the 
time period is included.





 Component  Value  Ref Range  Performed At

 

 Ventricular rate  77    [facility]

 

 Atrial rate  77    [facility]

 

 WY interval  144    [facility]

 

 QRSD interval  94    [facility]

 

 QT interval  418    [facility]

 

 QTC interval  473    [facility]

 

 P axis 1  32    [facility]

 

 QRS axis 1  94    [facility]

 

 T wave axis  85    [facility]

 

 EKG impression  Normal sinus rhythm-Rightward axis-Borderline    [facility]



   ECG-In automated comparison with ECG of    



   2018 18:18,-No significant change was    



   found-Electronically Signed By Missy Meneses MD    



   ([id number]) on 5/10/2018 7:43:28 AM    









 Performing Organization  Address  City/State/Zipcode  Phone Number

 

 [facility]  [address] [place] [address]  



ECG ED Preliminary Interpretation - NOT AN ORDER (2018  2:07 AM)Only the 
most recent of3 resultswithin the time period is included.





 Narrative  Performed At

 

 Raiza Juarez MD 20183:47 AM



  



 ECG ED Preliminary Interpretation - Not an Order



  



 Performed by: RAIZA JUAREZ



  



 Authorized by: RAIZA JUAREZ 



  



  



  



 ECG reviewed by ED Physician in the absence of a cardiologist: yes



  



 Previous ECG: 



  



 Previous ECG:Compared to current



  



 Comparison ECG info:18



  



 Similarity:No change



  



 Interpretation: 



  



 Interpretation: normal



  



 Rate: 



  



 ECG rate:77



  



 ECG rate assessment: normal



  



 Rhythm: 



  



 Rhythm: sinus rhythm



  



 QRS: 



  



 QRS axis:Right



  



 Comments: 



  



  Normal sinus rhythm. Right axis deviation.  



PET/CT Skull Base To Mid Thigh (2018  1:45 PM)





 Narrative  Performed At

 

 EXAMINATION:



  [employer]



 PET CT SKULL BASE TO MID THIGH



  



  



  



 Date and place of service: 3[place]26/2018 10:00 [place] [facility]



  



  



  



 DOSE: 11.8 mCi of 21-Rncuvu-4-Deoxyglucose (FDG) was injected  



 intravenously into left wrist done infiltration at a serum glucose level  



 of90 mg/dl.



  



  



  



 TECHNIQUE:



  



  



  



 PROCEDURE: Following injection of 17-Oxopza-6-Deoxyglucose (FDG) and a  



 standard uptake., The patient was imaged on a [employer] whole body PET CT  



 scanner. Multiple 6 minute bed position acquisitions were obtained along  



 the length of the patient's body from 



  



 approximately the Skull base to the mid thighs without administration of  



 intravenous or oral contrast. The software fused PET/CT images as well  



 as the PET and CT images could be reviewed separately.



  



  



  



 The dose length product for this procedure was 408 mGy-cm.



  



  



  



 CT imaging was performed with iterative reconstruction technique and/or  



 automated exposure control to reduce radiation dose



  



  



  



 COMPARISON:



  



 2017 done [facility]. 



  



  



  



 CLINICAL HISTORY:



  



 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 Status  



 post right lower lobectomy . No history of recent chemotherapy  



 or radiation therapy. For restaging.



  



  



  



 FINDINGS:



  



  



  



 Software [facility]  



 with the CAT scan from the Skull base to the mid thighs to the same time  



 as the PET scan.



  



  



  



 Physiologic uptake is present in the components of Waldeyer's ring in  



 the lateral pharynx. Vocal cord uptake is noted of doubtful  



 significance. Mild tracer uptake is seen in the right and left axillary  



 lymph node with the degree of uptake less than 2.5 



  



 and is of doubtful significance.



  



  



  



 No abnormal radiotracer uptake is demonstrated in the visualized brain,  



 neck, chest, abdomen, pelvis, spine, and visualized proximal upper and  



 lower extremities.



  



  



  



 No abnormal radiotracer uptake is demonstrated in the brain. However,  



 normal intense metabolic uptake of radiotracer in the brain can mask the  



 presence of lesions. Brain lesions are generally better evaluated with  



 an MRI without and with intravenous 



  



 gadolinium, if clinically indicated.



  



  



  



 Physiologic excretion of radiotracer is demonstrated into the kidneys,  



 ureters, and bladder. Physiologic excretion of radiotracer is also  



 demonstrated into the small and large bowel.



  



  



  



 Interrogation of the CT scan of the chest in lung window settings  



 demonstrates the previously noted masses in each lower lobe are not  



 identified on the current examination consistent with right lower  



 lobectomy on the right and posttreatment of the left 



  



 lung lesion.. Dependent atelectasis is noted in the posterior lung bases  



 bilaterally.



  



  



  



 Interrogation of the CT scan of the whole body with soft tissue window  



 settings demonstratessignificant dilatation of the pulmonary outflow  



 tract consistent with pulmonary arterial hypertension. Coronary artery  



 calcifications are present.



  



  



  



 An irregular contoured liver is noted consistent with cirrhosis.  



 Calcified atheromatous plaque formation is seen in the aortoiliac  



 system.



  



  



  



 Interrogation of the CT scan of the whole body and bone window settings  



 demonstrates no osteolytic or osteoblastic lesions. Patient is had a  



 spinal fusion in the lower lumbar spine.



  



  



  



 IMPRESSION:



  



 No evidence of metabolically active tumor.



  



  



  



 Otherwise unchanged .Pulmonary arterial hypertension as before.  



 Cirrhosis as before.



  



  



  



 [id number]



  



   









 Procedure Note

 

 Hm Interface, Radiology Results Incoming - 2018  4:30 PM CDT



EXAMINATION:



 PET CT SKULL BASE TO MID THIGH



 



 Date and place of service: 3/26/2018 10:00 AM [place] [facility]



 



 DOSE: 11.8 mCi of 71-Jpgsgd-2-Deoxyglucose (FDG) was injected intravenously 
into left wrist done infiltration at a serum glucose level of  90 mg/dl.



 



 TECHNIQUE:



 



 PROCEDURE: Following injection of 95-Lzpmxp-7-Deoxyglucose (FDG) and a 
standard uptake., The patient was imaged on a [employer] whole body PET CT scanner. 
Multiple 6 minute bed position acquisitions were obtained along the length of 
the patient's body from



 approximately the Skull base to the mid thighs without administration of 
intravenous or oral contrast. The software fused PET/CT images as well as the 
PET and CT images could be reviewed separately.



 



 The dose length product for this procedure was 408 mGy-cm.



 



 CT imaging was performed with iterative reconstruction technique and/or 
automated exposure control to reduce radiation dose



 



 COMPARISON:



 2017 done at [facility].



 



 CLINICAL HISTORY:



 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 Status post 
right lower lobectomy . No history of recent chemotherapy or 
radiation therapy. For restaging.



 



 FINDINGS:



 



 Software fusion was performed of the nuclear medicine  FDG PET scan with the 
CAT scan from the Skull base to the mid thighs to the same time as the PET scan.



 



 Physiologic uptake is present in the components of Waldeyer's ring in the 
lateral pharynx. Vocal cord uptake is noted of doubtful significance. Mild 
tracer uptake is seen in the right and left axillary lymph node with the



 degree of uptake less than 2.5



 and is of doubtful significance.



 



 No abnormal radiotracer uptake is demonstrated in the visualized brain, neck, 
chest, abdomen, pelvis, spine, and visualized proximal upper and lower 
extremities.



 



 No abnormal radiotracer uptake is demonstrated in the brain. However, normal 
intense metabolic uptake of radiotracer in the brain can mask the presence of 
lesions. Brain lesions are generally better evaluated with an MRI without and 
with intravenous



 gadolinium, if clinically indicated.



 



 Physiologic excretion of radiotracer is demonstrated into the kidneys, ureters
, and bladder. Physiologic excretion of radiotracer is also demonstrated into 
the small and large bowel.



 



 Interrogation of the CT scan of the chest in lung window settings demonstrates 
the previously noted masses in each lower lobe are not identified on the 
current examination consistent with right lower



 lobectomy on the right and posttreatment of the left



 lung lesion.. Dependent atelectasis is noted in the posterior lung bases 
bilaterally.



 



 Interrogation of the CT scan of the whole body with soft tissue window 
settings demonstrates  significant dilatation of the pulmonary outflow tract 
consistent with pulmonary arterial hypertension. Coronary artery calcifications 
are present.



 



 An irregular contoured liver is noted consistent with cirrhosis. Calcified 
atheromatous plaque formation is seen in the aortoiliac system.



 



 Interrogation of the CT scan of the whole body and bone window settings 
demonstrates no osteolytic or osteoblastic lesions. Patient is had a spinal 
fusion in the lower lumbar spine.



 



 IMPRESSION:



 No evidence of metabolically active tumor.



 



 Otherwise unchanged .Pulmonary arterial hypertension as before. Cirrhosis as 
before.



 



 [id number]









 Performing Organization  Address  City/[facility]/Zipcode  Phone Number

 

  [facility]  [address] [place] [address]  



POC glucose (2018 11:36 AM)Only the most recent of38 resultswithin the 
time period is included.





 Component  Value  Ref Range  Performed At

 

 POC glucose  90  65 - 100 mg/dL  [facility]



   Comment:    [facility]



   Meter ID: [id number]    



   : Heber Mcduffie    



       









 Performing Organization  Address  City/[facility]/Zipcode  Phone Number

 

 [facility]  [facility]  [place] [address]  



 [facility]      



Keppra (Levetiracetam) level (2018  5:13 AM)





 Component  Value  Ref Range  Performed At

 

 Levetiracetam  27  12 - 46 ug/mL  [facility]



   Comment:    



   INTERPRETIVE INFORMATION: Keppra (Levetiracetam)    



   Therapeutic Range:12-46 ug/mL    



   Toxic:Not well Established    



   Pharmacokinetics of levetiracetam are affected by renal function.    



   Adverse effects may include somnolence, weakness, headache and    



   vomiting.    



   Performed by [facility],
    



   [address], [place] [id number] [phone]
    



   [email or web address], Charlie Balbuena MD - Lab. Director    



       









 Specimen

 

 Serum









 Performing Organization  Address  City/[facility] Cent[facility]ode  Phone Number

 

 [facility]  [address] [place] [address]  



Basic metabolic panel (2018  5:13 AM)Only the most recent of6 
resultswithin the time period is included.





 Component  Value  Ref Range  Performed At

 

 Sodium  134 (L)  135 - 150 mEq/L  [facility] [place] [facility] [place] [facility]



       PATHOLOGY AND GENOMIC



       MEDICINE

 

 Potassium  4.2  3.5 - 5.0 mEq/L  [facility] [place] [facility] [facility]



       [place]OLOGY AND GENOMIC



       MEDICINE

 

 Chloride  102  100 - 109 mEq/L  [facility] [place] [facility]



       PATHOLOGY AND GENOMIC



       MEDICINE

 

 CO2  22 (L)  24 - 32 mmol/L  [facility] [place] [facility] [facility]



       PATHOLOGY AND GENOMIC



       MEDICINE

 

 Anion gap  10  7 - 15 mEq/L  [facility] [place] [facility] [facility]



   Comment:    PATHOLOGY AND GENOMIC



   Starting from  , anion gap calculation    MEDICINE



   no longer incorporates potassium. Please note the change.    



       

 

 BUN  17  7 - 18 mg/dL  [facility] [place] [facility]



       PATHOLOGY AND GENOMIC



       MEDICINE

 

 Creatinine  1.2  0.8 - 1.5 mg/dL  [facility] [place] [facility]



       PATHOLOGY AND GENOMIC



       MEDICINE

 

 Glucose  139 (H)  65 - 100 mg/dL  [facility] [place] [facility]



       PATHOLOGY AND GENOMIC



       MEDICINE

 

 Calcium  9.4  8.6 - 10.7 mg/dL  [facility] [place]



       PATHOLOGY AND GENOMIC



       MEDICINE









 Specimen

 

 Plasma specimen









 Performing Organization  Address  City/[facility]ode  Phone Number

 

 [facility] PATHOLOGY AND  [facility] Hartman.  [place], [place] [address] [place] [address]      



Hepatic function panel (2018  8:14 PM)





 Component  Value  Ref Range  Performed At

 

 Albumin  3.8  3.2 - 5.0 g/dL  [facility] [place] [facility] AND [facility]

 

 Total bilirubin  0.8  0.2 - 1.2 mg/dL  [facility] [place]OLOGY AND GENOMIC



       MEDICINE

 

 Bilirubin direct  0.2  0.0 - 0.4 mg/dL  [facility] [place]Y AND 
GENOMIC



       MEDICINE

 

 Alkaline phosphatase  231 (H)  30 - 120 U/L  [facility] [place] AND 
[facility]



       MEDICINE

 

 Protein  9.7 (H)  6.3 - 8.2 g/dL  [facility] [place]OLOGY AND GENOMIC



       MEDICINE

 

 ALT  17 (L)  30 - 65 U/L  [facility] [place]Y AND GENOMIC



       MEDICINE

 

 AST  29  15 - 37 U/L  [facility] [place]Y AND GENOMIC



       MEDICINE









 Specimen

 

 Plasma specimen









 Performing Organization  Address  [facility] Centercode  Phone Number

 

 [facility] PATHOLOGY AND  [facility] [address]  [place], [place] [address] [place] [address]      



CT Lumbar Spine Wo Contrast (2018  5:12 PM)





 Narrative  Performed At

 

 EXAMINATION: CT LUMBAR SPINE WO CONTRAST



   [facility]



  



  



 CLINICAL HISTORY: pain



  



  



  



 COMPARISON: CT myelogram lumbar spine 2011



  



  



  



 TECHNIQUE: Axial noncontrast enhanced images of lumbar spine was  



 performed with coronal sagittal reconstruction algorithms. CT imaging  



 was performed with iterative reconstruction technique and/or automated  



 exposure control to reduce radiation dose.



  



  



  



  



  



 FINDINGS:



  



  



  



 There are 5 non-rib bearing lumbar type vertebrae. Status post posterior  



 spinous mentation with rods and screws and anterior interbody grafts at  



 L4-5 and L5-S1. Status post decompressive laminectomies at L4-5 and  



 L5-S1. There is partial solid interbody 



  



 osseous fusion at L5-S1, without definite solid interbody osseous fusion  



 at L4-5. There is solid osseous fusion of the posterior elements of  



 L4-S1. No evidence of hardware loosening. Hardware is well positioned.



  



  



  



 No fracture. 1 to 2 mm retrolisthesis L3 on L4. Facet alignment is  



 anatomic.



  



  



  



 Limited dilation of the visualized intracranial contents demonstrates  



 atherosclerosis of the abdominal aorta and branch vessels. No abdominal  



 mass is identified on limited assessment. Osteoarthrosis sacroiliac  



 joints with small vacuum clefts and mild 



  



 sclerosis.



  



  



  



  



  



 Axial images through the disc spaces demonstrate the following:



  



  



  



 L1-L2: Small disc bulge without significant spinal canal or neural  



 foraminal stenosis. Small Schmorl's nodes.



  



  



  



 L2-L3: Small disc bulge contributes to mild subarticular zone stenosis  



 without significant spinal canal or neural foraminal stenosis. Small  



 Schmorl's nodes.



  



  



  



 L3-L4: Small disc bulge, slight prominence of dorsal epidural fat, and  



 mild ligament flavum infolding contribute to mild spinal canal stenosis.  



 There is mild bilateral neural foraminal stenosis secondary to facet  



 arthropathy and small endplate 



  



 osteophytes.



  



  



  



 L4-L5: Status post decompressive laminectomy. Mild neural foraminal  



 stenosis secondary to small endplate osteophytes bilaterally.



  



  



  



 L5-S1: Status post decompressive laminectomy. No significant neural  



 foraminal stenosis.



  



  



  



  



  



  



  



  



  



 IMPRESSION: 



  



  



  



 Postsurgical changes L4-S1 as detailed above.



  



  



  



 No fracture. Multilevel degenerative changes of the lumbar spine,  



 notably with mild spinal canal and neural foraminal stenosis at L3-4.



  



  



  



  



  



  



  



  



  



 [id number]



  



   









 Procedure Note

 

 Hm Interface, Radiology Results Incoming - 2018  5:24 PM CST



EXAMINATION: CT LUMBAR SPINE WO CONTRAST



 



 CLINICAL HISTORY: pain



 



 COMPARISON: CT myelogram lumbar spine 2011



 



 TECHNIQUE: Axial noncontrast enhanced images of lumbar spine was performed 
with coronal sagittal reconstruction algorithms. CT imaging was performed with 
iterative reconstruction technique and/or automated exposure control to reduce 
radiation dose.



 



 



 FINDINGS:



 



 There are 5 non-rib bearing lumbar type vertebrae. Status post posterior 
spinous mentation with rods and screws and anterior interbody grafts at L4-5 
and L5-S1. Status post decompressive laminectomies at L4-5 and L5-S1.



 There is partial solid interbody



 osseous fusion at L5-S1, without definite solid interbody osseous fusion at L4-
5. There is solid osseous fusion of the posterior elements of L4-S1. No 
evidence of hardware loosening. Hardware is well positioned.



 



 No fracture. 1 to 2 mm retrolisthesis L3 on L4. Facet alignment is anatomic.



 



 Limited dilation of the visualized intracranial contents demonstrates 
atherosclerosis of the abdominal aorta and branch vessels. No abdominal mass is 
identified on limited assessment. Osteoarthrosis sacroiliac joints



 with small vacuum clefts and mild



 sclerosis.



 



 



 Axial images through the disc spaces demonstrate the following:



 



 L1-L2: Small disc bulge without significant spinal canal or neural foraminal 
stenosis. Small Schmorl's nodes.



 



 L2-L3: Small disc bulge contributes to mild subarticular zone stenosis without 
significant spinal canal or neural foraminal stenosis. Small Schmorl's nodes.



 



 L3-L4: Small disc bulge, slight prominence of dorsal epidural fat, and mild 
ligament flavum infolding contribute to mild spinal canal stenosis. There is 
mild bilateral neural foraminal stenosis secondary to facet arthropathy and 
small endplate



 osteophytes.



 



 L4-L5: Status post decompressive laminectomy. Mild neural foraminal stenosis 
secondary to small endplate osteophytes bilaterally.



 



 L5-S1: Status post decompressive laminectomy. No significant neural foraminal 
stenosis.



 



 



 



 



 IMPRESSION:



 



 Postsurgical changes L4-S1 as detailed above.



 



 No fracture. Multilevel degenerative changes of the lumbar spine, notably with 
mild spinal canal and neural foraminal stenosis at L3-4.



 



 



 



 



 [id number]









 Performing Organization  Address  City/[facility]/Zipcode  Phone Number

 

 [facility]  [address] [place] [address]  



Partial thromboplastin time, activated (2018 12:29 PM)





 Component  Value  Ref Range  Performed At

 

 PTT  29.2  23.0 - 36.0 sec  [facility] [place]



   Comment:    PATHOLOGY AND GENOMIC



   PTT therapeutic range for unfractionated heparin is    MEDICINE



   61.0-112.0 seconds which corresponds to Anti-Xa    



   0.3-0.7 U/ml.    



   Note:Change in Panic Value    



   The PTT Panic Value is changing from 110 sec. to 100 sec.    



   due to new instrumentation and reagents.    



   Correlation studies have been performed to validate this result.    



       









 Specimen

 

 Blood









 Performing Organization  Address  City/[facility]/Zipcode  Phone Number

 

 [facility] [place] AND  [address]  [place] [address]  



 [facility] MEDICINE      



Creatine kinase, total (CPK) (2018  6:29 PM)





 Component  Value  Ref Range  Performed At

 

 Creatine kinase  134  61 - 224 U/L  [facility] [place] AND GENOMIC 
MEDICINE









 Specimen

 

 Plasma specimen









 Performing Organization  Address  City/State/Zipcode  Phone Number

 

 [facility] [place] AND  [place] [address]  



 GENOMIC MEDICINE      



CT Head Wo Contrast (2018  6:19 PM)





 Narrative  Performed At

 

 EXAMINATION: CT HEAD WO CONTRAST



   [facility]



  



  



 CLINICAL HISTORY: seizure syncope



  



  



  



 COMPARISON:Brain CT dated 2017



  



  



  



 TECHNIQUE: Noncontrast enhanced images of the brain were obtained from  



 the skull base to the vertex. Both soft tissue and bone reconstruction  



 algorithms were performed.CT imaging was performed with iterative  



 reconstruction technique and/or automated 



  



 exposure control to reduce radiation dose.



  



  



  



  



  



 FINDINGS:



  



  



  



 There is no evidence of acute intracranial hemorrhage, intracranial  



 mass, mass effect, midline shift or focal extra-axial collection. The  



 gray-white matter differentiation is preserved, and I do not see a  



 confluent area of acute transcortical ischemia. 



  



 No discretely hyperdense vessel is identified. 



  



  



  



 The ventricles and extra-axial spaces are mildly prominent bilaterally.  



 The ventricles have remained stable in size and configuration compared  



 to the previous head CT.



  



  



  



 Minimal foci of decreased attenuation are noted mainly along the  



 periventricular white matter at the level of the corona radiata. These  



 are nonspecific, but most commonly related to chronic microvascular  



 ischemic change.



  



  



  



 Beam hardening artifact obscures details at the level of the skull base,  



 including portions of the supraorbital frontal lobes, inferior temporal  



 lobes, brainstem and cerebellum.



  



  



  



 The mastoid air cells and middle ear cavities are clear. There is soft  



 tissue density in the external auditory canals bilaterally, nonspecific,  



 but most commonly representing cerumen.



  



  



  



 The calvarium shows no aggressive bone lesion or evidence of fracture.  



 No fluid levels are seen in the visualized paranasal sinuses.



  



  



  



 IMPRESSION:



  



  



  



 No acute intracranial abnormality identified.



  



  



  



  



  



  



  



  



  



  



  



  



  



 [id number]



  



   









 Procedure Note

 

 Hm Interface, Radiology Results Incoming - 2018  6:27 PM CST



EXAMINATION: CT HEAD WO CONTRAST



 



 CLINICAL HISTORY: seizure syncope



 



 COMPARISON:  Brain CT dated 2017



 



 TECHNIQUE: Noncontrast enhanced images of the brain were obtained from the 
skull base to the vertex. Both soft tissue and bone reconstruction algorithms 
were performed.  CT imaging was performed with iterative



 reconstruction technique and/or automated



 exposure control to reduce radiation dose.



 



 



 FINDINGS:



 



 There is no evidence of acute intracranial hemorrhage, intracranial mass, mass 
effect, midline shift or focal extra-axial collection. The gray-white matter 
differentiation is preserved, and I do not see a confluent area of



 acute transcortical ischemia.



 No discretely hyperdense vessel is identified.



 



 The ventricles and extra-axial spaces are mildly prominent bilaterally. The 
ventricles have remained stable in size and configuration compared to the 
previous head CT.



 



 Minimal foci of decreased attenuation are noted mainly along the 
periventricular white matter at the level of the corona radiata. These are 
nonspecific, but most commonly related to chronic microvascular ischemic change.



 



 Beam hardening artifact obscures details at the level of the skull base, 
including portions of the supraorbital frontal lobes, inferior temporal lobes, 
brainstem and cerebellum.



 



 The mastoid air cells and middle ear cavities are clear. There is soft tissue 
density in the external auditory canals bilaterally, nonspecific, but most 
commonly representing cerumen.



 



 The calvarium shows no aggressive bone lesion or evidence of fracture. No 
fluid levels are seen in the visualized paranasal sinuses.



 



 IMPRESSION:



 



 No acute intracranial abnormality identified.



 



 



 



 



 



 



 [id number]









 Performing Organization  Address  City/[facility]/Zipcode  Phone Number

 

 [facility]  [address] [place] [address]  



Surgical pathology request (2018  1:28 PM)





 Component  Value  Ref Range  Performed At

 

 Case number  [id number]    [facility] [place] [facility]



       PATHOLOGY AND GENOMIC



       MEDICINE

 

 Surgical pathology report  See link below for PDF    [facility] [place] [facility]



   Lab Report    PATHOLOGY AND GENOMIC



       MEDICINE

 

 Result status  This is Final Report to    [facility] [place] [facility]



   [id number]    PATHOLOGY AND GENOMIC



       MEDICINE









 Performing Organization  Address  [facility] Cent[facility]ode  Phone Number

 

 [facility] [place] [place] AND  [address] [place] [address]  



 GENOMIC MEDICINE      



Magnesium level (2018  5:59 AM)





 Component  Value  Ref Range  Performed At

 

 Magnesium  2.00  1.60 - 2.40 mg/dL  [facility] [place]Y AND GENOMIC 
MEDICINE









 Specimen

 

 Plasma specimen









 Performing Organization  Address  [facility] Centercode  Phone Number

 

 [facility] [place] AND  [address] [place] [address]  



 GENOMIC MEDICINE      



XR Chest 2 Vw (2017  7:47 PM)Only the most recent of2 resultswithin the 
time period is included.





 Narrative  Performed At

 

 EXAMINATION:[facility] 2 VW



   [facility]



  



  



 CLINICAL HISTORY:sob cough todayleukocytosisR basilar  



 crackles



  



  



  



  



  



  



  



 IMPRESSION:



  



  



  



 Aortic arch calcified. Heart size is normal. Lungs are clear of acute  



 infiltrate. There has been a prior partial pulmonary resection in the  



 right side. There are no effusions. There is an intraspinal catheter  



 present, and there is hardware in cervical 



  



 spine.



  



  



  



  



  



  



  



 [facility]-[id number]



  



   









 Procedure Note

 

 Hm Interface, Radiology Results Incoming - 2017  8:23 PM CST



EXAMINATION:  XR CHEST 2 VW



 



 CLINICAL HISTORY:  sob cough today  leukocytosis  R basilar crackles



 



 



 



 IMPRESSION:



 



 Aortic arch calcified. Heart size is normal. Lungs are clear of acute 
infiltrate. There has been a prior partial pulmonary resection in the right 
side. There are no effusions. There is an intraspinal catheter



 present, and there is hardware in cervical



 spine.



 



 



 



 [facility]-[id number]









 Performing Organization  Address  City/State/Zipcode  Phone Number

 

  [facility]  [address] [place] [address]  



Troponin (2017  7:33 PM)Only the most recent of2 resultswithin the time 
period is included.





 Component  Value  Ref Range  Performed At

 

 Troponin  <0.01  0.00 - 0.60 ng/mL  [facility] [place]Y



   Comment:    AND [facility] MEDICINE



   0.11 - 1.49 ng/mlMay indicate increased risk of acute
    



    coronary syndrome.
    



   >=1.5 ng/mlConsistent with acute myocardial    



    infarction.    



   
    



   The diagnostic value of a single normal or non-diagnostic    



   result is questionable.Serial samples at 2-6 hour intervals    



   are required to rule out acute myocardial injury.
    



       



       









 Specimen

 

 Plasma specimen









 Performing Organization  Address  [facility] Centercode  Phone Number

 

 [facility] [place] AND  [address]  [place] [address]  



 GENOMIC MEDICINE      



Influenza antigen (2017  3:58 PM)





 Component  Value  Ref Range  Performed At

 

 Influenza antigen  Negative for Influenza A/B antigen.    [facility] [place] [facility] 
PATHOLOGY



   Comment:    AND GENOMIC MEDICINE



   Specimen Information    



   Specimen Source: Nares    



   Specimen Site: Right    



       









 Specimen

 

 Nares - Right









 Performing Organization  Address  City/[facility]/Zipcode  Phone Number

 

 [facility] [place] AND  [address]  [place] [address]  



 GENOMIC MEDICINE      



B natriuretic peptide (2017  3:45 PM)





 Component  Value  Ref Range  Performed At

 

 BNP  43  0 - 100 pg/mL  [facility] [place]Y AND [facility]IC[facility]









 Specimen

 

 Blood









 Performing Organization  Address  City/[facility]/Zipcode  Phone Number

 

 [facility] [place]Y AND  [address]  [place] [address]  



 [facility] MEDICINE      



US Abdomen Complete (10/13/2017  7:28 AM)





 Narrative  Performed At

 

 EXAM: US ABDOMEN COMPLETE



  [facility]



  



  



 CLINICAL DATA:ABDOMINAL PAIN



  



  



  



 COMPARISON: 2016



  



  



  



 FINDINGS:



  



  



  



 LIVER:The liver is slightly heterogeneous in echogenicity and has an  



 irregular contour suggestive of cirrhosis. No discrete mass is  



 identified with ultrasound.



  



  



  



 MPV:Doppler evaluation of the portal vein demonstrates normal  



 hepatopetal flow. 



  



  



  



 GALLBLADDER:The gallbladder has been surgically removed.



  



  



  



 CBD: Common bile duct is not dilated measuring 6 mm.



  



  



  



 PANCREAS:The visualized portions of the pancreas are within normal  



 limits.



  



  



  



 SPLEEN:Spleen is enlarged measuring 15.3 x 7 x 6.7 cm. 



  



  



  



 KIDNEYS:The kidneys are normal in size and echogenicity. There is no  



 evidence of hydronephrosis.



  



  



  



 AORTA:The visualized upper abdominal aorta demonstrates no evidence  



 of ectasia or aneurysm.



  



  



  



 IVC:The visualized portions of the inferior vena cava are  



 unremarkable.



  



  



  



 ASCITES: No abnormal abdominal fluid collections are visualized. There  



 is no evidence of ascites.



  



  



  



 PLEURAL EFFUSION:There are no pleural effusions.



  



  



  



  



  



 IMPRESSION:



  



 1. Cirrhosis with splenomegaly.



  



  



  



 2.Status post cholecystectomy.



  



  



  



  



  



 [facility]-[id number]



  



   









 Procedure Note

 

  Interface, Radiology Results Incoming - 10/13/2017  7:34 AM CDT



EXAM: US ABDOMEN COMPLETE



 



 CLINICAL DATA:  ABDOMINAL PAIN



 



 COMPARISON: 2016



 



 FINDINGS:



 



 LIVER:  The liver is slightly heterogeneous in echogenicity and has an 
irregular contour suggestive of cirrhosis. No discrete mass is identified with 
ultrasound.



 



 MPV:  Doppler evaluation of the portal vein demonstrates normal hepatopetal 
flow.



 



 GALLBLADDER:  The gallbladder has been surgically removed.



 



 CBD: Common bile duct is not dilated measuring 6 mm.



 



 PANCREAS:  The visualized portions of the pancreas are within normal limits.



 



 SPLEEN:  Spleen is enlarged measuring 15.3 x 7 x 6.7 cm.



 



 KIDNEYS:  The kidneys are normal in size and echogenicity. There is no 
evidence of hydronephrosis.



 



 AORTA:  The visualized upper abdominal aorta demonstrates no evidence of 
ectasia or aneurysm.



 



 IVC:  The visualized portions of the inferior vena cava are unremarkable.



 



 ASCITES: No abnormal abdominal fluid collections are visualized. There is no 
evidence of ascites.



 



 PLEURAL EFFUSION:  There are no pleural effusions.



 



 



 IMPRESSION:



 1.   Cirrhosis with splenomegaly.



 



 2.  Status post cholecystectomy.



 



 



 [facility]-[id number]









 Performing Organization  Address  City/State/Zipcode  Phone Number

 

 [address] [address]  



Total iron binding capacity (10/13/2017  5:33 AM)Only the most recent of2 
resultswithin the time period is included.





 Component  Value  Ref Range  Performed At

 

 Iron level  86  76 - 198 ug/dL  [facility] [place] AND



       GENOMIC MEDICINE

 

 Iron binding capacity  252 (L)  271 - 474 ug/dL  [facility] [place] [facility] [place]Y 
AND



       GENOMIC MEDICINE

 

 % Saturation  34.1  20.0 - 40.0 %  [facility] [place] [facility] [place]OLOGY AND



       GENOMIC MEDICINE









 Specimen

 

 Blood









 Performing Organization  Address  City/[facility]  Phone Number

 

 [facility] [place] [facility] [place]Y AND  [address] [place] [address].  [address]      



Hepatitis C antibody (10/13/2017  5:33 AM)Only the most recent of2 
resultswithin the time period is included.





 Component  Value  Ref Range  Performed At

 

 Hepatitis C Ab  Non-reactive  Non-reactive  [facility] [place] [facility] [place]Y AND 
GENOMIC



       MEDICINE









 Specimen

 

 Blood









 Performing Organization  Address  [facility]  Phone Number

 

 [facility] [place] [facility] [place]Y AND  [address] [facility].  [address]      



Alpha-1 antitrypsin level (10/13/2017  5:33 AM)Only the most recent of2 
resultswithin the time period is included.





 Component  Value  Ref Range  Performed At

 

 Alpha-1 antitrypsin  134  90 - 200 mg/dL  [facility] DEPARTMENT OF PATHOLOGY AND 
GENOMIC



       MEDICINE









 Specimen

 

 Plasma specimen









 Performing Organization  Address  [facility]  Phone Number

 

 [facility] DEPARTMENT OF PATHOLOGY AND  [address] [place], [place] [id number]  



 [facility]      



Hepatitis A antibody IgM (10/13/2017  5:33 AM)





 Component  Value  Ref Range  Performed At

 

 Hepatitis A IgM  Non-reactive  Non-reactive  [facility] [place] [facility] [place]Y AND 
GENOMIC



       MEDICINE









 Specimen

 

 Blood









 Performing Organization  Address  City/[facility]  Phone Number

 

 [facility] [place] [facility] [place]Y AND  [address] [facility].  [place] [id number] [place] [address]      



Ceruloplasmin level (10/13/2017  5:33 AM)Only the most recent of2 resultswithin 
the time period is included.





 Component  Value  Ref Range  Performed At

 

 Ceruloplasmin  26  15 - 30 mg/dL  [facility] DEPARTMENT OF PATHOLOGY AND GENOMIC 
MEDICINE









 Specimen

 

 Plasma specimen









 Performing Organization  Address  City/[facility] [facility] Cent[facility]  Phone Number

 

 [facility] DEPARTMENT OF PATHOLOGY AND  [address] [place], [place] [id number]  



 [facility]      



Alpha fetoprotein (10/13/2017  5:33 AM)Only the most recent of2 resultswithin 
the time period is included.





 Component  Value  Ref Range  Performed At

 

 Alpha fetoprotein  3.5  0.0 - 8.3 ng/mL  [facility] DEPARTMENT OF



   Comment:    PATHOLOGY AND GENOMIC



   The Tru 8000 AFP immunoassay was used.    MEDICINE



   Results obtained with different assay methods or kits    



   should not be used interchangeably and may be different.    



       









 Specimen

 

 Serum









 Performing Organization  Address  City/[facility] Cent[facility]ode  Phone Number

 

 [facility] DEPARTMENT OF PATHOLOGY AND  [address] [place] [address]      



Hepatitis B core antibody IgM (10/13/2017  5:33 AM)Only the most recent of2 
resultswithin the time period is included.





 Component  Value  Ref Range  Performed At

 

 Hepatitis B core IgM  Non-reactive  Non-reactive  [facility] [place] 
AND



       [facility]









 Specimen

 

 Blood









 Performing Organization  Address  City/[facility] Cent[facility]ode  Phone Number

 

 [facility] [place]Y AND  [address]  [address]      



Hepatitis B surface antibody (10/13/2017  5:33 AM)Only the most recent of2 
resultswithin the time period is included.





 Component  Value  Ref Range  Performed At

 

 Hepatitis B surface Ab  Non-reactive  Non-reactive  [facility] [place] 
PATHOLOGY AND



       [facility]









 Specimen

 

 Blood









 Performing Organization  Address  City/[facility] Centercode  Phone Number

 

 [facility] [place] [place]Y AND  [address]  [address]      



Hepatitis B surface antigen (10/13/2017  5:33 AM)Only the most recent of2 
resultswithin the time period is included.





 Component  Value  Ref Range  Performed At

 

 Hepatitis B surface Ag  Non-reactive  Non-reactive  [facility] [place] 
PATHOLOGY AND



       [facility]









 Specimen

 

 Blood









 Performing Organization  Address  City/[facility] Centercode  Phone Number

 

 [facility] [place] [place]Y AND  [address]  [address]      



RADHA (10/13/2017  5:33 AM)Only the most recent of2 resultswithin the time period 
is included.





 Component  Value  Ref Range  Performed At

 

 RADHA screen  Not Detected  Not-Detected  [facility] DEPARTMENT OF PATHOLOGY AND 
GENOMIC MEDICINE









 Specimen

 

 Blood









 Performing Organization  Address  City/[facility]/Zipcode  Phone Number

 

 [facility] DEPARTMENT OF PATHOLOGY AND  [address] [place] [address]      



Ferritin level (10/13/2017  5:33 AM)Only the most recent of2 resultswithin the 
time period is included.





 Component  Value  Ref Range  Performed At

 

 Ferritin level  143  18 - 158 ng/mL  [facility] [place]Y AND GENOMIC 
MEDICINE









 Specimen

 

 Serum









 Performing Organization  Address  City/State/Zipcode  Phone Number

 

 [facility] [place]Y AND  [place] [address]  



 [facility]      



CT Abdomen Pelvis W Wo Contrast (10/13/2017  1:34 AM)





 Narrative  Performed At

 

 CT ABDOMEN PELVIS W WO CONTRAST



   [facility]



  



  



 CLINICAL INDICATION:cirrhosis



  



  



  



 TECHNIQUE: Multidetector CT of the abdomen and pelvis was performed  



 before and then following intravenous administration of iodinated  



 contrast with multiplanar reformats.



  



  



  



 CT scans are performed using radiation dose reduction techniques  



 (iterative reconstruction and/or automated exposure control). Technical  



 factors are evaluated and adjusted to ensure appropriate moderation of  



 exposure. Automated dose management technology



  



  is applied to adjust radiation exposure while achieving a diagnostic  



 quality image.



  



  



  



 COMPARISON:None. 



  



  



  



 FINDINGS:



  



  



  



 Lung bases:Dependent subsegmental atelectasis/scarring.



  



  



  



 Liver:Cirrhotic morphology with underlying hepatic steatosis. No  



 arterial hyperenhancing lesions are visualized.



  



  



  



 Gallbladder and biliary:The gallbladder is absent. Clips within the  



 gallbladder fossa. 



  



  



  



 Pancreas:Moderately atrophic with fatty replacement.



  



  



  



 Spleen: Enlarged measuring up to 15.5 cm.



  



  



  



 Gastrointestinal:Mild sigmoid diverticulosis. Large and small bowel  



 are normal in caliber. Appendix is not visualized. No focal inflammatory  



 changes within the right lower quadrant of the abdomen.



  



  



  



 Adrenals:Normal. 



  



  



  



 Kidneys and ureters:No mass or hydronephrosis.



  



  



  



 Urinary bladder: Decompressed.



  



  



  



 Lymph nodes:No enlarged lymph nodes in the abdomen or pelvis.



  



  



  



 Peritoneum:No ascites or free air.



  



  



  



 Vascular:Moderate-extensive atherosclerotic changes of the abdominal  



 aorta and major branch vessels.



  



  



  



 Reproductive organs:Normal prostate gland and seminal vesicles.



  



  



  



 Abdominal wall: Spinal stimulator device in the right lower back  



 subcutaneous soft tissues with partially visualized electrodes.



  



  



  



 Bones:Posterior spinal instrumentation at L4-S1.



  



  



  



 IMPRESSION:



  



  



  



 1. Cirrhosis. No focal or suspicious hepatic lesions visualized on liver  



 protocol CT.



  



  



  



 2. Splenomegaly.



  



  



  



  



  



 [facility]-[id number]



  



   









 Procedure Note

 

  Interface, Radiology Results Incoming - 10/13/2017  1:55 AM CDT



CT ABDOMEN PELVIS W WO CONTRAST



 



 CLINICAL INDICATION:  cirrhosis



 



 TECHNIQUE: Multidetector CT of the abdomen and pelvis was performed before and 
then following intravenous administration of iodinated contrast with 
multiplanar reformats.



 



 CT scans are performed using radiation dose reduction techniques (iterative 
reconstruction and/or automated exposure control). Technical factors are 
evaluated and adjusted to ensure appropriate moderation of



 exposure. Automated dose management technology



  is applied to adjust radiation exposure while achieving a diagnostic quality 
image.



 



 COMPARISON:  None.



 



 FINDINGS:



 



 Lung bases:  Dependent subsegmental atelectasis/scarring.



 



 Liver:  Cirrhotic morphology with underlying hepatic steatosis. No arterial 
hyperenhancing lesions are visualized.



 



 Gallbladder and biliary:  The gallbladder is absent. Clips within the 
gallbladder fossa.



 



 Pancreas:  Moderately atrophic with fatty replacement.



 



 Spleen: Enlarged measuring up to 15.5 cm.



 



 Gastrointestinal:  Mild sigmoid diverticulosis. Large and small bowel are 
normal in caliber. Appendix is not visualized. No focal inflammatory changes 
within the right lower quadrant of the abdomen.



 



 Adrenals:  Normal.



 



 Kidneys and ureters:  No mass or hydronephrosis.



 



 Urinary bladder: Decompressed.



 



 Lymph nodes:  No enlarged lymph nodes in the abdomen or pelvis.



 



 Peritoneum:  No ascites or free air.



 



 Vascular:  Moderate-extensive atherosclerotic changes of the abdominal aorta 
and major branch vessels.



 



 Reproductive organs:  Normal prostate gland and seminal vesicles.



 



 Abdominal wall: Spinal stimulator device in the right lower back subcutaneous 
soft tissues with partially visualized electrodes.



 



 Bones:  Posterior spinal instrumentation at L4-S1.



 



 IMPRESSION:



 



 1. Cirrhosis. No focal or suspicious hepatic lesions visualized on liver 
protocol CT.



 



 2. Splenomegaly.



 



 



 [facility]-[id number]









 Performing Organization  Address  City/[facility] Centercode  Phone Number

 

 [facility]  [address] [place] [address]  



Ammonia level (10/12/2017  5:45 PM)Only the most recent of2 resultswithin the 
time period is included.





 Component  Value  Ref Range  Performed At

 

 Ammonia  33  3 - 37 umol/L  [facility] [place]Y AND GENOMIC MEDICINE









 Specimen

 

 Plasma specimen









 Performing Organization  Address  City/[facility] Centercode  Phone Number

 

 [facility] [place] [place] AND  [facility], [place] [address] [place] [address] MEDICINE      



Anti smooth muscle Ab titer (10/12/2017  1:27 PM)





 Component  Value  Ref Range  Performed At

 

 Anti smooth muscle Ab titer  1:80 (A)  Not-Detected  [facility] DEPARTMENT OF 
PATHOLOGY AND



       GENOMIC MEDICINE









 Specimen

 

 Blood









 Performing Organization  Address  City/[facility] Centercode  Phone Number

 

 [facility] DEPARTMENT OF PATHOLOGY AND  [address] [place] [address]  



 [facility]      



Anti smooth muscle Ab screen (10/12/2017  1:27 PM)





 Component  Value  Ref Range  Performed At

 

 Anti smooth muscle Ab screen  Detected (A)  Not-Detected  [facility] DEPARTMENT OF 
PATHOLOGY



       AND GENOMIC MEDICINE









 Specimen

 

 Blood









 Performing Organization  Address  City/[facility]/Zipcode  Phone Number

 

 [facility] DEPARTMENT OF PATHOLOGY AND  [address] [place] [address]  



 [facility]      



Hemoglobin &amp; hematocrit (10/12/2017  1:27 PM)





 Component  Value  Ref Range  Performed At

 

 HGB  14.4  13.0 - 17.3 g/dL  [facility] [place] OF PATHOLOGY AND GENOMIC MEDICINE

 

 HCT  44.0  34.0 - 45.0 %  [facility] [place] OF PATHOLOGY AND GENOMIC MEDICINE









 Specimen

 

 Blood









 Performing Organization  Address  City/[facility] Centercode  Phone Number

 

 [facility] [place] OF [place]OLOGY AND  [address]  [address]      



Lactic acid level (10/11/2017 10:17 PM)Only the most recent of4 resultswithin 
the time period is included.





 Component  Value  Ref Range  Performed At

 

 Lactic acid  1.4  0.5 - 2.2 mmol/L  [facility] OF PATHOLOGY AND GENOMIC 
MEDICINE









 Specimen

 

 Blood









 Performing Organization  Address  City/[facility]  Phone Number

 

 [facility] [place]MENT OF [place]OLOGY AND  [address]  [address]      



Bilirubin direct (10/11/2017  7:49 PM)Only the most recent of2 resultswithin 
the time period is included.





 Component  Value  Ref Range  Performed At

 

 Bilirubin direct  0.1  0.0 - 0.4 mg/dL  [facility] [place] OF PATHOLOGY AND 
GENOMIC



       MEDICINE









 Specimen

 

 Plasma specimen









 Narrative  Performed At

 

 LA RESULT CALLED TO DAHLIA SUMNER  [facility]MENT OF PATHOLOGY AND GENOMIC



 [id number] BY DJ  MEDICINE









 Performing Organization  Address  City/[facility] Centercode  Phone Number

 

 [facility] [place] OF [place]OLOGY AND  [address]  [place] [id number] [place] [address]      



Amylase level (10/11/2017  7:49 PM)Only the most recent of2 resultswithin the 
time period is included.





 Component  Value  Ref Range  Performed At

 

 Amylase  59  34 - 122 U/L  [facility] OF PATHOLOGY AND GENOMIC MEDICINE









 Specimen

 

 Plasma specimen









 Performing Organization  Address  City/[facility] Centercode  Phone Number

 

 [facility] [place] OF [place]Y AND  [address]  [address]      



CT Chest W Contrast Abdomen W Contrast Pelvis W Contrast (2017  9:31 PM)





 Narrative  Performed At

 

 EXAMINATION:CT CHEST W CONTRAST ABDOMEN W CONTRAST PELVIS W CONTRAST



  [facility]



  



  



 CLINICAL HISTORY:RLL chest pain



  



  



  



 TECHNIQUE: Multiple axial images of the chest, abdomen, and pelvis were  



 obtained following intravenous administration of iodinated contrast.  



 Sagittal and coronal computerized reformatted images were  



 obtained.Automatic exposure control and iterative 



  



 reconstruction techniques used to reduce dose.



  



  



  



 COMPARISON:2017



  



  



  



 FINDINGS:



  



  



  



 Chest: 



  



  Mild diffuse emphysematous changes throughout the lungs.



  



  



  



 Mild atelectasis in the lingula postoperative changes in the right  



 middle lobe. Mild bronchiectasis in the bilateral upper and lower lobes.  



 No suspicious focal pulmonary nodules or infiltrates present



  



  



  



 Small nonspecific subcentimeter mediastinal and hilar lymph nodes



  



  



  



 Diffuse calcified atherosclerotic vascular disease throughout the  



 arterial structures.



  



  



  



 No pleural or pericardial effusions are present.



  



  



  



 Degenerative changes are present throughout the bony structures without  



 evidence of a suspicious focal lesion.



  



  



  



 Abdomen/Pelvis:



  



  Diffuse cirrhotic changes are present throughout the liver without  



 evidence of focal mass



  



  



  



 The gallbladder has been removed



  



  



  



 The spleen is enlarged



  



  



  



 Portal vein is patent



  



  



  



 Pancreas is unremarkable



  



  



  



 Subcentimeter adenoma in the left adrenal gland



  



  



  



 Kidneys are within normal limits



  



  



  



 No significant lymphadenopathy free fluid present



  



  



  



 Diffuse calcified atherosclerotic vascular disease throughout the  



 arterial structures.



  



  



  



 No evidence of appendicitis



  



  



  



 Diffuse diverticulosis of colon without evidence of diverticulitis. The  



 small bowel is unremarkable



  



  



  



 Diffuse calcified atherosclerotic vascular disease throughout the  



 arterial structures.



  



  



  



 Pelvis:



  



  



  



 No significant lymphadenopathy, solid masses or free fluid present



  



  



  



 Degenerative changes are present throughout the bony structures without  



 evidence of a suspicious focal lesion.



  



  



  



  



  



 IMPRESSION:



  



  



  



 Diffuse emphysematous changes throughout the lungs. No suspicious focal  



 pulmonary nodules or infiltrates present



  



  



  



 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



  



  



  



 Diverticulosis without evidence of diverticulitis



  



  



  



 [facility]-[id number]



  



   









 Procedure Note

 

 [facility], Radiology Results Incoming - 2017  9:42 PM CDT



EXAMINATION:  CT CHEST W CONTRAST ABDOMEN W CONTRAST PELVIS W CONTRAST



 



 CLINICAL HISTORY:  RLL chest pain



 



 TECHNIQUE: Multiple axial images of the chest, abdomen, and pelvis were 
obtained following intravenous administration of iodinated contrast. Sagittal 
and coronal computerized reformatted images were obtained.Automatic exposure 
control and iterative



 reconstruction techniques used to reduce dose.



 



 COMPARISON:  2017



 



 FINDINGS:



 



 Chest:



  Mild diffuse emphysematous changes throughout the lungs.



 



 Mild atelectasis in the lingula postoperative changes in the right middle 
lobe. Mild bronchiectasis in the bilateral upper and lower lobes. No suspicious 
focal pulmonary nodules or infiltrates present



 



 Small nonspecific subcentimeter mediastinal and hilar lymph nodes



 



 Diffuse calcified atherosclerotic vascular disease throughout the arterial 
structures.



 



 No pleural or pericardial effusions are present.



 



 Degenerative changes are present throughout the bony structures without 
evidence of a suspicious focal lesion.



 



 Abdomen/Pelvis:



  Diffuse cirrhotic changes are present throughout the liver without evidence 
of focal mass



 



 The gallbladder has been removed



 



 The spleen is enlarged



 



 Portal vein is patent



 



 Pancreas is unremarkable



 



 Subcentimeter adenoma in the left adrenal gland



 



 Kidneys are within normal limits



 



 No significant lymphadenopathy free fluid present



 



 Diffuse calcified atherosclerotic vascular disease throughout the arterial 
structures.



 



 No evidence of appendicitis



 



 Diffuse diverticulosis of colon without evidence of diverticulitis. The small 
bowel is unremarkable



 



 Diffuse calcified atherosclerotic vascular disease throughout the arterial 
structures.



 



 Pelvis:



 



 No significant lymphadenopathy, solid masses or free fluid present



 



 Degenerative changes are present throughout the bony structures without 
evidence of a suspicious focal lesion.



 



 



 IMPRESSION:



 



 Diffuse emphysematous changes throughout the lungs. No suspicious focal 
pulmonary nodules or infiltrates present



 



 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



 



 Diverticulosis without evidence of diverticulitis



 



 [facility]-[id number]









 [facility] Organization  Address  City/State/Zipcode  Phone Number

 

 [employer]  [address] [place] [address]  



after 2017



Insurance







 Payer  Benefit Plan / Group  Subscriber ID  Type  Phone  Address

 

 [employer] MEDICARE  [employer] MEDICARE HMO/PPO [facility]  xxxxxxxx  HMO    









 Guarantor Name  Account Type  Relation to  Date of  Phone  Billing Address



     Patient  Birth    

 

 CARLOS BARKLEY  Personal/Famil  Self  1965  Home:  [address]



 YOANA CANTU  y      [phone]  [phone]  [place]



           [address]

## 2018-08-24 NOTE — ER
Nurse's Notes                                                                                     

 [facility]                                                                

Name: Cleveland Ricketts Jr                                                                            

Age: 53 yrs                                                                                       

Sex: Male                                                                                         

: 1965                                                                                   

MRN: [id number]                                                                                   

Arrival Date: 2018                                                                          

Time: 12:57                                                                                       

Account#: [id number]                                                                            

Bed 15                                                                                            

Private MD: Brenden Charles                                                                         

Diagnosis: Cough;Dyspnea;chest pain                                                               

                                                                                                  

Presentation:                                                                                     

                                                                                             

12:58 Presenting complaint: Patient states: right sided chest tightness with intermittent     sv  

      radiation to the left side. c/o lightheadedness, dizziness, weakness, dyspnea for "a        

      couple of weeks.". Transition of care: patient was not received from another setting of     

      care. Onset of symptoms was 2018. Care prior to arrival: None.                       

12:58 Method Of Arrival: Wheelchair                                                           sv  

12:58 Acuity: JENNIFER 3                                                                           sv  

13:15 Risk Assessment: Do you want to hurt yourself or someone else? Patient reports no       jl7 

      desire to harm self or others. Initial Sepsis Screen: Does the patient meet any 2           

      criteria? No. Patient's initial sepsis screen is negative. Does the patient have a          

      suspected source of infection? Yes: Productive cough/pneumonia.                             

                                                                                                  

Historical:                                                                                       

- Allergies:                                                                                      

13:06 No Known Allergies;                                                                     sv  

- PMHx:                                                                                           

13:06 Lung Cancer; Seizures; Hypertension; COPD; Emphysema; PTSD;                             sv  

- PSHx:                                                                                           

13:06 cervical fusion; lumbar fusion; dorsal column stimulator implant; Appendectomy; right   sv  

      lower lobe lobectomy; right great toe; collarbone; Cholecystectomy;                         

                                                                                                  

- Immunization history:: Adult Immunizations up to date.                                          

- Social history:: Smoking status: Patient uses tobacco products, smokes one pack                 

  cigarettes per day.                                                                             

- Ebola Screening: : No symptoms or risks identified at this time.                                

- Hospitalizations: : No recent hospitalization is reported.                                      

                                                                                                  

                                                                                                  

Screenin:24 Abuse screen: Denies threats or abuse. Denies injuries from another. Nutritional        jl7 

      screening: No deficits noted. Tuberculosis screening: No symptoms or risk factors           

      identified. Fall Risk IV access (20 points). Total Art Fall Scale indicates No Risk       

      (0-24 pts).                                                                                 

                                                                                                  

Assessment:                                                                                       

13:05 General: Appears in no apparent distress. uncomfortable, Behavior is calm, cooperative, jl7 

      appropriate for age. Pain: Complains of pain in anterior aspect of right upper chest        

      Pain radiates to right submandibular area and right sternocleidomastoid Pain currently      

      is 9 out of 10 on a pain scale. Quality of pain is described as burning, sharp, Pain        

      began a couple of weeks ago Is intermittent. Neuro: Level of Consciousness is awake,        

      alert, obeys commands, Oriented to person, place, time, situation. Cardiovascular:          

      Heart tones present Patient's skin is warm and dry. Respiratory: Reports shortness of       

      breath Airway is patent Respiratory effort is even, unlabored, Respiratory pattern is       

      regular, symmetrical, Breath sounds are clear bilaterally. GI: No signs and/or symptoms     

      were reported involving the gastrointestinal system. : No signs and/or symptoms were      

      reported regarding the genitourinary system. EENT: No signs and/or symptoms were            

      reported regarding the EENT system. Derm: Skin is pink, warm \T\ dry.                       

14:00 Reassessment: Patient and/or family updated on plan of care and expected duration. Pain jl7 

      level reassessed. Patient is alert, oriented x 3, equal unlabored respirations, skin        

      warm/dry/pink.                                                                              

14:40 Reassessment: Patient appears in no apparent distress at this time. Patient is alert,   jl7 

      oriented x 3, equal unlabored respirations, skin warm/dry/pink. Patient states symptoms     

      have improved.                                                                              

15:30 Reassessment: No changes from previously documented assessment. Patient and/or family   jl7 

      updated on plan of care and expected duration. Pain level reassessed. Patient is alert,     

      oriented x 3, equal unlabored respirations, skin warm/dry/pink.                             

                                                                                                  

Vital Signs:                                                                                      

13:06  / 90; Pulse 68; Resp 18; Temp 96.8; Pulse Ox 98% ; Weight 76.2 kg; Height 6 ft.  sv  

      1 in. (185.42 cm); Pain 8/10;                                                               

14:05  / 98; Pulse 66; Resp 18 S; Pulse Ox 96% on R/A;                                  jl7 

14:41  / 90; Pulse 64; Resp 16; Pulse Ox 99% ; Pain 4/10;                               jl7 

16:13  / 91; Pulse 66; Resp 16; Pulse Ox 99% on R/A;                                    jl7 

13:06 Body Mass Index 22.16 (76.20 kg, 185.42 cm)                                             sv  

                                                                                                  

ED Course:                                                                                        

12:57 Patient arrived in ED.                                                                  sb2 

12:57 Brenden Charles DO is Private Physician.                                                 sb2 

12:58 Arm band placed on right wrist. Patient placed in an exam room, on a stretcher.         sv  

13:04 Triage completed.                                                                       sv  

13:07 Amado Delgadillo MD is Attending Physician.                                             wa  

13:08 Pelon Granados, RN is Primary Nurse.                                                      jl7 

13:10 EKG done, by EKG tech. reviewed by Amado Delgadillo MD.                                   at1 

13:24 Patient has correct armband on for positive identification. Bed in low position. Call   jl7 

      light in reach. Side rails up X 1. Cardiac monitor on. Pulse ox on. NIBP on.                

13:24 Patient maintains SpO2 saturation greater than 95% on room air.                         jl7 

13:35 Radiology exam delayed due to IV insertion attempt and/or patient not having            jb2 

      appropriate IV at this time.                                                                

13:45 Patient moved to radiology via wheelchair.                                              jb2 

13:49 Initial lab(s) drawn, by me, sent to lab. Urine collected: clean catch specimen, clear. jl7 

      Inserted saline lock: 22 gauge in right forearm, using aseptic technique. Blood             

      collected.                                                                                  

13:54 X-ray completed. Patient tolerated procedure well.                                      az  

13:55 XRAY Chest Pa And Lat (2 Views) In Process Unspecified.                                 EDMS

13:57 Patient moved back from radiology.                                                      az  

14:05 Urine collected: clean catch specimen, clear.                                           mh5 

15:59 IV discontinued, Pressure dressing applied.                                             5 

16:13 No provider procedures requiring assistance completed.                                  jl7 

16:16 IV discontinued, intact, bleeding controlled, No redness/swelling at site.              jl7 

                                                                                                  

Administered Medications:                                                                         

14:10 Drug: Zofran 4 mg Route: IVP; Site: right forearm;                                      jl7 

14:39 Follow up: Response: No adverse reaction                                                jl7 

14:12 Drug: morphine 4 mg Route: IVP; Site: right forearm;                                    jl7 

14:41 Follow up: Response: No adverse reaction; Pain is decreased                             jl7 

14:49 Drug: Xopenex 1.25 mg Route: Inhalation;                                                jl7 

15:35 Drug: Rocephin - (cefTRIAXone) 2 grams Route: IVPB; Infused Over: 30 mins; Site: right  jl7 

      forearm;                                                                                    

15:40 Follow up: Response: No adverse reaction; IV Status: Completed infusion                 jl7 

16:15 Follow up: Response: No adverse reaction                                                jl7 

15:46 Drug: Zithromax 500 mg Route: PO;                                                       jl7 

16:14 Follow up: Response: No adverse reaction                                                jl7 

                                                                                                  

                                                                                                  

Outcome:                                                                                          

15:48 Discharge ordered by MD.                                                                wa  

16:13 Discharged to home ambulatory.                                                          jl7 

16:13 Condition: stable                                                                           

16:13 Discharge instructions given to patient, Instructed on discharge instructions, follow       

      up and referral plans. medication usage, Demonstrated understanding of instructions,        

      follow-up care, medications, Prescriptions given X 3.                                       

16:16 Patient left the ED.                                                                    jl7 

                                                                                                  

Signatures:                                                                                       

Dispatcher [employer]                           EDShabnam Wren, RN                    Bebeto Leon                              jb2                                                  

Shaista Greer, EKG Tech              EKG [facility]                                                  

Mabel Irving                              5                                                  

Pelon Granados RN RN   jl7                                                  

Amado Delgadillo MD MD wa Billeau, Sheri sb2                                                  

Cristina Parmar                                                   

                                                                                                  

**************************************************************************************************

## 2018-08-24 NOTE — RAD REPORT
EXAM DESCRIPTION:  Israel Barron (2 Views)8/24/2018 2:05 pm

 

CLINICAL HISTORY:  Chest pain

 

COMPARISON:  June 2018

 

FINDINGS:  A 12 millimeter nodular opacity overlying the right base is more prominent than the prior 
exam.

 

The remainder lungs appear clear of acute infiltrate. Mild scarring within the lungs is seen.

 

The lungs are mildly hyperaerated. The The heart is normal size

 

A neurostimulator device is in place

 

IMPRESSION:  A 12 millimeter nodular opacity overlying the right base is more prominent than on the p
rior exam. It probably represents a nipple shadow. However as a pulmonary nodule also has this appear
ance it is recommended that the patient have frontal and oblique views of the chest with a right nipp
le marker

## 2018-08-24 NOTE — XMS REPORT
Patient Summary Document

 Created on:2018



Patient:CARLOS BARKLEY

Sex:Male

:1965

External Reference #:[id number]





Demographics







 Address  [address]



   [place] [address]

 

 Home Phone  [phone]

 

 Email Address  NONE

 

 Preferred Language  Unknown

 

 Marital Status  Unknown

 

 Pentecostal Affiliation  Unknown

 

 Race  Unknown

 

 Additional Race(s)  Unavailable

 

 Ethnic Group  Unknown









Author







 Organization  [facility]

 

 Address  [address]



   [place] [address]

 

 Phone  [phone]









Care Team Providers







 Name  Role  Phone

 

 BUD SAHU  Unavailable  Unavailable









Problems

This patient has no known problems.



Allergies, Adverse Reactions, Alerts

This patient has no known allergies or adverse reactions.



Medications

This patient has no known medications.



Results







 Test Description  Test Time  Test Comments  Text Results  Atomic Results  
Result Comments









 Comprehensive Metabolic Panel  2017 04:50:00    









   Test Item  Value  Reference Range  Comments









 Sodium (test code=NA)  137 mmol/L  135-145  

 

 Potassium (test code=K)  4.1 mmol/L  3.5-5.1  

 

 Chloride (test code=CL)  101 mmol/L    

 

 Carbon Dioxide (test  23 mmol/L  22-29  



 code=CO2)      

 

 Glucose (test code=GLU)  145 mg/dL    

 

 Blood Urea Nitrogen (test  9 mg/dL  6-20  



 code=BUN)      

 

 Creatinine (test code=CREAT)  1.0 mg/dL  0.7-1.2  

 

 Calcium (test code=CA)  9.6 mg/dL  8.3-10.5  

 

 Prot Total (test code=TP)  8.0 g/dL  6.4-8.3  

 

 Albumin (test code=ALB)  3.8 g/dL  3.5-5.2  

 

 A/G Ratio (test  0.9 Ratio    



 code=AGRATIO)      

 

 Globulin (test code=GLOB)  4.2  2.9-3.1  

 

 Bili Total (test code=TBIL)  0.4 mg/dL  0.1-0.9  

 

 Alk Phos (test code=APHOS)  149 U/L    

 

 AST (test code=AST)  29 U/L  1-40  

 

 ALT (test code=ALT)  22 U/L  1-41  

 

 BUN/Creatinine Ratio (test  9.0    



 code=BCRATIO)      

 

 Anion Gap (test code=AGAP)  13 mmol/L  7-16  

 

 Estimated GFR (test  >60 mL/min/1.73m2    eGFR (estimated Glomerular



 code=GFR)      Filtration Rate) is an



       estimated value,calculated



       from the patient's serum



       creatinine using the MDRD



       equation.It is NOT the



       patient's actual GFR. The eGFR



       provides a more



       clinicallyuseful measure of



       kidney disease than serum



       creatinine alone.***This



       calculation takes sex and race



       into account, if the



       informationis provided. If the



       race is not provided, and the



       patient isAfrican-American,



       multiply by 1.212. If sex is



       not provided, and thepatient



       is female, multiply by 0.742.



       Results for patients <18 years



       ofage have not been validated



       by the MDRD study and should



       be interpretedwith



       caution.eGFR Result



       Interpretation:eGFR > or=60 is



       in the Normal RangeeGFR < 60



       may mean kidney diseaseeGFR <



       15 may mean kidney



       failure***Ranges recommended



       by the National Kidney



       Foundation,http://nkdep.nih.go



       v



[id number] 04:50:00





 Test Item  Value  Reference Range  Comments

 

 Amphetamine (test code=AMPH)  Negative  Negative  For diagnostic purposes only,



       positive results should always



       be assessedin conjunctionwith



       the patient's medical



       history,clinical examination



       and otherfindings.To fulfill



       legal requirements, a more



       specific alternate chemical



       methodmust be used inorder to



       obtain a Confirmed analytical



       result. GC/MS is the preferred



       confirmatory method.

 

 Barbiturates (test code=MARQUIS)  Negative  Negative  

 

 Benzodiazepine (test  Negative  Negative  



 code=DEJAN)      

 

 Cocaine (test code=COCA)  Negative  Negative  

 

 Methadone (test code=MTHD)  Negative  Negative  

 

 Opiates (test code=OPIA)  Negative  Negative  

 

 PCP (test code=PCP)  Negative  Negative  

 

 Propoxyphene (test  Negative  Negative  



 code=PROPOX)      

 

 THC (test code=THC)  POSITIVE  Negative  



CBC with [id number] 03:53:00





 Test Item  Value  Reference Range  Comments

 

 WBC (test code=WBC)  12.5 K/cumm  4.4-10.5  

 

 RBC (test code=RBC)  4.71 M/cumm  4.10-5.70  

 

 Hemoglobin (test code=HGB)  15.0 gm/dL  13.4-17.4  

 

 Hematocrit (test code=HCT)  44.0 %  38.7-52.0  

 

 MCV (test code=MCV)  93.5 fL    

 

 MCH (test code=MCH)  31.8 pg  27.0-32.5  

 

 MCHC (test code=MCHC)  34.0 g/dL  32.0-37.5  

 

 RDW (test code=RDW)  14.7 %  11.5-14.5  

 

 Platelet Count (test code=PLTCT)  257 K/cumm  140-440  

 

 MPV (test code=MPV)  9.0 fL    

 

 Diff Method (test code=DIFFM)  Auto    

 

 Neutrophil (test code=NEUT)  79.4 %  36-70  

 

 Lymphocyte (test code=LYMPH)  13.7 %  12-44  

 

 Monocyte (test code=MONO)  5.2 %  0-11  

 

 Eosinophil (test code=EOS)  1.3 %  0-7  

 

 Basophil (test code=BASO)  0.4 %  0-2  

 

 Neutro Abs (test code=ANEUT)  9.9 K/cumm  1.6-7.4  

 

 Lymph Abs (test code=ALYMPH)  1.7 K/cumm  0.5-4.6  

 

 Mono Abs (test code=AMONO)  0.7 K/cumm  0.0-1.2  

 

 Eos Abs (test code=AEOS)  0.16 K/cumm  0.00-0.74  

 

 Baso Abs (test code=ABASO)  0.1 K/cumm  0.00-0.21

## 2018-08-24 NOTE — EDPHYS
Physician Documentation                                                                           

 [facility]                                                                

Name: Cleveland Ricketts Jr                                                                            

Age: 53 yrs                                                                                       

Sex: Male                                                                                         

: 1965                                                                                   

MRN: [id number]                                                                                   

Arrival Date: 2018                                                                          

Time: 12:57                                                                                       

Account#: [id number]                                                                            

Bed 15                                                                                            

Private MD: Brenden Charles                                                                         

ED Physician Amado Delgadillo                                                                      

HPI:                                                                                              

                                                                                             

14:37 This 53 yrs old  Male presents to ER via Wheelchair with complaints of Chest   wa  

      Tightness, Breathing Difficulty.                                                            

14:37 The patient or guardian reports chest pain that is located primarily in the R side      wa  

      chest, chest tightness. SOB. . Onset: 2 week(s) ago. The pain does not radiate.             

      Associated signs and symptoms: Pertinent positives: cough, shortness of breath,             

      Pertinent negatives: abdominal pain, diaphoresis, dizziness, headache, lower extremity      

      pain, lower extremity swelling, lightheadedness, nausea, near syncope, palpitations,        

      syncope, vomiting. The chest pain is described as sharp. Duration: The patient or           

      guardian reports a single episode, that is still ongoing, and worsening. Modifying          

      factors: The symptoms are alleviated by nothing. the symptoms are aggravated by             

      nothing. Severity of pain: At its worst the pain was moderate in the emergency              

      department the pain is actually worse moderately. The patient has experienced similar       

      episodes in the past, several times. The patient has not recently seen a physician.         

      states has h/o COPD and pna.                                                                

                                                                                                  

Historical:                                                                                       

- Allergies:                                                                                      

13:06 No Known Allergies;                                                                     sv  

- PMHx:                                                                                           

13:06 Lung Cancer; Seizures; Hypertension; COPD; Emphysema; PTSD;                             sv  

- PSHx:                                                                                           

13:06 cervical fusion; lumbar fusion; dorsal column stimulator implant; Appendectomy; right   sv  

      lower lobe lobectomy; right great toe; collarbone; Cholecystectomy;                         

                                                                                                  

- Immunization history:: Adult Immunizations up to date.                                          

- Social history:: Smoking status: Patient uses tobacco products, smokes one pack                 

  cigarettes per day.                                                                             

- Ebola Screening: : No symptoms or risks identified at this time.                                

- Hospitalizations: : No recent hospitalization is reported.                                      

                                                                                                  

                                                                                                  

ROS:                                                                                              

14:40 Constitutional: Negative for fever, chills, and weight loss, Eyes: Negative for injury, wa  

      pain, redness, and discharge, ENT: Negative for injury, pain, and discharge, Neck:          

      Negative for injury, pain, and swelling, Abdomen/GI: Negative for abdominal pain,           

      nausea, vomiting, diarrhea, and constipation, Back: Negative for injury and pain, :       

      Negative for injury, bleeding, discharge, and swelling, MS/Extremity: Negative for          

      injury and deformity, Skin: Negative for injury, rash, and discoloration, Neuro:            

      Negative for headache, weakness, numbness, tingling, and seizure, Psych: Negative for       

      depression, anxiety, suicide ideation, homicidal ideation, and hallucinations.              

14:40 Cardiovascular: Positive for chest pain, with cough, Negative for edema, orthopnea.         

14:40 Respiratory: Positive for cough, shortness of breath, Negative for cough, orthopnea,        

      pleurisy.                                                                                   

                                                                                                  

Exam:                                                                                             

14:40 Constitutional:  This is a well developed, well nourished patient who is awake, alert,  wa  

      and in no acute distress. Head/Face:  Normocephalic, atraumatic. Eyes:  Pupils equal        

      round and reactive to light, extra-ocular motions intact.  Lids and lashes normal.          

      Conjunctiva and sclera are non-icteric and not injected.  Cornea within normal limits.      

      Periorbital areas with no swelling, redness, or edema. ENT:  Nares patent. No nasal         

      discharge, no septal abnormalities noted.  Tympanic membranes are normal and external       

      auditory canals are clear.  Oropharynx with no redness, swelling, or masses, exudates,      

      or evidence of obstruction, uvula midline.  Mucous membranes moist. Neck:  Trachea          

      midline, no thyromegaly or masses palpated, and no cervical lymphadenopathy.  Supple,       

      full range of motion without nuchal rigidity, or vertebral point tenderness.  No            

      Meningismus. Chest/axilla:  Normal chest wall appearance and motion.  Nontender with no     

      deformity.  No lesions are appreciated. Cardiovascular:  Regular rate and rhythm with a     

      normal S1 and S2.  No gallops, murmurs, or rubs.  Normal PMI, no JVD.  No pulse             

      deficits. Abdomen/GI:  Soft, non-tender, with normal bowel sounds.  No distension or        

      tympany.  No guarding or rebound.  No evidence of tenderness throughout. Back:  No          

      spinal tenderness.  No costovertebral tenderness.  Full range of motion. Skin:  Warm,       

      dry with normal turgor.  Normal color with no rashes, no lesions, and no evidence of        

      cellulitis. MS/ Extremity:  Pulses equal, no cyanosis.  Neurovascular intact.  Full,        

      normal range of motion. Neuro:  Awake and alert, GCS 15, oriented to person, place,         

      time, and situation.  Cranial nerves II-XII grossly intact.  Motor strength 5/5 in all      

      extremities.  Sensory grossly intact.  Cerebellar exam normal.  Normal gait. Psych:         

      Awake, alert, with orientation to person, place and time.  Behavior, mood, and affect       

      are within normal limits.                                                                   

14:40 Respiratory: the patient does not display signs of respiratory distress,  Respirations:     

      normal, Breath sounds: mild coarseness noted bilaterally, Respiratory rate:  normal         

                                                                                                  

Vital Signs:                                                                                      

13:06  / 90; Pulse 68; Resp 18; Temp 96.8; Pulse Ox 98% ; Weight 76.2 kg; Height 6 ft.  sv  

      1 in. (185.42 cm); Pain 8/10;                                                               

14:05  / 98; Pulse 66; Resp 18 S; Pulse Ox 96% on R/A;                                  jl7 

14:41  / 90; Pulse 64; Resp 16; Pulse Ox 99% ; Pain 4/10;                               jl7 

16:13  / 91; Pulse 66; Resp 16; Pulse Ox 99% on R/A;                                    jl7 

13:06 Body Mass Index 22.16 (76.20 kg, 185.42 cm)                                             sv  

                                                                                                  

MDM:                                                                                              

13:07 Patient medically screened.                                                             wa  

14:43 Differential diagnosis: abnormal EKG, acute myocardial infarction, coronary artery      wa  

      disease chest wall pain, pericarditis, pleurisy, pneumonia, pulmonary embolus, stable       

      angina, unstable angina, pt a smoker. will r/o acute infectious process.                    

15:43 Data reviewed: vital signs, nurses notes, lab test result(s), EKG, radiologic studies.  wa  

      Test interpretation: by ED physician or midlevel provider: EKG: HR 64. nml sinus. no        

      acute ischemic changes. CXR: R lung nodule.                                                 

15:47 Test interpretation: by ED physician or midlevel provider: labs noted for elevated alk  wa  

      phos. .                                                                                     

                                                                                                  

                                                                                             

13:25 Order name: Hepatic Function; Complete Time: 14:45                                                                                                                                   

13:25 Order name: Blood Culture Adult (2)                                                     wa  

                                                                                             

13:25 Order name: BMP; Complete Time: 14:45                                                                                                                                                

13:25 Order name: CBC with Diff; Complete Time: 14:45                                                                                                                                      

13:25 Order name: Lipase; Complete Time: 14:45                                                                                                                                             

13:25 Order name: Magnesium; Complete Time: 14:45                                                                                                                                          

13:25 Order name: XRAY Chest Pa And Lat (2 Views); Complete Time: 15:13                       wa  

                                                                                             

13:25 Order name: NT PRO-BNP; Complete Time: 14:45                                            wa  

                                                                                             

13:25 Order name: PT-INR; Complete Time: 14:45                                                wa  

                                                                                             

13:25 Order name: Troponin (emerg Dept Use Only); Complete Time: 14:45                        wa  

                                                                                             

14:11 Order name: Urine Dipstick--Ancillary (enter results); Complete Time: 14:44             eb  

                                                                                             

13:03 Order name: EKG; Complete Time: 13:03                                                   sv  

                                                                                             

13:03 Order name: EKG - Nurse/Tech; Complete Time: 13:22                                        

                                                                                             

13:25 Order name: Cardiac monitoring; Complete Time: 13:49                                    wa  

                                                                                             

13:25 Order name: IV Saline Lock; Complete Time: 13:49                                        wa  

                                                                                             

13:25 Order name: Labs collected and sent; Complete Time: 13:49                               wa  

                                                                                             

13:25 Order name: O2 Per Protocol; Complete Time: 13:49                                       wa  

                                                                                             

13:25 Order name: O2 Sat Monitoring; Complete Time: 13:49                                     wa  

                                                                                             

13:25 Order name: Urine Dipstick-Ancillary (obtain specimen); Complete Time: 13:49            wa  

                                                                                                  

Administered Medications:                                                                         

14:10 Drug: Zofran 4 mg Route: IVP; Site: right forearm;                                      jl7 

14:39 Follow up: Response: No adverse reaction                                                jl7 

14:12 Drug: morphine 4 mg Route: IVP; Site: right forearm;                                    jl7 

14:41 Follow up: Response: No adverse reaction; Pain is decreased                             jl7 

14:49 Drug: Xopenex 1.25 mg Route: Inhalation;                                                jl7 

15:35 Drug: Rocephin - (cefTRIAXone) 2 grams Route: IVPB; Infused Over: 30 mins; Site: right  jl7 

      forearm;                                                                                    

15:40 Follow up: Response: No adverse reaction; IV Status: Completed infusion                 jl7 

16:15 Follow up: Response: No adverse reaction                                                jl7 

15:46 Drug: Zithromax 500 mg Route: PO;                                                       jl7 

16:14 Follow up: Response: No adverse reaction                                                jl7 

                                                                                                  

                                                                                                  

Disposition:                                                                                      

18 15:48 Discharged to Home. Impression: Cough, Dyspnea, chest pain.                        

- Condition is Stable.                                                                            

                                                                                                  

- Prescriptions for Augmentin 875- 125 mg Oral Tablet - take 1 tablet by ORAL route               

  every 12 hours for 7 days; 14 tablet. Albuterol Sulfate 2.5 mg /3 mL (0.083 %)                  

  Inhalation Solution for Nebulization - inhale 1 unit by NEBULIZATION route every 8              

  hours As needed; 1 box. Albuterol Sulfate 90 mcg/actuation - inhale 1-2 puff by                 

  INHALATION route every 4-6 hours; 1 Inhaler.                                                    

- Medication Reconciliation Form, Thank You Letter, Antibiotic Education, Prescription            

  Opioid Use form.                                                                                

- Follow up: Private Physician; When: 1 - 2 days; Reason: Re-evaluation by your                   

  physician.                                                                                      

- Problem is new.                                                                                 

- Symptoms have improved.                                                                         

- Notes: quit smoking. take medicines as prescribed. follow up with your doctor on                

  Monday for further evaluation. return here if your symptoms worsen                              

                                                                                                  

                                                                                                  

Signatures:                                                                                       

Dispatcher [employer]                           Shabnam Maurer RN RN                                                      

Pelon Granados RN RN   jl7                                                  

Amado Delgadillo MD MD wa                                                   

                                                                                                  

Corrections: (The following items were deleted from the chart)                                    

16:16 15:48 2018 15:48 Discharged to Home. Impression: Cough; Dyspnea; chest pain.      jl7 

      Condition is Stable. Forms are Medication Reconciliation Form, Thank You Letter,            

      Antibiotic Education, Prescription Opioid Use. Follow up: Private Physician; When: 1 -      

      2 days; Reason: Re-evaluation by your physician. Problem is new. Symptoms have              

      improved. wa                                                                                

                                                                                                  

**************************************************************************************************

## 2018-08-25 NOTE — EKG
Test Date:    2018-08-24               Test Time:    13:04:31

Technician:   RUBENS                                     

                                                     

MEASUREMENT RESULTS:                                       

Intervals:                                           

Rate:         64                                     

WY:           180                                    

QRSD:         86                                     

QT:           408                                    

QTc:          420                                    

Axis:                                                

P:            17                                     

WY:           180                                    

QRS:          78                                     

T:            54                                     

                                                     

INTERPRETIVE STATEMENTS:                                       

                                                     

Normal sinus rhythm

Normal ECG

Compared to ECG 06/12/2018 10:38:39

No significant changes



Electronically Signed On 08-25-18 08:34:00 CDT by Bryn Toribio

## 2018-09-18 ENCOUNTER — HOSPITAL ENCOUNTER (EMERGENCY)
Dept: HOSPITAL 97 - ER | Age: 53
Discharge: HOME | End: 2018-09-18
Payer: COMMERCIAL

## 2018-09-18 VITALS — OXYGEN SATURATION: 96 % | DIASTOLIC BLOOD PRESSURE: 84 MMHG | SYSTOLIC BLOOD PRESSURE: 133 MMHG

## 2018-09-18 VITALS — TEMPERATURE: 98.2 F

## 2018-09-18 DIAGNOSIS — R51: ICD-10-CM

## 2018-09-18 DIAGNOSIS — G40.909: ICD-10-CM

## 2018-09-18 DIAGNOSIS — Z90.2: ICD-10-CM

## 2018-09-18 DIAGNOSIS — R41.82: Primary | ICD-10-CM

## 2018-09-18 DIAGNOSIS — Z85.118: ICD-10-CM

## 2018-09-18 DIAGNOSIS — J44.9: ICD-10-CM

## 2018-09-18 DIAGNOSIS — I10: ICD-10-CM

## 2018-09-18 LAB
ALBUMIN SERPL BCP-MCNC: 3.9 G/DL (ref 3.4–5)
ALP SERPL-CCNC: 186 U/L (ref 45–117)
ALT SERPL W P-5'-P-CCNC: 27 U/L (ref 12–78)
AST SERPL W P-5'-P-CCNC: 33 U/L (ref 15–37)
BUN BLD-MCNC: 16 MG/DL (ref 7–18)
GLUCOSE SERPLBLD-MCNC: 131 MG/DL (ref 74–106)
HCT VFR BLD CALC: 46.4 % (ref 39.6–49)
INR BLD: 0.96
LYMPHOCYTES # SPEC AUTO: 1.1 K/UL (ref 0.7–4.9)
MAGNESIUM SERPL-MCNC: 2.2 MG/DL (ref 1.8–2.4)
MCH RBC QN AUTO: 33.1 PG (ref 27–35)
MCV RBC: 98.3 FL (ref 80–100)
NT-PROBNP SERPL-MCNC: 298 PG/ML (ref ?–125)
PMV BLD: 8.8 FL (ref 7.6–11.3)
POTASSIUM SERPL-SCNC: 4.1 MMOL/L (ref 3.5–5.1)
RBC # BLD: 4.72 M/UL (ref 4.33–5.43)
TROPONIN (EMERG DEPT USE ONLY): < 0.02 NG/ML (ref 0–0.04)

## 2018-09-18 PROCEDURE — 36415 COLL VENOUS BLD VENIPUNCTURE: CPT

## 2018-09-18 PROCEDURE — 71045 X-RAY EXAM CHEST 1 VIEW: CPT

## 2018-09-18 PROCEDURE — 82962 GLUCOSE BLOOD TEST: CPT

## 2018-09-18 PROCEDURE — 96360 HYDRATION IV INFUSION INIT: CPT

## 2018-09-18 PROCEDURE — 93005 ELECTROCARDIOGRAM TRACING: CPT

## 2018-09-18 PROCEDURE — 70450 CT HEAD/BRAIN W/O DYE: CPT

## 2018-09-18 PROCEDURE — 85610 PROTHROMBIN TIME: CPT

## 2018-09-18 PROCEDURE — 80076 HEPATIC FUNCTION PANEL: CPT

## 2018-09-18 PROCEDURE — 85730 THROMBOPLASTIN TIME PARTIAL: CPT

## 2018-09-18 PROCEDURE — 82550 ASSAY OF CK (CPK): CPT

## 2018-09-18 PROCEDURE — 80048 BASIC METABOLIC PNL TOTAL CA: CPT

## 2018-09-18 PROCEDURE — 99284 EMERGENCY DEPT VISIT MOD MDM: CPT

## 2018-09-18 PROCEDURE — 84484 ASSAY OF TROPONIN QUANT: CPT

## 2018-09-18 PROCEDURE — 83735 ASSAY OF MAGNESIUM: CPT

## 2018-09-18 PROCEDURE — 85025 COMPLETE CBC W/AUTO DIFF WBC: CPT

## 2018-09-18 PROCEDURE — 83880 ASSAY OF NATRIURETIC PEPTIDE: CPT

## 2018-09-18 NOTE — RAD REPORT
EXAM DESCRIPTION:  CT - Ct Stroke Brain Wo Cont - 9/18/2018 5:57 pm

 

CLINICAL HISTORY:  Right-sided headache, CVA symptoms, transient alteration of awareness

 

CLINICAL HISTORY:  None.

 

TECHNIQUE:  Axial 5 millimeter thick images of the head were obtained without IV contrast.

 

All CT scans are performed using dose optimization technique as appropriate and may include automated
 exposure control or mA/KV adjustment according to patient size.

 

FINDINGS:  No intracranial hemorrhage, mass, or cerebral edema. No acute cortical based infarction se
en. No cortical edema or sulcal effacement. Atrophy and chronic ischemic changes are present. Finding
s are not severe but are considered advanced for the patient's age. Ventricles are in proportion to v
olume loss. Arterial and physiologic calcifications are present. Gray matter-white matter differentia
tion is preserved.

 

 

Visualized portions of the mastoid air cells, paranasal sinuses, and orbits are unremarkable.

 

IMPRESSION:  No CT evidence of acute intracranial process.

 

Atrophy and chronic ischemic changes are present. These are not severe but are considered advanced gi
kamlesh the patient's age.

## 2018-09-18 NOTE — XMS REPORT
Patient Summary Document

 Created on:2018



Patient:CARLOS BARKLEY

Sex:Male

:1965

External Reference #:[id number]





Demographics







 Address  [address]



   [place] [address]

 

 Home Phone  [phone]

 

 Email Address  NONE

 

 Preferred Language  Unknown

 

 Marital Status  Unknown

 

 [employer] Affiliation  Unknown

 

 Race  Unknown

 

 Additional Race(s)  Unavailable

 

 Ethnic Group  Unknown









Author







 Organization  [facility]

 

 Address  [address]



   [place] [address]

 

 Phone  [phone]









Care Team Providers







 Name  Role  Phone

 

 BUD SAHU  Unavailable  Unavailable









Problems

This patient has no known problems.



Allergies, Adverse Reactions, Alerts

This patient has no known allergies or adverse reactions.



Medications

This patient has no known medications.



Results







 Test Description  Test Time  Test Comments  Text Results  Atomic Results  
Result Comments









 Comprehensive Metabolic Panel  2017 04:50:00    









   Test Item  Value  Reference Range  Comments









 Sodium (test code=NA)  137 mmol/L  135-145  

 

 Potassium (test code=K)  4.1 mmol/L  3.5-5.1  

 

 Chloride (test code=CL)  101 mmol/L    

 

 Carbon Dioxide (test  23 mmol/L  22-29  



 code=CO2)      

 

 Glucose (test code=GLU)  145 mg/dL    

 

 Blood Urea Nitrogen (test  9 mg/dL  6-20  



 code=BUN)      

 

 Creatinine (test code=CREAT)  1.0 mg/dL  0.7-1.2  

 

 Calcium (test code=CA)  9.6 mg/dL  8.3-10.5  

 

 Prot Total (test code=TP)  8.0 g/dL  6.4-8.3  

 

 Albumin (test code=ALB)  3.8 g/dL  3.5-5.2  

 

 A/G Ratio (test  0.9 Ratio    



 code=AGRATIO)      

 

 Globulin (test code=GLOB)  4.2  2.9-3.1  

 

 Bili Total (test code=TBIL)  0.4 mg/dL  0.1-0.9  

 

 Alk Phos (test code=APHOS)  149 U/L    

 

 AST (test code=AST)  29 U/L  1-40  

 

 ALT (test code=ALT)  22 U/L  1-41  

 

 BUN/Creatinine Ratio (test  9.0    



 code=BCRATIO)      

 

 Anion Gap (test code=AGAP)  13 mmol/L  7-16  

 

 Estimated GFR (test  >60 mL/min/1.73m2    eGFR (estimated Glomerular



 code=GFR)      Filtration Rate) is an



       estimated value,calculated



       from the patient's serum



       creatinine using the MDRD



       equation.It is NOT the



       patient's actual GFR. The eGFR



       provides a more



       clinicallyuseful measure of



       kidney disease than serum



       creatinine alone.***This



       calculation takes sex and race



       into account, if the



       informationis provided. If the



       race is not provided, and the



       patient isAfrican-American,



       multiply by 1.212. If sex is



       not provided, and thepatient



       is female, multiply by 0.742.



       Results for patients <18 years



       ofage have not been validated



       by the MDRD study and should



       be interpretedwith



       caution.eGFR Result



       Interpretation:eGFR > or=60 is



       in the Normal RangeeGFR < 60



       may mean kidney diseaseeGFR <



       15 may mean kidney



       failure***Ranges recommended



       by the National Kidney



       Foundation,http://nkdep.nih.go



       v



[id number] 04:50:00





 Test Item  Value  Reference Range  Comments

 

 Amphetamine (test code=AMPH)  Negative  Negative  For diagnostic purposes only,



       positive results should always



       be assessedin conjunctionwith



       the patient's medical



       history,clinical examination



       and otherfindings.To fulfill



       legal requirements, a more



       specific alternate chemical



       methodmust be used inorder to



       obtain a Confirmed analytical



       result. GC/MS is the preferred



       confirmatory method.

 

 Barbiturates (test code=MARQUIS)  Negative  Negative  

 

 Benzodiazepine (test  Negative  Negative  



 code=DEJAN)      

 

 Cocaine (test code=COCA)  Negative  Negative  

 

 Methadone (test code=MTHD)  Negative  Negative  

 

 Opiates (test code=OPIA)  Negative  Negative  

 

 PCP (test code=PCP)  Negative  Negative  

 

 Propoxyphene (test  Negative  Negative  



 code=PROPOX)      

 

 THC (test code=THC)  POSITIVE  Negative  



CBC with [id number] 03:53:00





 Test Item  Value  Reference Range  Comments

 

 WBC (test code=WBC)  12.5 K/cumm  4.4-10.5  

 

 RBC (test code=RBC)  4.71 M/cumm  4.10-5.70  

 

 Hemoglobin (test code=HGB)  15.0 gm/dL  13.4-17.4  

 

 Hematocrit (test code=HCT)  44.0 %  38.7-52.0  

 

 MCV (test code=MCV)  93.5 fL    

 

 MCH (test code=MCH)  31.8 pg  27.0-32.5  

 

 MCHC (test code=MCHC)  34.0 g/dL  32.0-37.5  

 

 RDW (test code=RDW)  14.7 %  11.5-14.5  

 

 Platelet Count (test code=PLTCT)  257 K/cumm  140-440  

 

 MPV (test code=MPV)  9.0 fL    

 

 Diff Method (test code=DIFFM)  Auto    

 

 Neutrophil (test code=NEUT)  79.4 %  36-70  

 

 Lymphocyte (test code=LYMPH)  13.7 %  12-44  

 

 Monocyte (test code=MONO)  5.2 %  0-11  

 

 Eosinophil (test code=EOS)  1.3 %  0-7  

 

 Basophil (test code=BASO)  0.4 %  0-2  

 

 Neutro Abs (test code=ANEUT)  9.9 K/cumm  1.6-7.4  

 

 Lymph Abs (test code=ALYMPH)  1.7 K/cumm  0.5-4.6  

 

 Mono Abs (test code=AMONO)  0.7 K/cumm  0.0-1.2  

 

 Eos Abs (test code=AEOS)  0.16 K/cumm  0.00-0.74  

 

 Baso Abs (test code=ABASO)  0.1 K/cumm  0.00-0.21

## 2018-09-18 NOTE — XMS REPORT
Clinical Summary

 Created on:2018



Patient:Carlos Barkley Jr

Sex:Male

:1965

External Reference #:[id number]





Demographics







 Address  [address]



   



   [place] [address]

 

 Home Phone  [phone]

 

 Home Phone  [phone]

 

 Email Address  [email or web address]

 

 Preferred Language  English

 

 Marital Status  

 

 Orthodoxy Affiliation  Unknown

 

 Race  White

 

 Ethnic Group  Not  or 









Author







 Organization  [facility]

 

 Address  [address] [place] [address]









Support







 Name  Relationship  Address  Phone

 

 Sabrina Greer  Unavailable  Unavailable  [phone]









Care Team Providers







 Name  Role  Phone

 

 Asked, No Pcp  Primary Care Provider  Unavailable









Allergies

No Known Allergies



Current Medications







 Prescription  Sig.  Disp.  Refills  Start  End  Status



         Date  Date  

 

 metFORMIN XR  Take 1,000 mg      20    Active



 (GLUCOPHAGE-XR) 500  by mouth daily.      17    



 mg 24 hr tablet            

 

 insulin GLARGINE  Inject 20 Units          Active



 (LANTUS) 100  under the skin          



 unit/mL injection  nightly.          



 (vial)            

 

 levETIRAcetam  Take 1,000 mg          Active



 (KEPPRA) 1000 MG  by mouth 2          



 tablet  (two) times a          



   day.          

 

 venlafaxine XR  Take 150 mg by          Active



 (EFFEXOR-XR) 150 MG  mouth daily.          



 24 hr capsule            

 

 diazePAM (VALIUM)  Take 10 mg by      20    Active



 10 MG tablet  mouth 3 (three)      18    



   times a day.          

 

 QUEtiapine  Take 600 mg by      20    Active



 (SEROquel) 300 MG  mouth nightly.      18    



 tablet            

 

 traMADol (ULTRAM)  Take 50 mg by          Active



 50 mg tablet  mouth every 6          



   (six) hours as          



   needed for          



   moderate pain.          

 

 gabapentin  Take 800 mg by    2  20  Discontinued



 (NEURONTIN) 800 mg  mouth 4 (four)      17  018  



 tablet  times a day.          

 

 QUEtiapine  Take 800 mg by    2  05/15/20  02/22/2  Discontinued



 (SEROquel) 400 MG  mouth nightly.      17  018  



 tablet  Take 2 tablets          



   at bedtime          

 

 busPIRone (BUSPAR)  Take 10 mg by          Discontinued



 10 MG tablet  mouth 3 (three)        018  



   times a day.          

 

 acetaminophen-codei  TK 1 TO 2 TS PO    0  07/15/20  03/03/2  Discontinued



 ne (TYLENOL WITH  Q 6 H PRN P      17  018  



 CODEINE #3) 300-30            



 mg per tablet            

 

 HYDROcodone-acetami  Take 1 tablet  10 tablet  0  20  



 nophen (NORCO)  by mouth every      17  017  



  mg per  6 (six) hours          



 tablet  as needed for          



   moderate pain          



   for up to 10          



   doses. Max          



   Daily Amount: 4          



   tablets          

 

 gabapentin  Take 1 tablet  90 tablet  0  20  



 (NEURONTIN) 800 mg  (800 mg total)      18  018  



 tablet  by mouth 3          



   (three) times a          



   day for 30          



   days.          

 

 metoprolol  Take 0.5  15 tablet  0  20  



 succinate XL  tablets (12.5      18  018  



 (TOPROL-XL) 25 mg  mg total) by          



 24 hr tablet  mouth daily for          



   30 days.          

 

 HYDROcodone-acetami  Take 1 tablet  15 tablet  0  20  



 nophen (NORCO)  by mouth every      18  018  



 7.5-325 mg per  6 (six) hours          



 tablet  as needed for          



   severe pain for          



   up to 14 days.          



   Max Daily          



   Amount: 4          



   tablets          

 

 pantoprazole  Take 1 tablet  30 tablet  0  20  



 (PROTONIX) 40 MG EC  (40 mg total)      18  018  



 tablet  by mouth daily          



   for 30 days.          

 

 ondansetron ODT  Take 1 tablet  15 tablet  0  20  Discontinued



 (ZOFRAN ODT) 4 MG  (4 mg total) by      18  018  



 disintegrating  mouth every 8          



 tablet  (eight) hours          



   as needed for          



   nausea or          



   vomiting for up          



   to 15 doses.          

 

 promethazine  Insert 1  12 suppository  0  20  Discontinued



 (PHENERGAN) 25 MG  suppository (25      18  018  



 suppository  mg total) into          



   the rectum          



   every 6 (six)          



   hours as needed          



   for nausea or          



   vomiting for up          



   to 12 doses.          

 

 hydromorPHONE  Take 1 tablet  10 tablet  0  03/03/20  03/10/2  



 (DILAUDID) 2 MG  (2 mg total) by      18  018  



 tablet  mouth every 8          



   (eight) hours          



   as needed for          



   severe pain          



   (score 7-10)          



   for up to 7          



   days. Max Daily          



   Amount: 6 mg          

 

 promethazine  Take 1 tablet  20 tablet  0  20  



 (PHENERGAN) 12.5 MG  (12.5 mg total)      18  018  



 tablet  by mouth every          



   8 (eight) hours          



   as needed for          



   nausea or          



   vomiting for up          



   to 30 days.          

 

 HYDROcodone-acetami  Take 1 tablet  20 tablet  0  03/03/20  03/10/2  



 nophen (NORCO)  by mouth every      18  018  



 7.5-325 mg per  8 (eight) hours          



 tablet  as needed for          



   moderate pain          



   for up to 7          



   days. Max Daily          



   Amount: 3          



   tablets          

 

 naproxen (NAPROSYN)  Take 1 tablet  28 tablet  0  20  



 500 MG tablet  (500 mg total)      18  018  



   by mouth 2          



   (two) times a          



   day with meals          



   for 14 days.          

 

 omeprazole  Take 2 capsules  60 capsule  0  20  



 (PriLOSEC) 20 MG  (40 mg total)      18  018  



 capsule  by mouth daily          



   for 30 days.          

 

 cyclobenzaprine  Take 1 tablet  20 tablet  0  20  



 (FLEXERIL) 10 mg  (10 mg total)      18  018  



 tablet  by mouth 3          



   (three) times a          



   day as needed          



   for muscle          



   spasms for up          



   to 10 days.          







Active Problems







 Problem  Noted Date

 

 Sciatica of left side  2018

 

 Syncope  2018

 

 Essential hypertension  2017

 

 Type 2 diabetes mellitus with hyperglycemia  2017

 

 PRIMITIVO (acute kidney injury)  2017

 

 Seizure disorder  2017

 

 Intractable vomiting with nausea  2017

 

 Abdominal pain  2017









 Overview: 







 Added automatically from request for surgery [id number]









 Gastrointestinal hemorrhage  10/11/2017

 

 Gastrointestinal hemorrhage with hematemesis  10/11/2017









 Overview: 







 Added automatically from request for surgery [id number]









 Vertigo  2017

 

 Pneumonia due to infectious organism  2017

 

 COPD with acute bronchitis  2017

 

 COPD (chronic obstructive pulmonary disease)  2017

 

 COPD exacerbation  2017

 

 Lung mass  2017

 

 Tobacco dependence  2017







Resolved Problems







 Problem  Noted Date  Resolved Date

 

 Leukocytosis  2017







Encounters







 Date  Type  Specialty  Care Team  Description

 

 20  Emergency  Emergency Medicine  Cait,  Suicidal ideation (Primary Dx
)



 18      Chago Olivas MD  

 

 20  Emergency  Emergency Medicine  [place]  Chronic right-sided thoracic 
back pain (Primary Dx);



 18      Meulen,  Narcotic abuse;



       Raiza  Drug-seeking behavior



       MD Bryn  

 

 20  Hospital  Radiology  [facility] (bronchogenic carcinoma)



 18  Encounter    MD Milagros  

 

 20  Transcribe  Access  [facility] (bronchogenic carcinoma)



 18  Orders    MD Milagros  (Primary Dx)

 

 20  Emergency  General Internal  Landaverde, Drew  Sciatica of left side (
Primary Dx);



 18 -    Gary GRECO MD



  Intractable pain



 20      Travon,  



 18      MD Jessenia De La Vega, Princess Guerin MD  

 

 20  Emergency  Emergency Medicine  Lelia,  Other chronic pain (Primary



 18      Chago FORTUNE,  Dx)



       DO  

 

 20  Emergency  General Internal  Lelia,  Syncope, unspecified syncope 
type (Primary Dx);



 18 -    Medicine  Chago FORTUNE,  Other chronic pain



 20      DO



  



 18      Marianela Turner MD  

 

 20  Anesthesia  Gastroenterology  Natali,  



 Ros  Event    Svitlana Villalta MD  

 

 20  Procedure Pass  Gastroenterology    



 18        

 

 20  Surgery  Gastroenterology  Con Knowles  ESOPHAGOGASTRODUODENOSCOPY



 18      MD Lang  (EGD) with bx

 

 20  [facility]  General Internal  [facility]  Intractable vomiting with 
nausea, unspecified vomiting type (Primary Dx);



 17 -  Encounter  Medicine  MD Ariel



  Pain;



 20      Bavare,  Abdominal pain, unspecified abdominal location;



 18      Jm Cabrera,  Essential hypertension;



       MD



  Type 2 diabetes mellitus with hyperglycemia, with long-term current use of 
insulin



       Heber Orourke DO  

 

 10/24/20  Telephone  Family Medicine  Jordy Lyles MD  

 

 10/19/20  Telephone  Gastroenterology  Jordy Lyles MD  

 

 10/13/20  Telephone  Gastroenterology  Jordy Lyles MD  

 

 10/13/20  Procedure Pass  Gastroenterology    



 17        

 

 10/13/20  Procedure Pass  Gastroenterology    



         

 

 10/11/20  Emergency  General Internal  Reichl, Ricco  Gastrointestinal 
hemorrhage with hematemesis (Primary Dx);



 17 -    Medicine  MD Ariel



  Gastrointestinal hemorrhage, unspecified gastrointestinal hemorrhage type;



 10/13/20      Gandhi, Izabel  Pain of upper abdomen;



 Umair Snow MD  Nausea and vomiting, intractability of vomiting not specified
, unspecified vomiting type

 

 20  Emergency  Emergency Medicine  Nile Lilly,  Vision blurred (Primary 
Dx)



 17      MD  

 

 20  Emergency  Emergency Medicine  Tirso Emanuel  Right upper quadrant 
abdominal pain (Primary Dx);



 17      Sung,   Other chronic pain

 

 20  Emergency  Emergency Medicine    



 17        



after 2017



Immunizations







 Name  Dates Previously Given  Next Due

 

 FLUCELVAX QUAD PF (0.5mL syringe)  2018  







Family History







 Medical History  Relation  Name  Comments

 

 Cerebral aneurysm  Father    

 

 Heart disease  Father    

 

 Kidney cancer  Father    

 

 Lung cancer  Father    

 

 Stroke  Father    

 

 Diabetes  Mother    

 

 Heart attack  Mother    

 

 Heart disease  Mother    

 

 Hyperlipidemia  Sister    

 

 Hypertension  Sister    









 Relation  Name  Status  Comments

 

 Father      

 

 Mother      

 

 Sister      







Social History







 Tobacco Use  Types  Packs/Day  Years Used  Date

 

 Current Every Day Smoker  Cigarettes  0.5  35  









 Smokeless Tobacco: Former User      









 Tobacco Cessation: Counseling Given: Yes



 Comments: per patient now only smoking 4-5 cigarette/day









 Alcohol Use  Drinks/Week  oz/Week  Comments

 

 No      stopped for 1.5 yrs









 Sex Assigned at Birth  Date Recorded

 

 Not on file  







Last Filed Vital Signs







 Vital Sign  Reading  Time Taken

 

 Blood Pressure  120/77  2018  1:15 PM CDT

 

 Pulse  76  2018  1:15 PM CDT

 

 Temperature  35.8 C (96.5 F)  2018  1:15 PM CDT

 

 Respiratory Rate  16  2018  1:15 PM CDT

 

 Oxygen Saturation  97%  2018  1:15 PM CDT

 

 Inhaled Oxygen Concentration  -  -

 

 Weight  73.9 kg (163 lb)  2018  8:09 AM CDT

 

 Height  185.4 cm (6' 1")  2018  8:09 AM CDT

 

 Body Mass Index  21.51  2018  8:09 AM CDT







Plan of Treatment







 Health Maintenance  Due Date  Last Done  Comments

 

 DIABETIC FOOT EXAM  1974    

 

 DIABETIC RETINAL EYE EXAM  1974    

 

 COLON CANCER SCREENING  2014    

 

 SHINGRIX VACCINE (#1)  2014    

 

 INFLUENZA VACCINE  2018  







Implants







 Implanted  Type  Area    Device  Expiration  Model /



         Identifier  Date  Serial /



             Lot

 

 Stimulator  Stimulator          

 

 Stimulator-2007  Stimulator  Hip        



 Implanted: 2007 (Quantity not on file)            

 

 Kit [employer] Air Leak 4ml Strl Progel - [id number]  Surgical  N/A: N/A  
[employer]      [id number] /



 Implanted: Qty: 1 on 2017 by Jonathan Fuentes Jr., MD  Implants;       
    /



   Expanders;          



   Extenders;          



   Surgical Wires          

 

 Dorsal Colum            



 Stimulator            

 

 Dorsal Column            



 Stimulator            

 

 Dorsal Colum Stimulator-2007    Lower:        



 Implanted: 2007 (Quantity not on file)    Back,        



     Other        



     than        



     Spine        







Procedures







 Procedure Name  Priority  Date/Time  Associated  Comments



       Diagnosis  

 

 URINALYSIS SCREEN AND  STAT  2018    Results for



 MICROSCOPY, WITH REFLEX TO    9:16 AM CDT    this procedure



 CULTURE        are in the



         results



         section.

 

 URINE DRUGS OF ABUSE SCREEN  STAT  2018    Results for



     9:16 AM CDT    this procedure



         are in the



         results



         section.

 

 URINE CULTURE  STAT  2018    Results for



     9:16 AM CDT    this procedure



         are in the



         results



         section.

 

 ZZESTIMATED GFR  STAT  2018    Results for



     9:02 AM CDT    this procedure



         are in the



         results



         section.

 

 SALICYLATE LEVEL  STAT  2018    Results for



     9:02 AM CDT    this procedure



         are in the



         results



         section.

 

 ACETAMINOPHEN LEVEL  STAT  2018    Results for



     9:02 AM CDT    this procedure



         are in the



         results



         section.

 

 ALCOHOL LEVEL, BLOOD  STAT  2018    Results for



     9:02 AM CDT    this procedure



         are in the



         results



         section.

 

 HC COMPLETE BLD COUNT W/AUTO  STAT  2018    Results for



 DIFF    9:02 AM CDT    this procedure



         are in the



         results



         section.

 

 THYROID STIMULATING HORMONE  STAT  2018    Results for



     9:02 AM CDT    this procedure



         are in the



         results



         section.

 

 T4, FREE  STAT  2018    Results for



     9:02 AM CDT    this procedure



         are in the



         results



         section.

 

 COMPREHENSIVE METABOLIC PANEL  STAT  2018    Results for



     9:02 AM CDT    this procedure



         are in the



         results



         section.

 

 CT ABDOMEN PELVIS WO CONTRAST  STAT  2018    Results for



     3:27 AM CDT    this procedure



         are in the



         results



         section.

 

 SMEAR REVIEW  STAT  2018    Results for



     3:13 AM CDT    this procedure



         are in the



         results



         section.

 

 ZZESTIMATED GFR  STAT  2018    Results for



     3:13 AM CDT    this procedure



         are in the



         results



         section.

 

 LIPASE LEVEL  STAT  2018    Results for



     3:13 AM CDT    this procedure



         are in the



         results



         section.

 

 COMPREHENSIVE METABOLIC PANEL  STAT  2018    Results for



     3:13 AM CDT    this procedure



         are in the



         results



         section.

 

 PROTHROMBIN TIME WITH INR  STAT  2018    Results for



     3:13 AM CDT    this procedure



         are in the



         results



         section.

 

 HC COMPLETE BLD COUNT W/AUTO  STAT  2018    Results for



 DIFF    3:13 AM CDT    this procedure



         are in the



         results



         section.

 

 ECG 12-LEAD  STAT  2018    Results for



     2:43 AM CDT    this procedure



         are in the



         results



         section.

 

 ECG ED PRELIMINARY  Routine  2018    Results for



 INTERPRETATION    2:07 AM CDT    this procedure



         are in the



         results



         section.

 

 PET CT SKULL BASE TO MID THIGH  Routine  2018  BC (bronchogenic  Results 
for



     1:45 PM CDT  carcinoma)  this procedure



         are in the



         results



         section.

 

 POC GLUCOSE  Routine  2018    Results for



     11:36 AM CDT    this procedure



         are in the



         results



         section.

 

 POC GLUCOSE  Routine  2018    Results for



     11:29 AM CST    this procedure



         are in the



         results



         section.

 

 POC GLUCOSE  Routine  2018    Results for



     6:52 AM CST    this procedure



         are in the



         results



         section.

 

 SMEAR REVIEW  Routine  2018    Results for



     5:13 AM CST    this procedure



         are in the



         results



         section.

 

 ZZESTIMATED GFR  Routine  2018    Results for



     5:13 AM CST    this procedure



         are in the



         results



         section.

 

 BASIC METABOLIC PANEL  Routine  2018    Results for



     5:13 AM CST    this procedure



         are in the



         results



         section.

 

 HC COMPLETE BLD COUNT W/AUTO  Routine  2018    Results for



 DIFF    5:13 AM CST    this procedure



         are in the



         results



         section.

 

 KEPPRA (LEVETIRACETAM) LEVEL  Routine  2018    Results for



     5:13 AM CST    this procedure



         are in the



         results



         section.

 

 SMEAR REVIEW  STAT  2018    Results for



     8:14 PM CST    this procedure



         are in the



         results



         section.

 

 ZZESTIMATED GFR  STAT  2018    Results for



     8:14 PM CST    this procedure



         are in the



         results



         section.

 

 HEPATIC FUNCTION PANEL  STAT  2018    Results for



     8:14 PM CST    this procedure



         are in the



         results



         section.

 

 BASIC METABOLIC PANEL  STAT  2018    Results for



     8:14 PM CST    this procedure



         are in the



         results



         section.

 

 HC COMPLETE BLD COUNT W/AUTO  STAT  2018    Results for



 DIFF    8:14 PM CST    this procedure



         are in the



         results



         section.

 

 CT LUMBAR SPINE WO CONTRAST  STAT  2018    Results for



     5:12 PM CST    this procedure



         are in the



         results



         section.

 

 ZZESTIMATED GFR  STAT  2018    Results for



     12:29 PM CST    this procedure



         are in the



         results



         section.

 

 LIPASE LEVEL  STAT  2018    Results for



     12:29 PM CST    this procedure



         are in the



         results



         section.

 

 COMPREHENSIVE METABOLIC PANEL  STAT  2018    Results for



     12:29 PM CST    this procedure



         are in the



         results



         section.

 

 PARTIAL THROMBOPLASTIN TIME  STAT  2018    Results for



 (PTT)    12:29 PM CST    this procedure



         are in the



         results



         section.

 

 PROTHROMBIN TIME WITH INR  STAT  2018    Results for



     12:29 PM CST    this procedure



         are in the



         results



         section.

 

 HC COMPLETE BLD COUNT W/AUTO  STAT  2018    Results for



 DIFF    12:29 PM CST    this procedure



         are in the



         results



         section.

 

 POC GLUCOSE  Routine  2018    Results for



     11:06 AM CST    this procedure



         are in the



         results



         section.

 

 POC GLUCOSE  Routine  2018    Results for



     6:41 AM CST    this procedure



         are in the



         results



         section.

 

 ZZESTIMATED GFR  Routine  2018    Results for



     4:58 AM CST    this procedure



         are in the



         results



         section.

 

 BASIC METABOLIC PANEL  Routine  2018    Results for



     4:58 AM CST    this procedure



         are in the



         results



         section.

 

 HC COMPLETE BLD COUNT W/AUTO  Routine  2018    Results for



 DIFF    4:58 AM CST    this procedure



         are in the



         results



         section.

 

 POC GLUCOSE  Routine  2018    Results for



     9:12 PM CST    this procedure



         are in the



         results



         section.

 

 ZZESTIMATED GFR  STAT  2018    Results for



     6:29 PM CST    this procedure



         are in the



         results



         section.

 

 CREATINE KINASE, TOTAL (CPK)  STAT  2018    Results for



     6:29 PM CST    this procedure



         are in the



         results



         section.

 

 HC COMPLETE BLD COUNT W/AUTO  STAT  2018    Results for



 DIFF    6:29 PM CST    this procedure



         are in the



         results



         section.

 

 BASIC METABOLIC PANEL  STAT  2018    Results for



     6:29 PM CST    this procedure



         are in the



         results



         section.

 

 CT HEAD WO CONTRAST  STAT  2018    Results for



     6:19 PM CST    this procedure



         are in the



         results



         section.

 

 ECG 12-LEAD  STAT  2018    Results for



     6:18 PM CST    this procedure



         are in the



         results



         section.

 

 ECG ED PRELIMINARY  Routine  2018    Results for



 INTERPRETATION    5:38 PM CST    this procedure



         are in the



         results



         section.

 

 POC GLUCOSE  Routine  2018    Results for



     10:56 AM CST    this procedure



         are in the



         results



         section.

 

 POC GLUCOSE  Routine  2018    Results for



     7:34 AM CST    this procedure



         are in the



         results



         section.

 

 POC GLUCOSE  Routine  2018    Results for



     8:37 PM CST    this procedure



         are in the



         results



         section.

 

 POC GLUCOSE  Routine  2018    Results for



     4:48 PM CST    this procedure



         are in the



         results



         section.

 

 SURGICAL PATHOLOGY REQUEST  Routine  2018    Results for



     1:28 PM CST    this procedure



         are in the



         results



         section.

 

 ESOPHAGOGASTRODUODENOSCOPY (EGD)    2018  Abdominal pain,  



     12:15 PM CST  unspecified  



       abdominal  



       location  

 

 POC GLUCOSE  Routine  2018    Results for



     6:56 AM CST    this procedure



         are in the



         results



         section.

 

 ZZESTIMATED GFR  Routine  2018    Results for



     6:03 AM CST    this procedure



         are in the



         results



         section.

 

 BASIC METABOLIC PANEL  Routine  2018    Results for



     6:03 AM CST    this procedure



         are in the



         results



         section.

 

 POC GLUCOSE  Routine  2018    Results for



     9:04 PM CST    this procedure



         are in the



         results



         section.

 

 POC GLUCOSE  Routine  2018    Results for



     3:39 PM CST    this procedure



         are in the



         results



         section.

 

 POC GLUCOSE  Routine  2018    Results for



     11:07 AM CST    this procedure



         are in the



         results



         section.

 

 POC GLUCOSE  Routine  2018    Results for



     7:12 AM CST    this procedure



         are in the



         results



         section.

 

 ZZESTIMATED GFR  Routine  2018    Results for



     5:59 AM CST    this procedure



         are in the



         results



         section.

 

 MAGNESIUM LEVEL  Routine  2018    Results for



     5:59 AM CST    this procedure



         are in the



         results



         section.

 

 COMPREHENSIVE METABOLIC PANEL  Routine  2018    Results for



     5:59 AM CST    this procedure



         are in the



         results



         section.

 

 CBC WITH PLATELET AND  Routine  2018    Results for



 DIFFERENTIAL    5:59 AM CST    this procedure



         are in the



         results



         section.

 

 POC GLUCOSE  Routine  2018    Results for



     7:54 PM CST    this procedure



         are in the



         results



         section.

 

 ECG ED PRELIMINARY  Routine  2018    Results for



 INTERPRETATION    5:59 PM CST    this procedure



         are in the



         results



         section.

 

 POC GLUCOSE  Routine  2018    Results for



     3:55 PM CST    this procedure



         are in the



         results



         section.

 

 POC GLUCOSE  Routine  2018    Results for



     11:54 AM CST    this procedure



         are in the



         results



         section.

 

 POC GLUCOSE  Routine  2018    Results for



     6:26 AM CST    this procedure



         are in the



         results



         section.

 

 ZZESTIMATED GFR  Routine  2018    Results for



     6:23 AM CST    this procedure



         are in the



         results



         section.

 

 COMPREHENSIVE METABOLIC PANEL  Routine  2018    Results for



     6:23 AM CST    this procedure



         are in the



         results



         section.

 

 HC COMPLETE BLD COUNT W/AUTO  Routine  2018    Results for



 DIFF    6:23 AM CST    this procedure



         are in the



         results



         section.

 

 POC GLUCOSE  Routine  2017    Results for



     8:54 PM CST    this procedure



         are in the



         results



         section.

 

 POC GLUCOSE  Routine  2017    Results for



     4:46 PM CST    this procedure



         are in the



         results



         section.

 

 POC GLUCOSE  Routine  2017    Results for



     11:33 AM CST    this procedure



         are in the



         results



         section.

 

 ZZESTIMATED GFR  Routine  2017    Results for



     7:05 AM CST    this procedure



         are in the



         results



         section.

 

 COMPREHENSIVE METABOLIC PANEL  Routine  2017    Results for



     7:05 AM CST    this procedure



         are in the



         results



         section.

 

 HC COMPLETE BLD COUNT W/AUTO  Routine  2017    Results for



 DIFF    7:05 AM CST    this procedure



         are in the



         results



         section.

 

 POC GLUCOSE  Routine  2017    Results for



     6:23 AM CST    this procedure



         are in the



         results



         section.

 

 POC GLUCOSE  Routine  2017    Results for



     8:30 PM CST    this procedure



         are in the



         results



         section.

 

 XR CHEST 2 VW  Routine  2017    Results for



     7:47 PM CST    this procedure



         are in the



         results



         section.

 

 ZZESTIMATED GFR  Routine  2017    Results for



     7:07 PM CST    this procedure



         are in the



         results



         section.

 

 COMPREHENSIVE METABOLIC PANEL  Routine  2017    Results for



     7:07 PM CST    this procedure



         are in the



         results



         section.

 

 HC COMPLETE BLD COUNT W/AUTO  Routine  2017    Results for



 DIFF    7:07 PM CST    this procedure



         are in the



         results



         section.

 

 POC GLUCOSE  Routine  2017    Results for



     3:58 PM CST    this procedure



         are in the



         results



         section.

 

 POC GLUCOSE  Routine  2017    Results for



     12:15 PM CST    this procedure



         are in the



         results



         section.

 

 POC GLUCOSE  Routine  2017    Results for



     7:03 AM CST    this procedure



         are in the



         results



         section.

 

 POC GLUCOSE  Routine  2017    Results for



     8:17 PM CST    this procedure



         are in the



         results



         section.

 

 POC GLUCOSE  Routine  2017    Results for



     3:34 PM CST    this procedure



         are in the



         results



         section.

 

 POC GLUCOSE  Routine  2017    Results for



     10:52 AM CST    this procedure



         are in the



         results



         section.

 

 URINALYSIS SCREEN AND  STAT  2017    Results for



 MICROSCOPY, WITH REFLEX TO    8:31 AM CST    this procedure



 CULTURE        are in the



         results



         section.

 

 URINE DRUGS OF ABUSE SCREEN  STAT  2017    Results for



     8:31 AM CST    this procedure



         are in the



         results



         section.

 

 URINE CULTURE  STAT  2017    Results for



     8:31 AM CST    this procedure



         are in the



         results



         section.

 

 POC GLUCOSE  Routine  2017    Results for



     7:02 AM CST    this procedure



         are in the



         results



         section.

 

 ZZESTIMATED GFR  Routine  2017    Results for



     5:34 AM CST    this procedure



         are in the



         results



         section.

 

 BASIC METABOLIC PANEL  Routine  2017    Results for



     5:34 AM CST    this procedure



         are in the



         results



         section.

 

 PROTHROMBIN TIME WITH INR  Routine  2017    Results for



     5:34 AM CST    this procedure



         are in the



         results



         section.

 

 HC COMPLETE BLD COUNT W/AUTO  Routine  2017    Results for



 DIFF    5:34 AM CST    this procedure



         are in the



         results



         section.

 

 POC GLUCOSE  Routine  2017    Results for



     12:44 AM CST    this procedure



         are in the



         results



         section.

 

 TROPONIN  Timed  2017    Results for



     7:33 PM CST    this procedure



         are in the



         results



         section.

 

 CT ABDOMEN PELVIS WO CONTRAST  STAT  2017    Results for



     4:23 PM CST    this procedure



         are in the



         results



         section.

 

 INFLUENZA ANTIGEN  Routine  2017    Results for



     3:58 PM CST    this procedure



         are in the



         results



         section.

 

 ECG 12-LEAD  STAT  2017    Results for



     3:48 PM CST    this procedure



         are in the



         results



         section.

 

 B NATRIURETIC PEPTIDE  STAT  2017    Results for



     3:45 PM CST    this procedure



         are in the



         results



         section.

 

 TROPONIN  STAT  2017    Results for



     3:45 PM CST    this procedure



         are in the



         results



         section.

 

 ALCOHOL LEVEL, BLOOD  STAT  2017    Results for



     3:45 PM CST    this procedure



         are in the



         results



         section.

 

 ZZESTIMATED GFR  STAT  2017    Results for



     3:45 PM CST    this procedure



         are in the



         results



         section.

 

 LIPASE LEVEL  STAT  2017    Results for



     3:45 PM CST    this procedure



         are in the



         results



         section.

 

 COMPREHENSIVE METABOLIC PANEL  STAT  2017    Results for



     3:45 PM CST    this procedure



         are in the



         results



         section.

 

 HC COMPLETE BLD COUNT W/AUTO  STAT  2017    Results for



 DIFF    3:45 PM CST    this procedure



         are in the



         results



         section.

 

 POC GLUCOSE  Routine  10/13/2017    Results for



     8:01 AM CDT    this procedure



         are in the



         results



         section.

 

 US ABDOMEN COMPLETE  STAT  10/13/2017    Results for



     7:28 AM CDT    this procedure



         are in the



         results



         section.

 

 ZZESTIMATED GFR  Routine  10/13/2017    Results for



     5:33 AM CDT    this procedure



         are in the



         results



         section.

 

 COMPREHENSIVE METABOLIC PANEL  Routine  10/13/2017    Results for



     5:33 AM CDT    this procedure



         are in the



         results



         section.

 

 HEPATITIS A ANTIBODY IGM  Routine  10/13/2017    Results for



     5:33 AM CDT    this procedure



         are in the



         results



         section.

 

 HEPATITIS B CORE ANTIBODY IGM  Routine  10/13/2017    Results for



     5:33 AM CDT    this procedure



         are in the



         results



         section.

 

 HEPATITIS B SURFACE ANTIBODY  Routine  10/13/2017    Results for



     5:33 AM CDT    this procedure



         are in the



         results



         section.

 

 HEPATITIS B SURFACE ANTIGEN  Routine  10/13/2017    Results for



     5:33 AM CDT    this procedure



         are in the



         results



         section.

 

 PROTHROMBIN TIME WITH INR  Routine  10/13/2017    Results for



     5:33 AM CDT    this procedure



         are in the



         results



         section.

 

 TOTAL IRON BINDING CAPACITY  Routine  10/13/2017    Results for



     5:33 AM CDT    this procedure



         are in the



         results



         section.

 

 FERRITIN LEVEL  Routine  10/13/2017    Results for



     5:33 AM CDT    this procedure



         are in the



         results



         section.

 

 CERULOPLASMIN LEVEL  Routine  10/13/2017    Results for



     5:33 AM CDT    this procedure



         are in the



         results



         section.

 

 HC COMPLETE BLD COUNT W/AUTO  Routine  10/13/2017    Results for



 DIFF    5:33 AM CDT    this procedure



         are in the



         results



         section.

 

 RADHA  Routine  10/13/2017    Results for



     5:33 AM CDT    this procedure



         are in the



         results



         section.

 

 ALPHA-1 ANTITRYPSIN LEVEL  Routine  10/13/2017    Results for



     5:33 AM CDT    this procedure



         are in the



         results



         section.

 

 ALPHA FETOPROTEIN  Routine  10/13/2017    Results for



     5:33 AM CDT    this procedure



         are in the



         results



         section.

 

 HEPATITIS C ANTIBODY  Routine  10/13/2017    Results for



     5:33 AM CDT    this procedure



         are in the



         results



         section.

 

 CT ABDOMEN PELVIS W WO CONTRAST  STAT  10/13/2017    Results for



     1:34 AM CDT    this procedure



         are in the



         results



         section.

 

 POC GLUCOSE  Routine  10/12/2017    Results for



     8:33 PM CDT    this procedure



         are in the



         results



         section.

 

 AMMONIA LEVEL  Routine  10/12/2017    Results for



     5:45 PM CDT    this procedure



         are in the



         results



         section.

 

 POC GLUCOSE  Routine  10/12/2017    Results for



     4:33 PM CDT    this procedure



         are in the



         results



         section.

 

 ECG 12-LEAD  Routine  10/12/2017    Results for



     2:20 PM CDT    this procedure



         are in the



         results



         section.

 

 ANTI SMOOTH MUSCLE AB TITER  Routine  10/12/2017    Results for



     1:27 PM CDT    this procedure



         are in the



         results



         section.

 

 HEPATITIS B CORE ANTIBODY IGM  Routine  10/12/2017    Results for



     1:27 PM CDT    this procedure



         are in the



         results



         section.

 

 HEPATITIS B SURFACE ANTIGEN  Routine  10/12/2017    Results for



     1:27 PM CDT    this procedure



         are in the



         results



         section.

 

 HEPATITIS B SURFACE ANTIBODY  Routine  10/12/2017    Results for



     1:27 PM CDT    this procedure



         are in the



         results



         section.

 

 HEPATITIS C ANTIBODY  Routine  10/12/2017    Results for



     1:27 PM CDT    this procedure



         are in the



         results



         section.

 

 TOTAL IRON BINDING CAPACITY  Routine  10/12/2017    Results for



     1:27 PM CDT    this procedure



         are in the



         results



         section.

 

 FERRITIN LEVEL  Routine  10/12/2017    Results for



     1:27 PM CDT    this procedure



         are in the



         results



         section.

 

 CERULOPLASMIN LEVEL  Routine  10/12/2017    Results for



     1:27 PM CDT    this procedure



         are in the



         results



         section.

 

 ALPHA FETOPROTEIN  Routine  10/12/2017    Results for



     1:27 PM CDT    this procedure



         are in the



         results



         section.

 

 ALPHA-1 ANTITRYPSIN LEVEL  Routine  10/12/2017    Results for



     1:27 PM CDT    this procedure



         are in the



         results



         section.

 

 ANTI SMOOTH MUSCLE AB SCREEN  Routine  10/12/2017    Results for



     1:27 PM CDT    this procedure



         are in the



         results



         section.

 

 RADHA  Routine  10/12/2017    Results for



     1:27 PM CDT    this procedure



         are in the



         results



         section.

 

 HEMOGLOBIN & HEMATOCRIT  Routine  10/12/2017    Results for



     1:27 PM CDT    this procedure



         are in the



         results



         section.

 

 POC GLUCOSE  Routine  10/12/2017    Results for



     11:14 AM CDT    this procedure



         are in the



         results



         section.

 

 POC GLUCOSE  Routine  10/12/2017    Results for



     7:48 AM CDT    this procedure



         are in the



         results



         section.

 

 POC GLUCOSE  Routine  10/12/2017    Results for



     12:36 AM CDT    this procedure



         are in the



         results



         section.

 

 LACTIC ACID LEVEL  Timed  10/11/2017    Results for



     10:17 PM CDT    this procedure



         are in the



         results



         section.

 

 URINALYSIS SCREEN AND  STAT  10/11/2017    Results for



 MICROSCOPY, WITH REFLEX TO    9:05 PM CDT    this procedure



 CULTURE        are in the



         results



         section.

 

 AMMONIA LEVEL  STAT  10/11/2017    Results for



     7:49 PM CDT    this procedure



         are in the



         results



         section.

 

 BILIRUBIN DIRECT  STAT  10/11/2017    Results for



     7:49 PM CDT    this procedure



         are in the



         results



         section.

 

 ZZESTIMATED GFR  STAT  10/11/2017    Results for



     7:49 PM CDT    this procedure



         are in the



         results



         section.

 

 LACTIC ACID LEVEL  STAT  10/11/2017    Results for



     7:49 PM CDT    this procedure



         are in the



         results



         section.

 

 HC COMPLETE BLD COUNT W/AUTO  STAT  10/11/2017    Results for



 DIFF    7:49 PM CDT    this procedure



         are in the



         results



         section.

 

 LIPASE LEVEL  STAT  10/11/2017    Results for



     7:49 PM CDT    this procedure



         are in the



         results



         section.

 

 AMYLASE LEVEL  STAT  10/11/2017    Results for



     7:49 PM CDT    this procedure



         are in the



         results



         section.

 

 COMPREHENSIVE METABOLIC PANEL  STAT  10/11/2017    Results for



     7:49 PM CDT    this procedure



         are in the



         results



         section.

 

 XR CHEST 2 VW  STAT  10/11/2017    Results for



     7:40 PM CDT    this procedure



         are in the



         results



         section.

 

 CT ABDOMEN PELVIS WO CONTRAST  STAT  10/11/2017    Results for



     7:19 PM CDT    this procedure



         are in the



         results



         section.

 

 URINE CULTURE  STAT  10/11/2017    Results for



     6:51 PM CDT    this procedure



         are in the



         results



         section.

 

 ZZESTIMATED GFR  STAT  2017    Results for



     3:06 PM CDT    this procedure



         are in the



         results



         section.

 

 COMPREHENSIVE METABOLIC PANEL  STAT  2017    Results for



     3:06 PM CDT    this procedure



         are in the



         results



         section.

 

 HC COMPLETE BLD COUNT W/AUTO  Routine  2017    Results for



 DIFF    3:00 PM CDT    this procedure



         are in the



         results



         section.

 

 URINALYSIS SCREEN AND  STAT  2017    Results for



 MICROSCOPY, WITH REFLEX TO    9:48 PM CDT    this procedure



 CULTURE        are in the



         results



         section.

 

 CT CHEST W CONTRAST ABDOMEN W  STAT  2017    Results for



 CONTRAST PELVIS W CONTRAST    9:31 PM CDT    this procedure



         are in the



         results



         section.

 

 LACTIC ACID LEVEL  Timed  2017    Results for



     5:35 PM CDT    this procedure



         are in the



         results



         section.

 

 ZZESTIMATED GFR  STAT  2017    Results for



     2:06 PM CDT    this procedure



         are in the



         results



         section.

 

 BILIRUBIN DIRECT  STAT  2017    Results for



     2:06 PM CDT    this procedure



         are in the



         results



         section.

 

 LACTIC ACID LEVEL  STAT  2017    Results for



     2:06 PM CDT    this procedure



         are in the



         results



         section.

 

 HC COMPLETE BLD COUNT W/AUTO  STAT  2017    Results for



 DIFF    2:06 PM CDT    this procedure



         are in the



         results



         section.

 

 LIPASE LEVEL  STAT  2017    Results for



     2:06 PM CDT    this procedure



         are in the



         results



         section.

 

 AMYLASE LEVEL  STAT  2017    Results for



     2:06 PM CDT    this procedure



         are in the



         results



         section.

 

 COMPREHENSIVE METABOLIC PANEL  STAT  2017    Results for



     2:06 PM CDT    this procedure



         are in the



         results



         section.



after 2017



Results

Urinalysis screen and microscopy, with reflex to culture (2018  9:16 AM)
Only the most recent of4 resultswithin the time period is included.





 Component  Value  Ref Range  Performed At

 

 Specimen site  Clean catch    [facility] DEPARTMENT OF PATHOLOGY



       AND GENOMIC MEDICINE

 

 Color, UA  Yellow    [facility] DEPARTMENT OF PATHOLOGY



       AND GENOMIC MEDICINE

 

 Appearance, UA  Clear    [facility] DEPARTMENT OF PATHOLOGY



       AND GENOMIC MEDICINE

 

 Specific gravity, UA  1.011  1.001 - 1.035  [facility] DEPARTMENT OF PATHOLOGY



       AND GENOMIC MEDICINE

 

 pH, UA  5.0  5.0 - 8.5  [facility] DEPARTMENT OF PATHOLOGY



       AND GENOMIC MEDICINE

 

 Protein, UA  Negative  Negative  [facility] DEPARTMENT OF PATHOLOGY



       AND GENOMIC MEDICINE

 

 Glucose, UA  Negative  Negative  [facility] DEPARTMENT OF PATHOLOGY



       AND GENOMIC MEDICINE

 

 Ketones, UA  Negative  Negative  [facility] DEPARTMENT OF PATHOLOGY



       AND GENOMIC MEDICINE

 

 Bilirubin, UA  Negative  Negative  [facility] DEPARTMENT OF PATHOLOGY



       AND GENOMIC MEDICINE

 

 Blood, UA  Negative  Negative  [facility] DEPARTMENT OF PATHOLOGY



       AND GENOMIC MEDICINE

 

 Nitrite, UA  Negative  Negative  [facility] DEPARTMENT OF PATHOLOGY



       AND GENOMIC MEDICINE

 

 Urobilinogen, UA  2.0 (A)  <2.0  [facility] DEPARTMENT OF PATHOLOGY



       AND GENOMIC MEDICINE

 

 Leukocyte esterase, UA  Negative  Negative  [facility] DEPARTMENT OF PATHOLOGY



       AND GENOMIC MEDICINE

 

 Epithelial cells, UA  Few  /HPF  [facility] DEPARTMENT OF PATHOLOGY



       AND GENOMIC MEDICINE

 

 WBC, UA  1  0 - 1 /HPF  [facility] DEPARTMENT OF PATHOLOGY



       AND GENOMIC MEDICINE

 

 RBC, UA  <1  0 - 5 /HPF  [facility] DEPARTMENT OF PATHOLOGY



       AND GENOMIC MEDICINE

 

 Bacteria, UA  None seen  None seen  [facility] DEPARTMENT OF PATHOLOGY



       AND GENOMIC MEDICINE

 

 Yeast, UA  None seen    [facility] DEPARTMENT OF PATHOLOGY



       AND GENOMIC MEDICINE

 

 Yeast with pseudohyphae, UA  None seen    [facility] DEPARTMENT OF PATHOLOGY



       AND GENOMIC MEDICINE









 Specimen

 

 Urine









 Performing Organization  Address  City/State/Zipcode  Phone Number

 

 [facility] DEPARTMENT OF PATHOLOGY AND  [address] [place] [address]  



 [facility]      



Urine drugs of abuse screen (2018  9:16 AM)Only the most recent of2 
resultswithin the time period is included.





 Component  Value  Ref Range  Performed At

 

 Amphetamine screen, urine  NEG    [facility] DEPARTMENT OF



       PATHOLOGY AND GENOMIC



       MEDICINE

 

 Barbiturate screen, urine  NEG    [facility] DEPARTMENT OF



       PATHOLOGY AND GENOMIC



       MEDICINE

 

 Benzodiazepine screen,  POS    [facility] DEPARTMENT OF



 urine      PATHOLOGY AND GENOMIC



       MEDICINE

 

 Cocaine screen, urine  NEG    [facility] DEPARTMENT OF



       PATHOLOGY AND GENOMIC



       MEDICINE

 

 Methadone screen, urine  NEG    [facility] DEPARTMENT OF



       PATHOLOGY AND GENOMIC



       MEDICINE

 

 Opiates screen, urine  POS    [facility] DEPARTMENT OF



       PATHOLOGY AND GENOMIC



       MEDICINE

 

 Phencyclidine screen, urine  NEG    [facility] DEPARTMENT OF



       PATHOLOGY AND GENOMIC



       MEDICINE

 

 Cannabinoid screen, urine  POS    [facility] DEPARTMENT OF



   Comment:    PATHOLOGY AND GENOMIC



   Drug screen minimum concentration of detectability    MEDICINE



   [id number] ng/mL    



   Tzjgostnykmanjhh0754 ng/mL    



   Barbiturates 300 ng/mL    



   Ievozzvpqbaeeay498 ng/mL    



   Antvpno252 ng/mL    



   Xfppfquig027 ng/mL    



   Behqcge834 ng/mL    



   Phencyclidine 25 ng/mL    



   Arkukqobgauk43 ng/mL    



   Nxqtnomreo2120 ng/mL    



   Negative test results indicates presumptive evidence of lack    



   of clinically significant drug concentration in this urine    



   specimen.    



   Positive test results are presumptive evidence of clinically    



   significant drug concentration in this urine specimen.    



   Testing performed for medical purposes only.    



       









 Specimen

 

 Urine









 Performing Organization  Address  City/[facility] [address]/Zipcode  Phone Number

 

 [facility] DEPARTMENT OF PATHOLOGY AND  [address] [place] [address]  



 [facility] MEDICINE      



Urine culture (2018  9:16 AM)Only the most recent of3 resultswithin the 
time period is included.





 Component  Value  Ref Range  Performed At

 

 Urine culture  SEE COMMENTComment: Bacteriuria    [facility] DEPARTMENT OF PATHOLOGY



   screen negative.    AND GENOMIC MEDICINE









 Specimen

 

 Urine









 Performing Organization  Address  City/State/Zipcode  Phone Number

 

 [facility] DEPARTMENT OF PATHOLOGY AND  [address] [place] [address]  



 [facility] MEDICINE      



Estimated GFR (2018  9:02 AM)Only the most recent of18 resultswithin the 
time period is included.





 Component  Value  Ref Range  Performed At

 

 GFR Non Af Amer  49 (A)  mL/min/1.73 m2  [facility] DEPARTMENT OF



       PATHOLOGY AND GENOMIC



       MEDICINE

 

 GFR Af Amer  59 (A)  mL/min/1.73 m2  [facility] DEPARTMENT OF



   Comment:    PATHOLOGY AND GENOMIC



   Chronic kidney disease: <60 mL/min/1.73m2    MEDICINE



   Kidney failure: <15 mL/min/1.73m2    



   The estimated GFR is calculated from the [employer]MS-traceable Modification of Diet
    



   in Renal Disease Equation. The accuracy of the calculation is poor when the 
   



   creatinine is normal. Calculated values >90 mL/min/1.73m2 are not reported. 
   



   This equation has not been validated in children (<18 years), pregnant    



   women, the elderly (>70 years), or ethnic groups other than Caucasians and  
  



    Americans.    



       









 Specimen

 

 Plasma specimen









 Performing Organization  Address  City/State/Zipcode  Phone Number

 

 [facility] DEPARTMENT OF PATHOLOGY AND  [facility]  [place] [address]  



 [facility]      



CBC with platelet and differential (2018  9:02 AM)Only the most recent 
of17 resultswithin the time period is included.





 Component  Value  Ref Range  Performed At

 

 WBC  9.1  4.2 - 11.0 k/uL  [facility] DEPARTMENT OF PATHOLOGY AND



       GENOMIC MEDICINE

 

 RBC  4.57  4.04 - 5.86 m/uL  [facility] DEPARTMENT OF PATHOLOGY AND



       GENOMIC MEDICINE

 

 HGB  14.7  13.0 - 17.3 g/dL  [facility] DEPARTMENT OF PATHOLOGY AND



       GENOMIC MEDICINE

 

 HCT  44.6  34.0 - 45.0 %  [facility] DEPARTMENT OF PATHOLOGY AND



       GENOMIC MEDICINE

 

 MCV  97.6  80.0 - 98.0 fL  [facility] DEPARTMENT OF PATHOLOGY AND



       GENOMIC MEDICINE

 

 MCH  32.2  27.0 - 34.0 pg  [facility] DEPARTMENT OF PATHOLOGY AND



       GENOMIC MEDICINE

 

 MCHC  33.0  31.5 - 36.5 g/dL  [facility] DEPARTMENT OF PATHOLOGY AND



       GENOMIC MEDICINE

 

 RDW - SD  55.1 (H)  37.0 - 51.0 fL  [facility] DEPARTMENT OF PATHOLOGY AND



       GENOMIC MEDICINE

 

 MPV  10.1  7.4 - 10.4 fL  [facility] DEPARTMENT OF PATHOLOGY AND



       GENOMIC MEDICINE

 

 Platelet count  124 (L)  150 - 400 k/uL  [facility] [facility] DEPARTMENT OF PATHOLOGY AND



       GENOMIC MEDICINE

 

 Nucleated RBC  0.00  /100 WBC  [facility] DEPARTMENT OF PATHOLOGY AND



       GENOMIC MEDICINE

 

 Neutrophils  80.4 (H)  36.0 - 66.0 %  [facility] DEPARTMENT OF PATHOLOGY AND



       GENOMIC MEDICINE

 

 Lymphocytes  11.6 (L)  24.0 - 44.0 %  [facility] DEPARTMENT OF PATHOLOGY AND



       GENOMIC MEDICINE

 

 Monocytes  5.8  0.0 - 6.0 %  [facility] DEPARTMENT OF PATHOLOGY AND



       GENOMIC MEDICINE

 

 Eosinophils  1.3  0.0 - 6.0 %  [facility] DEPARTMENT OF PATHOLOGY AND



       GENOMIC MEDICINE

 

 Basophils  0.6  0.0 - 1.2 %  [facility] DEPARTMENT OF PATHOLOGY AND



       GENOMIC MEDICINE

 

 Immature granulocytes  0.3  0.0 - 1.0 %  [facility] DEPARTMENT OF PATHOLOGY AND



       GENOMIC MEDICINE









 Specimen

 

 Blood









 Performing Organization  Address  City/[facility] [address]/[facility]  Phone Number

 

 [facility] DEPARTMENT OF PATHOLOGY AND  [address] [place] [address].  [address]      



Thyroid stimulating hormone (2018  9:02 AM)





 Component  Value  Ref Range  Performed At

 

 TSH  2.60  0.38 - 4.82 uIU/mL  [facility] DEPARTMENT OF PATHOLOGY AND GENOMIC 
MEDICINE









 Specimen

 

 Plasma specimen









 Performing Organization  Address  City/[facility] [address]/[facility] Cent[facility]ode  Phone Number

 

 [facility] DEPARTMENT OF PATHOLOGY AND  [address] [place] [address].  [address]      



T4, free (2018  9:02 AM)





 Component  Value  Ref Range  Performed At

 

 T4, free  1.00  0.70 - 1.61 ng/dL  [facility] DEPARTMENT OF PATHOLOGY AND GENOMIC 
MEDICINE









 Specimen

 

 Plasma specimen









 Performing Organization  Address  City/[facility] [address]/[facility]  Phone Number

 

 [facility] DEPARTMENT OF PATHOLOGY AND  [address] [place] [address].  [address]      



Alcohol level, blood (2018  9:02 AM)Only the most recent of2 
resultswithin the time period is included.





 Component  Value  Ref Range  Performed At

 

 Alcohol  None Detected  mg/dL  [facility] DEPARTMENT OF PATHOLOGY



   Comment:    AND [facility] MEDICINE



   NormalNone Detected    



   Legal Intoxication in [place] 80 mg/dL (0.08%)    



   Toxic Concentration 200 mg/dL (0.2%)    



   Potentially Fatal 350-500 mg/dL (0.35%-0.5%)    



       

 

 Alcohol percent  None Detected  %  [facility] DEPARTMENT OF PATHOLOGY



       AND GENOMIC MEDICINE









 Specimen

 

 Blood









 Performing Organization  Address  City/[facility] [address]/[facility] Centercode  Phone Number

 

 [facility] DEPARTMENT OF PATHOLOGY AND  [address] [place] [address].  [address]      



Acetaminophen level (2018  9:02 AM)





 Component  Value  Ref Range  Performed At

 

 Acetaminophen level  <2.0 (L)  10.0 - 20.0 ug/mL  [facility] DEPARTMENT OF



   Comment:    PATHOLOGY AND GENOMIC



   Therapeutic 10-30 ug/mL
    MEDICINE



   Possible Toxicity 150-200 ug/mL
    



   Probable Toxicity >200 ug/mL    



       









 Specimen

 

 Blood









 Performing Organization  Address  City/[facility] [address]/Zipcode  Phone Number

 

 [facility] DEPARTMENT OF PATHOLOGY AND  [place] [address]  



 GENOMIC MEDICINE      



Salicylate level (2018  9:02 AM)





 Component  Value  Ref Range  Performed At

 

 Salicylate  7.0  mg/dL  [facility] DEPARTMENT OF PATHOLOGY AND GENOMIC



   Comment:    MEDICINE



   Therapeutic Range:    



    5 - 30 mg/dL    



       









 Specimen

 

 Blood









 Performing Organization  Address  City/[facility] [address]/[facility] Centercode  Phone Number

 

 [facility] DEPARTMENT OF PATHOLOGY AND  [place] [address]  



 [facility]      



Comprehensive metabolic panel (2018  9:02 AM)Only the most recent of12 
resultswithin the time period is included.





 Component  Value  Ref Range  Performed At

 

 Sodium  140  135 - 150 mEq/L  [facility] DEPARTMENT OF PATHOLOGY AND



       GENOMIC MEDICINE

 

 Potassium  3.8  3.5 - 5.0 mEq/L  [facility] DEPARTMENT OF PATHOLOGY AND



       GENOMIC MEDICINE

 

 Chloride  109  100 - 109 mEq/L  [facility] DEPARTMENT OF PATHOLOGY AND



       GENOMIC MEDICINE

 

 CO2  24  24 - 32 mmol/L  [facility] DEPARTMENT OF PATHOLOGY AND



       GENOMIC MEDICINE

 

 Anion gap  7@ANIO  7 - 15 mEq/L  [facility] DEPARTMENT OF PATHOLOGY AND



       GENOMIC MEDICINE

 

 BUN  14  7 - 18 mg/dL  [facility] DEPARTMENT OF PATHOLOGY AND



       GENOMIC MEDICINE

 

 Creatinine  1.5  0.8 - 1.5 mg/dL  [facility] DEPARTMENT OF PATHOLOGY AND



       GENOMIC MEDICINE

 

 Glucose  103 (H)  65 - 100 mg/dL  [facility] DEPARTMENT OF PATHOLOGY AND



       GENOMIC MEDICINE

 

 Calcium  8.6  8.6 - 10.7 mg/dL  [facility] DEPARTMENT OF PATHOLOGY AND



       GENOMIC MEDICINE

 

 Protein  7.9  6.3 - 8.2 g/dL  [facility] DEPARTMENT OF PATHOLOGY AND



       GENOMIC MEDICINE

 

 Albumin  3.4  3.2 - 5.0 g/dL  [facility] DEPARTMENT OF PATHOLOGY AND



       GENOMIC MEDICINE

 

 A/G ratio  0.8  0.7 - 3.8  [facility] DEPARTMENT OF PATHOLOGY AND



       GENOMIC MEDICINE

 

 Alkaline phosphatase  153 (H)  30 - 120 U/L  [facility] DEPARTMENT OF PATHOLOGY AND



       GENOMIC MEDICINE

 

 AST  21  15 - 37 U/L  [facility] DEPARTMENT OF PATHOLOGY AND



       GENOMIC MEDICINE

 

 ALT  17 (L)  30 - 65 U/L  [facility] DEPARTMENT OF PATHOLOGY AND



       GENOMIC MEDICINE

 

 Total bilirubin  0.2  0.2 - 1.2 mg/dL  [facility] DEPARTMENT OF PATHOLOGY AND



       GENOMIC MEDICINE









 Specimen

 

 Plasma specimen









 Performing Organization  Address  City/[facility] [address]/Zipcode  Phone Number

 

 [facility] DEPARTMENT OF PATHOLOGY AND  [address] [place] [address].  [place] [address]  



 [facility]IC[facility]      



CT Abdomen Pelvis Wo Contrast (2018  3:27 AM)Only the most recent of3 
resultswithin the time period is included.





 Narrative  Performed At

 

 EXAMINATION:CT ABDOMEN PELVIS WO CONTRAST



   [facility]



  



  



 CLINICAL HISTORY:R flank pain



  



  



  



 TECHNIQUE: Multiple axial images of the abdomen and pelvis were obtained  



 without intravenous administration of iodinated contrast. Sagittal and  



 coronal computerized reformatted images were also obtained. The lack of  



 intravenous contrast reduces the 



  



 sensitivity of detecting solid organ disease.



  



  



  



 CT imaging was performed with iterative reconstruction technique and/or  



 automated exposure control to reduce radiation dose.



  



  



  



 COMPARISON:2017



  



  



  



  



  



 IMPRESSION:



  



  



  



 Subsegmental atelectasis is seen of the lung bases.



  



  



  



 Patient is status post cholecystectomy. Pancreas and adrenal glands are  



 normal.



  



  



  



 Cirrhotic liver.



  



  



  



 Spleen is enlarged measuring 14.5 cm in length. 



  



  



  



 Kidneys, ureters and bladder are normal.



  



  



  



 No free intraperitoneal fluid or air. Atherosclerotic vascular  



 calcifications are seen. Some paraesophageal varices are seen,  



 compatible with portal hypertension.



  



  



  



 Diverticulosis is seen without diverticulitis. Appendix cannot be seen.  



 No gastrointestinal tract obstruction.



  



  



  



 No acute osseous abnormalities. Postoperative appearance of the lower  



 lumbar spine. A device is seen of the right gluteal region, with lead  



 entering the thoracic spinal canal.



  



  



  



 CONCLUSION:



  



  



  



 Cirrhotic liver with small paraesophageal varices, compatible with  



 portal hypertension.



  



  



  



  



  



  



  



  



  



  



  



  



  



  



  



  



  



  



  



  



  



 [facility]-[id number]



  



   









 Procedure Note

 

  Interface, Radiology Results Incoming - 2018  4:08 AM CDT



EXAMINATION:  CT ABDOMEN PELVIS WO CONTRAST



 



 CLINICAL HISTORY:  R flank pain



 



 TECHNIQUE: Multiple axial images of the abdomen and pelvis were obtained 
without intravenous administration of iodinated contrast. Sagittal and coronal 
computerized reformatted images were also obtained. The lack of intravenous 
contrast reduces the



 sensitivity of detecting solid organ disease.



 



 CT imaging was performed with iterative reconstruction technique and/or 
automated exposure control to reduce radiation dose.



 



 COMPARISON:  2017



 



 



 IMPRESSION:



 



 Subsegmental atelectasis is seen of the lung bases.



 



 Patient is status post cholecystectomy. Pancreas and adrenal glands are normal.



 



 Cirrhotic liver.



 



 Spleen is enlarged measuring 14.5 cm in length.



 



 Kidneys, ureters and bladder are normal.



 



 No free intraperitoneal fluid or air. Atherosclerotic vascular calcifications 
are seen. Some paraesophageal varices are seen, compatible with portal 
hypertension.



 



 Diverticulosis is seen without diverticulitis. Appendix cannot be seen. No 
gastrointestinal tract obstruction.



 



 No acute osseous abnormalities. Postoperative appearance of the lower lumbar 
spine. A device is seen of the right gluteal region, with lead entering the 
thoracic spinal canal.



 



 CONCLUSION:



 



 Cirrhotic liver with small paraesophageal varices, compatible with portal 
hypertension.



 



 



 



 



 



 



 



 



 



 



 [facility]-[id number]









 Performing Organization  Address  City/State/Zipcode  Phone Number

 

  [facility]  [address] [place] [address]  



Smear review (2018  3:13 AM)Only the most recent of3 resultswithin the 
time period is included.





 Component  Value  Ref Range  Performed At

 

 Smear review  Smear Reviewed    [facility] DEPARTMENT OF PATHOLOGY AND GENOMIC



       MEDICINE









 Performing Organization  Address  City/[facility] [address]/[facility] Centercode  Phone Number

 

 [facility] DEPARTMENT OF PATHOLOGY AND  [facility].  [place] [id number]  



 [facility]      



Prothrombin time with INR (2018  3:13 AM)Only the most recent of4 
resultswithin the time period is included.





 Component  Value  Ref Range  Performed At

 

 Prothrombin time  14.5  12.0 - 15.0 sec  [facility] DEPARTMENT OF



       PATHOLOGY AND GENOMIC



       MEDICINE

 

 INR  1.11  0.92 - 1.12  [facility] DEPARTMENT OF



   Comment:    PATHOLOGY AND GENOMIC



   For patients on anticoagulant therapy, reference ranges below:    MEDICINE



   Indication:INR 
Value    



   Treatment of Venous Thrombosis, 2.0-3.0    



   pulmonary emboli, or prophylaxis    



   of a venous thrombosis, or systemic    



   emboli.    



   High dose, high risk patients 3.0-4.5    



   with mechanical valves.    



   NOTE:INR values over 3.0 are sometimes associated with    



   gastrointestinal hemorrhage, especially values over 4.0.    



       









 Specimen

 

 Blood









 Performing Organization  Address  City/[facility] [address]/[facility] Centercode  Phone Number

 

 [facility] DEPARTMENT OF PATHOLOGY AND  [address]  [place] [id number] [place] [address]      



Lipase level (2018  3:13 AM)Only the most recent of5 resultswithin the 
time period is included.





 Component  Value  Ref Range  Performed At

 

 Lipase  129  65 - 230 U/L  [facility] DEPARTMENT OF PATHOLOGY AND GENOMIC MEDICINE









 Specimen

 

 Plasma specimen









 Performing Organization  Address  City/[facility] [address]/[facility] Centercode  Phone Number

 

 [facility] DEPARTMENT OF PATHOLOGY AND  [address] [place] [address]  



 [facility]      



ECG 12 lead (2018  2:43 AM)Only the most recent of4 resultswithin the 
time period is included.





 Component  Value  Ref Range  Performed At

 

 Ventricular rate  77    [facility]

 

 Atrial rate  77    [facility]

 

 OK interval  144    [facility]

 

 QRSD interval  94    [facility]

 

 QT interval  418    [facility]

 

 QTC interval  473    [facility]

 

 P axis 1  32    [facility]

 

 QRS axis 1  94    [facility]

 

 T wave axis  85    [facility]

 

 EKG impression  Normal sinus rhythm-Rightward axis-Borderline    [facility]



   ECG-In automated comparison with ECG of    



   2018 18:18,-No significant change was    



   found-Electronically Signed By Missy Meneses MD    



   ([id number]) on 5/10/2018 7:43:28 AM    









 Performing Organization  Address  City/State/Zipcode  Phone Number

 

 [facility]  [address] [place] [address]  



ECG ED Preliminary Interpretation - NOT AN ORDER (2018  2:07 AM)Only the 
most recent of3 resultswithin the time period is included.





 Narrative  Performed At

 

 Raiza Juarez MD 20183:47 AM



  



 ECG ED Preliminary Interpretation - Not an Order



  



 Performed by: RAIZA JUAREZ



  



 Authorized by: RAIZA JUAREZ 



  



  



  



 ECG reviewed by ED Physician in the absence of a cardiologist: yes



  



 Previous ECG: 



  



 Previous ECG:Compared to current



  



 Comparison ECG info:18



  



 Similarity:No change



  



 Interpretation: 



  



 Interpretation: normal



  



 Rate: 



  



 ECG rate:77



  



 ECG rate assessment: normal



  



 Rhythm: 



  



 Rhythm: sinus rhythm



  



 QRS: 



  



 QRS axis:Right



  



 Comments: 



  



  Normal sinus rhythm. Right axis deviation.  



PET/CT Skull Base To Mid Thigh (2018  1:45 PM)





 Narrative  Performed At

 

 EXAMINATION:



  [facility]



 PET CT SKULL BASE TO MID THIGH



  



  



  



 Date and place of service: 3/26/2018 10:00 AM at [facility]



  



  



  



 DOSE: 11.8 mCi of 11-Weptas-2-Deoxyglucose (FDG) was injected  



 intravenously into left wrist done infiltration at a serum glucose level  



 of90 mg/dl.



  



  



  



 TECHNIQUE:



  



  



  



 PROCEDURE: Following injection of 37-Wcuqgi-9-Deoxyglucose (FDG) and a  



 standard uptake., The patient was imaged on a TinderBox whole body PET CT  



 scanner. Multiple 6 minute bed position acquisitions were obtained along  



 the length of the patient's body from 



  



 approximately the Skull base to the mid thighs without administration of  



 intravenous or oral contrast. The software fused PET/CT images as well  



 as the PET and CT images could be reviewed separately.



  



  



  



 The dose length product for this procedure was 408 mGy-cm.



  



  



  



 CT imaging was performed with iterative reconstruction technique and/or  



 automated exposure control to reduce radiation dose



  



  



  



 COMPARISON:



  



 2017 done at [facility]. 



  



  



  



 CLINICAL HISTORY:



  



 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 Status  



 post right lower lobectomy . No history of recent chemotherapy  



 or radiation therapy. For restaging.



  



  



  



 FINDINGS:



  



  



  



 Software fusion [facility]  



 with the CAT scan from the Skull base to the mid thighs to the same time  



 as the PET scan.



  



  



  



 Physiologic uptake is present in the components of Waldeyer's ring in  



 the lateral pharynx. Vocal cord uptake is noted of doubtful  



 significance. Mild tracer uptake is seen in the right and left axillary  



 lymph node with the degree of uptake less than 2.5 



  



 and is of doubtful significance.



  



  



  



 No abnormal radiotracer uptake is demonstrated in the visualized brain,  



 neck, chest, abdomen, pelvis, spine, and visualized proximal upper and  



 lower extremities.



  



  



  



 No abnormal radiotracer uptake is demonstrated in the brain. However,  



 normal intense metabolic uptake of radiotracer in the brain can mask the  



 presence of lesions. Brain lesions are generally better evaluated with  



 an MRI without and with intravenous 



  



 gadolinium, if clinically indicated.



  



  



  



 Physiologic excretion of radiotracer is demonstrated into the kidneys,  



 ureters, and bladder. Physiologic excretion of radiotracer is also  



 demonstrated into the small and large bowel.



  



  



  



 Interrogation of the CT scan of the chest in lung window settings  



 demonstrates the previously noted masses in each lower lobe are not  



 identified on the current examination consistent with right lower  



 lobectomy on the right and posttreatment of the left 



  



 lung lesion.. Dependent atelectasis is noted in the posterior lung bases  



 bilaterally.



  



  



  



 Interrogation of the CT scan of the whole body with soft tissue window  



 settings demonstratessignificant dilatation of the pulmonary outflow  



 tract consistent with pulmonary arterial hypertension. Coronary artery  



 calcifications are present.



  



  



  



 An irregular contoured liver is noted consistent with cirrhosis.  



 Calcified atheromatous plaque formation is seen in the aortoiliac  



 system.



  



  



  



 Interrogation of the CT scan of the whole body and bone window settings  



 demonstrates no osteolytic or osteoblastic lesions. Patient is had a  



 spinal fusion in the lower lumbar spine.



  



  



  



 IMPRESSION:



  



 No evidence of metabolically active tumor.



  



  



  



 Otherwise unchanged .Pulmonary arterial hypertension as before.  



 Cirrhosis as before.



  



  



  



 [facility]-[id number]



  



   









 Procedure Note

 

  Interface, Radiology Results Incoming - 2018  4:30 PM CDT



EXAMINATION:



 PET CT SKULL BASE TO MID THIGH



 



 Date and place of service: 3/26/2018 10:00 AM at [facility]



 



 DOSE: 11.8 mCi of 75-Vpwelw-5-Deoxyglucose (FDG) was injected intravenously 
into left wrist done infiltration at a serum glucose level of  90 mg/dl.



 



 TECHNIQUE:



 



 PROCEDURE: Following injection of 81-Kuqczi-5-Deoxyglucose (FDG) and a 
standard uptake., The patient was imaged on a TinderBox whole body PET CT scanner. 
Multiple 6 minute bed position acquisitions were obtained along the length of 
the patient's body from



 approximately the Skull base to the mid thighs without administration of 
intravenous or oral contrast. The software fused PET/CT images as well as the 
PET and CT images could be reviewed separately.



 



 The dose length product for this procedure was 408 mGy-cm.



 



 CT imaging was performed with iterative reconstruction technique and/or 
automated exposure control to reduce radiation dose



 



 COMPARISON:



 2017 done at [facility].



 



 CLINICAL HISTORY:



 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 Status post 
right lower lobectomy . No history of recent chemotherapy or 
radiation therapy. For restaging.



 



 FINDINGS:



 



 Software fusion was performed of the nuclear medicine  FDG PET scan with the 
CAT scan from the Skull base to the mid thighs to the same time as the PET scan.



 



 Physiologic uptake is present in the components of Waldeyer's ring in the 
lateral pharynx. Vocal cord uptake is noted of doubtful significance. Mild 
tracer uptake is seen in the right and left axillary lymph node with the



 degree of uptake less than 2.5



 and is of doubtful significance.



 



 No abnormal radiotracer uptake is demonstrated in the visualized brain, neck, 
chest, abdomen, pelvis, spine, and visualized proximal upper and lower 
extremities.



 



 No abnormal radiotracer uptake is demonstrated in the brain. However, normal 
intense metabolic uptake of radiotracer in the brain can mask the presence of 
lesions. Brain lesions are generally better evaluated with an MRI without and 
with intravenous



 gadolinium, if clinically indicated.



 



 Physiologic excretion of radiotracer is demonstrated into the kidneys, ureters
, and bladder. Physiologic excretion of radiotracer is also demonstrated into 
the small and large bowel.



 



 Interrogation of the CT scan of the chest in lung window settings demonstrates 
the previously noted masses in each lower lobe are not identified on the 
current examination consistent with right lower



 lobectomy on the right and posttreatment of the left



 lung lesion.. Dependent atelectasis is noted in the posterior lung bases 
bilaterally.



 



 Interrogation of the CT scan of the whole body with soft tissue window 
settings demonstrates  significant dilatation of the pulmonary outflow tract 
consistent with pulmonary arterial hypertension. Coronary artery calcifications 
are present.



 



 An irregular contoured liver is noted consistent with cirrhosis. Calcified 
atheromatous plaque formation is seen in the aortoiliac system.



 



 Interrogation of the CT scan of the whole body and bone window settings 
demonstrates no osteolytic or osteoblastic lesions. Patient is had a spinal 
fusion in the lower lumbar spine.



 



 IMPRESSION:



 No evidence of metabolically active tumor.



 



 Otherwise unchanged .Pulmonary arterial hypertension as before. Cirrhosis as 
before.



 



 [facility]-[id number]









 Performing Organization  Address  City/State/Zipcode  Phone Number

 

 [facility]  [address] [place] [address]  



POC glucose (2018 11:36 AM)Only the most recent of38 resultswithin the 
time period is included.





 Component  Value  Ref Range  Performed At

 

 POC glucose  90  65 - 100 mg/dL  [facility] DEPARTMENT OF PATHOLOGY AND



   Comment:    GENOMIC MEDICINE



   Meter ID: [id number]    



   : Heber Mcduffie    



       









 Performing Organization  Address  City/[facility] [address]/Zipcode  Phone Number

 

 [facility] DEPARTMENT OF PATHOLOGY AND  [address] [place] [id number]  



 [facility]      



Keppra (Levetiracetam) level (2018  5:13 AM)





 Component  Value  Ref Range  Performed At

 

 Levetiracetam  27  12 - 46 ug/mL  [facility]



   Comment:    



   INTERPRETIVE INFORMATION: Keppra (Levetiracetam)    



   Therapeutic Range:12-46 ug/mL    



   Toxic:Not well Established    



   Pharmacokinetics of levetiracetam are affected by renal function.    



   Adverse effects may include somnolence, weakness, headache and    



   vomiting.    



   Performed by [facility],
    



   [address] [place], [place] [id number] [phone]
    



   [email or web address], Charlie Balbuena MD - Lab. Director    



       









 Specimen

 

 Serum









 Performing Organization  Address  City/[facility] [address]/[facility] Cent[facility]ode  Phone Number

 

 [facility]  [address] [place] [address]  



Basic metabolic panel (2018  5:13 AM)Only the most recent of6 
resultswithin the time period is included.





 Component  Value  Ref Range  Performed At

 

 Sodium  134 (L)  135 - 150 mEq/L  [facility] DEPARTMENT OF



       PATHOLOGY AND GENOMIC



       MEDICINE

 

 Potassium  4.2  3.5 - 5.0 mEq/L  [facility] DEPARTMENT OF



       PATHOLOGY AND GENOMIC



       MEDICINE

 

 Chloride  102  100 - 109 mEq/L  [facility] DEPARTMENT OF



       PATHOLOGY AND GENOMIC



       MEDICINE

 

 CO2  22 (L)  24 - 32 mmol/L  [facility] DEPARTMENT OF



       PATHOLOGY AND GENOMIC



       MEDICINE

 

 Anion gap  10  7 - 15 mEq/L  [facility] DEPARTMENT OF



   Comment:    PATHOLOGY AND GENOMIC



   Starting from  , anion gap calculation    MEDICINE



   no longer incorporates potassium. Please note the change.    



       

 

 BUN  17  7 - 18 mg/dL  [facility] DEPARTMENT OF



       PATHOLOGY AND GENOMIC



       MEDICINE

 

 Creatinine  1.2  0.8 - 1.5 mg/dL  [facility] DEPARTMENT OF



       PATHOLOGY AND GENOMIC



       MEDICINE

 

 Glucose  139 (H)  65 - 100 mg/dL  [facility] DEPARTMENT OF



       PATHOLOGY AND GENOMIC



       MEDICINE

 

 Calcium  9.4  8.6 - 10.7 mg/dL  [facility] DEPARTMENT OF



       PATHOLOGY AND GENOMIC



       MEDICINE









 Specimen

 

 Plasma specimen









 Performing Organization  Address  City/State/[facility]ode  Phone Number

 

 [facility] DEPARTMENT  PATHOLOGY AND  [facility] [place] [address]  



 GENOMIC MEDICINE      



Hepatic function panel (2018  8:14 PM)





 Component  Value  Ref Range  Performed At

 

 Albumin  3.8  3.2 - 5.0 g/dL  [facility] DEPARTMENT OF PATHOLOGY AND GENOMIC



       MEDICINE

 

 Total bilirubin  0.8  0.2 - 1.2 mg/dL  [facility] DEPARTMENT OF PATHOLOGY AND GENOMIC



       MEDICINE

 

 Bilirubin direct  0.2  0.0 - 0.4 mg/dL  [facility] DEPARTMENT OF PATHOLOGY AND 
GENOMIC



       MEDICINE

 

 Alkaline phosphatase  231 (H)  30 - 120 U/L  [facility] DEPARTMENT OF PATHOLOGY AND 
GENOMIC



       MEDICINE

 

 Protein  9.7 (H)  6.3 - 8.2 g/dL  [facility] DEPARTMENT OF PATHOLOGY AND GENOMIC



       MEDICINE

 

 ALT  17 (L)  30 - 65 U/L  [facility] DEPARTMENT OF PATHOLOGY AND GENOMIC



       MEDICINE

 

 AST  29  15 - 37 U/L  [facility] DEPARTMENT OF PATHOLOGY AND GENOMIC



       MEDICINE









 Specimen

 

 Plasma specimen









 Performing Organization  Address  City/State/[facility] Cent[facility]ode  Phone Number

 

 [facility] PATHOLOGY AND  [facility] [place] [place] [address]  



 [facility]      



CT Lumbar Spine Wo Contrast (2018  5:12 PM)





 Narrative  Performed At

 

 EXAMINATION: CT LUMBAR SPINE WO CONTRAST



   [facility]



  



  



 CLINICAL HISTORY: pain



  



  



  



 COMPARISON: CT myelogram lumbar spine 2011



  



  



  



 TECHNIQUE: Axial noncontrast enhanced images of lumbar spine was  



 performed with coronal sagittal reconstruction algorithms. CT imaging  



 was performed with iterative reconstruction technique and/or automated  



 exposure control to reduce radiation dose.



  



  



  



  



  



 FINDINGS:



  



  



  



 There are 5 non-rib bearing lumbar type vertebrae. Status post posterior  



 spinous mentation with rods and screws and anterior interbody grafts at  



 L4-5 and L5-S1. Status post decompressive laminectomies at L4-5 and  



 L5-S1. There is partial solid interbody 



  



 osseous fusion at L5-S1, without definite solid interbody osseous fusion  



 at L4-5. There is solid osseous fusion of the posterior elements of  



 L4-S1. No evidence of hardware loosening. Hardware is well positioned.



  



  



  



 No fracture. 1 to 2 mm retrolisthesis L3 on L4. Facet alignment is  



 anatomic.



  



  



  



 Limited dilation of the visualized intracranial contents demonstrates  



 atherosclerosis of the abdominal aorta and branch vessels. No abdominal  



 mass is identified on limited assessment. Osteoarthrosis sacroiliac  



 joints with small vacuum clefts and mild 



  



 sclerosis.



  



  



  



  



  



 Axial images through the disc spaces demonstrate the following:



  



  



  



 L1-L2: Small disc bulge without significant spinal canal or neural  



 foraminal stenosis. Small Schmorl's nodes.



  



  



  



 L2-L3: Small disc bulge contributes to mild subarticular zone stenosis  



 without significant spinal canal or neural foraminal stenosis. Small  



 Schmorl's nodes.



  



  



  



 L3-L4: Small disc bulge, slight prominence of dorsal epidural fat, and  



 mild ligament flavum infolding contribute to mild spinal canal stenosis.  



 There is mild bilateral neural foraminal stenosis secondary to facet  



 arthropathy and small endplate 



  



 osteophytes.



  



  



  



 L4-L5: Status post decompressive laminectomy. Mild neural foraminal  



 stenosis secondary to small endplate osteophytes bilaterally.



  



  



  



 L5-S1: Status post decompressive laminectomy. No significant neural  



 foraminal stenosis.



  



  



  



  



  



  



  



  



  



 IMPRESSION: 



  



  



  



 Postsurgical changes L4-S1 as detailed above.



  



  



  



 No fracture. Multilevel degenerative changes of the lumbar spine,  



 notably with mild spinal canal and neural foraminal stenosis at L3-4.



  



  



  



  



  



  



  



  



  



 [id number]



  



   









 Procedure Note

 

  Interface, Radiology Results Incoming - 2018  5:24 PM CST



EXAMINATION: CT LUMBAR SPINE WO CONTRAST



 



 CLINICAL HISTORY: pain



 



 COMPARISON: CT myelogram lumbar spine 2011



 



 TECHNIQUE: Axial noncontrast enhanced images of lumbar spine was performed 
with coronal sagittal reconstruction algorithms. CT imaging was performed with 
iterative reconstruction technique and/or automated exposure control to reduce 
radiation dose.



 



 



 FINDINGS:



 



 There are 5 non-rib bearing lumbar type vertebrae. Status post posterior 
spinous mentation with rods and screws and anterior interbody grafts at L4-5 
and L5-S1. Status post decompressive laminectomies at L4-5 and L5-S1.



 There is partial solid interbody



 osseous fusion at L5-S1, without definite solid interbody osseous fusion at L4-
5. There is solid osseous fusion of the posterior elements of L4-S1. No 
evidence of hardware loosening. Hardware is well positioned.



 



 No fracture. 1 to 2 mm retrolisthesis L3 on L4. Facet alignment is anatomic.



 



 Limited dilation of the visualized intracranial contents demonstrates 
atherosclerosis of the abdominal aorta and branch vessels. No abdominal mass is 
identified on limited assessment. Osteoarthrosis sacroiliac joints



 with small vacuum clefts and mild



 sclerosis.



 



 



 Axial images through the disc spaces demonstrate the following:



 



 L1-L2: Small disc bulge without significant spinal canal or neural foraminal 
stenosis. Small Schmorl's nodes.



 



 L2-L3: Small disc bulge contributes to mild subarticular zone stenosis without 
significant spinal canal or neural foraminal stenosis. Small Schmorl's nodes.



 



 L3-L4: Small disc bulge, slight prominence of dorsal epidural fat, and mild 
ligament flavum infolding contribute to mild spinal canal stenosis. There is 
mild bilateral neural foraminal stenosis secondary to facet arthropathy and 
small endplate



 osteophytes.



 



 L4-L5: Status post decompressive laminectomy. Mild neural foraminal stenosis 
secondary to small endplate osteophytes bilaterally.



 



 L5-S1: Status post decompressive laminectomy. No significant neural foraminal 
stenosis.



 



 



 



 



 IMPRESSION:



 



 Postsurgical changes L4-S1 as detailed above.



 



 No fracture. Multilevel degenerative changes of the lumbar spine, notably with 
mild spinal canal and neural foraminal stenosis at L3-4.



 



 



 



 



 [facility]-[id number]









 Performing Organization  Address  City/[facility] [address]/Zipcode  Phone Number

 

 [facility]  [address] [place] [address]  



Partial thromboplastin time, activated (2018 12:29 PM)





 Component  Value  Ref Range  Performed At

 

 PTT  29.2  23.0 - 36.0 sec  [facility] DEPARTMENT OF



   Comment:    PATHOLOGY AND GENOMIC



   PTT therapeutic range for unfractionated heparin is    MEDICINE



   61.0-112.0 seconds which corresponds to Anti-Xa    



   0.3-0.7 U/ml.    



   Note:Change in Panic Value    



   The PTT Panic Value is changing from 110 sec. to 100 sec.    



   due to new instrumentation and reagents.    



   Correlation studies have been performed to validate this result.    



       









 Specimen

 

 Blood









 Performing Organization  Address  City/[facility] [address]/Zipcode  Phone Number

 

 [facility] DEPARTMENT OF PATHOLOGY AND  [address] [place] [address]  



 GENOMIC MEDICINE      



Creatine kinase, total (CPK) (2018  6:29 PM)





 Component  Value  Ref Range  Performed At

 

 Creatine kinase  134  61 - 224 U/L  [facility] DEPARTMENT OF PATHOLOGY AND GENOMIC 
MEDICINE









 Specimen

 

 Plasma specimen









 Performing Organization  Address  City/State/Zipcode  Phone Number

 

 [facility] DEPARTMENT OF PATHOLOGY AND  [address] [place] [address]  



 [facility]      



CT Head Wo Contrast (2018  6:19 PM)





 Narrative  Performed At

 

 EXAMINATION: CT HEAD WO CONTRAST



   [facility]



  



  



 CLINICAL HISTORY: seizure syncope



  



  



  



 COMPARISON:Brain CT dated 2017



  



  



  



 TECHNIQUE: Noncontrast enhanced images of the brain were obtained from  



 the skull base to the vertex. Both soft tissue and bone reconstruction  



 algorithms were performed.CT imaging was performed with iterative  



 reconstruction technique and/or automated 



  



 exposure control to reduce radiation dose.



  



  



  



  



  



 FINDINGS:



  



  



  



 There is no evidence of acute intracranial hemorrhage, intracranial  



 mass, mass effect, midline shift or focal extra-axial collection. The  



 gray-white matter differentiation is preserved, and I do not see a  



 confluent area of acute transcortical ischemia. 



  



 No discretely hyperdense vessel is identified. 



  



  



  



 The ventricles and extra-axial spaces are mildly prominent bilaterally.  



 The ventricles have remained stable in size and configuration compared  



 to the previous head CT.



  



  



  



 Minimal foci of decreased attenuation are noted mainly along the  



 periventricular white matter at the level of the corona radiata. These  



 are nonspecific, but most commonly related to chronic microvascular  



 ischemic change.



  



  



  



 Beam hardening artifact obscures details at the level of the skull base,  



 including portions of the supraorbital frontal lobes, inferior temporal  



 lobes, brainstem and cerebellum.



  



  



  



 The mastoid air cells and middle ear cavities are clear. There is soft  



 tissue density in the external auditory canals bilaterally, nonspecific,  



 but most commonly representing cerumen.



  



  



  



 The calvarium shows no aggressive bone lesion or evidence of fracture.  



 No fluid levels are seen in the visualized paranasal sinuses.



  



  



  



 IMPRESSION:



  



  



  



 No acute intracranial abnormality identified.



  



  



  



  



  



  



  



  



  



  



  



  



  



 [id number]



  



   









 Procedure Note

 

  Interface, Radiology Results Incoming - 2018  6:27 PM CST



EXAMINATION: CT HEAD WO CONTRAST



 



 CLINICAL HISTORY: seizure syncope



 



 COMPARISON:  Brain CT dated 2017



 



 TECHNIQUE: Noncontrast enhanced images of the brain were obtained from the 
skull base to the vertex. Both soft tissue and bone reconstruction algorithms 
were performed.  CT imaging was performed with iterative



 reconstruction technique and/or automated



 exposure control to reduce radiation dose.



 



 



 FINDINGS:



 



 There is no evidence of acute intracranial hemorrhage, intracranial mass, mass 
effect, midline shift or focal extra-axial collection. The gray-white matter 
differentiation is preserved, and I do not see a confluent area of



 acute transcortical ischemia.



 No discretely hyperdense vessel is identified.



 



 The ventricles and extra-axial spaces are mildly prominent bilaterally. The 
ventricles have remained stable in size and configuration compared to the 
previous head CT.



 



 Minimal foci of decreased attenuation are noted mainly along the 
periventricular white matter at the level of the corona radiata. These are 
nonspecific, but most commonly related to chronic microvascular ischemic change.



 



 Beam hardening artifact obscures details at the level of the skull base, 
including portions of the supraorbital frontal lobes, inferior temporal lobes, 
brainstem and cerebellum.



 



 The mastoid air cells and middle ear cavities are clear. There is soft tissue 
density in the external auditory canals bilaterally, nonspecific, but most 
commonly representing cerumen.



 



 The calvarium shows no aggressive bone lesion or evidence of fracture. No 
fluid levels are seen in the visualized paranasal sinuses.



 



 IMPRESSION:



 



 No acute intracranial abnormality identified.



 



 



 



 



 



 



 [id number]









 Performing Organization  Address  City/[facility] [address]/[facility]ode  Phone Number

 

 [facility]  [address] [place] [address]  



Surgical pathology request (2018  1:28 PM)





 Component  Value  Ref Range  Performed At

 

 Case number  [id number]    [facility] DEPARTMENT OF



       PATHOLOGY AND GENOMIC



       MEDICINE

 

 Surgical pathology report  See link below for PDF    [facility] DEPARTMENT OF



   Lab Report    PATHOLOGY AND GENOMIC



       MEDICINE

 

 Result status  This is Final Report to    [facility] DEPARTMENT OF



   [id number]    PATHOLOGY AND GENOMIC



       MEDICINE









 Performing Organization  Address  City/[facility] [address]/[facility] Centercode  Phone Number

 

 [facility] DEPARTMENT OF PATHOLOGY AND  [address] [facility] [place] [address]  



 GENOMIC MEDICINE      



Magnesium level (2018  5:59 AM)





 Component  Value  Ref Range  Performed At

 

 Magnesium  2.00  1.60 - 2.40 mg/dL  [facility] DEPARTMENT OF PATHOLOGY AND GENOMIC 
MEDICINE









 Specimen

 

 Plasma specimen









 Performing Organization  Address  City/[facility] [address]/[facility] Cent[facility]ode  Phone Number

 

 [facility] DEPARTMENT OF PATHOLOGY AND  [address] [facility] [place] [address]  



 GENOMIC MEDICINE      



XR Chest 2 Vw (2017  7:47 PM)Only the most recent of2 resultswithin the 
time period is included.





 Narrative  Performed At

 

 EXAMINATION:XR CHEST 2 VW



   [facility]



  



  



 CLINICAL HISTORY:sob cough todayleukocytosisR basilar  



 crackles



  



  



  



  



  



  



  



 IMPRESSION:



  



  



  



 Aortic arch calcified. Heart size is normal. Lungs are clear of acute  



 infiltrate. There has been a prior partial pulmonary resection in the  



 right side. There are no effusions. There is an intraspinal catheter  



 present, and there is hardware in cervical 



  



 spine.



  



  



  



  



  



  



  



 [facility]-[id number]



  



   









 Procedure Note

 

 Hm Interface, Radiology Results Incoming - 2017  8:23 PM CST



EXAMINATION:  XR CHEST 2 VW



 



 CLINICAL HISTORY:  sob cough today  leukocytosis  R basilar crackles



 



 



 



 IMPRESSION:



 



 Aortic arch calcified. Heart size is normal. Lungs are clear of acute 
infiltrate. There has been a prior partial pulmonary resection in the right 
side. There are no effusions. There is an intraspinal catheter



 present, and there is hardware in cervical



 spine.



 



 



 



 [facility]-[id number]









 Performing Organization  Address  City/State/Zipcode  Phone Number

 

 [facility]  [address] [place] [address]  



Troponin (2017  7:33 PM)Only the most recent of2 resultswithin the time 
period is included.





 Component  Value  Ref Range  Performed At

 

 Troponin  <0.01  0.00 - 0.60 ng/mL  [facility] DEPARTMENT OF PATHOLOGY



   Comment:    AND [employer] MEDICINE



   0.11 - 1.49 ng/mlMay indicate increased risk of acute
    



    coronary syndrome.
    



   >=1.5 ng/mlConsistent with acute myocardial    



    infarction.    



   
    



   The diagnostic value of a single normal or non-diagnostic    



   result is questionable.Serial samples at 2-6 hour intervals    



   are required to rule out acute myocardial injury.
    



       



       









 Specimen

 

 Plasma specimen









 Performing Organization  Address  City/[facility] [address]/Zipcode  Phone Number

 

 [facility] DEPARTMENT OF PATHOLOGY AND  [address] [place] [address]  



 GENOMIC MEDICINE      



Influenza antigen (2017  3:58 PM)





 Component  Value  Ref Range  Performed At

 

 Influenza antigen  Negative for Influenza A/B antigen.    [facility] DEPARTMENT OF 
PATHOLOGY



   Comment:    AND GENOMIC MEDICINE



   Specimen Information    



   Specimen Source: Nares    



   Specimen Site: Right    



       









 Specimen

 

 Nares - Right









 Performing Organization  Address  City/State/Zipcode  Phone Number

 

 [facility] DEPARTMENT OF PATHOLOGY AND  [address] [place] [address]  



 GENOMIC MEDICINE      



B natriuretic peptide (2017  3:45 PM)





 Component  Value  Ref Range  Performed At

 

 BNP  43  0 - 100 pg/mL  [facility] DEPARTMENT OF PATHOLOGY AND GENOMIC MEDICINE









 Specimen

 

 Blood









 Performing Organization  Address  City/State/Zipcode  Phone Number

 

 [facility] DEPARTMENT OF PATHOLOGY AND  [address] [place] [address].  [place] [address]  



 GENOMIC MEDICINE      



US Abdomen Complete (10/13/2017  7:28 AM)





 Narrative  Performed At

 

 EXAM: US ABDOMEN COMPLETE



   [facility]



  



  



 CLINICAL DATA:ABDOMINAL PAIN



  



  



  



 COMPARISON: 2016



  



  



  



 FINDINGS:



  



  



  



 LIVER:The liver is slightly heterogeneous in echogenicity and has an  



 irregular contour suggestive of cirrhosis. No discrete mass is  



 identified with ultrasound.



  



  



  



 MPV:Doppler evaluation of the portal vein demonstrates normal  



 hepatopetal flow. 



  



  



  



 GALLBLADDER:The gallbladder has been surgically removed.



  



  



  



 CBD: Common bile duct is not dilated measuring 6 mm.



  



  



  



 PANCREAS:The visualized portions of the pancreas are within normal  



 limits.



  



  



  



 SPLEEN:Spleen is enlarged measuring 15.3 x 7 x 6.7 cm. 



  



  



  



 KIDNEYS:The kidneys are normal in size and echogenicity. There is no  



 evidence of hydronephrosis.



  



  



  



 AORTA:The visualized upper abdominal aorta demonstrates no evidence  



 of ectasia or aneurysm.



  



  



  



 IVC:The visualized portions of the inferior vena cava are  



 unremarkable.



  



  



  



 ASCITES: No abnormal abdominal fluid collections are visualized. There  



 is no evidence of ascites.



  



  



  



 PLEURAL EFFUSION:There are no pleural effusions.



  



  



  



  



  



 IMPRESSION:



  



 1. Cirrhosis with splenomegaly.



  



  



  



 2.Status post cholecystectomy.



  



  



  



  



  



 [id number]



  



   









 Procedure Note

 

  Interface, Radiology Results Incoming - 10/13/2017  7:34 AM CDT



EXAM: US ABDOMEN COMPLETE



 



 CLINICAL DATA:  ABDOMINAL PAIN



 



 COMPARISON: 2016



 



 FINDINGS:



 



 LIVER:  The liver is slightly heterogeneous in echogenicity and has an 
irregular contour suggestive of cirrhosis. No discrete mass is identified with 
ultrasound.



 



 MPV:  Doppler evaluation of the portal vein demonstrates normal hepatopetal 
flow.



 



 GALLBLADDER:  The gallbladder has been surgically removed.



 



 CBD: Common bile duct is not dilated measuring 6 mm.



 



 PANCREAS:  The visualized portions of the pancreas are within normal limits.



 



 SPLEEN:  Spleen is enlarged measuring 15.3 x 7 x 6.7 cm.



 



 KIDNEYS:  The kidneys are normal in size and echogenicity. There is no 
evidence of hydronephrosis.



 



 AORTA:  The visualized upper abdominal aorta demonstrates no evidence of 
ectasia or aneurysm.



 



 IVC:  The visualized portions of the inferior vena cava are unremarkable.



 



 ASCITES: No abnormal abdominal fluid collections are visualized. There is no 
evidence of ascites.



 



 PLEURAL EFFUSION:  There are no pleural effusions.



 



 



 IMPRESSION:



 1.   Cirrhosis with splenomegaly.



 



 2.  Status post cholecystectomy.



 



 



 [id number]









 Performing Organization  Address  City/[facility] [address]/Zipcode  Phone Number

 

 [facility]  [address] [place] [address]  



Total iron binding capacity (10/13/2017  5:33 AM)Only the most recent of2 
resultswithin the time period is included.





 Component  Value  Ref Range  Performed At

 

 Iron level  86  76 - 198 ug/dL  [facility] DEPARTMENT OF PATHOLOGY AND



       GENOMIC MEDICINE

 

 Iron binding capacity  252 (L)  271 - 474 ug/dL  [facility] DEPARTMENT OF PATHOLOGY 
AND



       GENOMIC MEDICINE

 

 % Saturation  34.1  20.0 - 40.0 %  [facility] DEPARTMENT OF PATHOLOGY AND



       GENOMIC MEDICINE









 Specimen

 

 Blood









 Performing Organization  Address  City/[facility] Centercode  Phone Number

 

 [facility] DEPARTMENT OF PATHOLOGY AND  [address]  [address]      



Hepatitis C antibody (10/13/2017  5:33 AM)Only the most recent of2 
resultswithin the time period is included.





 Component  Value  Ref Range  Performed At

 

 Hepatitis C Ab  Non-reactive  Non-reactive  [facility] DEPARTMENT OF PATHOLOGY AND 
GENOMIC



       MEDICINE









 Specimen

 

 Blood









 Performing Organization  Address  City/[facility] [address]/[facility] Centercode  Phone Number

 

 [facility] DEPARTMENT OF PATHOLOGY AND  [address]  [address]      



Alpha-1 antitrypsin level (10/13/2017  5:33 AM)Only the most recent of2 
resultswithin the time period is included.





 Component  Value  Ref Range  Performed At

 

 Alpha-1 antitrypsin  134  90 - 200 mg/dL  [facility] DEPARTMENT OF PATHOLOGY AND 
GENOMIC



       MEDICINE









 Specimen

 

 Plasma specimen









 Performing Organization  Address  City/[facility] [address]/[facility] Centercode  Phone Number

 

 [facility] DEPARTMENT OF PATHOLOGY AND  [address] [place] [address]  



 [facility]      



Hepatitis A antibody IgM (10/13/2017  5:33 AM)





 Component  Value  Ref Range  Performed At

 

 Hepatitis A IgM  Non-reactive  Non-reactive  [facility] DEPARTMENT OF PATHOLOGY AND 
GENOMIC



       MEDICINE









 Specimen

 

 Blood









 Performing Organization  Address  City/[facility] Street/Zipcode  Phone Number

 

 [facility] DEPARTMENT OF PATHOLOGY AND  [address]  [address]      



Ceruloplasmin level (10/13/2017  5:33 AM)Only the most recent of2 resultswithin 
the time period is included.





 Component  Value  Ref Range  Performed At

 

 Ceruloplasmin  26  15 - 30 mg/dL  [facility] DEPARTMENT OF PATHOLOGY AND GENOMIC 
MEDICINE









 Specimen

 

 Plasma specimen









 Performing Organization  Address  City/State/Zipcode  Phone Number

 

 [facility] DEPARTMENT OF PATHOLOGY AND  [address] [place] [address]      



Alpha fetoprotein (10/13/2017  5:33 AM)Only the most recent of2 resultswithin 
the time period is included.





 Component  Value  Ref Range  Performed At

 

 Alpha fetoprotein  3.5  0.0 - 8.3 ng/mL  [facility] DEPARTMENT OF



   Comment:    PATHOLOGY AND GENOMIC



   The Tru 8000 AFP immunoassay was used.    MEDICINE



   Results obtained with different assay methods or kits    



   should not be used interchangeably and may be different.    



       









 Specimen

 

 Serum









 Performing Organization  Address  City/[facility] [address]/[facility] Cent[facility]  Phone Number

 

 [facility] DEPARTMENT OF PATHOLOGY AND  [address] [place] [address]      



Hepatitis B core antibody IgM (10/13/2017  5:33 AM)Only the most recent of2 
resultswithin the time period is included.





 Component  Value  Ref Range  Performed At

 

 Hepatitis B core IgM  Non-reactive  Non-reactive  [facility] DEPARTMENT OF PATHOLOGY 
AND



       GENOMIC MEDICINE









 Specimen

 

 Blood









 Performing Organization  Address  City/[facility] [address]/[facility] Centercode  Phone Number

 

 [facility] DEPARTMENT OF PATHOLOGY AND  [address]  [address]      



Hepatitis B surface antibody (10/13/2017  5:33 AM)Only the most recent of2 
resultswithin the time period is included.





 Component  Value  Ref Range  Performed At

 

 Hepatitis B surface Ab  Non-reactive  Non-reactive  [facility] DEPARTMENT OF 
PATHOLOGY AND



       GENOMIC MEDICINE









 Specimen

 

 Blood









 Performing Organization  Address  City/[facility] [address]/[facility] Centercode  Phone Number

 

 [facility] DEPARTMENT OF PATHOLOGY AND  [address]  [address]      



Hepatitis B surface antigen (10/13/2017  5:33 AM)Only the most recent of2 
resultswithin the time period is included.





 Component  Value  Ref Range  Performed At

 

 Hepatitis B surface Ag  Non-reactive  Non-reactive  [facility] DEPARTMENT OF 
PATHOLOGY AND



       GENOMIC MEDICINE









 Specimen

 

 Blood









 Performing Organization  Address  City/[facility] [address]/[facility] Centercode  Phone Number

 

 [facility] DEPARTMENT OF PATHOLOGY AND  [address]  [address]      



RADHA (10/13/2017  5:33 AM)Only the most recent of2 resultswithin the time period 
is included.





 Component  Value  Ref Range  Performed At

 

 RADHA screen  Not Detected  Not-Detected  [facility] DEPARTMENT OF PATHOLOGY AND 
GENOMIC MEDICINE









 Specimen

 

 Blood









 Performing Organization  Address  City/[facility] [address]/[facility] Centercode  Phone Number

 

 [facility] DEPARTMENT OF PATHOLOGY AND  [address] [place] [address]      



Ferritin level (10/13/2017  5:33 AM)Only the most recent of2 resultswithin the 
time period is included.





 Component  Value  Ref Range  Performed At

 

 Ferritin level  143  18 - 158 ng/mL  [facility] DEPARTMENT OF PATHOLOGY AND GENOMIC 
MEDICINE









 Specimen

 

 Serum









 Performing Organization  Address  City/State/Zipcode  Phone Number

 

 [facility] DEPARTMENT OF PATHOLOGY AND  [address]  [place] [address]  



 [facility]      



CT Abdomen Pelvis W Wo Contrast (10/13/2017  1:34 AM)





 Narrative  Performed At

 

 CT ABDOMEN PELVIS W WO CONTRAST



   [facility]



  



  



 CLINICAL INDICATION:cirrhosis



  



  



  



 TECHNIQUE: Multidetector CT of the abdomen and pelvis was performed  



 before and then following intravenous administration of iodinated  



 contrast with multiplanar reformats.



  



  



  



 CT scans are performed using radiation dose reduction techniques  



 (iterative reconstruction and/or automated exposure control). Technical  



 factors are evaluated and adjusted to ensure appropriate moderation of  



 exposure. Automated dose management technology



  



  is applied to adjust radiation exposure while achieving a diagnostic  



 quality image.



  



  



  



 COMPARISON:None. 



  



  



  



 FINDINGS:



  



  



  



 Lung bases:Dependent subsegmental atelectasis/scarring.



  



  



  



 Liver:Cirrhotic morphology with underlying hepatic steatosis. No  



 arterial hyperenhancing lesions are visualized.



  



  



  



 Gallbladder and biliary:The gallbladder is absent. Clips within the  



 gallbladder fossa. 



  



  



  



 Pancreas:Moderately atrophic with fatty replacement.



  



  



  



 Spleen: Enlarged measuring up to 15.5 cm.



  



  



  



 Gastrointestinal:Mild sigmoid diverticulosis. Large and small bowel  



 are normal in caliber. Appendix is not visualized. No focal inflammatory  



 changes within the right lower quadrant of the abdomen.



  



  



  



 Adrenals:Normal. 



  



  



  



 Kidneys and ureters:No mass or hydronephrosis.



  



  



  



 Urinary bladder: Decompressed.



  



  



  



 Lymph nodes:No enlarged lymph nodes in the abdomen or pelvis.



  



  



  



 Peritoneum:No ascites or free air.



  



  



  



 Vascular:Moderate-extensive atherosclerotic changes of the abdominal  



 aorta and major branch vessels.



  



  



  



 Reproductive organs:Normal prostate gland and seminal vesicles.



  



  



  



 Abdominal wall: Spinal stimulator device in the right lower back  



 subcutaneous soft tissues with partially visualized electrodes.



  



  



  



 Bones:Posterior spinal instrumentation at L4-S1.



  



  



  



 IMPRESSION:



  



  



  



 1. Cirrhosis. No focal or suspicious hepatic lesions visualized on liver  



 protocol CT.



  



  



  



 2. Splenomegaly.



  



  



  



  



  



 [facility]-[id number]



  



   









 Procedure Note

 

  Interface, Radiology Results Incoming - 10/13/2017  1:55 AM CDT



CT ABDOMEN PELVIS W WO CONTRAST



 



 CLINICAL INDICATION:  cirrhosis



 



 TECHNIQUE: Multidetector CT of the abdomen and pelvis was performed before and 
then following intravenous administration of iodinated contrast with 
multiplanar reformats.



 



 CT scans are performed using radiation dose reduction techniques (iterative 
reconstruction and/or automated exposure control). Technical factors are 
evaluated and adjusted to ensure appropriate moderation of



 exposure. Automated dose management technology



  is applied to adjust radiation exposure while achieving a diagnostic quality 
image.



 



 COMPARISON:  None.



 



 FINDINGS:



 



 Lung bases:  Dependent subsegmental atelectasis/scarring.



 



 Liver:  Cirrhotic morphology with underlying hepatic steatosis. No arterial 
hyperenhancing lesions are visualized.



 



 Gallbladder and biliary:  The gallbladder is absent. Clips within the 
gallbladder fossa.



 



 Pancreas:  Moderately atrophic with fatty replacement.



 



 Spleen: Enlarged measuring up to 15.5 cm.



 



 Gastrointestinal:  Mild sigmoid diverticulosis. Large and small bowel are 
normal in caliber. Appendix is not visualized. No focal inflammatory changes 
within the right lower quadrant of the abdomen.



 



 Adrenals:  Normal.



 



 Kidneys and ureters:  No mass or hydronephrosis.



 



 Urinary bladder: Decompressed.



 



 Lymph nodes:  No enlarged lymph nodes in the abdomen or pelvis.



 



 Peritoneum:  No ascites or free air.



 



 Vascular:  Moderate-extensive atherosclerotic changes of the abdominal aorta 
and major branch vessels.



 



 Reproductive organs:  Normal prostate gland and seminal vesicles.



 



 Abdominal wall: Spinal stimulator device in the right lower back subcutaneous 
soft tissues with partially visualized electrodes.



 



 Bones:  Posterior spinal instrumentation at L4-S1.



 



 IMPRESSION:



 



 1. Cirrhosis. No focal or suspicious hepatic lesions visualized on liver 
protocol CT.



 



 2. Splenomegaly.



 



 



 [facility]-[id number]









 Performing Organization  Address  City/[facility] [address]/[facility] Centercode  Phone Number

 

 [facility]  [address] [place] [address]  



Ammonia level (10/12/2017  5:45 PM)Only the most recent of2 resultswithin the 
time period is included.





 Component  Value  Ref Range  Performed At

 

 Ammonia  33  3 - 37 umol/L  [facility] DEPARTMENT OF PATHOLOGY AND GENOMIC MEDICINE









 Specimen

 

 Plasma specimen









 Performing Organization  Address  City/[facility] [address]/[facility] Centercode  Phone Number

 

 [facility] DEPARTMENT OF PATHOLOGY AND  [address] [place] [address]  



 [facility]      



Anti smooth muscle Ab titer (10/12/2017  1:27 PM)





 Component  Value  Ref Range  Performed At

 

 Anti smooth muscle Ab titer  1:80 (A)  Not-Detected  [facility] DEPARTMENT OF 
PATHOLOGY AND



       GENOMIC MEDICINE









 Specimen

 

 Blood









 Performing Organization  Address  City/[facility] [address]/[facility] Centercode  Phone Number

 

 [facility] DEPARTMENT OF PATHOLOGY AND  [address] [place] [address]  



 [facility]      



Anti smooth muscle Ab screen (10/12/2017  1:27 PM)





 Component  Value  Ref Range  Performed At

 

 Anti smooth muscle Ab screen  Detected (A)  Not-Detected  [facility] DEPARTMENT OF 
PATHOLOGY



       AND GENOMIC MEDICINE









 Specimen

 

 Blood









 Performing Organization  Address  City/State/Zipcode  Phone Number

 

 [facility] DEPARTMENT OF PATHOLOGY AND  [address] [place] [address]  



 GENOMIC MEDICINE      



Hemoglobin &amp; hematocrit (10/12/2017  1:27 PM)





 Component  Value  Ref Range  Performed At

 

 HGB  14.4  13.0 - 17.3 g/dL  [facility] DEPARTMENT OF PATHOLOGY AND GENOMIC MEDICINE

 

 HCT  44.0  34.0 - 45.0 %  [facility] DEPARTMENT OF PATHOLOGY AND GENOMIC MEDICINE









 Specimen

 

 Blood









 Performing Organization  Address  City/[facility] [address]/[facility] Centercode  Phone Number

 

 [facility] DEPARTMENT OF PATHOLOGY AND  [address]  [place], [place] [address] [place] [address]      



Lactic acid level (10/11/2017 10:17 PM)Only the most recent of4 resultswithin 
the time period is included.





 Component  Value  Ref Range  Performed At

 

 Lactic acid  1.4  0.5 - 2.2 mmol/L  [facility] DEPARTMENT OF PATHOLOGY AND GENOMIC 
MEDICINE









 Specimen

 

 Blood









 Performing Organization  Address  City/[facility] [address]/[facility] Centercode  Phone Number

 

 [facility] DEPARTMENT OF PATHOLOGY AND  [address]  [place], [place] [address] [place] [address]      



Bilirubin direct (10/11/2017  7:49 PM)Only the most recent of2 resultswithin 
the time period is included.





 Component  Value  Ref Range  Performed At

 

 Bilirubin direct  0.1  0.0 - 0.4 mg/dL  [facility] DEPARTMENT OF PATHOLOGY AND 
GENOMIC



       MEDICINE









 Specimen

 

 Plasma specimen









 Narrative  Performed At

 

 LA RESULT CALLED TO DAHLIA SUMNER  [facility] DEPARTMENT OF PATHOLOGY AND GENOMIC



 [id number]/11/[id number] BY DJ  MEDICINE









 Performing Organization  Address  City/[facility] [address]/[facility] Centercode  Phone Number

 

 [facility] DEPARTMENT OF PATHOLOGY AND  [address] [place] [address]  



 GENOMIC MEDICINE      



Amylase level (10/11/2017  7:49 PM)Only the most recent of2 resultswithin the 
time period is included.





 Component  Value  Ref Range  Performed At

 

 Amylase  59  34 - 122 U/L  [facility] DEPARTMENT OF PATHOLOGY AND GENOMIC MEDICINE









 Specimen

 

 Plasma specimen









 Performing Organization  Address  City/[facility] [address]/[facility] Centercode  Phone Number

 

 [facility] DEPARTMENT OF PATHOLOGY AND  [address] [place] [address]  



 [facility]      



CT Chest W Contrast Abdomen W Contrast Pelvis W Contrast (2017  9:31 PM)





 Narrative  Performed At

 

 EXAMINATION:CT CHEST W CONTRAST ABDOMEN W CONTRAST PELVIS W CONTRAST



  [facility]



  



  



 CLINICAL HISTORY:RLL chest pain



  



  



  



 TECHNIQUE: Multiple axial images of the chest, abdomen, and pelvis were  



 obtained following intravenous administration of iodinated contrast.  



 Sagittal and coronal computerized reformatted images were  



 obtained.Automatic exposure control and iterative 



  



 reconstruction techniques used to reduce dose.



  



  



  



 COMPARISON:2017



  



  



  



 FINDINGS:



  



  



  



 Chest: 



  



  Mild diffuse emphysematous changes throughout the lungs.



  



  



  



 Mild atelectasis in the lingula postoperative changes in the right  



 middle lobe. Mild bronchiectasis in the bilateral upper and lower lobes.  



 No suspicious focal pulmonary nodules or infiltrates present



  



  



  



 Small nonspecific subcentimeter mediastinal and hilar lymph nodes



  



  



  



 Diffuse calcified atherosclerotic vascular disease throughout the  



 arterial structures.



  



  



  



 No pleural or pericardial effusions are present.



  



  



  



 Degenerative changes are present throughout the bony structures without  



 evidence of a suspicious focal lesion.



  



  



  



 Abdomen/Pelvis:



  



  Diffuse cirrhotic changes are present throughout the liver without  



 evidence of focal mass



  



  



  



 The gallbladder has been removed



  



  



  



 The spleen is enlarged



  



  



  



 Portal vein is patent



  



  



  



 Pancreas is unremarkable



  



  



  



 Subcentimeter adenoma in the left adrenal gland



  



  



  



 Kidneys are within normal limits



  



  



  



 No significant lymphadenopathy free fluid present



  



  



  



 Diffuse calcified atherosclerotic vascular disease throughout the  



 arterial structures.



  



  



  



 No evidence of appendicitis



  



  



  



 Diffuse diverticulosis of colon without evidence of diverticulitis. The  



 small bowel is unremarkable



  



  



  



 Diffuse calcified atherosclerotic vascular disease throughout the  



 arterial structures.



  



  



  



 Pelvis:



  



  



  



 No significant lymphadenopathy, solid masses or free fluid present



  



  



  



 Degenerative changes are present throughout the bony structures without  



 evidence of a suspicious focal lesion.



  



  



  



  



  



 IMPRESSION:



  



  



  



 Diffuse emphysematous changes throughout the lungs. No suspicious focal  



 pulmonary nodules or infiltrates present



  



  



  



 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



  



  



  



 Diverticulosis without evidence of diverticulitis



  



  



  



 [facility]-[id number]



  



   









 Procedure Note

 

  Interface, Radiology Results 2017  9:42 PM CDT



EXAMINATION:  CT CHEST W CONTRAST ABDOMEN W CONTRAST PELVIS W CONTRAST



 



 CLINICAL HISTORY:  RLL chest pain



 



 TECHNIQUE: Multiple axial images of the chest, abdomen, and pelvis were 
obtained following intravenous administration of iodinated contrast. Sagittal 
and coronal computerized reformatted images were obtained.Automatic exposure 
control and iterative



 reconstruction techniques used to reduce dose.



 



 COMPARISON:  2017



 



 FINDINGS:



 



 Chest:



  Mild diffuse emphysematous changes throughout the lungs.



 



 Mild atelectasis in the lingula postoperative changes in the right middle 
lobe. Mild bronchiectasis in the bilateral upper and lower lobes. No suspicious 
focal pulmonary nodules or infiltrates present



 



 Small nonspecific subcentimeter mediastinal and hilar lymph nodes



 



 Diffuse calcified atherosclerotic vascular disease throughout the arterial 
structures.



 



 No pleural or pericardial effusions are present.



 



 Degenerative changes are present throughout the bony structures without 
evidence of a suspicious focal lesion.



 



 Abdomen/Pelvis:



  Diffuse cirrhotic changes are present throughout the liver without evidence 
of focal mass



 



 The gallbladder has been removed



 



 The spleen is enlarged



 



 Portal vein is patent



 



 Pancreas is unremarkable



 



 Subcentimeter adenoma in the left adrenal gland



 



 Kidneys are within normal limits



 



 No significant lymphadenopathy free fluid present



 



 Diffuse calcified atherosclerotic vascular disease throughout the arterial 
structures.



 



 No evidence of appendicitis



 



 Diffuse diverticulosis of colon without evidence of diverticulitis. The small 
bowel is unremarkable



 



 Diffuse calcified atherosclerotic vascular disease throughout the arterial 
structures.



 



 Pelvis:



 



 No significant lymphadenopathy, solid masses or free fluid present



 



 Degenerative changes are present throughout the bony structures without 
evidence of a suspicious focal lesion.



 



 



 IMPRESSION:



 



 Diffuse emphysematous changes throughout the lungs. No suspicious focal 
pulmonary nodules or infiltrates present



 



 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



 



 Diverticulosis without evidence of diverticulitis



 



 [facility]-[id number]









 [facility] Organization  Address  City/State/Zipcode  Phone Number

 

 [facility]  [address] [place] [address]  



after 2017



Insurance







 Payer  Benefit Plan / Group  Subscriber ID  Type  Phone  Address

 

 [employer] MEDICARE  [employer] MEDICARE HMO/PPO [facility]  xxxxxxxx  HMO    









 Guarantor Name  Account Type  Relation to  Date of  Phone  Billing



     Patient  Birth    Address

 

 CARLOS BARKLEY  Personal/Family  Self  1965  Home:  [phone]         [phone]  [address]







         [address]  [place]



           [phone]

## 2018-09-18 NOTE — ER
Nurse's Notes                                                                                     

 [facility]                                                                

Name: Cleveland Ricketts Jr                                                                            

Age: 53 yrs                                                                                       

Sex: Male                                                                                         

: 1965                                                                                   

MRN: [id number]                                                                                   

Arrival Date: 2018                                                                          

Time: 17:44                                                                                       

Account#: [id number]                                                                            

Bed 3                                                                                             

Private MD:                                                                                       

Diagnosis: Altered mental status, unspecified                                                     

                                                                                                  

Presentation:                                                                                     

                                                                                             

17:44 Presenting complaint: Patient states: right sided head pain and neck pain, pt sees      sv  

      lights in his eyes. Pt is alert to person and situation, he believes he is in [place].     

      Transition of care: patient was not received from another setting of care. An acute         

      neurological deficit is present. The charge nurse has been notified. Onset of symptoms      

      was 2018 at 11:00. Care prior to arrival: None.                               

17:44 Method Of Arrival: Wheelchair                                                           sv  

17:44 Acuity: JENNIFER 2                                                                           sv  

18:22 Pre-hospital glucose is not applicable to this patient. Risk Assessment: Do you want to hb  

      hurt yourself or someone else? Patient reports no desire to harm self or others.            

      Initial Sepsis Screen: Does the patient meet any 2 criteria? No. Patient's initial          

      sepsis screen is negative. Does the patient have a suspected source of infection? No.       

      Patient's initial sepsis screen is negative.                                                

                                                                                                  

Triage Assessment:                                                                                

17:59 The onset of the patients symptoms was 2018 at 11:00. General: Appears in hb  

      no apparent distress. Behavior is calm, cooperative. Pain: Denies pain. EENT: No signs      

      and/or symptoms were reported regarding the EENT system. Neuro: Level of Consciousness      

      is awake, obeys commands, confused, Oriented to person, Reports dizziness, confusion.       

      Cardiovascular: Heart tones S1 S2 present Capillary refill < 3 seconds Patient's skin       

      is warm and dry. Respiratory: Airway is patent Trachea midline Respiratory effort is        

      even, unlabored, Respiratory pattern is regular, symmetrical, Breath sounds are clear       

      bilaterally. GI: No signs and/or symptoms were reported involving the gastrointestinal      

      system. : No signs and/or symptoms were reported regarding the genitourinary system.      

      Derm: No signs and/or symptoms reported regarding the dermatologic system. Skin is          

      intact. Musculoskeletal: No signs and/or symptoms reported regarding the                    

      musculoskeletal system.                                                                     

                                                                                                  

Stroke Activation: Symptom onset > 6 hours                                                        

 Physician: Stroke Attending; Name: ; Notified At: ; Arrived At:                                  

 Physician: Chief Stroke Resident; Name: ; Notified At: ; Arrived At:                             

 Physician: Stroke Resident; Name: ; Notified At: ; Arrived At:                                   

 Physician: ED Attending; Name: ; Notified At: ; Arrived At:                                      

 Physician: ED Resident; Name: ; Notified At: ; Arrived At:                                       

Stroke Activation: Symptom onset > 6 hours                                                        

 Physician: Stroke Attending; Name: ; Notified At: ; Arrived At:                                  

 Physician: Chief Stroke Resident; Name: ; Notified At: ; Arrived At:                             

 Physician: Stroke Resident; Name: ; Notified At: ; Arrived At:                                   

 Physician: ED Attending; Name: ; Notified At: ; Arrived At:                                      

 Physician: ED Resident; Name: ; Notified At: ; Arrived At:                                       

Stroke Activation:                                                                                

 Physician: Stroke Attending; Name: Dr. Alberts; Notified At:  17:44; Arrived           

  At:  17:46                                                                            

 Physician: Chief Stroke Resident; Name: ; Notified At:  17:44; Arrived At:             

 Physician: Stroke Resident; Name: ; Notified At:  17:44; Arrived At:                   

 Physician: ED Attending; Name: ; Notified At:  17:44; Arrived At:                      

 Physician: ED Resident; Name: ; Notified At:  17:44; Arrived At:                       

                                                                                                  

Historical:                                                                                       

- Home Meds:                                                                                      

18:21 Effexor  mg Oral cp24 1 cap once daily [Active]; Keppra 1,000 mg Oral tab 1 tab   hb  

      every 12 hours [Active]; Seroquel 600 mg Oral 1 tab nightly [Active];                       

- PMHx:                                                                                           

18:21 COPD; Emphysema; Hypertension; lunch cancer; Lung Cancer; PTSD; Seizures;               hb  

- PSHx:                                                                                           

18:21 lumbar fusion; cervical fusion; dorsal column stimulator implant; Appendectomy; right   hb  

      lower lobe lobectomy; right great toe; collarbone; Cholecystectomy;                         

                                                                                                  

- Immunization history:: Adult Immunizations up to date.                                          

- Social history:: Smoking status: Patient/guardian denies using tobacco.                         

- Ebola Screening: : No symptoms or risks identified at this time.                                

                                                                                                  

                                                                                                  

Screenin:00 Abuse screen: Denies threats or abuse. Denies injuries from another. Nutritional        hb  

      screening: No deficits noted. Tuberculosis screening: No symptoms or risk factors           

      identified. Fall Risk Total Art Fall Scale indicates Low Risk Score (25-44 pts). Fall     

      prevention measures have been instituted. Side Rails Up X 2 Frequent Obs/Assesments         

      occuring As available Patient and Family Educated on Fall Prevention Program and            

      strategies.                                                                                 

                                                                                                  

Assessment:                                                                                       

17:52 Reassessment: Back from CT at this time, Dr. Alberts at bedside assessing patient.      hb  

17:56 Reassessment: Dr. Sewell reported to Gaston Russell NP who spoke to Dr. Alberts that   

      CT results are negative for acute abnormality.                                              

18:26 Patient has been NPO before screening. The patient is alert, and able to follow         hb  

      commands. The patient does not exhibit slurred or garbled speech. The patient is not        

      exhibiting difficulty speaking. The patient does not exhibit difficulty understanding       

      words. The patient is able to swallow own secretions with no drooling or need for           

      suction. Patient tolerated one teaspoon of water. No drooling, immediate coughing,          

      gurgling, or clearing of the throat was noted. The patient tolerated 90mL of water. No      

      drooling, immediate coughing, gurgling, or clearing of the throat was noted. The            

      patient passed the bedside swallow screening. Oral medications may be given as ordered.     

      Contact Physician for further diet orders. Provider notified of bedside swallow             

      screening results: Rajendra Alberts MD. T-PA (Activase) Screening: Contraindications:          

      Patient reports onset of signs and symptoms of stroke greater than 6 hours ago: Yes.        

19:16 Reassessment: Patient appears in no apparent distress at this time.                     ak1 

20:04 Reassessment: pt to request pain medication. ERP assessed pt, discharge papers printed. ak1 

20:11 Reassessment: pt refusing to leave. pt refused wheelchair. pt throwing EKG leads. pt    ak1 

      escorted out by ER tech. .                                                                  

                                                                                                  

Vital Signs:                                                                                      

17:52  / 79; Pulse 82; Resp 22; Temp 98.2(O); Pulse Ox 92% on R/A; Weight 74.84 kg;     hb  

      Height 6 ft. 1 in. (185.42 cm);                                                             

18:26  / 91; Pulse 80; Resp 18; Pulse Ox 94% on R/A;                                    hb  

19:16  / 84; Pulse 71; Pulse Ox 96% on R/A;                                             ak1 

17:52 Body Mass Index 21.77 (74.84 kg, 185.42 cm)                                             hb  

                                                                                                  

NIH Stroke Scale Scores:                                                                          

18:08 NIHSS Score: 0                                                                          kdr 

18:10 NIHSS Score: 0                                                                          hb  

                                                                                                  

ED Course:                                                                                        

17:44 Patient arrived in ED.                                                                  sv  

17:46 Triage completed.                                                                       sv  

17:50 Rajendra Alberts MD is Attending Physician.                                              kdr 

17:50 Arm band placed on.                                                                     hb  

17:54 Inserted saline lock: 22 gauge in right antecubital area, using aseptic technique.      hb  

      Blood collected. Patient maintains SpO2 saturation greater than 95% on room air.            

17:57 CT Stroke Brain w/o Contrast In Process Unspecified.                                    EDMS

17:58 EKG done, by EKG tech. reviewed by Rajendra Alberts MD.                                    sm3 

18:01 Patient has correct armband on for positive identification. Placed in gown. Bed in low  hb  

      position. Call light in reach. Side rails up X2.                                            

18:15 XRAY Chest (1 view) In Process Unspecified.                                             EDMS

18:57 Sera Wu, RN is Primary Nurse.                                                   hb  

19:25 No provider procedures requiring assistance completed.                                  ak1 

20:11 IV discontinued, intact, bleeding controlled, No redness/swelling at site. Pressure     ak1 

      dressing applied.                                                                           

                                                                                                  

Administered Medications:                                                                         

18:57 Drug: Ibuprofen 800 mg Route: PO;                                                       hb  

19:24 Follow up: Response: No adverse reaction                                                ak1 

19:24 Drug: NS 0.9% 1000 ml Route: IV; Rate: 1 bolus; Site: right antecubital;                ak1 

20:04 Follow up: IV Status: Completed infusion                                                ak1 

                                                                                                  

                                                                                                  

Point of Care Testing:                                                                            

      Blood Glucose:                                                                              

17:54 Blood Glucose: 112 mg/dL;                                                               hb  

      Ranges:                                                                                     

                                                                                                  

Outcome:                                                                                          

19:52 Discharge ordered by MD.                                                                kdr 

20:05 Discharged to home via wheelchair.                                                      ak1 

20:05 Condition: stable                                                                           

20:05 Discharge instructions given to patient, Instructed on discharge instructions, follow       

      up and referral plans. Demonstrated understanding of instructions, follow-up care.          

20:12 Patient left the ED.                                                                    ak1 

                                                                                                  

                                                                                                  

NIH Stroke Scale - NIH Stroke Score                                                               

Date: 2018                                                                                  

Time: 18:08                                                                                       

Total Score = 0                                                                                   

  1a. Level of Consciousness (LOC) - 0(Alert)                                                     

  1b. Level of Consciousness (LOC) (Year \T\ Age) - 0(Both)                                       

  1c. LOC Commands (Open \T\ Closes Eyes/) - 0(Both)                                          

   2. Best Gaze (Lateral Gaze Paresis) - 0(Normal)                                                

   3. Visual Field Loss - 0(No visual loss)                                                       

   4. Facial Palsy - 0(Normal)                                                                    

  5a. Left Arm: Motor (10-second hold) - 0(No drift)                                              

  5b. Right Arm: Motor (10-second hold) - 0(No drift)                                             

  6a. Left Leg: Motor (5-second hold - always test supine) - 0(No drift)                          

  6b. Right Leg: Motor (5-second hold - always test supine) - 0(No drift)                         

   7. Limb Ataxia (finger/nose \T\ heel/shin - test with eyes open) - 0(Absent)                   

   8. Sensory Loss (pinprick arms/legs/face) - 0(Normal)                                          

   9. Best Language: Aphasia (description/naming/reading) - 0(No aphasia)                         

  10. Dysarthria (speech clarity - read or repeat words) - 0(Normal)                              

  11. Extinction and Inattention (visual/tactile/auditory/spatial/personal) - 0(No                

      abnormality)                                                                                

Initials: [facility]                                                                                     

                                                                                                  

                                                                                                  

NIH Stroke Scale - NIH Stroke Score                                                               

Date: 2018                                                                                  

Time: 18:10                                                                                       

Total Score = 0                                                                                   

  1a. Level of Consciousness (LOC) - 0(Alert)                                                     

  1b. Level of Consciousness (LOC) (Year \T\ Age) - 0(Both)                                       

  1c. LOC Commands (Open \T\ Closes Eyes/) - 0(Both)                                          

   2. Best Gaze (Lateral Gaze Paresis) - 0(Normal)                                                

   3. Visual Field Loss - 0(No visual loss)                                                       

   4. Facial Palsy - 0(Normal)                                                                    

  5a. Left Arm: Motor (10-second hold) - 0(No drift)                                              

  5b. Right Arm: Motor (10-second hold) - 0(No drift)                                             

  6a. Left Leg: Motor (5-second hold - always test supine) - 0(No drift)                          

  6b. Right Leg: Motor (5-second hold - always test supine) - 0(No drift)                         

   7. Limb Ataxia (finger/nose \T\ heel/shin - test with eyes open) - 0(Absent)                   

   8. Sensory Loss (pinprick arms/legs/face) - 0(Normal)                                          

   9. Best Language: Aphasia (description/naming/reading) - 0(No aphasia)                         

  10. Dysarthria (speech clarity - read or repeat words) - 0(Normal)                              

  11. Extinction and Inattention (visual/tactile/auditory/spatial/personal) - 0(No                

      abnormality)                                                                                

Initials: hb                                                                                      

                                                                                                  

Signatures:                                                                                       

Dispatcher [employer]                           Shabnam Maurer, RN                    Rajendra Charles MD MD kdr Krenek, Amber, RN                       RN   ak1                                                  

Sera Wu RN RN hb Montes, Shakira                              3                                                  

                                                                                                  

**************************************************************************************************

## 2018-09-18 NOTE — EDPHYS
Physician Documentation                                                                           

 [facility]                                                                

Name: Cleveland Ricketts Jr                                                                            

Age: 53 yrs                                                                                       

Sex: Male                                                                                         

: 1965                                                                                   

MRN: [id number]                                                                                   

Arrival Date: 2018                                                                          

Time: 17:44                                                                                       

Account#: [id number]                                                                            

Bed 3                                                                                             

Private MD:                                                                                       

ED Physician Rajendra Alberts                                                                       

HPI:                                                                                              

                                                                                             

21:34 This 53 yrs old  Male presents to ER via Wheelchair with complaints of S/S of  kdr 

      Possible Stroke.                                                                            

21:34 The patient's problem is reported as altered mental status.                             kdr 

21:34 The patient states that he as working out in the yard today and he became very hot and  kdr 

      then confused. He has had this before. He c/o HA, blurry vision, light in his eyes. He      

      has no focal deficits or c/o other than those mentioned. Onset: The symptoms/episode        

      began/occurred gradually, just prior to arrival. Severity of symptoms: At their worst       

      the symptoms were mild moderate just prior to arrival, in the emergency department the      

      symptoms are unchanged. The patient has not experienced similar symptoms in the past.       

                                                                                                  

Historical:                                                                                       

- Home Meds:                                                                                      

18:21 Effexor  mg Oral cp24 1 cap once daily [Active]; Keppra 1,000 mg Oral tab 1 tab   hb  

      every 12 hours [Active]; Seroquel 600 mg Oral 1 tab nightly [Active];                       

- PMHx:                                                                                           

18:21 COPD; Emphysema; Hypertension; lunch cancer; Lung Cancer; PTSD; Seizures;               hb  

- PSHx:                                                                                           

18:21 lumbar fusion; cervical fusion; dorsal column stimulator implant; Appendectomy; right   hb  

      lower lobe lobectomy; right great toe; collarbone; Cholecystectomy;                         

                                                                                                  

- Immunization history:: Adult Immunizations up to date.                                          

- Social history:: Smoking status: Patient/guardian denies using tobacco.                         

- Ebola Screening: : No symptoms or risks identified at this time.                                

                                                                                                  

                                                                                                  

ROS:                                                                                              

21:34 Constitutional: Negative for fever, chills, and weight loss, Eyes: Negative for injury, kdr 

      pain, redness, and discharge, - he does c/o blurry vision and flashing lights ENT:          

      Negative for injury, pain, and discharge, Neck: Negative for injury, pain, and              

      swelling, Cardiovascular: Negative for chest pain, palpitations, and edema,                 

      Respiratory: Negative for shortness of breath, cough, wheezing, and pleuritic chest         

      pain, Abdomen/GI: Negative for abdominal pain, nausea, vomiting, diarrhea, and              

      constipation, Back: Negative for injury and pain, : Negative for injury, bleeding,        

      discharge, and swelling, MS/Extremity: Negative for injury and deformity, Skin:             

      Negative for injury, rash, and discoloration, Psych: Negative for depression, anxiety,      

      suicide ideation, homicidal ideation, and hallucinations, Allergy/Immunology: Negative      

      for hives, rash, and allergies, Endocrine: Negative for neck swelling, polydipsia,          

      polyuria, polyphagia, and marked weight changes, Hematologic/Lymphatic: Negative for        

      swollen nodes, abnormal bleeding, and unusual bruising.                                     

21:34 Neuro: Positive for altered mental status, headache, visual changes, weakness, Negative     

      for dizziness, gait disturbance, hearing loss, loss of consciousness, numbness, seizure     

      activity, speech changes, syncope, near syncope, tinnitus, tremor.                          

                                                                                                  

Exam:                                                                                             

18:08 Radiologist reports: Negative                                                           kdr 

21:34 Constitutional:  This is a well developed, well nourished patient who is awake, alert,  kdr 

      and in no acute distress. Head/Face:  Normocephalic, atraumatic. Eyes:  Pupils equal        

      round and reactive to light, extra-ocular motions intact.  Lids and lashes normal.          

      Conjunctiva and sclera are non-icteric and not injected.  Cornea within normal limits.      

      Periorbital areas with no swelling, redness, or edema. Neck:  Trachea midline, no           

      thyromegaly or masses palpated, and no cervical lymphadenopathy.  Supple, full range of     

      motion without nuchal rigidity, or vertebral point tenderness.  No Meningismus.             

      Chest/axilla:  Normal chest wall appearance and motion.  Nontender with no deformity.       

      No lesions are appreciated. Cardiovascular:  Regular rate and rhythm with a normal S1       

      and S2.  No gallops, murmurs, or rubs.  Normal PMI, no JVD.  No pulse deficits.             

      Respiratory:  Lungs have equal breath sounds bilaterally, clear to auscultation and         

      percussion.  No rales, rhonchi or wheezes noted.  No increased work of breathing, no        

      retractions or nasal flaring. Abdomen/GI:  Soft, non-tender, with normal bowel sounds.      

      No distension or tympany.  No guarding or rebound.  No evidence of tenderness               

      throughout. Back:  No spinal tenderness.  No costovertebral tenderness.  Full range of      

      motion. Skin:  Warm, dry with normal turgor.  Normal color with no rashes, no lesions,      

      and no evidence of cellulitis. MS/ Extremity:  Pulses equal, no cyanosis.                   

      Neurovascular intact.  Full, normal range of motion. Psych:  Awake, alert, with             

      orientation to person, place and time.  Behavior, mood, and affect are within normal        

      limits.                                                                                     

21:34 Neuro: Orientation: appropriate for stated age, to person, place, time, situation,          

      Mentation: able to follow commands, slow to respond, confused, Memory: appropriate for      

      stated age, Cranial nerves: CN II- XII are normal as tested, extraocular movements are      

      intact, Facial palsy and sensory deficits are absent. Motor: is normal.                     

                                                                                                  

Vital Signs:                                                                                      

17:52  / 79; Pulse 82; Resp 22; Temp 98.2(O); Pulse Ox 92% on R/A; Weight 74.84 kg;     hb  

      Height 6 ft. 1 in. (185.42 cm);                                                             

18:26  / 91; Pulse 80; Resp 18; Pulse Ox 94% on R/A;                                    hb  

19:16  / 84; Pulse 71; Pulse Ox 96% on R/A;                                             ak1 

17:52 Body Mass Index 21.77 (74.84 kg, 185.42 cm)                                             hb  

                                                                                                  

NIH Stroke Scale Scores:                                                                          

18:08 NIHSS Score: 0                                                                          kdr 

18:10 NIHSS Score: 0                                                                          hb  

                                                                                                  

MDM:                                                                                              

19:52 Patient medically screened.                                                             kdr 

21:34 Data reviewed: vital signs, nurses notes, lab test result(s), radiologic studies.       kdr 

      Counseling: I had a detailed discussion with the patient and/or guardian regarding: the     

      historical points, exam findings, and any diagnostic results supporting the                 

      discharge/admit diagnosis, lab results, radiology results, the need for outpatient          

      follow up. ED course: The patient was stable in the ED and though no real findings on       

      the laboratory evaluation, the patient continued to ask for pain medication.                

                                                                                                  

                                                                                             

18:05 Order name: Basic Metabolic Panel; Complete Time: 18:52                                 kdr 

                                                                                             

18:05 Order name: CBC with Diff; Complete Time: 18:52                                         kdr 

                                                                                             

18:05 Order name: LFT's; Complete Time: 18:52                                                 kdr 

                                                                                             

18:05 Order name: Magnesium; Complete Time: 18:52                                             kdr 

                                                                                             

18:05 Order name: NT PRO-BNP; Complete Time: 18:52                                            kdr 

                                                                                             

18:05 Order name: PT-INR; Complete Time: 18:52                                                kdr 

                                                                                             

18:05 Order name: Troponin (emerg Dept Use Only); Complete Time: 18:52                        kdr 

                                                                                             

17:47 Order name: CT Stroke Brain w/o Contrast; Complete Time: 18:10                          bd  

                                                                                             

18:04 Order name: XRAY Chest (1 view); Complete Time: 19:50                                   bd  

                                                                                             

18:04 Order name: Cardiac monitoring; Complete Time: 18:19                                    bd  

                                                                                             

18:04 Order name: EKG - Nurse/Tech; Complete Time: 18:19                                      bd  

                                                                                             

18:04 Order name: IV Saline Lock; Complete Time: 18:19                                        bd  

                                                                                             

18:04 Order name: Labs collected and sent; Complete Time: 18:19                               bd  

                                                                                             

18:04 Order name: O2 Per Protocol; Complete Time: 18:19                                       bd  

                                                                                             

18:04 Order name: O2 Sat Monitoring; Complete Time: 18:20                                     bd  

                                                                                             

18:05 Order name: EKG; Complete Time: 18:06                                                   kdr 

                                                                                             

18:05 Order name: Cardiac monitoring; Complete Time: 18:19                                    kdr 

                                                                                             

18:05 Order name: CPK; Complete Time: 18:52                                                   kdr 

                                                                                             

18:09 Order name: PTT, Activated Partial Thromb; Complete Time: 18:52                         EDMS

                                                                                             

18:05 Order name: EKG - Nurse/Tech; Complete Time: 18:19                                      kdr 

                                                                                             

18:05 Order name: IV Saline Lock; Complete Time: 18:18                                        kdr 

                                                                                             

18:05 Order name: Labs collected and sent; Complete Time: 18:19                               kdr 

                                                                                             

18:05 Order name: O2 Per Protocol; Complete Time: 18:19                                       kdr 

                                                                                             

18:05 Order name: O2 Sat Monitoring; Complete Time: 18:19                                     kdr 

                                                                                                  

Administered Medications:                                                                         

18:57 Drug: Ibuprofen 800 mg Route: PO;                                                       hb  

19:24 Follow up: Response: No adverse reaction                                                ak1 

19:24 Drug: NS 0.9% 1000 ml Route: IV; Rate: 1 bolus; Site: right antecubital;                ak1 

20:04 Follow up: IV Status: Completed infusion                                                ak1 

                                                                                                  

                                                                                                  

Point of Care Testing:                                                                            

      Blood Glucose:                                                                              

17:54 Blood Glucose: 112 mg/dL;                                                               hb  

      Ranges:                                                                                     

      Critical Glucose Levels:Adult <50 mg/dl or >400 mg/dl  <40 mg/dl or >180 mg/dl       

Disposition:                                                                                      

18 19:52 Discharged to Home. Impression: Altered mental status, unspecified.                

- Condition is Stable.                                                                            

- Discharge Instructions: Confusion, Heat Exhaustion Information.                                 

                                                                                                  

- Medication Reconciliation Form, Thank You Letter form.                                          

- Follow up: Private Physician; When: 2 - 3 days; Reason: If symptoms return, Further             

  diagnostic work-up, Recheck today's complaints, Continuance of care, Re-evaluation by           

  your physician.                                                                                 

- Problem is new.                                                                                 

- Symptoms have improved.                                                                         

                                                                                                  

                                                                                                  

                                                                                                  

                                                                                                  

NIH Stroke Scale - NIH Stroke Score                                                               

Date: 2018                                                                                  

Time: 18:08                                                                                       

Total Score = 0                                                                                   

  1a. Level of Consciousness (LOC) - 0(Alert)                                                     

  1b. Level of Consciousness (LOC) (Year \T\ Age) - 0(Both)                                       

  1c. LOC Commands (Open \T\ Closes Eyes/) - 0(Both)                                          

   2. Best Gaze (Lateral Gaze Paresis) - 0(Normal)                                                

   3. Visual Field Loss - 0(No visual loss)                                                       

   4. Facial Palsy - 0(Normal)                                                                    

  5a. Left Arm: Motor (10-second hold) - 0(No drift)                                              

  5b. Right Arm: Motor (10-second hold) - 0(No drift)                                             

  6a. Left Leg: Motor (5-second hold - always test supine) - 0(No drift)                          

  6b. Right Leg: Motor (5-second hold - always test supine) - 0(No drift)                         

   7. Limb Ataxia (finger/nose \T\ heel/shin - test with eyes open) - 0(Absent)                   

   8. Sensory Loss (pinprick arms/legs/face) - 0(Normal)                                          

   9. Best Language: Aphasia (description/naming/reading) - 0(No aphasia)                         

  10. Dysarthria (speech clarity - read or repeat words) - 0(Normal)                              

  11. Extinction and Inattention (visual/tactile/auditory/spatial/personal) - 0(No                

      abnormality)                                                                                

Initials: [facility]                                                                                     

                                                                                                  

                                                                                                  

NIH Stroke Scale - NIH Stroke Score                                                               

Date: 2018                                                                                  

Time: 18:10                                                                                       

Total Score = 0                                                                                   

  1a. Level of Consciousness (LOC) - 0(Alert)                                                     

  1b. Level of Consciousness (LOC) (Year \T\ Age) - 0(Both)                                       

  1c. LOC Commands (Open \T\ Closes Eyes/) - 0(Both)                                          

   2. Best Gaze (Lateral Gaze Paresis) - 0(Normal)                                                

   3. Visual Field Loss - 0(No visual loss)                                                       

   4. Facial Palsy - 0(Normal)                                                                    

  5a. Left Arm: Motor (10-second hold) - 0(No drift)                                              

  5b. Right Arm: Motor (10-second hold) - 0(No drift)                                             

  6a. Left Leg: Motor (5-second hold - always test supine) - 0(No drift)                          

  6b. Right Leg: Motor (5-second hold - always test supine) - 0(No drift)                         

   7. Limb Ataxia (finger/nose \T\ heel/shin - test with eyes open) - 0(Absent)                   

   8. Sensory Loss (pinprick arms/legs/face) - 0(Normal)                                          

   9. Best Language: Aphasia (description/naming/reading) - 0(No aphasia)                         

  10. Dysarthria (speech clarity - read or repeat words) - 0(Normal)                              

  11. Extinction and Inattention (visual/tactile/auditory/spatial/personal) - 0(No                

      abnormality)                                                                                

Initials: hb                                                                                      

                                                                                                  

Signatures:                                                                                       

Dispatcher [employer]                           EDMS                                                 

Akilah Garcia Kevin, MD MD   kdr                                                  

Candelaria Cisneros RN                       RN   ak1                                                  

Sera Wu, SALEEM                     RN   hb                                                   

                                                                                                  

Corrections: (The following items were deleted from the chart)                                    

18:10 18:04 PROTIME (+INR)+COAG.LAB.BRZ ordered. EDMS                                 EDMS        

18:15 18:06 Chest Single View+RAD.RAD.BRZ ordered. EDMS                               EDMS        

19:23 18:04 CBC+H.LAB.BRZ ordered. [facility]                                               EDMS        

20:12 19:52 2018 19:52 Discharged to Home. Impression: Altered mental status,   ak1         

      unspecified. Condition is Stable. Forms are Medication Reconciliation Form,                 

      Thank You Letter, Antibiotic Education, Prescription Opioid Use. Follow up:                 

      Private Physician; When: 2 - 3 days; Reason: If symptoms return, Further                    

      diagnostic work-up, Recheck today's complaints, Continuance of care,                        

      Re-evaluation by your physician. Problem is new. Symptoms have improved. kdr                

                                                                                                  

**************************************************************************************************

## 2018-09-18 NOTE — RAD REPORT
EXAM DESCRIPTION:  RAD - Chest Single View - 9/18/2018 6:15 pm

 

CLINICAL HISTORY:  Code stroke chest film

 

COMPARISON:  August 24

 

TECHNIQUE:  AP portable chest image was obtained 1804 hours .

 

FINDINGS:  Chronic interstitial lung disease is present. Pattern is not substantially different from 
comparison. Fullness in each hilum has not changed. An acute infiltrate, mass or failure not suspecte
d. Shallow inspiration does accentuate each lung base. Heart and vasculature are normal. No measurabl
e pleural effusion and no pneumothorax. No gross bony abnormality seen. No acute aortic findings susp
ected.

 

IMPRESSION:  Shallow inspiration film without acute cardiopulmonary finding.

 

No significant change from comparison.

## 2018-09-19 NOTE — EKG
Test Date:    2018-09-18               Test Time:    17:55:46

Technician:   JEAN CLAUDE                                     

                                                     

MEASUREMENT RESULTS:                                       

Intervals:                                           

Rate:         76                                     

OH:           164                                    

QRSD:         96                                     

QT:           394                                    

QTc:          443                                    

Axis:                                                

P:            32                                     

OH:           164                                    

QRS:          87                                     

T:            68                                     

                                                     

INTERPRETIVE STATEMENTS:                                       

                                                     

Normal sinus rhythm

Normal ECG

Compared to ECG 08/24/2018 13:04:31

No significant changes



Electronically Signed On 09-19-18 11:46:36 CDT by Benjamín Taylor

## 2018-10-17 ENCOUNTER — HOSPITAL ENCOUNTER (EMERGENCY)
Dept: HOSPITAL 97 - ER | Age: 53
Discharge: HOME | End: 2018-10-17
Payer: COMMERCIAL

## 2018-10-17 VITALS — DIASTOLIC BLOOD PRESSURE: 87 MMHG | SYSTOLIC BLOOD PRESSURE: 110 MMHG

## 2018-10-17 VITALS — TEMPERATURE: 97.4 F | OXYGEN SATURATION: 99 %
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
## 2018-10-17 DIAGNOSIS — S70.01XA: ICD-10-CM

## 2018-10-17 DIAGNOSIS — J44.9: ICD-10-CM

## 2018-10-17 DIAGNOSIS — Z90.2: ICD-10-CM

## 2018-10-17 DIAGNOSIS — I10: ICD-10-CM

## 2018-10-17 DIAGNOSIS — G40.909: ICD-10-CM
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
## 2018-10-17 DIAGNOSIS — S70.02XA: ICD-10-CM

## 2018-10-17 DIAGNOSIS — S30.0XXA: Primary | ICD-10-CM

## 2018-10-17 DIAGNOSIS — Z85.118: ICD-10-CM
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
## 2018-10-17 DIAGNOSIS — F43.10: ICD-10-CM

## 2018-10-17 PROCEDURE — 72100 X-RAY EXAM L-S SPINE 2/3 VWS: CPT

## 2018-10-17 PROCEDURE — 99283 EMERGENCY DEPT VISIT LOW MDM: CPT

## 2018-10-17 PROCEDURE — 72170 X-RAY EXAM OF PELVIS: CPT

## 2018-10-17 NOTE — XMS REPORT
Clinical Summary

 Created on:2018



Patient:Carlos Barkley Jr

Sex:Male

:1965

External Reference #:[id number]





Demographics







 Address  [address]



   



   [place] [address]

 

 Home Phone  [phone]

 

 Home Phone  [phone]

 

 Email Address  [email or web address]

 

 Preferred Language  English

 

 Marital Status  

 

 [employer]ian Affiliation  Unknown

 

 Race  White

 

 Ethnic Group  Not  or 









Author







 Organization  [facility]

 

 Address  [address] [place] [address]









Support







 Name  Relationship  Address  Phone

 

 Sabrina Greer  Unavailable  Unavailable  [phone]









Care Team Providers







 Name  Role  Phone

 

 Asked, No Pcp  Primary Care Provider  Unavailable









Allergies

No Known Allergies



Current Medications







 Prescription  Sig.  Disp.  Refills  Start  End  Status



         Date  Date  

 

 metFORMIN XR  Take 1,000 mg      20    Active



 (GLUCOPHAGE-XR) 500  by mouth daily.      17    



 mg 24 hr tablet            

 

 insulin GLARGINE  Inject 20 Units          Active



 (LANTUS) 100  under the skin          



 unit/mL injection  nightly.          



 (vial)            

 

 levETIRAcetam  Take 1,000 mg          Active



 (KEPPRA) 1000 MG  by mouth 2          



 tablet  (two) times a          



   day.          

 

 venlafaxine XR  Take 150 mg by          Active



 (EFFEXOR-XR) 150 MG  mouth daily.          



 24 hr capsule            

 

 diazePAM (VALIUM)  Take 10 mg by      20    Active



 10 MG tablet  mouth 3 (three)      18    



   times a day.          

 

 QUEtiapine  Take 600 mg by      20    Active



 (SEROquel) 300 MG  mouth nightly.      18    



 tablet            

 

 traMADol (ULTRAM)  Take 50 mg by          Active



 50 mg tablet  mouth every 6          



   (six) hours as          



   needed for          



   moderate pain.          

 

 gabapentin  Take 800 mg by    2  20  Discontinued



 (NEURONTIN) 800 mg  mouth 4 (four)      17  018  



 tablet  times a day.          

 

 QUEtiapine  Take 800 mg by    2  05/15/20  02/22/2  Discontinued



 (SEROquel) 400 MG  mouth nightly.      17  018  



 tablet  Take 2 tablets          



   at bedtime          

 

 busPIRone (BUSPAR)  Take 10 mg by          Discontinued



 10 MG tablet  mouth 3 (three)        018  



   times a day.          

 

 acetaminophen-codei  TK 1 TO 2 TS PO    0  07/15/20  03/03/2  Discontinued



 ne (TYLENOL WITH  Q 6 H PRN P      17  018  



 CODEINE #3) 300-30            



 mg per tablet            

 

 gabapentin  Take 1 tablet  90 tablet  0  20  



 (NEURONTIN) 800 mg  (800 mg total)      18  018  



 tablet  by mouth 3          



   (three) times a          



   day for 30          



   days.          

 

 metoprolol  Take 0.5  15 tablet  0  20  



 succinate XL  tablets (12.5      18  018  



 (TOPROL-XL) 25 mg  mg total) by          



 24 hr tablet  mouth daily for          



   30 days.          

 

 HYDROcodone-acetami  Take 1 tablet  15 tablet  0  20  



 nophen (NORCO)  by mouth every      18  018  



 7.5-325 mg per  6 (six) hours          



 tablet  as needed for          



   severe pain for          



   up to 14 days.          



   Max Daily          



   Amount: 4          



   tablets          

 

 pantoprazole  Take 1 tablet  30 tablet  0  20  



 (PROTONIX) 40 MG EC  (40 mg total)      18  018  



 tablet  by mouth daily          



   for 30 days.          

 

 ondansetron ODT  Take 1 tablet  15 tablet  0  20  Discontinued



 (ZOFRAN ODT) 4 MG  (4 mg total) by      18  018  



 disintegrating  mouth every 8          



 tablet  (eight) hours          



   as needed for          



   nausea or          



   vomiting for up          



   to 15 doses.          

 

 promethazine  Insert 1  12 suppository  0  20  Discontinued



 (PHENERGAN) 25 MG  suppository (25      18  018  



 suppository  mg total) into          



   the rectum          



   every 6 (six)          



   hours as needed          



   for nausea or          



   vomiting for up          



   to 12 doses.          

 

 hydromorPHONE  Take 1 tablet  10 tablet  0  03/03/20  03/10/2  



 (DILAUDID) 2 MG  (2 mg total) by      18  018  



 tablet  mouth every 8          



   (eight) hours          



   as needed for          



   severe pain          



   (score 7-10)          



   for up to 7          



   days. Max Daily          



   Amount: 6 mg          

 

 promethazine  Take 1 tablet  20 tablet  0  20  



 (PHENERGAN) 12.5 MG  (12.5 mg total)      18  018  



 tablet  by mouth every          



   8 (eight) hours          



   as needed for          



   nausea or          



   vomiting for up          



   to 30 days.          

 

 HYDROcodone-acetami  Take 1 tablet  20 tablet  0  03/03/20  03/10/2  



 nophen (NORCO)  by mouth every      18  018  



 7.5-325 mg per  8 (eight) hours          



 tablet  as needed for          



   moderate pain          



   for up to 7          



   days. Max Daily          



   Amount: 3          



   tablets          

 

 naproxen (NAPROSYN)  Take 1 tablet  28 tablet  0  20  



 500 MG tablet  (500 mg total)      18  018  



   by mouth 2          



   (two) times a          



   day with meals          



   for 14 days.          

 

 omeprazole  Take 2 capsules  60 capsule  0  20  



 (PriLOSEC) 20 MG  (40 mg total)      18  018  



 capsule  by mouth daily          



   for 30 days.          

 

 cyclobenzaprine  Take 1 tablet  20 tablet  0  20  



 (FLEXERIL) 10 mg  (10 mg total)      18  018  



 tablet  by mouth 3          



   (three) times a          



   day as needed          



   for muscle          



   spasms for up          



   to 10 days.          







Active Problems







 Problem  Noted Date

 

 Sciatica of left side  2018

 

 Syncope  2018

 

 Essential hypertension  2017

 

 Type 2 diabetes mellitus with hyperglycemia ([facility])  2017

 

 PRIMITIVO (acute kidney injury) ([facility])  2017

 

 Seizure disorder ([facility])  2017

 

 Intractable vomiting with nausea  2017

 

 Abdominal pain  2017









 Overview: 







 Added automatically from request for surgery [id number]









 Gastrointestinal hemorrhage  10/11/2017

 

 Gastrointestinal hemorrhage with hematemesis  10/11/2017









 Overview: 







 Added automatically from request for surgery [id number]









 Vertigo  2017

 

 Pneumonia due to infectious organism  2017

 

 COPD with acute bronchitis ([facility])  2017

 

 COPD (chronic obstructive pulmonary disease) ([facility])  2017

 

 COPD exacerbation ([facility])  2017

 

 Lung mass  2017

 

 Tobacco dependence  2017







Resolved Problems







 Problem  Noted Date  Resolved Date

 

 Leukocytosis  2017







Encounters







 Date  Type  Specialty  Care Team  Description

 

 20  Emergency  Emergency Medicine  Cait,  Suicidal ideation (Primary Dx
)



 18      Chago Olivas MD  

 

 20  Emergency  Emergency Medicine  Hayes  Chronic right-sided thoracic 
back pain (Primary Dx);



 18      Judy,  Narcotic abuse;



       Raiza  Drug-seeking behavior



       MD Bryn  

 

 20  Hospital  Radiology  Dioni Garay  BC (bronchogenic carcinoma)



 18  Encounter    MD Milagros  

 

 20  Transcribe  Access  Dioni Garay  BC (bronchogenic carcinoma)



 18  Orders    MD Milagros  (Primary Dx)

 

 20  Emergency  General Internal  Landaverde, Drew  Sciatica of left side (
Primary Dx);



 18 -    Gary GRECO MD



  Intractable pain



 20      Travon,  



 MD Jessenia Swift, Princess Guerin MD  

 

 20  Emergency  Emergency Medicine  Lelia,  Other chronic pain (Primary



 18      Chago BMichaelle,  Dx)



       DO  

 

 20  Emergency  General Internal  Lelia,  Syncope, unspecified syncope 
type (Primary Dx);



 18 -    Medicine  Chago FORTUNE,  Other chronic pain



 20      DO



  



 18      Marianela Turner MD  

 

 20  Anesthesia  Gastroenterology  Natali,  



 18  Event    Svitlana Villalta MD  

 

 20  Procedure Pass  Gastroenterology    



 18        

 

 20  Surgery  Gastroenterology  Con Knowles  ESOPHAGOGASTRODUODENOSCOPY



 18      MD Lang  (EGD) with bx

 

 20  [facility] Internal  [facility]  Intractable vomiting with 
nausea, unspecified vomiting type (Primary Dx);



 17 -  Encounter  Medicine  MD Ariel



  Pain;



 20      Bavare,  Abdominal pain, unspecified abdominal location;



 18      Jm Cabrera  Essential hypertension;



       MD



  Type 2 diabetes mellitus with hyperglycemia, with long-term current use of 
insulin



       Heber Orourke,   

 

 10/24/20  Telephone  Family Medicine  Jordy Lyles MD  

 

 10/19/20  Telephone  Gastroenterology  Jordy Lyles MD  



after 10/16/2017



Immunizations







 Name  Dates Previously Given  Next Due

 

 FLUCELVAX QUAD PF (0.5mL syringe)  2018  







Family History







 Medical History  Relation  Name  Comments

 

 Cerebral aneurysm  Father    

 

 Heart disease  Father    

 

 Kidney cancer  Father    

 

 Lung cancer  Father    

 

 Stroke  Father    

 

 Diabetes  Mother    

 

 Heart attack  Mother    

 

 Heart disease  Mother    

 

 Hyperlipidemia  Sister    

 

 Hypertension  Sister    









 Relation  Name  Status  Comments

 

 Father      

 

 Mother      

 

 Sister      







Social History







 Tobacco Use  Types  Packs/Day  Years Used  Date

 

 Current Every Day Smoker  Cigarettes  0.5  35  









 Smokeless Tobacco: Former User      









 Tobacco Cessation: Counseling Given: Yes



 Comments: per patient now only smoking 4-5 cigarette/day









 Alcohol Use  Drinks/Week  oz/Week  Comments

 

 No      stopped for 1.5 yrs









 Sex Assigned at Birth  Date Recorded

 

 Not on file  







Last Filed Vital Signs







 Vital Sign  Reading  Time Taken

 

 Blood Pressure  120/77  2018  1:15 PM CDT

 

 Pulse  76  2018  1:15 PM CDT

 

 Temperature  35.8 C (96.5 F)  2018  1:15 PM CDT

 

 Respiratory Rate  16  2018  1:15 PM CDT

 

 Oxygen Saturation  97%  2018  1:15 PM CDT

 

 Inhaled Oxygen Concentration  -  -

 

 Weight  73.9 kg (163 lb)  2018  8:09 AM CDT

 

 Height  185.4 cm (6' 1")  2018  8:09 AM CDT

 

 Body Mass Index  21.51  2018  8:09 AM CDT







Plan of Treatment







 Health Maintenance  Due Date  Last Done  Comments

 

 DIABETIC FOOT EXAM  1974    

 

 DIABETIC RETINAL EYE EXAM  1974    

 

 COLON CANCER SCREENING  2014    

 

 SHINGRIX VACCINE (#1)  2014    

 

 INFLUENZA VACCINE  2018  







Implants







 Implanted  Type  Area    Device  Expiration  Model /



         Identifier  Date  Serial /



             Lot

 

 Stimulator  Stimulator          

 

 Stimulator-2007  Stimulator  Hip        



 Implanted: 2007 (Quantity not on file)            

 

 Kit [employer] Air Leak 4ml Strl Progel - [id number]  Surgical  N/A: N/A  
[employer]      [id number] /



 Implanted: Qty: 1 on 2017 by Jonathan Fuentes Jr., MD  Implants;       
    /



   Expanders;          



   Extenders;          



   Surgical Wires          

 

 Dorsal Colum            



 Stimulator            

 

 Dorsal Column            



 Stimulator            

 

 Dorsal Colum Stimulator-2007    Lower:        



 Implanted: 2007 (Quantity not on file)    Back,        



     Other        



     than        



     Spine        







Procedures







 Procedure Name  Priority  Date/Time  Associated  Comments



       Diagnosis  

 

 URINALYSIS SCREEN AND  STAT  2018    Results for



 MICROSCOPY, WITH REFLEX TO    9:16 AM CDT    this procedure



 CULTURE        are in the



         results



         section.

 

 URINE DRUGS OF ABUSE SCREEN  STAT  2018    Results for



     9:16 AM CDT    this procedure



         are in the



         results



         section.

 

 URINE CULTURE  STAT  2018    Results for



     9:16 AM CDT    this procedure



         are in the



         results



         section.

 

 ZZESTIMATED GFR  STAT  2018    Results for



     9:02 AM CDT    this procedure



         are in the



         results



         section.

 

 SALICYLATE LEVEL  STAT  2018    Results for



     9:02 AM CDT    this procedure



         are in the



         results



         section.

 

 ACETAMINOPHEN LEVEL  STAT  2018    Results for



     9:02 AM CDT    this procedure



         are in the



         results



         section.

 

 ALCOHOL LEVEL, BLOOD  STAT  2018    Results for



     9:02 AM CDT    this procedure



         are in the



         results



         section.

 

 HC COMPLETE BLD COUNT W/AUTO  STAT  2018    Results for



 DIFF    9:02 AM CDT    this procedure



         are in the



         results



         section.

 

 THYROID STIMULATING HORMONE  STAT  2018    Results for



     9:02 AM CDT    this procedure



         are in the



         results



         section.

 

 T4, FREE  STAT  2018    Results for



     9:02 AM CDT    this procedure



         are in the



         results



         section.

 

 COMPREHENSIVE METABOLIC PANEL  STAT  2018    Results for



     9:02 AM CDT    this procedure



         are in the



         results



         section.

 

 CT ABDOMEN PELVIS WO CONTRAST  STAT  2018    Results for



     3:27 AM CDT    this procedure



         are in the



         results



         section.

 

 SMEAR REVIEW  STAT  2018    Results for



     3:13 AM CDT    this procedure



         are in the



         results



         section.

 

 ZZESTIMATED GFR  STAT  2018    Results for



     3:13 AM CDT    this procedure



         are in the



         results



         section.

 

 LIPASE LEVEL  STAT  2018    Results for



     3:13 AM CDT    this procedure



         are in the



         results



         section.

 

 COMPREHENSIVE METABOLIC PANEL  STAT  2018    Results for



     3:13 AM CDT    this procedure



         are in the



         results



         section.

 

 PROTHROMBIN TIME WITH INR  STAT  2018    Results for



     3:13 AM CDT    this procedure



         are in the



         results



         section.

 

 HC COMPLETE BLD COUNT W/AUTO  STAT  2018    Results for



 DIFF    3:13 AM CDT    this procedure



         are in the



         results



         section.

 

 ECG 12-LEAD  STAT  2018    Results for



     2:43 AM CDT    this procedure



         are in the



         results



         section.

 

 ECG ED PRELIMINARY  Routine  2018    Results for



 INTERPRETATION    2:07 AM CDT    this procedure



         are in the



         results



         section.

 

 PET CT SKULL BASE TO MID THIGH  Routine  2018  BC (bronchogenic  Results 
for



     1:45 PM CDT  carcinoma)  this procedure



         are in the



         results



         section.

 

 POC GLUCOSE  Routine  2018    Results for



     11:36 AM CDT    this procedure



         are in the



         results



         section.

 

 POC GLUCOSE  Routine  2018    Results for



     11:29 AM CST    this procedure



         are in the



         results



         section.

 

 POC GLUCOSE  Routine  2018    Results for



     6:52 AM CST    this procedure



         are in the



         results



         section.

 

 SMEAR REVIEW  Routine  2018    Results for



     5:13 AM CST    this procedure



         are in the



         results



         section.

 

 ZZESTIMATED GFR  Routine  2018    Results for



     5:13 AM CST    this procedure



         are in the



         results



         section.

 

 BASIC METABOLIC PANEL  Routine  2018    Results for



     5:13 AM CST    this procedure



         are in the



         results



         section.

 

 HC COMPLETE BLD COUNT W/AUTO  Routine  2018    Results for



 DIFF    5:13 AM CST    this procedure



         are in the



         results



         section.

 

 KEPPRA (LEVETIRACETAM) LEVEL  Routine  2018    Results for



     5:13 AM CST    this procedure



         are in the



         results



         section.

 

 SMEAR REVIEW  STAT  2018    Results for



     8:14 PM CST    this procedure



         are in the



         results



         section.

 

 ZZESTIMATED GFR  STAT  2018    Results for



     8:14 PM CST    this procedure



         are in the



         results



         section.

 

 HEPATIC FUNCTION PANEL  STAT  2018    Results for



     8:14 PM CST    this procedure



         are in the



         results



         section.

 

 BASIC METABOLIC PANEL  STAT  2018    Results for



     8:14 PM CST    this procedure



         are in the



         results



         section.

 

 HC COMPLETE BLD COUNT W/AUTO  STAT  2018    Results for



 DIFF    8:14 PM CST    this procedure



         are in the



         results



         section.

 

 CT LUMBAR SPINE WO CONTRAST  STAT  2018    Results for



     5:12 PM CST    this procedure



         are in the



         results



         section.

 

 ZZESTIMATED GFR  STAT  2018    Results for



     12:29 PM CST    this procedure



         are in the



         results



         section.

 

 LIPASE LEVEL  STAT  2018    Results for



     12:29 PM CST    this procedure



         are in the



         results



         section.

 

 COMPREHENSIVE METABOLIC PANEL  STAT  2018    Results for



     12:29 PM CST    this procedure



         are in the



         results



         section.

 

 PARTIAL THROMBOPLASTIN TIME  STAT  2018    Results for



 (PTT)    12:29 PM CST    this procedure



         are in the



         results



         section.

 

 PROTHROMBIN TIME WITH INR  STAT  2018    Results for



     12:29 PM CST    this procedure



         are in the



         results



         section.

 

 HC COMPLETE BLD COUNT W/AUTO  STAT  2018    Results for



 DIFF    12:29 PM CST    this procedure



         are in the



         results



         section.

 

 POC GLUCOSE  Routine  2018    Results for



     11:06 AM CST    this procedure



         are in the



         results



         section.

 

 POC GLUCOSE  Routine  2018    Results for



     6:41 AM CST    this procedure



         are in the



         results



         section.

 

 ZZESTIMATED GFR  Routine  2018    Results for



     4:58 AM CST    this procedure



         are in the



         results



         section.

 

 BASIC METABOLIC PANEL  Routine  2018    Results for



     4:58 AM CST    this procedure



         are in the



         results



         section.

 

 HC COMPLETE BLD COUNT W/AUTO  Routine  2018    Results for



 DIFF    4:58 AM CST    this procedure



         are in the



         results



         section.

 

 POC GLUCOSE  Routine  2018    Results for



     9:12 PM CST    this procedure



         are in the



         results



         section.

 

 ZZESTIMATED GFR  STAT  2018    Results for



     6:29 PM CST    this procedure



         are in the



         results



         section.

 

 CREATINE KINASE, TOTAL (CPK)  STAT  2018    Results for



     6:29 PM CST    this procedure



         are in the



         results



         section.

 

 HC COMPLETE BLD COUNT W/AUTO  STAT  2018    Results for



 DIFF    6:29 PM CST    this procedure



         are in the



         results



         section.

 

 BASIC METABOLIC PANEL  STAT  2018    Results for



     6:29 PM CST    this procedure



         are in the



         results



         section.

 

 CT HEAD WO CONTRAST  STAT  2018    Results for



     6:19 PM CST    this procedure



         are in the



         results



         section.

 

 ECG 12-LEAD  STAT  2018    Results for



     6:18 PM CST    this procedure



         are in the



         results



         section.

 

 ECG ED PRELIMINARY  Routine  2018    Results for



 INTERPRETATION    5:38 PM CST    this procedure



         are in the



         results



         section.

 

 POC GLUCOSE  Routine  2018    Results for



     10:56 AM CST    this procedure



         are in the



         results



         section.

 

 POC GLUCOSE  Routine  2018    Results for



     7:34 AM CST    this procedure



         are in the



         results



         section.

 

 POC GLUCOSE  Routine  2018    Results for



     8:37 PM CST    this procedure



         are in the



         results



         section.

 

 POC GLUCOSE  Routine  2018    Results for



     4:48 PM CST    this procedure



         are in the



         results



         section.

 

 SURGICAL PATHOLOGY REQUEST  Routine  2018    Results for



     1:28 PM CST    this procedure



         are in the



         results



         section.

 

 ESOPHAGOGASTRODUODENOSCOPY (EGD)    2018  Abdominal pain,  



     12:15 PM CST  unspecified  



       abdominal  



       location  

 

 POC GLUCOSE  Routine  2018    Results for



     6:56 AM CST    this procedure



         are in the



         results



         section.

 

 ZZESTIMATED GFR  Routine  2018    Results for



     6:03 AM CST    this procedure



         are in the



         results



         section.

 

 BASIC METABOLIC PANEL  Routine  2018    Results for



     6:03 AM CST    this procedure



         are in the



         results



         section.

 

 POC GLUCOSE  Routine  2018    Results for



     9:04 PM CST    this procedure



         are in the



         results



         section.

 

 POC GLUCOSE  Routine  2018    Results for



     3:39 PM CST    this procedure



         are in the



         results



         section.

 

 POC GLUCOSE  Routine  2018    Results for



     11:07 AM CST    this procedure



         are in the



         results



         section.

 

 POC GLUCOSE  Routine  2018    Results for



     7:12 AM CST    this procedure



         are in the



         results



         section.

 

 ZZESTIMATED GFR  Routine  2018    Results for



     5:59 AM CST    this procedure



         are in the



         results



         section.

 

 MAGNESIUM LEVEL  Routine  2018    Results for



     5:59 AM CST    this procedure



         are in the



         results



         section.

 

 COMPREHENSIVE METABOLIC PANEL  Routine  2018    Results for



     5:59 AM CST    this procedure



         are in the



         results



         section.

 

 CBC WITH PLATELET AND  Routine  2018    Results for



 DIFFERENTIAL    5:59 AM CST    this procedure



         are in the



         results



         section.

 

 POC GLUCOSE  Routine  2018    Results for



     7:54 PM CST    this procedure



         are in the



         results



         section.

 

 ECG ED PRELIMINARY  Routine  2018    Results for



 INTERPRETATION    5:59 PM CST    this procedure



         are in the



         results



         section.

 

 POC GLUCOSE  Routine  2018    Results for



     3:55 PM CST    this procedure



         are in the



         results



         section.

 

 POC GLUCOSE  Routine  2018    Results for



     11:54 AM CST    this procedure



         are in the



         results



         section.

 

 POC GLUCOSE  Routine  2018    Results for



     6:26 AM CST    this procedure



         are in the



         results



         section.

 

 ZZESTIMATED GFR  Routine  2018    Results for



     6:23 AM CST    this procedure



         are in the



         results



         section.

 

 COMPREHENSIVE METABOLIC PANEL  Routine  2018    Results for



     6:23 AM CST    this procedure



         are in the



         results



         section.

 

 HC COMPLETE BLD COUNT W/AUTO  Routine  2018    Results for



 DIFF    6:23 AM CST    this procedure



         are in the



         results



         section.

 

 POC GLUCOSE  Routine  2017    Results for



     8:54 PM CST    this procedure



         are in the



         results



         section.

 

 POC GLUCOSE  Routine  2017    Results for



     4:46 PM CST    this procedure



         are in the



         results



         section.

 

 POC GLUCOSE  Routine  2017    Results for



     11:33 AM CST    this procedure



         are in the



         results



         section.

 

 ZZESTIMATED GFR  Routine  2017    Results for



     7:05 AM CST    this procedure



         are in the



         results



         section.

 

 COMPREHENSIVE METABOLIC PANEL  Routine  2017    Results for



     7:05 AM CST    this procedure



         are in the



         results



         section.

 

 HC COMPLETE BLD COUNT W/AUTO  Routine  2017    Results for



 DIFF    7:05 AM CST    this procedure



         are in the



         results



         section.

 

 POC GLUCOSE  Routine  2017    Results for



     6:23 AM CST    this procedure



         are in the



         results



         section.

 

 POC GLUCOSE  Routine  2017    Results for



     8:30 PM CST    this procedure



         are in the



         results



         section.

 

 XR CHEST 2 VW  Routine  2017    Results for



     7:47 PM CST    this procedure



         are in the



         results



         section.

 

 ZZESTIMATED GFR  Routine  2017    Results for



     7:07 PM CST    this procedure



         are in the



         results



         section.

 

 COMPREHENSIVE METABOLIC PANEL  Routine  2017    Results for



     7:07 PM CST    this procedure



         are in the



         results



         section.

 

 HC COMPLETE BLD COUNT W/AUTO  Routine  2017    Results for



 DIFF    7:07 PM CST    this procedure



         are in the



         results



         section.

 

 POC GLUCOSE  Routine  2017    Results for



     3:58 PM CST    this procedure



         are in the



         results



         section.

 

 POC GLUCOSE  Routine  2017    Results for



     12:15 PM CST    this procedure



         are in the



         results



         section.

 

 POC GLUCOSE  Routine  2017    Results for



     7:03 AM CST    this procedure



         are in the



         results



         section.

 

 POC GLUCOSE  Routine  2017    Results for



     8:17 PM CST    this procedure



         are in the



         results



         section.

 

 POC GLUCOSE  Routine  2017    Results for



     3:34 PM CST    this procedure



         are in the



         results



         section.

 

 POC GLUCOSE  Routine  2017    Results for



     10:52 AM CST    this procedure



         are in the



         results



         section.

 

 URINALYSIS SCREEN AND  STAT  2017    Results for



 MICROSCOPY, WITH REFLEX TO    8:31 AM CST    this procedure



 CULTURE        are in the



         results



         section.

 

 URINE DRUGS OF ABUSE SCREEN  STAT  2017    Results for



     8:31 AM CST    this procedure



         are in the



         results



         section.

 

 URINE CULTURE  STAT  2017    Results for



     8:31 AM CST    this procedure



         are in the



         results



         section.

 

 POC GLUCOSE  Routine  2017    Results for



     7:02 AM CST    this procedure



         are in the



         results



         section.

 

 ZZESTIMATED GFR  Routine  2017    Results for



     5:34 AM CST    this procedure



         are in the



         results



         section.

 

 BASIC METABOLIC PANEL  Routine  2017    Results for



     5:34 AM CST    this procedure



         are in the



         results



         section.

 

 PROTHROMBIN TIME WITH INR  Routine  2017    Results for



     5:34 AM CST    this procedure



         are in the



         results



         section.

 

 HC COMPLETE BLD COUNT W/AUTO  Routine  2017    Results for



 DIFF    5:34 AM CST    this procedure



         are in the



         results



         section.

 

 POC GLUCOSE  Routine  2017    Results for



     12:44 AM CST    this procedure



         are in the



         results



         section.

 

 TROPONIN  Timed  2017    Results for



     7:33 PM CST    this procedure



         are in the



         results



         section.

 

 CT ABDOMEN PELVIS WO CONTRAST  STAT  2017    Results for



     4:23 PM CST    this procedure



         are in the



         results



         section.

 

 INFLUENZA ANTIGEN  Routine  2017    Results for



     3:58 PM CST    this procedure



         are in the



         results



         section.

 

 ECG 12-LEAD  STAT  2017    Results for



     3:48 PM CST    this procedure



         are in the



         results



         section.

 

 B NATRIURETIC PEPTIDE  STAT  2017    Results for



     3:45 PM CST    this procedure



         are in the



         results



         section.

 

 TROPONIN  STAT  2017    Results for



     3:45 PM CST    this procedure



         are in the



         results



         section.

 

 ALCOHOL LEVEL, BLOOD  STAT  2017    Results for



     3:45 PM CST    this procedure



         are in the



         results



         section.

 

 ZZESTIMATED GFR  STAT  2017    Results for



     3:45 PM CST    this procedure



         are in the



         results



         section.

 

 LIPASE LEVEL  STAT  2017    Results for



     3:45 PM CST    this procedure



         are in the



         results



         section.

 

 COMPREHENSIVE METABOLIC PANEL  STAT  2017    Results for



     3:45 PM CST    this procedure



         are in the



         results



         section.

 

 HC COMPLETE BLD COUNT W/AUTO  STAT  2017    Results for



 DIFF    3:45 PM CST    this procedure



         are in the



         results



         section.



after 10/16/2017



Results

Urinalysis screen and microscopy, with reflex to culture (2018  9:16 AM)
Only the most recent of2 resultswithin the time period is included.





 Component  Value  Ref Range  Performed At

 

 Specimen site  Clean catch    [facility] DEPARTMENT OF PATHOLOGY



       AND GENOMIC MEDICINE

 

 Color, UA  Yellow    [facility] DEPARTMENT OF PATHOLOGY



       AND GENOMIC MEDICINE

 

 Appearance, UA  Clear    [facility] DEPARTMENT OF PATHOLOGY



       AND GENOMIC MEDICINE

 

 Specific gravity, UA  1.011  1.001 - 1.035  [facility] DEPARTMENT OF PATHOLOGY



       AND GENOMIC MEDICINE

 

 pH, UA  5.0  5.0 - 8.5  [facility] DEPARTMENT OF PATHOLOGY



       AND GENOMIC MEDICINE

 

 Protein, UA  Negative  Negative  [facility] DEPARTMENT OF PATHOLOGY



       AND GENOMIC MEDICINE

 

 Glucose, UA  Negative  Negative  [facility] DEPARTMENT OF PATHOLOGY



       AND GENOMIC MEDICINE

 

 Ketones, UA  Negative  Negative  [facility] DEPARTMENT OF PATHOLOGY



       AND GENOMIC MEDICINE

 

 Bilirubin, UA  Negative  Negative  [facility] DEPARTMENT OF PATHOLOGY



       AND GENOMIC MEDICINE

 

 Blood, UA  Negative  Negative  [facility] DEPARTMENT OF PATHOLOGY



       AND GENOMIC MEDICINE

 

 Nitrite, UA  Negative  Negative  [facility] DEPARTMENT OF PATHOLOGY



       AND GENOMIC MEDICINE

 

 Urobilinogen, UA  2.0 (A)  <2.0  [facility] DEPARTMENT OF PATHOLOGY



       AND GENOMIC MEDICINE

 

 Leukocyte esterase, UA  Negative  Negative  [facility] DEPARTMENT OF PATHOLOGY



       AND GENOMIC MEDICINE

 

 Epithelial cells, UA  Few  /HPF  [facility] DEPARTMENT OF PATHOLOGY



       AND GENOMIC MEDICINE

 

 WBC, UA  1  0 - 1 /HPF  [facility] DEPARTMENT OF PATHOLOGY



       AND GENOMIC MEDICINE

 

 RBC, UA  <1  0 - 5 /HPF  [facility] DEPARTMENT OF PATHOLOGY



       AND GENOMIC MEDICINE

 

 Bacteria, UA  None seen  None seen  [facility] DEPARTMENT OF PATHOLOGY



       AND GENOMIC MEDICINE

 

 Yeast, UA  None seen    [facility] DEPARTMENT OF PATHOLOGY



       AND GENOMIC MEDICINE

 

 Yeast with pseudohyphae, UA  None seen    [facility] DEPARTMENT OF PATHOLOGY



       AND GENOMIC MEDICINE









 Specimen

 

 Urine









 Performing Organization  Address  City/State/Zipcode  Phone Number

 

 [facility] DEPARTMENT OF PATHOLOGY AND  [facility] [place]  [place] [address]  



 [facility]      



Urine drugs of abuse screen (2018  9:16 AM)Only the most recent of2 
resultswithin the time period is included.





 Component  Value  Ref Range  Performed At

 

 Amphetamine screen, urine  NEG    [facility] DEPARTMENT OF



       PATHOLOGY AND GENOMIC



       MEDICINE

 

 Barbiturate screen, urine  NEG    [facility] DEPARTMENT OF



       PATHOLOGY AND GENOMIC



       MEDICINE

 

 Benzodiazepine screen,  POS    [facility] DEPARTMENT OF



 urine      PATHOLOGY AND GENOMIC



       MEDICINE

 

 Cocaine screen, urine  NEG    [facility] DEPARTMENT OF



       PATHOLOGY AND GENOMIC



       MEDICINE

 

 Methadone screen, urine  NEG    [facility] DEPARTMENT OF



       PATHOLOGY AND GENOMIC



       MEDICINE

 

 Opiates screen, urine  POS    [facility] DEPARTMENT OF



       PATHOLOGY AND GENOMIC



       MEDICINE

 

 Phencyclidine screen, urine  NEG    [facility] DEPARTMENT OF



       PATHOLOGY AND GENOMIC



       MEDICINE

 

 Cannabinoid screen, urine  POS    [facility] DEPARTMENT OF



   Comment:    PATHOLOGY AND GENOMIC



   Drug screen minimum concentration of detectability    MEDICINE



   Cfgvokqgarbb0105 ng/mL    



   Corozzjkcdwvqkow7659 ng/mL    



   Barbiturates 300 ng/mL    



   Azqozlkplqzxkdo528 ng/mL    



   Qqppawi248 ng/mL    



   Tmhxuwniw352 ng/mL    



   Ntrndfn392 ng/mL    



   Phencyclidine 25 ng/mL    



   Umxtdkyhvmsi73 ng/mL    



   Cuxsyoiwoi6774 ng/mL    



   Negative test results indicates presumptive evidence of lack    



   of clinically significant drug concentration in this urine    



   specimen.    



   Positive test results are presumptive evidence of clinically    



   significant drug concentration in this urine specimen.    



   Testing performed for medical purposes only.    



       









 Specimen

 

 Urine









 Performing Organization  Address  City/[facility]/Zipcode  Phone Number

 

 [facility] DEPARTMENT  PATHOLOGY AND  [facility] [address]  [place], [place] [address] [place] [address]      



Urine culture (2018  9:16 AM)Only the most recent of2 resultswithin the 
time period is included.





 Component  Value  Ref Range  Performed At

 

 Urine culture  SEE COMMENTComment: Bacteriuria    [facility] DEPARTMENT OF PATHOLOGY



   screen negative.    AND GENOMIC MEDICINE









 Specimen

 

 Urine









 Performing Organization  Address  City/State/Zipcode  Phone Number

 

 [facility] DEPARTMENT OF PATHOLOGY AND  [address] [place] [address]  



 [facility] MEDICINE      



Estimated GFR (2018  9:02 AM)Only the most recent of14 resultswithin the 
time period is included.





 Component  Value  Ref Range  Performed At

 

 GFR Non Af Amer  49 (A)  mL/min/1.73 m2  [facility] DEPARTMENT OF



       PATHOLOGY AND GENOMIC



       MEDICINE

 

 GFR Af Amer  59 (A)  mL/min/1.73 m2  [facility] DEPARTMENT OF



   Comment:    PATHOLOGY AND GENOMIC



   Chronic kidney disease: <60 mL/min/1.73m2    MEDICINE



   Kidney failure: <15 mL/min/1.73m2    



   The estimated GFR is calculated from the IDMS-traceable Modification of Diet
    



   in Renal Disease Equation. The accuracy of the calculation is poor when the 
   



   creatinine is normal. Calculated values >90 mL/min/1.73m2 are not reported. 
   



   This equation has not been validated in children (<18 years), pregnant    



   women, the elderly (>70 years), or ethnic groups other than Caucasians and  
  



    Americans.    



       









 Specimen

 

 Plasma specimen









 Performing Organization  Address  City/State/Zipcode  Phone Number

 

 [facility] PATHOLOGY AND  [facility] [address]  [place], [place] [address] [place] [address]      



CBC with platelet and differential (2018  9:02 AM)Only the most recent 
of13 resultswithin the time period is included.





 Component  Value  Ref Range  Performed At

 

 WBC  9.1  4.2 - 11.0 k/uL  [facility] DEPARTMENT OF PATHOLOGY AND



       GENOMIC MEDICINE

 

 RBC  4.57  4.04 - 5.86 m/uL  [facility] DEPARTMENT OF PATHOLOGY AND



       GENOMIC MEDICINE

 

 HGB  14.7  13.0 - 17.3 g/dL  [facility] DEPARTMENT OF PATHOLOGY AND



       GENOMIC MEDICINE

 

 HCT  44.6  34.0 - 45.0 %  [facility] DEPARTMENT OF PATHOLOGY AND



       GENOMIC MEDICINE

 

 MCV  97.6  80.0 - 98.0 fL  [facility] DEPARTMENT OF PATHOLOGY AND



       GENOMIC MEDICINE

 

 MCH  32.2  27.0 - 34.0 pg  [facility] DEPARTMENT OF PATHOLOGY AND



       GENOMIC MEDICINE

 

 MCHC  33.0  31.5 - 36.5 g/dL  [facility] DEPARTMENT OF PATHOLOGY AND



       GENOMIC MEDICINE

 

 RDW - SD  55.1 (H)  37.0 - 51.0 fL  [facility] DEPARTMENT OF PATHOLOGY AND



       GENOMIC MEDICINE

 

 MPV  10.1  7.4 - 10.4 fL  [facility] DEPARTMENT OF PATHOLOGY AND



       GENOMIC MEDICINE

 

 Platelet count  124 (L)  150 - 400 k/uL  [facility] DEPARTMENT OF PATHOLOGY AND



       GENOMIC MEDICINE

 

 Nucleated RBC  0.00  /100 WBC  [facility] DEPARTMENT OF PATHOLOGY AND



       GENOMIC MEDICINE

 

 Neutrophils  80.4 (H)  36.0 - 66.0 %  [facility] DEPARTMENT OF PATHOLOGY AND



       GENOMIC MEDICINE

 

 Lymphocytes  11.6 (L)  24.0 - 44.0 %  [facility] DEPARTMENT OF PATHOLOGY AND



       GENOMIC MEDICINE

 

 Monocytes  5.8  0.0 - 6.0 %  [facility] DEPARTMENT OF PATHOLOGY AND



       GENOMIC MEDICINE

 

 Eosinophils  1.3  0.0 - 6.0 %  [facility] DEPARTMENT OF PATHOLOGY AND



       GENOMIC MEDICINE

 

 Basophils  0.6  0.0 - 1.2 %  [facility] DEPARTMENT OF PATHOLOGY AND



       GENOMIC MEDICINE

 

 Immature granulocytes  0.3  0.0 - 1.0 %  [facility] DEPARTMENT OF PATHOLOGY AND



       GENOMIC MEDICINE









 Specimen

 

 Blood









 Performing Organization  Address  City/[facility]  Phone Number

 

 [facility] DEPARTMENT OF PATHOLOGY AND  [address] [place] [address]      



Thyroid stimulating hormone (2018  9:02 AM)





 Component  Value  Ref Range  Performed At

 

 TSH  2.60  0.38 - 4.82 uIU/mL  [facility] DEPARTMENT OF PATHOLOGY AND GENOMIC 
MEDICINE









 Specimen

 

 Plasma specimen









 Performing Organization  Address  City/[facility]  Phone Number

 

 [facility] DEPARTMENT OF PATHOLOGY AND  [address] [place] [address]      



T4, free (2018  9:02 AM)





 Component  Value  Ref Range  Performed At

 

 T4, free  1.00  0.70 - 1.61 ng/dL  [facility] DEPARTMENT OF PATHOLOGY AND GENOMIC 
MEDICINE









 Specimen

 

 Plasma specimen









 Performing Organization  Address  City/[facility]  Phone Number

 

 [facility] PATHOLOGY AND  [address] [place] [address]      



Alcohol level, blood (2018  9:02 AM)Only the most recent of2 
resultswithin the time period is included.





 Component  Value  Ref Range  Performed At

 

 Alcohol  None Detected  mg/dL  [facility] DEPARTMENT OF PATHOLOGY



   Comment:    AND [facility] MEDICINE



   NormalNone Detected    



   Legal Intoxication in [place] 80 mg/dL (0.08%)    



   Toxic Concentration 200 mg/dL (0.2%)    



   Potentially Fatal 350-500 mg/dL (0.35%-0.5%)    



       

 

 Alcohol percent  None Detected  %  [facility] DEPARTMENT OF PATHOLOGY



       AND GENOMIC MEDICINE









 Specimen

 

 Blood









 Performing Organization  Address  City/[facility]  Phone Number

 

 [facility] DEPARTMENT OF PATHOLOGY AND  [address]  [place], [place] [address] [place] [address]      



Acetaminophen level (2018  9:02 AM)





 Component  Value  Ref Range  Performed At

 

 Acetaminophen level  <2.0 (L)  10.0 - 20.0 ug/mL  [facility] DEPARTMENT OF



   Comment:    PATHOLOGY AND GENOMIC



   Therapeutic 10-30 ug/mL
    MEDICINE



   Possible Toxicity 150-200 ug/mL
    



   Probable Toxicity >200 ug/mL    



       









 Specimen

 

 Blood









 Performing Organization  Address  City/[facility]/Zipcode  Phone Number

 

 [facility] DEPARTMENT OF PATHOLOGY AND  [address]  [place], [place] [address] [place] [address]      



Salicylate level (2018  9:02 AM)





 Component  Value  Ref Range  Performed At

 

 Salicylate  7.0  mg/dL  [facility] DEPARTMENT OF PATHOLOGY AND GENOMIC



   Comment:    MEDICINE



   Therapeutic Range:    



    5 - 30 mg/dL    



       









 Specimen

 

 Blood









 Performing Organization  Address  City/[facility]  Phone Number

 

 [facility] DEPARTMENT OF PATHOLOGY AND  [facility] [address]  [place] [id number] [place] [address]      



Comprehensive metabolic panel (2018  9:02 AM)Only the most recent of8 
resultswithin the time period is included.





 Component  Value  Ref Range  Performed At

 

 Sodium  140  135 - 150 mEq/L  [facility] DEPARTMENT OF PATHOLOGY AND



       GENOMIC MEDICINE

 

 Potassium  3.8  3.5 - 5.0 mEq/L  [facility] DEPARTMENT OF PATHOLOGY AND



       GENOMIC MEDICINE

 

 Chloride  109  100 - 109 mEq/L  [facility] DEPARTMENT OF PATHOLOGY AND



       GENOMIC MEDICINE

 

 CO2  24  24 - 32 mmol/L  [facility] DEPARTMENT OF PATHOLOGY AND



       GENOMIC MEDICINE

 

 Anion gap  7@ANIO  7 - 15 mEq/L  [facility] DEPARTMENT OF PATHOLOGY AND



       GENOMIC MEDICINE

 

 BUN  14  7 - 18 mg/dL  [facility] DEPARTMENT OF PATHOLOGY AND



       GENOMIC MEDICINE

 

 Creatinine  1.5  0.8 - 1.5 mg/dL  [facility] DEPARTMENT OF PATHOLOGY AND



       GENOMIC MEDICINE

 

 Glucose  103 (H)  65 - 100 mg/dL  [facility] DEPARTMENT OF PATHOLOGY AND



       GENOMIC MEDICINE

 

 Calcium  8.6  8.6 - 10.7 mg/dL  [facility] DEPARTMENT OF PATHOLOGY AND



       GENOMIC MEDICINE

 

 Protein  7.9  6.3 - 8.2 g/dL  [facility] DEPARTMENT OF PATHOLOGY AND



       GENOMIC MEDICINE

 

 Albumin  3.4  3.2 - 5.0 g/dL  [facility] DEPARTMENT OF PATHOLOGY AND



       GENOMIC MEDICINE

 

 A/G ratio  0.8  0.7 - 3.8  [facility] DEPARTMENT OF PATHOLOGY AND



       GENOMIC MEDICINE

 

 Alkaline phosphatase  153 (H)  30 - 120 U/L  [facility] DEPARTMENT OF PATHOLOGY AND



       GENOMIC MEDICINE

 

 AST  21  15 - 37 U/L  [facility] DEPARTMENT OF PATHOLOGY AND



       GENOMIC MEDICINE

 

 ALT  17 (L)  30 - 65 U/L  [facility] DEPARTMENT OF PATHOLOGY AND



       GENOMIC MEDICINE

 

 Total bilirubin  0.2  0.2 - 1.2 mg/dL  [facility] DEPARTMENT OF PATHOLOGY AND



       GENOMIC MEDICINE









 Specimen

 

 Plasma specimen









 Performing Organization  Address  City/State/Zipcode  Phone Number

 

 [facility] DEPARTMENT OF PATHOLOGY AND  [address] [place], [place] [address] [address]  



 [facility]      



CT Abdomen Pelvis Wo Contrast (2018  3:27 AM)Only the most recent of2 
resultswithin the time period is included.





 Narrative  Performed At

 

 EXAMINATION:CT ABDOMEN PELVIS WO CONTRAST



   [facility]



  



  



 CLINICAL HISTORY:R flank pain



  



  



  



 TECHNIQUE: Multiple axial images of the abdomen and pelvis were obtained  



 without intravenous administration of iodinated contrast. Sagittal and  



 coronal computerized reformatted images were also obtained. The lack of  



 intravenous contrast reduces the 



  



 sensitivity of detecting solid organ disease.



  



  



  



 CT imaging was performed with iterative reconstruction technique and/or  



 automated exposure control to reduce radiation dose.



  



  



  



 COMPARISON:2017



  



  



  



  



  



 IMPRESSION:



  



  



  



 Subsegmental atelectasis is seen of the lung bases.



  



  



  



 Patient is status post cholecystectomy. Pancreas and adrenal glands are  



 normal.



  



  



  



 Cirrhotic liver.



  



  



  



 Spleen is enlarged measuring 14.5 cm in length. 



  



  



  



 Kidneys, ureters and bladder are normal.



  



  



  



 No free intraperitoneal fluid or air. Atherosclerotic vascular  



 calcifications are seen. Some paraesophageal varices are seen,  



 compatible with portal hypertension.



  



  



  



 Diverticulosis is seen without diverticulitis. Appendix cannot be seen.  



 No gastrointestinal tract obstruction.



  



  



  



 No acute osseous abnormalities. Postoperative appearance of the lower  



 lumbar spine. A device is seen of the right gluteal region, with lead  



 entering the thoracic spinal canal.



  



  



  



 CONCLUSION:



  



  



  



 Cirrhotic liver with small paraesophageal varices, compatible with  



 portal hypertension.



  



  



  



  



  



  



  



  



  



  



  



  



  



  



  



  



  



  



  



  



  



 [facility]-[id number]



  



   









 Procedure Note

 

  Interface, Radiology Results Incoming - 2018  4:08 AM CDT



EXAMINATION:  CT ABDOMEN PELVIS WO CONTRAST



 



 CLINICAL HISTORY:  R flank pain



 



 TECHNIQUE: Multiple axial images of the abdomen and pelvis were obtained 
without intravenous administration of iodinated contrast. Sagittal and coronal 
computerized reformatted images were also obtained. The lack of intravenous 
contrast reduces the



 sensitivity of detecting solid organ disease.



 



 CT imaging was performed with iterative reconstruction technique and/or 
automated exposure control to reduce radiation dose.



 



 COMPARISON:  2017



 



 



 IMPRESSION:



 



 Subsegmental atelectasis is seen of the lung bases.



 



 Patient is status post cholecystectomy. Pancreas and adrenal glands are normal.



 



 Cirrhotic liver.



 



 Spleen is enlarged measuring 14.5 cm in length.



 



 Kidneys, ureters and bladder are normal.



 



 No free intraperitoneal fluid or air. Atherosclerotic vascular calcifications 
are seen. Some paraesophageal varices are seen, compatible with portal 
hypertension.



 



 Diverticulosis is seen without diverticulitis. Appendix cannot be seen. No 
gastrointestinal tract obstruction.



 



 No acute osseous abnormalities. Postoperative appearance of the lower lumbar 
spine. A device is seen of the right gluteal region, with lead entering the 
thoracic spinal canal.



 



 CONCLUSION:



 



 Cirrhotic liver with small paraesophageal varices, compatible with portal 
hypertension.



 



 



 



 



 



 



 



 



 



 



 [facility]-[id number]









 Performing Organization  Address  City/State/Zipcode  Phone Number

 

 [facility]  [address] [place] [address]  



Smear review (2018  3:13 AM)Only the most recent of3 resultswithin the 
time period is included.





 Component  Value  Ref Range  Performed At

 

 Smear review  Smear Reviewed    [facility] DEPARTMENT OF PATHOLOGY AND GENOMIC



       MEDICINE









 Performing Organization  Address  City/[facility]  Phone Number

 

 [facility] DEPARTMENT OF PATHOLOGY AND  [facility] [address] [address] [place] [address]  



 [facility] MEDICINE      



Prothrombin time with INR (2018  3:13 AM)Only the most recent of3 
resultswithin the time period is included.





 Component  Value  Ref Range  Performed At

 

 Prothrombin time  14.5  12.0 - 15.0 sec  [facility] DEPARTMENT OF



       PATHOLOGY AND GENOMIC



       MEDICINE

 

 INR  1.11  0.92 - 1.12  [facility] DEPARTMENT OF



   Comment:    PATHOLOGY AND GENOMIC



   For patients on anticoagulant therapy, reference ranges below:    MEDICINE



   Indication:INR 
Value    



   Treatment of Venous Thrombosis, 2.0-3.0    



   pulmonary emboli, or prophylaxis    



   of a venous thrombosis, or systemic    



   emboli.    



   High dose, high risk patients 3.0-4.5    



   with mechanical valves.    



   NOTE:INR values over 3.0 are sometimes associated with    



   gastrointestinal hemorrhage, especially values over 4.0.    



       









 Specimen

 

 Blood









 Performing Organization  Address  City/[facility]/Zipcode  Phone Number

 

 [facility] DEPARTMENT OF PATHOLOGY AND  [address] [place] [address]  



 [facility] MEDICINE      



Lipase level (2018  3:13 AM)Only the most recent of3 resultswithin the 
time period is included.





 Component  Value  Ref Range  Performed At

 

 Lipase  129  65 - 230 U/L  [facility] DEPARTMENT OF PATHOLOGY AND GENOMIC MEDICINE









 Specimen

 

 Plasma specimen









 Performing Organization  Address  City/[facility]/Zipcode  Phone Number

 

 [facility] DEPARTMENT OF PATHOLOGY AND  [facility] [place] [address]  



 GENOMIC MEDICINE      



ECG 12 lead (2018  2:43 AM)Only the most recent of3 resultswithin the 
time period is included.





 Component  Value  Ref Range  Performed At

 

 Ventricular rate  77    [facility]

 

 Atrial rate  77    [facility]

 

 FL interval  144    [facility]

 

 QRSD interval  94    [facility]

 

 QT interval  418    [facility]

 

 QTC interval  473    [facility]

 

 P axis 1  32    [facility]

 

 QRS axis 1  94    [facility]

 

 T wave axis  85    [facility]

 

 EKG impression  Normal sinus rhythm-Rightward axis-Borderline    [facility]



   ECG-In automated comparison with ECG of    



   2018 18:18,-No significant change was    



   found-Electronically Signed By Missy Meneses MD    



   ([id number]) on 5/10/2018 7:43:28 AM    









 Performing Organization  Address  City/[facility]  Phone Number

 

 [facility] MUSE  [address] [place] [address]  



ECG ED Preliminary Interpretation - NOT AN ORDER (2018  2:07 AM)Only the 
most recent of3 resultswithin the time period is included.





 Narrative  Performed At

 

 Raiza Juarez MD 20183:47 AM



  



 ECG ED Preliminary Interpretation - Not an Order



  



 Performed by: RAIZA JUAREZ



  



 Authorized by: RAIZA JUAREZ 



  



  



  



 ECG reviewed by ED Physician in the absence of a cardiologist: yes



  



 Previous ECG: 



  



 Previous ECG:Compared to current



  



 Comparison ECG info:18



  



 Similarity:No change



  



 Interpretation: 



  



 Interpretation: normal



  



 Rate: 



  



 ECG rate:77



  



 ECG rate assessment: normal



  



 Rhythm: 



  



 Rhythm: sinus rhythm



  



 QRS: 



  



 QRS axis:Right



  



 Comments: 



  



  Normal sinus rhythm. Right axis deviation.  



PET/CT Skull Base To Mid Thigh (2018  1:45 PM)





 Narrative  Performed At

 

 [facility] SKULL BASE TO MID THIGH



  



  



  



 Date and place of service: 3/26/2018 10:00 AM at [facility]



  



  



  



 DOSE: 11.8 mCi of 79-Rxbkya-1-Deoxyglucose (FDG) was injected  



 intravenously into left wrist done infiltration at a serum glucose level  



 of90 mg/dl.



  



  



  



 TECHNIQUE:



  



  



  



 PROCEDURE: Following injection of 54-Djsryr-7-Deoxyglucose (FDG) and a  



 standard uptake., The patient was imaged on a Photoblog whole body PET CT  



 scanner. Multiple 6 minute bed position acquisitions were obtained along  



 the length of the patient's body from 



  



 approximately the Skull base to the mid thighs without administration of  



 intravenous or oral contrast. The software fused PET/CT images as well  



 as the PET and CT images could be reviewed separately.



  



  



  



 The dose length product for this procedure was 408 mGy-cm.



  



  



  



 CT imaging was performed with iterative reconstruction technique and/or  



 automated exposure control to reduce radiation dose



  



  



  



 COMPARISON:



  



 2017 done at [facility]. 



  



  



  



 CLINICAL HISTORY:



  



 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 Status  



 post right lower lobectomy . No history of recent chemotherapy  



 or radiation therapy. For restaging.



  



  



  



 FINDINGS:



  



  



  



 [facility]  



 with the CAT scan from the Skull base to the mid thighs to the same time  



 as the PET scan.



  



  



  



 Physiologic uptake is present in the components of Waldeyer's ring in  



 the lateral pharynx. Vocal cord uptake is noted of doubtful  



 significance. Mild tracer uptake is seen in the right and left axillary  



 lymph node with the degree of uptake less than 2.5 



  



 and is of doubtful significance.



  



  



  



 No abnormal radiotracer uptake is demonstrated in the visualized brain,  



 neck, chest, abdomen, pelvis, spine, and visualized proximal upper and  



 lower extremities.



  



  



  



 No abnormal radiotracer uptake is demonstrated in the brain. However,  



 normal intense metabolic uptake of radiotracer in the brain can mask the  



 presence of lesions. Brain lesions are generally better evaluated with  



 an MRI without and with intravenous 



  



 gadolinium, if clinically indicated.



  



  



  



 Physiologic excretion of radiotracer is demonstrated into the kidneys,  



 ureters, and bladder. Physiologic excretion of radiotracer is also  



 demonstrated into the small and large bowel.



  



  



  



 Interrogation of the CT scan of the chest in lung window settings  



 demonstrates the previously noted masses in each lower lobe are not  



 identified on the current examination consistent with right lower  



 lobectomy on the right and posttreatment of the left 



  



 lung lesion.. Dependent atelectasis is noted in the posterior lung bases  



 bilaterally.



  



  



  



 Interrogation of the CT scan of the whole body with soft tissue window  



 settings demonstratessignificant dilatation of the pulmonary outflow  



 tract consistent with pulmonary arterial hypertension. Coronary artery  



 calcifications are present.



  



  



  



 An irregular contoured liver is noted consistent with cirrhosis.  



 Calcified atheromatous plaque formation is seen in the aortoiliac  



 system.



  



  



  



 Interrogation of the CT scan of the whole body and bone window settings  



 demonstrates no osteolytic or osteoblastic lesions. Patient is had a  



 spinal fusion in the lower lumbar spine.



  



  



  



 IMPRESSION:



  



 No evidence of metabolically active tumor.



  



  



  



 Otherwise unchanged .Pulmonary arterial hypertension as before.  



 Cirrhosis as before.



  



  



  



 [id number]



  



   









 Procedure Note

 

 Hm Interface, Radiology Results Incoming - 2018  4:30 PM CDT



EXAMINATION:



 PET CT SKULL BASE TO MID THIGH



 



 Date and place of service: 3/26/2018 10:00 AM at [facility]



 



 DOSE: 11.8 mCi of 20-Wwotic-7-Deoxyglucose (FDG) was injected intravenously 
into left wrist done infiltration at a serum glucose level of  90 mg/dl.



 



 TECHNIQUE:



 



 PROCEDURE: Following injection of 67-Cchsjj-1-Deoxyglucose (FDG) and a 
standard uptake., The patient was imaged on a Photoblog whole body PET CT scanner. 
Multiple 6 minute bed position acquisitions were obtained along the length of 
the patient's body from



 approximately the Skull base to the mid thighs without administration of 
intravenous or oral contrast. The software fused PET/CT images as well as the 
PET and CT images could be reviewed separately.



 



 The dose length product for this procedure was 408 mGy-cm.



 



 CT imaging was performed with iterative reconstruction technique and/or 
automated exposure control to reduce radiation dose



 



 COMPARISON:



 2017 done at [facility].



 



 CLINICAL HISTORY:



 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 Status post 
right lower lobectomy . No history of recent chemotherapy or 
radiation therapy. For restaging.



 



 FINDINGS:



 



 Software fusion was performed of the nuclear medicine  FDG PET scan with the 
CAT scan from the Skull base to the mid thighs to the same time as the PET scan.



 



 Physiologic uptake is present in the components of Waldeyer's ring in the 
lateral pharynx. Vocal cord uptake is noted of doubtful significance. Mild 
tracer uptake is seen in the right and left axillary lymph node with the



 degree of uptake less than 2.5



 and is of doubtful significance.



 



 No abnormal radiotracer uptake is demonstrated in the visualized brain, neck, 
chest, abdomen, pelvis, spine, and visualized proximal upper and lower 
extremities.



 



 No abnormal radiotracer uptake is demonstrated in the brain. However, normal 
intense metabolic uptake of radiotracer in the brain can mask the presence of 
lesions. Brain lesions are generally better evaluated with an MRI without and 
with intravenous



 gadolinium, if clinically indicated.



 



 Physiologic excretion of radiotracer is demonstrated into the kidneys, ureters
, and bladder. Physiologic excretion of radiotracer is also demonstrated into 
the small and large bowel.



 



 Interrogation of the CT scan of the chest in lung window settings demonstrates 
the previously noted masses in each lower lobe are not identified on the 
current examination consistent with right lower



 lobectomy on the right and posttreatment of the left



 lung lesion.. Dependent atelectasis is noted in the posterior lung bases 
bilaterally.



 



 Interrogation of the CT scan of the whole body with soft tissue window 
settings demonstrates  significant dilatation of the pulmonary outflow tract 
consistent with pulmonary arterial hypertension. Coronary artery calcifications 
are present.



 



 An irregular contoured liver is noted consistent with cirrhosis. Calcified 
atheromatous plaque formation is seen in the aortoiliac system.



 



 Interrogation of the CT scan of the whole body and bone window settings 
demonstrates no osteolytic or osteoblastic lesions. Patient is had a spinal 
fusion in the lower lumbar spine.



 



 IMPRESSION:



 No evidence of metabolically active tumor.



 



 Otherwise unchanged .Pulmonary arterial hypertension as before. Cirrhosis as 
before.



 



 [id number]









 Performing Organization  Address  City/[facility]/Zipcode  Phone Number

 

 [facility]  [address] [place] [address]  



POC glucose (2018 11:36 AM)Only the most recent of32 resultswithin the 
time period is included.





 Component  Value  Ref Range  Performed At

 

 POC glucose  90  65 - 100 mg/dL  [facility] DEPARTMENT OF PATHOLOGY AND



   Comment:    GENOMIC MEDICINE



   Meter ID: [id number]    



   : Heber Mcduffie    



       









 Performing Organization  Address  City/[facility]/Zipcode  Phone Number

 

 [facility] DEPARTMENT OF PATHOLOGY AND  [address] [place] [id number]  



 GENOMIC MEDICINE      



Keppra (Levetiracetam) level (2018  5:13 AM)





 Component  Value  Ref Range  Performed At

 

 Levetiracetam  27  12 - 46 ug/mL  [email or web address]



   Comment:    



   INTERPRETIVE INFORMATION: Keppra (Levetiracetam)    



   Therapeutic Range:12-46 ug/mL    



   Toxic:Not well Established    



   Pharmacokinetics of levetiracetam are affected by renal function.    



   Adverse effects may include somnolence, weakness, headache and    



   vomiting.    



   Performed by [facility],
    



   [address] [place] [id number] [phone]
    



   [email or web address], Charlie Balbuena MD - Lab. Director    



       









 Specimen

 

 Serum









 Performing Organization  Address  City/State/Zipcode  Phone Number

 

 [facility]ORY  [address] [place] [address]  



Basic metabolic panel (2018  5:13 AM)Only the most recent of6 
resultswithin the time period is included.





 Component  Value  Ref Range  Performed At

 

 Sodium  134 (L)  135 - 150 mEq/L  [facility] DEPARTMENT OF



       PATHOLOGY AND GENOMIC



       MEDICINE

 

 Potassium  4.2  3.5 - 5.0 mEq/L  [facility] DEPARTMENT OF



       PATHOLOGY AND GENOMIC



       MEDICINE

 

 Chloride  102  100 - 109 mEq/L  [facility] DEPARTMENT OF



       PATHOLOGY AND GENOMIC



       MEDICINE

 

 CO2  22 (L)  24 - 32 mmol/L  [facility] DEPARTMENT OF



       PATHOLOGY AND [facility]



       MEDICINE

 

 Anion gap  10  7 - 15 mEq/L  [facility] DEPARTMENT OF



   Comment:    PATHOLOGY AND GENOMIC



   Starting from  , anion gap calculation    MEDICINE



   no longer incorporates potassium. Please note the change.    



       

 

 BUN  17  7 - 18 mg/dL  [facility] DEPARTMENT OF



       PATHOLOGY AND GENOMIC



       MEDICINE

 

 Creatinine  1.2  0.8 - 1.5 mg/dL  [facility] DEPARTMENT OF



       PATHOLOGY AND GENOMIC



       MEDICINE

 

 Glucose  139 (H)  65 - 100 mg/dL  [facility] DEPARTMENT OF



       PATHOLOGY AND GENOMIC



       MEDICINE

 

 Calcium  9.4  8.6 - 10.7 mg/dL  [facility] DEPARTMENT OF



       PATHOLOGY AND [facility]



       MEDICINE









 Specimen

 

 Plasma specimen









 Performing Organization  Address  City/[facility]  Phone Number

 

 [facility] PATHOLOGY AND  [address] [place] [address].  [place] [address]  



 GENOMIC MEDICINE      



Hepatic function panel (2018  8:14 PM)





 Component  Value  Ref Range  Performed At

 

 Albumin  3.8  3.2 - 5.0 g/dL  [facility] DEPARTMENT OF PATHOLOGY AND GENOMIC



       MEDICINE

 

 Total bilirubin  0.8  0.2 - 1.2 mg/dL  [facility] DEPARTMENT OF PATHOLOGY AND GENOMIC



       MEDICINE

 

 Bilirubin direct  0.2  0.0 - 0.4 mg/dL  [facility] DEPARTMENT OF PATHOLOGY AND 
GENOMIC



       MEDICINE

 

 Alkaline phosphatase  231 (H)  30 - 120 U/L  [facility] DEPARTMENT OF PATHOLOGY AND 
GENOMIC



       MEDICINE

 

 Protein  9.7 (H)  6.3 - 8.2 g/dL  [facility] DEPARTMENT OF PATHOLOGY AND GENOMIC



       MEDICINE

 

 ALT  17 (L)  30 - 65 U/L  [facility] DEPARTMENT OF PATHOLOGY AND GENOMIC



       MEDICINE

 

 AST  29  15 - 37 U/L  [facility] DEPARTMENT OF PATHOLOGY AND [facility]



       MEDICINE









 Specimen

 

 Plasma specimen









 Performing Organization  Address  City/[facility]/Zipcode  Phone Number

 

 [facility] PATHOLOGY AND  [address] [place] [address]  



 [facility]      



CT Lumbar Spine Wo Contrast (2018  5:12 PM)





 Narrative  Performed At

 

 EXAMINATION: CT LUMBAR SPINE WO CONTRAST



   [facility]



  



  



 CLINICAL HISTORY: pain



  



  



  



 COMPARISON: CT myelogram lumbar spine 2011



  



  



  



 TECHNIQUE: Axial noncontrast enhanced images of lumbar spine was  



 performed with coronal sagittal reconstruction algorithms. CT imaging  



 was performed with iterative reconstruction technique and/or automated  



 exposure control to reduce radiation dose.



  



  



  



  



  



 FINDINGS:



  



  



  



 There are 5 non-rib bearing lumbar type vertebrae. Status post posterior  



 spinous mentation with rods and screws and anterior interbody grafts at  



 L4-5 and L5-S1. Status post decompressive laminectomies at L4-5 and  



 L5-S1. There is partial solid interbody 



  



 osseous fusion at L5-S1, without definite solid interbody osseous fusion  



 at L4-5. There is solid osseous fusion of the posterior elements of  



 L4-S1. No evidence of hardware loosening. Hardware is well positioned.



  



  



  



 No fracture. 1 to 2 mm retrolisthesis L3 on L4. Facet alignment is  



 anatomic.



  



  



  



 Limited dilation of the visualized intracranial contents demonstrates  



 atherosclerosis of the abdominal aorta and branch vessels. No abdominal  



 mass is identified on limited assessment. Osteoarthrosis sacroiliac  



 joints with small vacuum clefts and mild 



  



 sclerosis.



  



  



  



  



  



 Axial images through the disc spaces demonstrate the following:



  



  



  



 L1-L2: Small disc bulge without significant spinal canal or neural  



 foraminal stenosis. Small Schmorl's nodes.



  



  



  



 L2-L3: Small disc bulge contributes to mild subarticular zone stenosis  



 without significant spinal canal or neural foraminal stenosis. Small  



 Schmorl's nodes.



  



  



  



 L3-L4: Small disc bulge, slight prominence of dorsal epidural fat, and  



 mild ligament flavum infolding contribute to mild spinal canal stenosis.  



 There is mild bilateral neural foraminal stenosis secondary to facet  



 arthropathy and small endplate 



  



 osteophytes.



  



  



  



 L4-L5: Status post decompressive laminectomy. Mild neural foraminal  



 stenosis secondary to small endplate osteophytes bilaterally.



  



  



  



 L5-S1: Status post decompressive laminectomy. No significant neural  



 foraminal stenosis.



  



  



  



  



  



  



  



  



  



 IMPRESSION: 



  



  



  



 Postsurgical changes L4-S1 as detailed above.



  



  



  



 No fracture. Multilevel degenerative changes of the lumbar spine,  



 notably with mild spinal canal and neural foraminal stenosis at L3-4.



  



  



  



  



  



  



  



  



  



 [id number]



  



   









 Procedure Note

 

  Interface, Radiology Results [facility] - 2018  5:24 PM CST



EXAMINATION: CT LUMBAR SPINE WO CONTRAST



 



 CLINICAL HISTORY: pain



 



 COMPARISON: CT myelogram lumbar spine 2011



 



 TECHNIQUE: Axial noncontrast enhanced images of lumbar spine was performed 
with coronal sagittal reconstruction algorithms. CT imaging was performed with 
iterative reconstruction technique and/or automated exposure control to reduce 
radiation dose.



 



 



 FINDINGS:



 



 There are 5 non-rib bearing lumbar type vertebrae. Status post posterior 
spinous mentation with rods and screws and anterior interbody grafts at L4-5 
and L5-S1. Status post decompressive laminectomies at L4-5 and L5-S1.



 There is partial solid interbody



 osseous fusion at L5-S1, without definite solid interbody osseous fusion at L4-
5. There is solid osseous fusion of the posterior elements of L4-S1. No 
evidence of hardware loosening. Hardware is well positioned.



 



 No fracture. 1 to 2 mm retrolisthesis L3 on L4. Facet alignment is anatomic.



 



 Limited dilation of the visualized intracranial contents demonstrates 
atherosclerosis of the abdominal aorta and branch vessels. No abdominal mass is 
identified on limited assessment. Osteoarthrosis sacroiliac joints



 with small vacuum clefts and mild



 sclerosis.



 



 



 Axial images through the disc spaces demonstrate the following:



 



 L1-L2: Small disc bulge without significant spinal canal or neural foraminal 
stenosis. Small Schmorl's nodes.



 



 L2-L3: Small disc bulge contributes to mild subarticular zone stenosis without 
significant spinal canal or neural foraminal stenosis. Small Schmorl's nodes.



 



 L3-L4: Small disc bulge, slight prominence of dorsal epidural fat, and mild 
ligament flavum infolding contribute to mild spinal canal stenosis. There is 
mild bilateral neural foraminal stenosis secondary to facet arthropathy and 
small endplate



 osteophytes.



 



 L4-L5: Status post decompressive laminectomy. Mild neural foraminal stenosis 
secondary to small endplate osteophytes bilaterally.



 



 L5-S1: Status post decompressive laminectomy. No significant neural foraminal 
stenosis.



 



 



 



 



 IMPRESSION:



 



 Postsurgical changes L4-S1 as detailed above.



 



 No fracture. Multilevel degenerative changes of the lumbar spine, notably with 
mild spinal canal and neural foraminal stenosis at L3-4.



 



 



 



 



 [id number]









 Performing Organization  Address  City/State/Zipcode  Phone Number

 

 [facility]  [address] [place] [address]  



Partial thromboplastin time, activated (2018 12:29 PM)





 Component  Value  Ref Range  Performed At

 

 PTT  29.2  23.0 - 36.0 sec  [facility] DEPARTMENT OF



   Comment:    PATHOLOGY AND GENOMIC



   PTT therapeutic range for unfractionated heparin is    MEDICINE



   61.0-112.0 seconds which corresponds to Anti-Xa    



   0.3-0.7 U/ml.    



   Note:Change in Panic Value    



   The PTT Panic Value is changing from 110 sec. to 100 sec.    



   due to new instrumentation and reagents.    



   Correlation studies have been performed to validate this result.    



       









 Specimen

 

 Blood









 Performing Organization  Address  City/State/Zipcode  Phone Number

 

 [facility] DEPARTMENT OF PATHOLOGY AND  [address]  [place], [place] [address] [place] [address]      



Creatine kinase, total (CPK) (2018  6:29 PM)





 Component  Value  Ref Range  Performed At

 

 Creatine kinase  134  61 - 224 U/L  [facility] DEPARTMENT OF PATHOLOGY AND [facility]









 Specimen

 

 Plasma specimen









 Performing Organization  Address  City/[facility]  Phone Number

 

 [facility] DEPARTMENT OF PATHOLOGY AND  [address]  [place], [place] [address] [place] [address]      



CT Head Wo Contrast (2018  6:19 PM)





 Narrative  Performed At

 

 EXAMINATION: CT HEAD WO CONTRAST



   [facility]



  



  



 CLINICAL HISTORY: seizure syncope



  



  



  



 COMPARISON:Brain CT dated 2017



  



  



  



 TECHNIQUE: Noncontrast enhanced images of the brain were obtained from  



 the skull base to the vertex. Both soft tissue and bone reconstruction  



 algorithms were performed.CT imaging was performed with iterative  



 reconstruction technique and/or automated 



  



 exposure control to reduce radiation dose.



  



  



  



  



  



 FINDINGS:



  



  



  



 There is no evidence of acute intracranial hemorrhage, intracranial  



 mass, mass effect, midline shift or focal extra-axial collection. The  



 gray-white matter differentiation is preserved, and I do not see a  



 confluent area of acute transcortical ischemia. 



  



 No discretely hyperdense vessel is identified. 



  



  



  



 The ventricles and extra-axial spaces are mildly prominent bilaterally.  



 The ventricles have remained stable in size and configuration compared  



 to the previous head CT.



  



  



  



 Minimal foci of decreased attenuation are noted mainly along the  



 periventricular white matter at the level of the corona radiata. These  



 are nonspecific, but most commonly related to chronic microvascular  



 ischemic change.



  



  



  



 Beam hardening artifact obscures details at the level of the skull base,  



 including portions of the supraorbital frontal lobes, inferior temporal  



 lobes, brainstem and cerebellum.



  



  



  



 The mastoid air cells and middle ear cavities are clear. There is soft  



 tissue density in the external auditory canals bilaterally, nonspecific,  



 but most commonly representing cerumen.



  



  



  



 The calvarium shows no aggressive bone lesion or evidence of fracture.  



 No fluid levels are seen in the visualized paranasal sinuses.



  



  



  



 IMPRESSION:



  



  



  



 No acute intracranial abnormality identified.



  



  



  



  



  



  



  



  



  



  



  



  



  



 [id number]



  



   









 Procedure Note

 

  Interface, Radiology Results Incoming - 2018  6:27 PM CST



EXAMINATION: CT HEAD WO CONTRAST



 



 CLINICAL HISTORY: seizure syncope



 



 COMPARISON:  Brain CT dated 2017



 



 TECHNIQUE: Noncontrast enhanced images of the brain were obtained from the 
skull base to the vertex. Both soft tissue and bone reconstruction algorithms 
were performed.  CT imaging was performed with iterative



 reconstruction technique and/or automated



 exposure control to reduce radiation dose.



 



 



 FINDINGS:



 



 There is no evidence of acute intracranial hemorrhage, intracranial mass, mass 
effect, midline shift or focal extra-axial collection. The gray-white matter 
differentiation is preserved, and I do not see a confluent area of



 acute transcortical ischemia.



 No discretely hyperdense vessel is identified.



 



 The ventricles and extra-axial spaces are mildly prominent bilaterally. The 
ventricles have remained stable in size and configuration compared to the 
previous head CT.



 



 Minimal foci of decreased attenuation are noted mainly along the 
periventricular white matter at the level of the corona radiata. These are 
nonspecific, but most commonly related to chronic microvascular ischemic change.



 



 Beam hardening artifact obscures details at the level of the skull base, 
including portions of the supraorbital frontal lobes, inferior temporal lobes, 
brainstem and cerebellum.



 



 The mastoid air cells and middle ear cavities are clear. There is soft tissue 
density in the external auditory canals bilaterally, nonspecific, but most 
commonly representing cerumen.



 



 The calvarium shows no aggressive bone lesion or evidence of fracture. No 
fluid levels are seen in the visualized paranasal sinuses.



 



 IMPRESSION:



 



 No acute intracranial abnormality identified.



 



 



 



 



 



 



 [facility]-[id number]









 Performing Organization  Address  City/State/Zipcode  Phone Number

 

 [facility]  [address] [place] [address]  



Surgical pathology request (2018  1:28 PM)





 Component  Value  Ref Range  Performed At

 

 Case number  [id number]    [facility] DEPARTMENT OF



       PATHOLOGY AND GENOMIC



       MEDICINE

 

 Surgical pathology report  See link below for PDF    [facility] DEPARTMENT OF



   Lab Report    PATHOLOGY AND GENOMIC



       MEDICINE

 

 Result status  This is Final Report to    [facility] DEPARTMENT OF



   [id number]    PATHOLOGY AND GENOMIC



       MEDICINE









 Performing Organization  Address  City/State/Zipcode  Phone Number

 

 [facility] DEPARTMENT OF PATHOLOGY AND  [facility] [address]  [place] [address]  



 GENOMIC MEDICINE      



Magnesium level (2018  5:59 AM)





 Component  Value  Ref Range  Performed At

 

 Magnesium  2.00  1.60 - 2.40 mg/dL  [facility] DEPARTMENT OF PATHOLOGY AND [facility]









 Specimen

 

 Plasma specimen









 Performing Organization  Address  City/State/Zipcode  Phone Number

 

 [facility] DEPARTMENT OF PATHOLOGY AND  [address] [place] [address]  



 GENOMIC MEDICINE      



XR Chest 2 Vw (2017  7:47 PM)





 Narrative  Performed At

 

 EXAMINATION:XR CHEST 2 VW



   [facility]



  



  



 CLINICAL HISTORY:sob cough todayleukocytosisR basilar  



 crackles



  



  



  



  



  



  



  



 IMPRESSION:



  



  



  



 Aortic arch calcified. Heart size is normal. Lungs are clear of acute  



 infiltrate. There has been a prior partial pulmonary resection in the  



 right side. There are no effusions. There is an intraspinal catheter  



 present, and there is hardware in cervical 



  



 spine.



  



  



  



  



  



  



  



 [id number]



  



   









 Procedure Note

 

 Hm Interface, Radiology Results Incoming - 2017  8:23 PM CST



EXAMINATION:  XR CHEST 2 VW



 



 CLINICAL HISTORY:  sob cough today  leukocytosis  R basilar crackles



 



 



 



 IMPRESSION:



 



 Aortic arch calcified. Heart size is normal. Lungs are clear of acute 
infiltrate. There has been a prior partial pulmonary resection in the right 
side. There are no effusions. There is an intraspinal catheter



 present, and there is hardware in cervical



 spine.



 



 



 



 [facility]-[id number]









 Performing Organization  Address  City/[facility]/Zipcode  Phone Number

 

 [facility]  [address] [place] [address]  



Troponin (2017  7:33 PM)Only the most recent of2 resultswithin the time 
period is included.





 Component  Value  Ref Range  Performed At

 

 Troponin  <0.01  0.00 - 0.60 ng/mL  [facility] DEPARTMENT OF PATHOLOGY



   Comment:    AND [facility] MEDICINE



   0.11 - 1.49 ng/mlMay indicate increased risk of acute
    



    coronary syndrome.
    



   >=1.5 ng/mlConsistent with acute myocardial    



    infarction.    



   
    



   The diagnostic value of a single normal or non-diagnostic    



   result is questionable.Serial samples at 2-6 hour intervals    



   are required to rule out acute myocardial injury.
    



       



       









 Specimen

 

 Plasma specimen









 Performing Organization  Address  City/State/Zipcode  Phone Number

 

 [facility] DEPARTMENT OF PATHOLOGY AND  [address] [place] [address]  



 [facility] MEDICINE      



Influenza antigen (2017  3:58 PM)





 Component  Value  Ref Range  Performed At

 

 Influenza antigen  Negative for Influenza A/B antigen.    [facility] DEPARTMENT OF 
PATHOLOGY



   Comment:    AND GENOMIC MEDICINE



   Specimen Information    



   Specimen Source: Nares    



   Specimen Site: Right    



       









 Specimen

 

 Nares - Right









 Performing Organization  Address  City/State/Zipcode  Phone Number

 

 [facility] DEPARTMENT OF PATHOLOGY AND  [address] [address]  



 GENOMIC MEDICINE      



B natriuretic peptide (2017  3:45 PM)





 Component  Value  Ref Range  Performed At

 

 BNP  43  0 - 100 pg/mL  [facility] DEPARTMENT OF PATHOLOGY AND GENOMIC MEDICINE









 Specimen

 

 Blood









 Performing Organization  Address  City/State/Zipcode  Phone Number

 

 [facility] DEPARTMENT OF PATHOLOGY AND  [address]  [place] [address]  



 GENOMIC MEDICINE      



after 10/16/2017



Insurance







 Payer  Benefit Plan / Group  Subscriber ID  Type  Phone  Address

 

 [employer] MEDICARE  [employer] MEDICARE HMO/PPO [facility]  xxxxxxxx  HMO    









 Guarantor Name  Account Type  Relation to  Date of  Phone  Billing



     Patient  Birth    Address

 

 CARLOS BARKLEY  Personal/Family  Self  1965  Home:  [address] [place] [phone] 



 [employer]         [phone]  [address]







         [address]  [place]



           [id number]

## 2018-10-17 NOTE — EDPHYS
Physician Documentation                                                                           

 [facility]                                                                

Name: Cleveland Ricketts Jr                                                                            

Age: 53 yrs                                                                                       

Sex: Male                                                                                         

: 1965                                                                                   

MRN: [id number]                                                                                   

Arrival Date: 10/17/2018                                                                          

Time: 12:23                                                                                       

Account#: [id number]                                                                            

Bed 18                                                                                            

Private MD: None, None                                                                            

ED Physician Theron May                                                                         

HPI:                                                                                              

10/17                                                                                             

12:46 This 53 yrs old  Male presents to ER via Ambulatory with complaints of Leg     rn  

      Pain.                                                                                       

12:46 The patient presents with an injury, pain. The complaints affect the pelvis, right leg  rn  

      and left leg. Onset: The symptoms/episode began/occurred 3 day(s) ago. Modifying            

      factors: The symptoms are alleviated by remaining still, the symptoms are aggravated by     

      movement. Associated signs and symptoms: Pertinent negatives fever, warmth, weakness.       

      Severity of symptoms: At their worst the symptoms were mild, in the emergency               

      department the symptoms are unchanged. The patient has experienced similar episodes in      

      the past. Reports chronic pain of back/neck/legs, but fell backward, slipped 3 days         

      ago, landed on back and left side, has been ambulatory since then but reports               

      persistent pain has him concerned. States due to chronic pain can't tell if really          

      injured. .                                                                                  

                                                                                                  

Historical:                                                                                       

- Allergies:                                                                                      

12:36 No Known Allergies;                                                                     aj  

- Home Meds:                                                                                      

12:36 Effexor  mg Oral cp24 1 cap once daily [Active]; Keppra 1,000 mg Oral tab 1 tab   aj  

      every 12 hours [Active]; Seroquel 600 mg Oral 1 tab nightly [Active];                       

12:36 Norco  mg Oral tab 1 tab every 4 hours [Active]; Diazepam Oral [Active];          aj  

- PMHx:                                                                                           

12:36 COPD; Emphysema; Hypertension; lunch cancer; PTSD; Seizures;                            aj  

12:36 Chronic pain;                                                                           aj  

- PSHx:                                                                                           

12:36 lumbar fusion; cervical fusion; dorsal column stimulator implant; Appendectomy; right   aj  

      lower lobe lobectomy; right great toe; collarbone; Cholecystectomy;                         

                                                                                                  

- Immunization history:: Adult Immunizations up to date.                                          

- Ebola Screening: : Patient negative for fever greater than or equal to 101.5 degrees            

  Fahrenheit, and additional compatible Ebola Virus Disease symptoms Patient denies               

  exposure to infectious person Patient denies travel to an Ebola-affected area in the            

  21 days before illness onset No symptoms or risks identified at this time.                      

- Social history:: Smoking status: Patient/guardian denies using tobacco,                         

  Patient/guardian denies using alcohol.                                                          

- Family history:: not pertinent.                                                                 

- Hospitalizations: : No recent hospitalization is reported.                                      

                                                                                                  

                                                                                                  

ROS:                                                                                              

12:46 Constitutional: Negative for fever, chills, and weight loss, Eyes: Negative for injury, rn  

      pain, redness, and discharge, Neck: Negative for injury, pain, and swelling,                

      Cardiovascular: Negative for chest pain, palpitations, and edema, Respiratory: Negative     

      for shortness of breath, cough, wheezing, and pleuritic chest pain, Abdomen/GI:             

      Negative for abdominal pain, nausea, vomiting, diarrhea, and constipation, Back: + low      

      back injury and pain MS/Extremity: + bilateral hip pain Skin: Negative for injury,          

      rash, and discoloration, Neuro: Negative for headache, weakness, numbness, tingling,        

      and seizure.                                                                                

                                                                                                  

Exam:                                                                                             

12:46 Constitutional:  This is a well developed, well nourished patient who is awake, alert,  rn  

      and in no acute distress. Sitting upright in stretcher Head/Face:  Normocephalic,           

      atraumatic. Neck:  no bony tenderness Back:  No spinal tenderness. No ecchymosis.  MS/      

      Extremity:  Pulses equal, no cyanosis.  Neurovascular intact.  Mild painful ROM             

      bilateral hips without deformity or shortening/rotation. Neuro:  Awake and alert, GCS       

      15, oriented to person, place, time, and situation.   Motor strength 5/5 in all             

      extremities.  Sensory grossly intact.                                                       

                                                                                                  

Vital Signs:                                                                                      

12:36  / 85; Pulse 84; Resp 17; Temp 97.4; Pulse Ox 99% on R/A; Weight 83.01 kg; Height aj  

      6 ft. 1 in. (185.42 cm);                                                                    

14:25  / 87; Pulse 86; Resp 18; Pulse Ox 99% on R/A;                                    hj  

12:36 Body Mass Index 24.14 (83.01 kg, 185.42 cm)                                             aj  

                                                                                                  

MDM:                                                                                              

12:40 Patient medically screened.                                                             rn  

14:27 Differential diagnosis: closed fracture, contusion. Data reviewed: vital signs, nurses  rn  

      notes, radiologic studies, plain films, and as a result, I will discharge patient.          

      Counseling: I had a detailed discussion with the patient and/or guardian regarding: the     

      historical points, exam findings, and any diagnostic results supporting the                 

      discharge/admit diagnosis, radiology results, the need for outpatient follow up, to         

      return to the emergency department if symptoms worsen or persist or if there are any        

      questions or concerns that arise at home. Response to treatment: the patient's symptoms     

      have mildly improved after treatment, and as a result, I will discharge patient.            

      Special discussion: I discussed with the patient/guardian in detail that at this point      

      there is no indication for admission to the hospital. It is understood, however, that       

      if the symptoms persist or worsen the patient needs to return immediately for               

      re-evaluation.                                                                              

                                                                                                  

10/17                                                                                             

12:46 Order name: XRAY Lumbar Spine (3 Views); Complete Time: 14:26                           rn  

10/17                                                                                             

12:46 Order name: XRAY Pelvis; Complete Time: 14:                                           rn  

10/17                                                                                             

12:46 Order name: XRAY Hip LEFT 2 view; Complete Time: 14:                                  rn  

10/17                                                                                             

12:46 Order name: XRAY Hip RIGHT 2 view; Complete Time: 14:                                 rn  

                                                                                                  

Administered Medications:                                                                         

03:58 Drug: Norco 10 mg-325 mg 1 tabs Route: PO;                                              hj  

14:02 Follow up: Response: No adverse reaction                                                hj  

                                                                                                  

                                                                                                  

Disposition:                                                                                      

10/17/18 14:32 Discharged to Home. Impression: Contusion of left hip, Contusion of right hip,     

  Contusion of lower back and pelvis.                                                             

- Condition is Stable.                                                                            

- Discharge Instructions: Contusion, Hip Pain.                                                    

- Prescriptions for Tylenol- Codeine #3 300-30 mg Oral Tablet - take 1 tablet by ORAL             

  route every 6 hours As needed; 10 tablet.                                                       

- Medication Reconciliation Form, Thank You Letter, Antibiotic Education, Prescription            

  Opioid Use form.                                                                                

- Follow up: Private Physician; When: As needed; Reason: Recheck today's complaints,              

  Re-evaluation by your physician.                                                                

- Problem is new.                                                                                 

- Symptoms have improved.                                                                         

                                                                                                  

                                                                                                  

                                                                                                  

Signatures:                                                                                       

Dispatcher [employer]                           EDShaista Walter RN RN aj Nieto, Roman, MD MD rn Joaquin, Henry, RN RN hj                                                   

                                                                                                  

Corrections: (The following items were deleted from the chart)                                    

14:41 14:32 10/17/2018 14:32 Discharged to Home. Impression: Contusion of left hip; Contusion hj  

      of right hip; Contusion of lower back and pelvis. Condition is Stable. Forms are            

      Medication Reconciliation Form, Thank You Letter, Antibiotic Education, Prescription        

      Opioid Use. Follow up: Private Physician; When: As needed; Reason: Recheck today's          

      complaints, Re-evaluation by your physician. Problem is new. Symptoms have improved. rn     

14:47 14:41 10/17/2018 14:32 Discharged to Home. Impression: Contusion of left hip; Contusion hj  

      of right hip; Contusion of lower back and pelvis. Condition is Stable. Discharge            

      Instructions: Contusion, Hip Pain. Forms are Medication Reconciliation Form, Thank You      

      Letter, Antibiotic Education, Prescription Opioid Use. Follow up: Private Physician;        

      When: As needed; Reason: Recheck today's complaints, Re-evaluation by your physician.       

      Problem is new. Symptoms have improved. hj                                                  

14:53 14:47 10/17/2018 14:32 Discharged to Home. Impression: Contusion of left hip; Contusion hj  

      of right hip; Contusion of lower back and pelvis. Condition is Stable. Discharge            

      Instructions: Contusion, Hip Pain. Prescriptions for Tramadol 50 mg Oral Tablet - take      

      1 tablet by ORAL route every 8 hours as needed; 10 tablet. and Forms are Medication         

      Reconciliation Form, Thank You Letter, Antibiotic Education, Prescription Opioid Use.       

      Follow up: Private Physician; When: As needed; Reason: Recheck today's complaints,          

      Re-evaluation by your physician. Problem is new. Symptoms have improved. hj                 

                                                                                                  

**************************************************************************************************

## 2018-10-17 NOTE — RAD REPORT
EXAM DESCRIPTION:  RAD - Pelvis - 10/17/2018 2:09 pm

 

CLINICAL HISTORY:  Leg pain, back pain

 

COMPARISON:  None.

 

TECHNIQUE:  AP imaging of the pelvis was obtained.

 

FINDINGS:  No fracture of the bony pelvis. No fracture, dislocation or other acute hip joint finding.
 No significant SI joint findings. No gross hip joint abnormality seen. Hip joints are detailed furth
er in separate reports.

 

Lumbosacral fusion changes are present detailed on separate report.

 

No soft tissue abnormality. Numerous phleboliths are seen in the lower left pelvis.

 

IMPRESSION:  Negative pelvis for acute or significant findings.

## 2018-10-17 NOTE — RAD REPORT
EXAM DESCRIPTION:  RAD - Hip Right 2 View - 10/17/2018 2:09 pm

 

CLINICAL HISTORY:  Right hip pain

 

COMPARISON:  None.

 

FINDINGS:  AP and frog-leg views of the right hip were obtained.  There is no fracture or dislocation
.  No AVN or focal femoral head abnormality. Degenerative change at the hip joint is minimal. There i
s slight narrowing of the joint space. Arterial tree calcifications are present.

 

IMPRESSION:  Negative right hip examination for acute findings. Minimal degenerative change present.

## 2018-10-17 NOTE — RAD REPORT
EXAM DESCRIPTION:  RAD - Lumbar Spine 3 Views - 10/17/2018 2:09 pm

 

CLINICAL HISTORY:  Fall, back pain

 

COMPARISON:  None.

 

FINDINGS:  A three-view lumbar spine examination was performed. Lumbar bodies are normal in height. N
o lytic, sclerotic or expansile bony destructive process. L4 shows a very slight retrolisthesis relat
francisco to L5. This is a fused level. Pedicle screws and rods are in place spanning L4-S1. No fracture of
 the hardware. Graft material is present in the L4-5 and L5-S1 disc levels. The slight retrolisthesis
 of L4 is believed to be chronic well-healed fusion. There is heterotopic bone posteriorly at the fac
et joints and hardware.

 

Slight retrolisthesis of L3 on L4. L3-4 mild to moderate facet joint degenerative changes are present
. Mild endplate spurring changes are present. No disc space narrowing. No pars defects identified.

 

IMPRESSION:  L4-S1 fusion changes appearing well healed with no hardware fracture. Central canal or f
oraminal encroachment changes may be present but no acute findings seen.

 

Slight retrolisthesis of L3 on L4 with mild to moderate facet degenerative change.

 

No compression fracture or acute finding identifiable.

## 2018-10-17 NOTE — RAD REPORT
EXAM DESCRIPTION:  RAD - Hip Left 2 View - 10/17/2018 2:09 pm

 

CLINICAL HISTORY:  Fall, hip pain

 

COMPARISON:  None.

 

FINDINGS:  AP and frogleg views of the left hip were obtained. There is no fracture or dislocation. N
o AVN or focal femoral head abnormality. There is sclerotic and minimal spurring changes along the fajardo
perior acetabulum. Joint space narrowing is present. Minimal spurring along the superior articular ma
rgin of the femoral head.

 

No soft tissue abnormality.

 

Arterial tree calcifications are present.

 

IMPRESSION:  Mild to moderate for age degenerative change at the left hip joint. No acute or destruct
francisco finding. Detail Level: Detailed

## 2018-10-17 NOTE — ER
Nurse's Notes                                                                                     

 [facility]                                                                

Name: Cleveland Ricketts Jr                                                                            

Age: 53 yrs                                                                                       

Sex: Male                                                                                         

: 1965                                                                                   

MRN: [id number]                                                                                   

Arrival Date: 10/17/2018                                                                          

Time: 12:23                                                                                       

Account#: [id number]                                                                            

Bed 18                                                                                            

Private MD: None, None                                                                            

Diagnosis: Contusion of left hip;Contusion of right hip;Contusion of lower back and pelvis        

                                                                                                  

Presentation:                                                                                     

10/17                                                                                             

12:32 Presenting complaint: Patient states: Reports bilateral leg and lower back pain since   aj  

      fall 3 days ago. Ambulatory with cane. Transition of care: patient was not received         

      from another setting of care. Onset of symptoms was 2018. Risk Assessment:      

      Do you want to hurt yourself or someone else? Patient reports no desire to harm self or     

      others. Initial Sepsis Screen: Does the patient meet any 2 criteria? No. Patient's          

      initial sepsis screen is negative. Does the patient have a suspected source of              

      infection? No. Patient's initial sepsis screen is negative. Care prior to arrival: None.    

12:32 Method Of Arrival: Ambulatory                                                             

12:32 Acuity: JENNIFER 3                                                                             

                                                                                                  

Triage Assessment:                                                                                

12:36 General: Appears in no apparent distress. comfortable, Behavior is calm, cooperative,   aj  

      appropriate for age. Pain: Complains of pain in buttocks, right leg and left leg.           

      Neuro: Level of Consciousness is awake, alert, obeys commands, Oriented to person,          

      place, time, situation, Appropriate for age. Respiratory: Airway is patent Respiratory      

      effort is even, unlabored, Respiratory pattern is regular, symmetrical. Derm: Skin is       

      intact, is healthy with good turgor, Skin is pink, warm \T\ dry. normal. Musculoskeletal:   

      Reports pain in buttocks, right leg and left leg.                                           

                                                                                                  

Historical:                                                                                       

- Allergies:                                                                                      

12:36 No Known Allergies;                                                                     aj  

- Home Meds:                                                                                      

12:36 Effexor  mg Oral cp24 1 cap once daily [Active]; Keppra 1,000 mg Oral tab 1 tab   aj  

      every 12 hours [Active]; Seroquel 600 mg Oral 1 tab nightly [Active];                       

12:36 Norco  mg Oral tab 1 tab every 4 hours [Active]; Diazepam Oral [Active];          aj  

- PMHx:                                                                                           

12:36 COPD; Emphysema; Hypertension; lunch cancer; PTSD; Seizures;                            aj  

12:36 Chronic pain;                                                                           aj  

- PSHx:                                                                                           

12:36 lumbar fusion; cervical fusion; dorsal column stimulator implant; Appendectomy; right   aj  

      lower lobe lobectomy; right great toe; collarbone; Cholecystectomy;                         

                                                                                                  

- Immunization history:: Adult Immunizations up to date.                                          

- Ebola Screening: : Patient negative for fever greater than or equal to 101.5 degrees            

  Fahrenheit, and additional compatible Ebola Virus Disease symptoms Patient denies               

  exposure to infectious person Patient denies travel to an Ebola-affected area in the            

  21 days before illness onset No symptoms or risks identified at this time.                      

- Social history:: Smoking status: Patient/guardian denies using tobacco,                         

  Patient/guardian denies using alcohol.                                                          

- Family history:: not pertinent.                                                                 

- Hospitalizations: : No recent hospitalization is reported.                                      

                                                                                                  

                                                                                                  

Screenin:42 Abuse screen: Denies threats or abuse. Abuse screen: Denies injuries from another.      hj  

      Nutritional screening: No deficits noted. Tuberculosis screening: No symptoms or risk       

      factors identified. Fall Risk None identified.                                              

                                                                                                  

Assessment:                                                                                       

12:42 General: Appears in no apparent distress. uncomfortable, Behavior is calm, cooperative, hj  

      appropriate for age. Pain: Complains of pain in left leg and right leg. Neuro: Level of     

      Consciousness is awake, alert, obeys commands, Oriented to person, place, time,             

      situation, Appropriate for age. Cardiovascular: Capillary refill < 3 seconds Patient's      

      skin is warm and dry. Respiratory: Airway is patent Respiratory effort is even,             

      unlabored, Respiratory pattern is regular, symmetrical. GI: No signs and/or symptoms        

      were reported involving the gastrointestinal system. : No signs and/or symptoms were      

      reported regarding the genitourinary system. EENT: No signs and/or symptoms were            

      reported regarding the EENT system. Derm: No signs and/or symptoms reported regarding       

      the dermatologic system. Musculoskeletal: Reports pain in right leg and left leg.           

                                                                                                  

Vital Signs:                                                                                      

12:36  / 85; Pulse 84; Resp 17; Temp 97.4; Pulse Ox 99% on R/A; Weight 83.01 kg; Height aj  

      6 ft. 1 in. (185.42 cm);                                                                    

14:25  / 87; Pulse 86; Resp 18; Pulse Ox 99% on R/A;                                    hj  

12:36 Body Mass Index 24.14 (83.01 kg, 185.42 cm)                                               

                                                                                                  

ED Course:                                                                                        

12:23 Patient arrived in ED.                                                                  mr  

12:23 None, None is Private Physician.                                                        mr  

12:35 Triage completed.                                                                       aj  

12:36 Arm band placed on left wrist. Patient placed in an exam room.                          aj  

12:40 Theron May MD is Attending Physician.                                                rn  

12:41 Drew Urbina RN is Primary Nurse.                                                    hj  

12:42 Patient has correct armband on for positive identification. Placed in gown. Bed in low  hj  

      position. Call light in reach. Side rails up X 1.                                           

13:18 Patient moved to radiology via stretcher.                                               jb2 

14:10 XRAY Lumbar Spine (3 Views) In Process Unspecified.                                     EDMS

14:10 XRAY Pelvis In Process Unspecified.                                                     EDMS

14:10 XRAY Hip LEFT 2 view In Process Unspecified.                                            EDMS

14:10 XRAY Hip RIGHT 2 view In Process Unspecified.                                           EDMS

14:39 No provider procedures requiring assistance completed. Patient did not have IV access   hj  

      during this emergency room visit.                                                           

                                                                                                  

Administered Medications:                                                                         

03:58 Drug: Norco 10 mg-325 mg 1 tabs Route: PO;                                              hj  

14:02 Follow up: Response: No adverse reaction                                                hj  

                                                                                                  

                                                                                                  

Outcome:                                                                                          

14:32 Discharge ordered by MD.                                                                rn  

14:41 Discharged to home with crutches.                                                       hj  

14:41 Condition: stable                                                                           

14:41 Discharge instructions given to patient, Instructed on discharge instructions, follow       

      up and referral plans. Demonstrated understanding of instructions, follow-up care.          

14:41 Patient left the ED.                                                                    hj  

14:47 Patient left the ED.                                                                    hj  

14:53 Patient left the ED.                                                                    hj  

                                                                                                  

Signatures:                                                                                       

Dispatcher [employer]                           Shaista Pimentel RN RN aj                                                   

Chantelle Solimna Jesse                              jb2                                                  

Theron May MD MD rn Joaquin, Henry, RN RN                                                      

                                                                                                  

**************************************************************************************************

## 2018-10-17 NOTE — XMS REPORT
Patient Summary Document

 Created on:2018



Patient:CARLOS BARKLEY

Sex:Male

:1965

External Reference #:[id number]





Demographics







 Address  [address]



   [place] [address]

 

 Home Phone  [phone]

 

 Email Address  NONE

 

 Preferred Language  Unknown

 

 Marital Status  Unknown

 

 Ep[employer]alian Affiliation  Unknown

 

 Race  Unknown

 

 Additional Race(s)  Unavailable

 

 Ethnic Group  Unknown









Author







 Organization  [facility]

 

 Address  [address]



   [place] [address]

 

 Phone  [phone]









Care Team Providers







 Name  Role  Phone

 

 BUD SAHU  Unavailable  Unavailable









Problems

This patient has no known problems.



Allergies, Adverse Reactions, Alerts

This patient has no known allergies or adverse reactions.



Medications

This patient has no known medications.



Results







 Test Description  Test Time  Test Comments  Text Results  Atomic Results  
Result Comments









 Comprehensive Metabolic Panel  2017 04:50:00    









   Test Item  Value  Reference Range  Comments









 Sodium (test code=NA)  137 mmol/L  135-145  

 

 Potassium (test code=K)  4.1 mmol/L  3.5-5.1  

 

 Chloride (test code=CL)  101 mmol/L    

 

 Carbon Dioxide (test  23 mmol/L  22-29  



 code=CO2)      

 

 Glucose (test code=GLU)  145 mg/dL    

 

 Blood Urea Nitrogen (test  9 mg/dL  6-20  



 code=BUN)      

 

 Creatinine (test code=CREAT)  1.0 mg/dL  0.7-1.2  

 

 Calcium (test code=CA)  9.6 mg/dL  8.3-10.5  

 

 Prot Total (test code=TP)  8.0 g/dL  6.4-8.3  

 

 Albumin (test code=ALB)  3.8 g/dL  3.5-5.2  

 

 A/G Ratio (test  0.9 Ratio    



 code=AGRATIO)      

 

 Globulin (test code=GLOB)  4.2  2.9-3.1  

 

 Bili Total (test code=TBIL)  0.4 mg/dL  0.1-0.9  

 

 Alk Phos (test code=APHOS)  149 U/L    

 

 AST (test code=AST)  29 U/L  1-40  

 

 ALT (test code=ALT)  22 U/L  1-41  

 

 BUN/Creatinine Ratio (test  9.0    



 code=BCRATIO)      

 

 Anion Gap (test code=AGAP)  13 mmol/L  7-16  

 

 Estimated GFR (test  >60 mL/min/1.73m2    eGFR (estimated Glomerular



 code=GFR)      Filtration Rate) is an



       estimated value,calculated



       from the patient's serum



       creatinine using the MDRD



       equation.It is NOT the



       patient's actual GFR. The eGFR



       provides a more



       clinicallyuseful measure of



       kidney disease than serum



       creatinine alone.***This



       calculation takes sex and race



       into account, if the



       informationis provided. If the



       race is not provided, and the



       patient isAfrican-American,



       multiply by 1.212. If sex is



       not provided, and thepatient



       is female, multiply by 0.742.



       Results for patients <18 years



       ofage have not been validated



       by the MDRD study and should



       be interpretedwith



       caution.eGFR Result



       Interpretation:eGFR > or=60 is



       in the Normal RangeeGFR < 60



       may mean kidney diseaseeGFR <



       15 may mean kidney



       failure***Ranges recommended



       by the National Kidney



       Foundation,http://nkdep.nih.go



       v



[id number] 04:50:00





 Test Item  Value  Reference Range  Comments

 

 Amphetamine (test code=AMPH)  Negative  Negative  For diagnostic purposes only,



       positive results should always



       be assessedin conjunctionwith



       the patient's medical



       history,clinical examination



       and otherfindings.To fulfill



       legal requirements, a more



       specific alternate chemical



       methodmust be used inorder to



       obtain a Confirmed analytical



       result. GC/MS is the preferred



       confirmatory method.

 

 Barbiturates (test code=MARQUIS)  Negative  Negative  

 

 Benzodiazepine (test  Negative  Negative  



 code=DEJAN)      

 

 Cocaine (test code=COCA)  Negative  Negative  

 

 Methadone (test code=MTHD)  Negative  Negative  

 

 Opiates (test code=OPIA)  Negative  Negative  

 

 PCP (test code=PCP)  Negative  Negative  

 

 Propoxyphene (test  Negative  Negative  



 code=PROPOX)      

 

 THC (test code=THC)  POSITIVE  Negative  



CBC with [id number] 03:53:00





 Test Item  Value  Reference Range  Comments

 

 WBC (test code=WBC)  12.5 K/cumm  4.4-10.5  

 

 RBC (test code=RBC)  4.71 M/cumm  4.10-5.70  

 

 Hemoglobin (test code=HGB)  15.0 gm/dL  13.4-17.4  

 

 Hematocrit (test code=HCT)  44.0 %  38.7-52.0  

 

 MCV (test code=MCV)  93.5 fL    

 

 MCH (test code=MCH)  31.8 pg  27.0-32.5  

 

 MCHC (test code=MCHC)  34.0 g/dL  32.0-37.5  

 

 RDW (test code=RDW)  14.7 %  11.5-14.5  

 

 Platelet Count (test code=PLTCT)  257 K/cumm  140-440  

 

 MPV (test code=MPV)  9.0 fL    

 

 Diff Method (test code=DIFFM)  Auto    

 

 Neutrophil (test code=NEUT)  79.4 %  36-70  

 

 Lymphocyte (test code=LYMPH)  13.7 %  12-44  

 

 Monocyte (test code=MONO)  5.2 %  0-11  

 

 Eosinophil (test code=EOS)  1.3 %  0-7  

 

 Basophil (test code=BASO)  0.4 %  0-2  

 

 Neutro Abs (test code=ANEUT)  9.9 K/cumm  1.6-7.4  

 

 Lymph Abs (test code=ALYMPH)  1.7 K/cumm  0.5-4.6  

 

 Mono Abs (test code=AMONO)  0.7 K/cumm  0.0-1.2  

 

 Eos Abs (test code=AEOS)  0.16 K/cumm  0.00-0.74  

 

 Baso Abs (test code=ABASO)  0.1 K/cumm  0.00-0.21

## 2018-12-19 ENCOUNTER — HOSPITAL ENCOUNTER (EMERGENCY)
Dept: HOSPITAL 97 - ER | Age: 53
Discharge: HOME | End: 2018-12-19
Payer: COMMERCIAL

## 2018-12-19 DIAGNOSIS — G89.4: Primary | ICD-10-CM

## 2018-12-19 DIAGNOSIS — Z72.0: ICD-10-CM

## 2018-12-19 DIAGNOSIS — F43.10: ICD-10-CM

## 2018-12-19 DIAGNOSIS — I10: ICD-10-CM

## 2018-12-19 DIAGNOSIS — G40.909: ICD-10-CM

## 2018-12-19 DIAGNOSIS — J44.9: ICD-10-CM

## 2018-12-19 LAB
ALBUMIN SERPL BCP-MCNC: 3.6 G/DL (ref 3.4–5)
ALP SERPL-CCNC: 183 U/L (ref 45–117)
ALT SERPL W P-5'-P-CCNC: 30 U/L (ref 12–78)
AST SERPL W P-5'-P-CCNC: 22 U/L (ref 15–37)
BUN BLD-MCNC: 18 MG/DL (ref 7–18)
GLUCOSE SERPLBLD-MCNC: 80 MG/DL (ref 74–106)
HCT VFR BLD CALC: 44.6 % (ref 39.6–49)
INR BLD: 1.04
LYMPHOCYTES # SPEC AUTO: 0.7 K/UL (ref 0.7–4.9)
MAGNESIUM SERPL-MCNC: 2.1 MG/DL (ref 1.8–2.4)
NT-PROBNP SERPL-MCNC: 91 PG/ML (ref ?–125)
PMV BLD: 7.9 FL (ref 7.6–11.3)
POTASSIUM SERPL-SCNC: 4.4 MMOL/L (ref 3.5–5.1)
RBC # BLD: 4.6 M/UL (ref 4.33–5.43)
TROPONIN (EMERG DEPT USE ONLY): < 0.02 NG/ML (ref 0–0.04)

## 2018-12-19 PROCEDURE — 83735 ASSAY OF MAGNESIUM: CPT

## 2018-12-19 PROCEDURE — 93005 ELECTROCARDIOGRAM TRACING: CPT

## 2018-12-19 PROCEDURE — 85610 PROTHROMBIN TIME: CPT

## 2018-12-19 PROCEDURE — 71045 X-RAY EXAM CHEST 1 VIEW: CPT

## 2018-12-19 PROCEDURE — 85025 COMPLETE CBC W/AUTO DIFF WBC: CPT

## 2018-12-19 PROCEDURE — 80076 HEPATIC FUNCTION PANEL: CPT

## 2018-12-19 PROCEDURE — 99285 EMERGENCY DEPT VISIT HI MDM: CPT

## 2018-12-19 PROCEDURE — 80048 BASIC METABOLIC PNL TOTAL CA: CPT

## 2018-12-19 PROCEDURE — 83880 ASSAY OF NATRIURETIC PEPTIDE: CPT

## 2018-12-19 PROCEDURE — 84484 ASSAY OF TROPONIN QUANT: CPT

## 2018-12-19 PROCEDURE — 36415 COLL VENOUS BLD VENIPUNCTURE: CPT

## 2018-12-19 NOTE — EDPHYS
Physician Documentation                                                                           

 [facility]                                                                

Name: Cleveland Ricketts Jr                                                                            

Age: 53 yrs                                                                                       

Sex: Male                                                                                         

: 1965                                                                                   

MRN: [id number]                                                                                   

Arrival Date: 2018                                                                          

Time: 13:53                                                                                       

Account#: [id number]                                                                            

Bed 18                                                                                            

Private MD: [facility]                                                                         

ED Physician Rajendra Alberts                                                                       

HPI:                                                                                              

                                                                                             

14:48 This 53 yrs old  Male presents to ER via Ambulatory with complaints of Chest   jmm 

      Pain > 29 y/o.                                                                              

14:48 The patient or guardian reports chest pain that is located primarily in the substernal  jmm 

      area. Onset: gradually, 2 day(s) ago. The pain does not radiate. The chest pain is          

      described as sharp, stabbing. Duration: The patient or guardian reports a single            

      episode, that is still ongoing. Modifying factors: The symptoms are alleviated by           

      nothing. the symptoms are aggravated by nothing. This is a 53 year old male with a          

      history of chronic pain, COPD, emphysema, HTN, PTSD, that presents to the ED with           

      complaints of worsening chronic pain along with 2 days of substernal chest pain             

      described as stabbing. Patient smokes tobacco. .                                            

                                                                                                  

Historical:                                                                                       

- Allergies:                                                                                      

14:16 No Known Allergies;                                                                     ph  

- Home Meds:                                                                                      

14:16 diazepam Oral [Active]; Norco  mg Oral tab 1 tab every 4 hours [Active]; Seroquel ph  

      600 mg Oral 1 tab nightly [Active];                                                         

- PMHx:                                                                                           

14:16 Chronic pain; COPD; Emphysema; Hypertension; PTSD; Seizures;                            ph  

- PSHx:                                                                                           

14:16 lumbar fusion; cervical fusion; dorsal column stimulator implant; Appendectomy; right   ph  

      lower lobe lobectomy; right great toe; collarbone; Cholecystectomy;                         

                                                                                                  

- Immunization history:: Adult Immunizations up to date.                                          

- Social history:: Smoking status: unknown.                                                       

                                                                                                  

                                                                                                  

ROS:                                                                                              

14:48 Constitutional: Negative for fever, chills, and weight loss.                            jmm 

14:48 Cardiovascular: Positive for chest pain.                                                    

14:48 Respiratory: Negative for shortness of breath.                                              

14:48 Neuro: Positive for headache.                                                               

14:48 All other systems are negative.                                                             

                                                                                                  

Exam:                                                                                             

14:48 Head/Face:  atraumatic. Eyes:  EOMI, no conjunctival erythema appreciated ENT:  Moist   jmm 

      Mucus Membranes Neck:  Trachea midline, Supple Chest/axilla:  Normal chest wall             

      appearance and motion.   Cardiovascular:  Regular rate and rhythm.  No edema                

      appreciated Respiratory:  Normal respirations, no respiratory distress appreciated          

      Abdomen/GI:  Non distended, soft Back:  Normal ROM Skin:  General appearance color          

      normal MS/ Extremity:  Moves all extremities, no obvious deformities appreciated, no        

      edema noted to the lower extremities  Neuro:  Awake and alert, normal gait Psych:           

      Behavior is normal, Mood is normal, Patient is cooperative and pleasant                     

14:48 Constitutional: The patient appears in no acute distress, alert, awake.                     

                                                                                                  

Vital Signs:                                                                                      

14:13  / 95; Pulse 82; Resp 18; Temp 98.7(TE); Pulse Ox 98% on R/A; Weight 81.65 kg;    ph  

      Height 6 ft. 1 in. (185.42 cm); Pain 10/10;                                                 

15:29  / 73; Pulse 74; Resp 18; Pulse Ox 99% on R/A; Pain 10/10;                        em  

16:21  / 84; Pulse 75; Resp 16; Pulse Ox 95% on R/A;                                    dh3 

18:11  / 79; Pulse 69; Resp 18; Pulse Ox 99% on R/A; Pain 6/10;                         em  

14:13 Body Mass Index 23.75 (81.65 kg, 185.42 cm)                                             ph  

                                                                                                  

MDM:                                                                                              

14:47 Patient medically screened.                                                             [facility] 

18:00 HEART Score: History: Slightly Suspicious (0), ECG: Significant ST-deviation (2), Age:  [facility] 

      > 45 and < 65 years (1), Risk Factors: 1 or 2 risk factors (1), Troponin: < or = 1 x        

      Normal Limit (0). Data reviewed: vital signs, nurses notes, lab test result(s),             

      radiologic studies, plain films.                                                            

18:00 ED course: Patient's symptoms have improved in the emergency department. On             [facility] 

      reevaluation I discussed results with the patient whom then mentioned he had complaints     

      of dizziness. I discussed with the patient for need for further evaluation. Patient was     

      made aware of the risks of leaving the ED prior to full evaluation for dizziness.           

      Patient understood. Heart score indicates low likelihood of major cardiac event.            

      patient advised of the need to follow up with PCP or cardiology for further evaluation.     

      patient given strict return precautions. patient understood and agrees with the plan of     

      care. .                                                                                     

                                                                                                  

                                                                                             

14:47 Order name: Basic Metabolic Panel; Complete Time: 15:38                                 [facility] 

                                                                                             

14:47 Order name: CBC with Diff; Complete Time: 15:33                                         [facility] 

                                                                                             

14:47 Order name: LFT's; Complete Time: 15:38                                                 [facility] 

                                                                                             

14:47 Order name: Magnesium; Complete Time: 15:38                                             [facility] 

                                                                                             

14:47 Order name: NT PRO-BNP; Complete Time: 15:38                                            [facility] 

                                                                                             

14:47 Order name: PT-INR; Complete Time: 15:33                                                [facility] 

                                                                                             

14:47 Order name: Troponin (emerg Dept Use Only); Complete Time: 15:38                        [facility] 

                                                                                             

14:47 Order name: XRAY Chest (1 view); Complete Time: 15:33                                   [facility] 

                                                                                             

14:47 Order name: EKG; Complete Time: 14:49                                                   [facility] 

                                                                                             

17:12 Order name: Troponin (emerg Dept Use Only); Complete Time: 17:52                        [facility], [facility] 

                                                                                             

14:47 Order name: Cardiac monitoring; Complete Time: 14:50                                    [facility] 

                                                                                             

14:47 Order name: EKG - Nurse/Tech; Complete Time: 14:51                                      [facility] 

                                                                                             

14:47 Order name: IV Saline Lock; Complete Time: 14:51                                        [facility] 

                                                                                             

14:47 Order name: Labs collected and sent; Complete Time: 14:51                               [facility] 

                                                                                             

14:47 Order name: O2 Per Protocol; Complete Time: 14:51                                       [facility] 

                                                                                             

14:47 Order name: O2 Sat Monitoring; Complete Time: 14:51                                     [facility] 

                                                                                             

17:48 Order name: EKG Electrocardiogram                                                       [facility]

                                                                                                  

Administered Medications:                                                                         

15:05 Drug: Ketorolac 15 mg Route: IVP; Site: right antecubital;                              ss  

15:26 Follow up: Response: No adverse reaction; Pain is unchanged, physician notified         em  

15:45 Drug: diphenhydrAMINE 25 mg Route: PO;                                                  em  

17:11 Follow up: Response: No adverse reaction                                                em  

15:50 Drug: morphine 4 mg Route: IVP; Site: right forearm;                                    ss  

17:11 Follow up: Response: No adverse reaction; Pain is decreased                             em  

17:52 Drug: morphine 4 mg Route: IVP; Site: right forearm;                                    em  

18:11 Follow up: Response: No adverse reaction; Pain is decreased                             em  

                                                                                                  

                                                                                                  

Disposition:                                                                                      

                                                                                             

07:39 Co-signature as Attending Physician, Rajendra Alberts MD I agree with the assessment and   kdr 

      plan of care.                                                                               

                                                                                                  

Disposition:                                                                                      

18 18:21 Discharged to Home. Impression: Chest pain, unspecified, Chronic pain syndrome.    

- Condition is Stable.                                                                            

- Discharge Instructions: Nonspecific Chest Pain, Chronic Pain.                                   

                                                                                                  

- Medication Reconciliation Form, Thank You Letter, Antibiotic Education, Prescription            

  Opioid Use form.                                                                                

- Follow up: Brenden Charles DO; When: 1 - 2 days; Reason: Recheck today's complaints,             

  Continuance of care, Re-evaluation by your physician.                                           

                                                                                                  

                                                                                                  

                                                                                                  

Signatures:                                                                                       

Dispatcher [employer]                           EDMS                                                 

Rajendra Alberts MD MD kdr Mickail, Joel, PA                       PA   [email or web address]                                                  

Bernabe Lopez, ENDYN                       LVN  em                                                   

Rosetta De RN                      RN   ss                                                   

Faby Lofton RN                      RN   ph                                                   

                                                                                                  

Corrections: (The following items were deleted from the chart)                                    

                                                                                             

18:26 18:21 2018 18:21 Discharged to Home. Impression: Chest pain, unspecified; Chronic ss  

      pain syndrome. Condition is Stable. Forms are Medication Reconciliation Form, Thank You     

      Letter, Antibiotic Education, Prescription Opioid Use. Follow up: Brenden Charles; When: 1     

      - 2 days; Reason: Recheck today's complaints, Continuance of care, Re-evaluation by         

      your physician. tian                                                                         

                                                                                                  

**************************************************************************************************

## 2018-12-19 NOTE — RAD REPORT
EXAM DESCRIPTION:  RAD - Chest Single View - 12/19/2018 3:16 pm

 

CLINICAL HISTORY:  CHEST PAIN

Chest pain.

 

COMPARISON:  Chest Single View dated 9/18/2018; Chest Pa And Lat (2 Views) dated 8/24/2018; Abdomen A
cute Series dated 6/19/2018; Chest Single View dated 6/12/2018

 

FINDINGS:  Portable technique limits examination quality.

 

Emphysematous changes are present throughout the lungs. No focal infiltrate detected. The heart is no
rmal in size. No displaced fractures. [phone]

## 2018-12-19 NOTE — EKG
Test Date:    2018-12-19               Test Time:    14:00:41

Technician:   DENEEN                                   

                                                     

MEASUREMENT RESULTS:                                       

Intervals:                                           

Rate:         81                                     

NM:           148                                    

QRSD:         88                                     

QT:           374                                    

QTc:          434                                    

Axis:                                                

P:            35                                     

NM:           148                                    

QRS:          83                                     

T:            59                                     

                                                     

INTERPRETIVE STATEMENTS:                                       

                                                     

Normal sinus rhythm

Normal ECG

Compared to ECG 09/18/2018 17:55:46

No significant changes



Electronically Signed On 12-19-18 17:15:08 CST by Bryn Toribio
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
## 2018-12-20 NOTE — EKG
Test Date:    2018-12-19               Test Time:    17:24:04

Technician:   JEAN CLAUDE                                     

                                                     

MEASUREMENT RESULTS:                                       

Intervals:                                           

Rate:         76                                     

UT:           170                                    

QRSD:         82                                     

QT:           390                                    

QTc:          438                                    

Axis:                                                

P:            77                                     

UT:           170                                    

QRS:          102                                    

T:            71                                     

                                                     

INTERPRETIVE STATEMENTS:                                       

                                                     

Normal sinus rhythm

Rightward axis

Borderline ECG

Compared to ECG 12/19/2018 14:00:41

Right-axis deviation now present



Electronically Signed On 12-20-18 05:58:39 CST by Bryn Toribio

## 2018-12-21 NOTE — XMS REPORT
Patient Summary Document

 Created on:2018



Patient:CARLOS BARKLEY

Sex:Male

:1965

External Reference #:[id number]





Demographics







 Address  [address]



   [place] [address]

 

 Home Phone  [phone]

 

 Email Address  NONE

 

 Preferred Language  Unknown

 

 Marital Status  Unknown

 

 Yazidi Affiliation  Unknown

 

 Race  Unknown

 

 Additional Race(s)  Unavailable

 

 Ethnic Group  Unknown









Author







 Organization  [facility]

 

 Address  [address]



   [place] [address]

 

 Phone  [phone]









Care Team Providers







 Name  Role  Phone

 

 BUD SAHU  Unavailable  Unavailable









Problems

This patient has no known problems.



Allergies, Adverse Reactions, Alerts

This patient has no known allergies or adverse reactions.



Medications

This patient has no known medications.



Results







 Test Description  Test Time  Test Comments  Text Results  Atomic Results  
Result Comments









 Comprehensive Metabolic Panel  2017 04:50:00    









   Test Item  Value  Reference Range  Comments









 Sodium (test code=NA)  137 mmol/L  135-145  

 

 Potassium (test code=K)  4.1 mmol/L  3.5-5.1  

 

 Chloride (test code=CL)  101 mmol/L    

 

 Carbon Dioxide (test  23 mmol/L  22-29  



 code=CO2)      

 

 Glucose (test code=GLU)  145 mg/dL    

 

 Blood Urea Nitrogen (test  9 mg/dL  6-20  



 code=BUN)      

 

 Creatinine (test code=CREAT)  1.0 mg/dL  0.7-1.2  

 

 Calcium (test code=CA)  9.6 mg/dL  8.3-10.5  

 

 Prot Total (test code=TP)  8.0 g/dL  6.4-8.3  

 

 Albumin (test code=ALB)  3.8 g/dL  3.5-5.2  

 

 A/G Ratio (test  0.9 Ratio    



 code=AGRATIO)      

 

 Globulin (test code=GLOB)  4.2  2.9-3.1  

 

 Bili Total (test code=TBIL)  0.4 mg/dL  0.1-0.9  

 

 Alk Phos (test code=APHOS)  149 U/L    

 

 AST (test code=AST)  29 U/L  1-40  

 

 ALT (test code=ALT)  22 U/L  1-41  

 

 BUN/Creatinine Ratio (test  9.0    



 code=BCRATIO)      

 

 Anion Gap (test code=AGAP)  13 mmol/L  7-16  

 

 Estimated GFR (test  >60 mL/min/1.73m2    eGFR (estimated Glomerular



 code=GFR)      Filtration Rate) is an



       estimated value,calculated



       from the patient's serum



       creatinine using the MDRD



       equation.It is NOT the



       patient's actual GFR. The eGFR



       provides a more



       clinicallyuseful measure of



       kidney disease than serum



       creatinine alone.***This



       calculation takes sex and race



       into account, if the



       informationis provided. If the



       race is not provided, and the



       patient isAfrican-American,



       multiply by 1.212. If sex is



       not provided, and thepatient



       is female, multiply by 0.742.



       Results for patients <18 years



       ofage have not been validated



       by the MDRD study and should



       be interpretedwith



       caution.eGFR Result



       Interpretation:eGFR > or=60 is



       in the Normal RangeeGFR < 60



       may mean kidney diseaseeGFR <



       15 may mean kidney



       failure***Ranges recommended



       by the National Kidney



       Foundation,http://nkdep.nih.go



       v



[id number] 04:50:00





 Test Item  Value  Reference Range  Comments

 

 Amphetamine (test code=AMPH)  Negative  Negative  For diagnostic purposes only,



       positive results should always



       be assessedin conjunctionwith



       the patient's medical



       history,clinical examination



       and otherfindings.To fulfill



       legal requirements, a more



       specific alternate chemical



       methodmust be used inorder to



       obtain a Confirmed analytical



       result. GC/MS is the preferred



       confirmatory method.

 

 Barbiturates (test code=MARQUIS)  Negative  Negative  

 

 Benzodiazepine (test  Negative  Negative  



 code=DEJAN)      

 

 Cocaine (test code=COCA)  Negative  Negative  

 

 Methadone (test code=MTHD)  Negative  Negative  

 

 Opiates (test code=OPIA)  Negative  Negative  

 

 PCP (test code=PCP)  Negative  Negative  

 

 Propoxyphene (test  Negative  Negative  



 code=PROPOX)      

 

 THC (test code=THC)  POSITIVE  Negative  



CBC with [id number] 03:53:00





 Test Item  Value  Reference Range  Comments

 

 WBC (test code=WBC)  12.5 K/cumm  4.4-10.5  

 

 RBC (test code=RBC)  4.71 M/cumm  4.10-5.70  

 

 Hemoglobin (test code=HGB)  15.0 gm/dL  13.4-17.4  

 

 Hematocrit (test code=HCT)  44.0 %  38.7-52.0  

 

 MCV (test code=MCV)  93.5 fL    

 

 MCH (test code=MCH)  31.8 pg  27.0-32.5  

 

 MCHC (test code=MCHC)  34.0 g/dL  32.0-37.5  

 

 RDW (test code=RDW)  14.7 %  11.5-14.5  

 

 Platelet Count (test code=PLTCT)  257 K/cumm  140-440  

 

 MPV (test code=MPV)  9.0 fL    

 

 Diff Method (test code=DIFFM)  Auto    

 

 Neutrophil (test code=NEUT)  79.4 %  36-70  

 

 Lymphocyte (test code=LYMPH)  13.7 %  12-44  

 

 Monocyte (test code=MONO)  5.2 %  0-11  

 

 Eosinophil (test code=EOS)  1.3 %  0-7  

 

 Basophil (test code=BASO)  0.4 %  0-2  

 

 Neutro Abs (test code=ANEUT)  9.9 K/cumm  1.6-7.4  

 

 Lymph Abs (test code=ALYMPH)  1.7 K/cumm  0.5-4.6  

 

 Mono Abs (test code=AMONO)  0.7 K/cumm  0.0-1.2  

 

 Eos Abs (test code=AEOS)  0.16 K/cumm  0.00-0.74  

 

 Baso Abs (test code=ABASO)  0.1 K/cumm  0.00-0.21

## 2018-12-21 NOTE — XMS REPORT
Clinical Summary

 Created on:2018



Patient:Cleveland Ricketts Jr

Sex:Male

:1965

External Reference #:[id number]





Demographics







 Address  [address]



   



   [place] [address]

 

 Home Phone  [phone]

 

 Home Phone  [phone]

 

 Email Address  [email or web address]

 

 Preferred Language  English

 

 Marital Status  

 

 Congregation Affiliation  Unknown

 

 Race  White

 

 Ethnic Group  Not  or 









Author







 Organization  [facility]

 

 Address  [address] [place] [address]









Support







 Name  Relationship  Address  Phone

 

 Sabrina Greer  Unavailable  Unavailable  [phone]









Care Team Providers







 Name  Role  Phone

 

 Asked, No Pcp  Primary Care Provider  Unavailable









Allergies

No Known Allergies



Medications







 Medication  Sig  Dispensed  Refills  Start  End  Status



         Date  Date  

 

 metFORMIN XR  Take 1,000 mg    0  20    Active



 (GLUCOPHAGE-XR) 500  by mouth daily.      17    



 mg 24 hr tablet            

 

 insulin GLARGINE  Inject 20 Units    0      Active



 (LANTUS) 100  under the skin          



 unit/mL injection  nightly.          



 (vial)            

 

 levETIRAcetam  Take 1,000 mg    0      Active



 (KEPPRA) 1000 MG  by mouth 2          



 tablet  (two) times a          



   day.          

 

 venlafaxine XR  Take 150 mg by    0      Active



 (EFFEXOR-XR) 150 MG  mouth daily.          



 24 hr capsule            

 

 diazePAM (VALIUM)  Take 10 mg by    0  20    Active



 10 MG tablet  mouth 3 (three)      18    



   times a day.          

 

 QUEtiapine  Take 600 mg by    0  20    Active



 (SEROquel) 300 MG  mouth nightly.      18    



 tablet            

 

 traMADol (ULTRAM)  Take 50 mg by    0      Active



 50 mg tablet  mouth every 6          



   (six) hours as          



   needed for          



   moderate pain.          

 

 gabapentin  Take 800 mg by    2  20/  Discontinued



 (NEURONTIN) 800 mg  mouth 4 (four)      17  018  



 tablet  times a day.          

 

 QUEtiapine  Take 800 mg by    2  05/15/20  02/22/2  Discontinued



 (SEROquel) 400 MG  mouth nightly.      17  018  



 tablet  Take 2 tablets          



   at bedtime          

 

 busPIRone (BUSPAR)  Take 10 mg by    0      Discontinued



 10 MG tablet  mouth 3 (three)        018  



   times a day.          

 

 acetaminophen-codei  TK 1 TO 2 TS PO    0  07/15/20  03/03/2  Discontinued



 ne (TYLENOL WITH  Q 6 H PRN P      17  018  



 CODEINE #3) 300-30            



 mg per tablet            

 

 gabapentin  Take 1 tablet  90 tablet  0  20  



 (NEURONTIN) 800 mg  (800 mg total)      18  018  



 tablet  by mouth 3          



   (three) times a          



   day for 30          



   days.          

 

 metoprolol  Take 0.5  15 tablet  0  20  



 succinate XL  tablets (12.5      18  018  



 (TOPROL-XL) 25 mg  mg total) by          



 24 hr tablet  mouth daily for          



   30 days.          

 

 HYDROcodone-acetami  Take 1 tablet  15 tablet  0  20  



 nophen (NORCO)  by mouth every      18  018  



 7.5-325 mg per  6 (six) hours          



 tablet  as needed for          



   severe pain for          



   up to 14 days.          



   Max Daily          



   Amount: 4          



   tablets          

 

 pantoprazole  Take 1 tablet  30 tablet  0  20  



 (PROTONIX) 40 MG EC  (40 mg total)      18  018  



 tablet  by mouth daily          



   for 30 days.          

 

 ondansetron ODT  Take 1 tablet  15 tablet  0  20  Discontinued



 (ZOFRAN ODT) 4 MG  (4 mg total) by      18  018  



 disintegrating  mouth every 8          



 tablet  (eight) hours          



   as needed for          



   nausea or          



   vomiting for up          



   to 15 doses.          

 

 promethazine  Insert 1  12 suppository  0  20  Discontinued



 (PHENERGAN) 25 MG  suppository (25      18  018  



 suppository  mg total) into          



   the rectum          



   every 6 (six)          



   hours as needed          



   for nausea or          



   vomiting for up          



   to 12 doses.          

 

 hydromorPHONE  Take 1 tablet  10 tablet  0  03/03/20  03/10/2  



 (DILAUDID) 2 MG  (2 mg total) by      18  018  



 tablet  mouth every 8          



   (eight) hours          



   as needed for          



   severe pain          



   (score 7-10)          



   for up to 7          



   days. Max Daily          



   Amount: 6 mg          

 

 promethazine  Take 1 tablet  20 tablet  0  20  



 (PHENERGAN) 12.5 MG  (12.5 mg total)      18  018  



 tablet  by mouth every          



   8 (eight) hours          



   as needed for          



   nausea or          



   vomiting for up          



   to 30 days.          

 

 HYDROcodone-acetami  Take 1 tablet  20 tablet  0  03/03/20  03/10/2  



 nophen (NORCO)  by mouth every      18  018  



 7.5-325 mg per  8 (eight) hours          



 tablet  as needed for          



   moderate pain          



   for up to 7          



   days. Max Daily          



   Amount: 3          



   tablets          

 

 naproxen (NAPROSYN)  Take 1 tablet  28 tablet  0  20  



 500 MG tablet  (500 mg total)      18  018  



   by mouth 2          



   (two) times a          



   day with meals          



   for 14 days.          

 

 omeprazole  Take 2 capsules  60 capsule  0  20  



 (PriLOSEC) 20 MG  (40 mg total)      18  018  



 capsule  by mouth daily          



   for 30 days.          

 

 cyclobenzaprine  Take 1 tablet  20 tablet  0  20  



 (FLEXERIL) 10 mg  (10 mg total)      18  018  



 tablet  by mouth 3          



   (three) times a          



   day as needed          



   for muscle          



   spasms for up          



   to 10 days.          







Active Problems







 Problem  Noted Date

 

 Sciatica of left side  2018

 

 Syncope  2018

 

 Essential hypertension  2017

 

 Type 2 diabetes mellitus with hyperglycemia  2017

 

 PRIMITIVO (acute kidney injury)  2017

 

 Seizure disorder  2017

 

 Intractable vomiting with nausea  2017

 

 Abdominal pain  2017









 Overview: 







 Added automatically from request for surgery [id number]









 Gastrointestinal hemorrhage  10/11/2017

 

 Gastrointestinal hemorrhage with hematemesis  10/11/2017









 Overview: 







 Added automatically from request for surgery [id number]









 Vertigo  2017

 

 Pneumonia due to infectious organism  2017

 

 COPD with acute bronchitis  2017

 

 COPD (chronic obstructive pulmonary disease)  2017

 

 COPD exacerbation  2017

 

 Lung mass  2017

 

 Tobacco dependence  2017







Resolved Problems







 Problem  Noted Date  Resolved Date

 

 Leukocytosis  2017







Encounters







 Date  Type  Specialty  Care Team  Description

 

 20  Emergency  Emergency Medicine  Cait,  Suicidal ideation (Primary Dx
)



 18      Chago Olivas MD  

 

 20  Emergency  Emergency Medicine  [place]  Chronic right-sided thoracic 
back pain (Primary Dx);



 18      Judy,  Narcotic abuse;



       [place]  Drug-seeking behavior



       MD Bryn  

 

 20  Hospital  Radiology  Dioni Garay (bronchogenic carcinoma)



 18  Encounter    MD Milagros  

 

 20  [facility] Early  BC (bronchogenic carcinoma)



 18  Juli Linares MD  (Primary Dx)

 

 20  Emergency  General Internal  Landaverde, Drew  Sciatica of left side (
Primary Dx);



 18 -    Gary GRECO MD



  Intractable pain



 20      Travon,  



 18      MD Jessenia De La Vega, Princess Guerin MD  

 

 20  Emergency  Emergency Medicine  Lelia,  Other chronic pain (Primary



 18      Chago FORTUNE,  Dx)



       DO  

 

 20  Emergency  General Internal  Lelia,  Syncope, unspecified syncope 
type (Primary Dx);



 18 -    Medicine  Chago FORTUNE,  Other chronic pain



 20      DO



  



 18      Marainela Turner MD  

 

 20  Surgery  Gastroenterology  Con Knowles  ESOPHAGOGASTRODUODENOSCOPY



 18      MD Lang  (EGD) with bx

 

 20  Anesthesia  Gastroenterology  Natali,  



 18  Event    Svitlana Villalta MD  

 

 20  [facility] Internal  [facility]  Intractable vomiting with 
nausea, unspecified vomiting type (Primary Dx);



 17 -  Encounter  Medicine  MD Ariel



  Pain;



 20      [place],  Abdominal pain, unspecified abdominal location;



 18      Jm Cabrera,  Essential hypertension;



       MD



  Type 2 diabetes mellitus with hyperglycemia, with long-term current use of 
insulin



       Heber Orourke DO  



after 2017



Immunizations







 Name  Dates Previously Given  Next Due

 

 FLUCELVAX QUAD PF (0.5mL syringe)  2018  







Family History







 Medical History  Relation  Name  Comments

 

 Cerebral aneurysm  Father    

 

 Heart disease  Father    

 

 Kidney cancer  Father    

 

 Lung cancer  Father    

 

 Stroke  Father    

 

 Diabetes  Mother    

 

 Heart attack  Mother    

 

 Heart disease  Mother    

 

 Hyperlipidemia  Sister    

 

 Hypertension  Sister    









 Relation  Name  Status  Comments

 

 Father      

 

 Mother      

 

 Sister      







Social History







 Tobacco Use  Types  Packs/Day  Years Used  Date

 

 Current Every Day Smoker  Cigarettes  0.5  35  









 Smokeless Tobacco: Former User      









 Tobacco Cessation: Counseling Given: Yes



 Comments: per patient now only smoking 4-5 cigarette/day









 Alcohol Use  Drinks/Week  oz/Week  Comments

 

 No      stopped for 1.5 yrs









 Sex Assigned at Birth  Date Recorded

 

 Not on file  









 Job Start Date  Occupation  Industry

 

 Not on file  Not on file  Not on file









 Travel History  Travel Start  Travel End









 No recent travel history available.







Last Filed Vital Signs







 Vital Sign  Reading  Time Taken

 

 Blood Pressure  120/77  2018  1:15 PM CDT

 

 Pulse  76  2018  1:15 PM CDT

 

 Temperature  35.8 C (96.5 F)  2018  1:15 PM CDT

 

 Respiratory Rate  16  2018  1:15 PM CDT

 

 Oxygen Saturation  97%  2018  1:15 PM CDT

 

 Inhaled Oxygen Concentration  -  -

 

 Weight  73.9 kg (163 lb)  2018  8:09 AM CDT

 

 Height  185.4 cm (6' 1")  2018  8:09 AM CDT

 

 Body Mass Index  21.51  2018  8:09 AM CDT







Plan of Treatment







 Health Maintenance  Due Date  Last Done  Comments

 

 DIABETIC RETINAL EYE EXAM  1965    

 

 DIABETIC FOOT EXAM  1974    

 

 COLON CANCER SCREENING  2014    

 

 SHINGLES VACCINES (1 of 2)  2014    

 

 INFLUENZA VACCINE  2018  







Implants







 Implanted  Type  Area    Device  Shelf  Model /



         Identifier  Expiration  Serial /



           Date  Lot

 

 Stimulator  Stimulator          

 

 Stimulator-2007  Stimulator  Hip        



 Implanted: 2007 (Quantity not on file)            

 

 Kit [employer] Air Leak 4ml Strl Progel - [id number]  Surgical  N/A: N/A  
[employer]      [id number] /



 Implanted: Qty: 1 on 2017 by Jonathan Fuentes Jr., MD  Implants;       
    /



   Expanders;          



   Extenders;          



   Surgical Wires          

 

 Dorsal Colum            



 Stimulator            

 

 Dorsal Column            



 Stimulator            

 

 Dorsal Colum Stimulator-2007    Lower:        



 Implanted: 2007 (Quantity not on file)    Back,        



     Other        



     than        



     Spine        







Procedures







 Procedure Name  Priority  Date/Time  Associated  Comments



       Diagnosis  

 

 URINALYSIS SCREEN AND  STAT  2018    Results for



 MICROSCOPY, WITH REFLEX TO    9:16 AM CDT    this procedure



 CULTURE        are in the



         results



         section.

 

 URINE DRUGS OF ABUSE SCREEN  STAT  2018    Results for



     9:16 AM CDT    this procedure



         are in the



         results



         section.

 

 URINE CULTURE  STAT  2018    Results for



     9:16 AM CDT    this procedure



         are in the



         results



         section.

 

 ZZESTIMATED GFR  STAT  2018    Results for



     9:02 AM CDT    this procedure



         are in the



         results



         section.

 

 SALICYLATE LEVEL  STAT  2018    Results for



     9:02 AM CDT    this procedure



         are in the



         results



         section.

 

 ACETAMINOPHEN LEVEL  STAT  2018    Results for



     9:02 AM CDT    this procedure



         are in the



         results



         section.

 

 ALCOHOL LEVEL, BLOOD  STAT  2018    Results for



     9:02 AM CDT    this procedure



         are in the



         results



         section.

 

 HC COMPLETE BLD COUNT W/AUTO  STAT  2018    Results for



 DIFF    9:02 AM CDT    this procedure



         are in the



         results



         section.

 

 THYROID STIMULATING HORMONE  STAT  2018    Results for



     9:02 AM CDT    this procedure



         are in the



         results



         section.

 

 T4, FREE  STAT  2018    Results for



     9:02 AM CDT    this procedure



         are in the



         results



         section.

 

 COMPREHENSIVE METABOLIC PANEL  STAT  2018    Results for



     9:02 AM CDT    this procedure



         are in the



         results



         section.

 

 CT ABDOMEN PELVIS WO CONTRAST  STAT  2018    Results for



     3:27 AM CDT    this procedure



         are in the



         results



         section.

 

 SMEAR REVIEW  STAT  2018    Results for



     3:13 AM CDT    this procedure



         are in the



         results



         section.

 

 ZZESTIMATED GFR  STAT  2018    Results for



     3:13 AM CDT    this procedure



         are in the



         results



         section.

 

 LIPASE LEVEL  STAT  2018    Results for



     3:13 AM CDT    this procedure



         are in the



         results



         section.

 

 COMPREHENSIVE METABOLIC PANEL  STAT  2018    Results for



     3:13 AM CDT    this procedure



         are in the



         results



         section.

 

 PROTHROMBIN TIME WITH INR  STAT  2018    Results for



     3:13 AM CDT    this procedure



         are in the



         results



         section.

 

 HC COMPLETE BLD COUNT W/AUTO  STAT  2018    Results for



 DIFF    3:13 AM CDT    this procedure



         are in the



         results



         section.

 

 ECG 12-LEAD  STAT  2018    Results for



     2:43 AM CDT    this procedure



         are in the



         results



         section.

 

 ECG ED PRELIMINARY  Routine  2018    Results for



 INTERPRETATION    2:07 AM CDT    this procedure



         are in the



         results



         section.

 

 PET CT SKULL BASE TO MID THIGH  Routine  2018  BC (bronchogenic  Results 
for



     1:45 PM CDT  carcinoma)  this procedure



         are in the



         results



         section.

 

 POC GLUCOSE  Routine  2018    Results for



     11:36 AM CDT    this procedure



         are in the



         results



         section.

 

 POC GLUCOSE  Routine  2018    Results for



     11:29 AM CST    this procedure



         are in the



         results



         section.

 

 POC GLUCOSE  Routine  2018    Results for



     6:52 AM CST    this procedure



         are in the



         results



         section.

 

 SMEAR REVIEW  Routine  2018    Results for



     5:13 AM CST    this procedure



         are in the



         results



         section.

 

 ZZESTIMATED GFR  Routine  2018    Results for



     5:13 AM CST    this procedure



         are in the



         results



         section.

 

 BASIC METABOLIC PANEL  Routine  2018    Results for



     5:13 AM CST    this procedure



         are in the



         results



         section.

 

 HC COMPLETE BLD COUNT W/AUTO  Routine  2018    Results for



 DIFF    5:13 AM CST    this procedure



         are in the



         results



         section.

 

 KEPPRA (LEVETIRACETAM) LEVEL  Routine  2018    Results for



     5:13 AM CST    this procedure



         are in the



         results



         section.

 

 SMEAR REVIEW  STAT  2018    Results for



     8:14 PM CST    this procedure



         are in the



         results



         section.

 

 ZZESTIMATED GFR  STAT  2018    Results for



     8:14 PM CST    this procedure



         are in the



         results



         section.

 

 HEPATIC FUNCTION PANEL  STAT  2018    Results for



     8:14 PM CST    this procedure



         are in the



         results



         section.

 

 BASIC METABOLIC PANEL  STAT  2018    Results for



     8:14 PM CST    this procedure



         are in the



         results



         section.

 

 HC COMPLETE BLD COUNT W/AUTO  STAT  2018    Results for



 DIFF    8:14 PM CST    this procedure



         are in the



         results



         section.

 

 CT LUMBAR SPINE WO CONTRAST  STAT  2018    Results for



     5:12 PM CST    this procedure



         are in the



         results



         section.

 

 ZZESTIMATED GFR  STAT  2018    Results for



     12:29 PM CST    this procedure



         are in the



         results



         section.

 

 LIPASE LEVEL  STAT  2018    Results for



     12:29 PM CST    this procedure



         are in the



         results



         section.

 

 COMPREHENSIVE METABOLIC PANEL  STAT  2018    Results for



     12:29 PM CST    this procedure



         are in the



         results



         section.

 

 PARTIAL THROMBOPLASTIN TIME  STAT  2018    Results for



 (PTT)    12:29 PM CST    this procedure



         are in the



         results



         section.

 

 PROTHROMBIN TIME WITH INR  STAT  2018    Results for



     12:29 PM CST    this procedure



         are in the



         results



         section.

 

 HC COMPLETE BLD COUNT W/AUTO  STAT  2018    Results for



 DIFF    12:29 PM CST    this procedure



         are in the



         results



         section.

 

 POC GLUCOSE  Routine  2018    Results for



     11:06 AM CST    this procedure



         are in the



         results



         section.

 

 POC GLUCOSE  Routine  2018    Results for



     6:41 AM CST    this procedure



         are in the



         results



         section.

 

 ZZESTIMATED GFR  Routine  2018    Results for



     4:58 AM CST    this procedure



         are in the



         results



         section.

 

 BASIC METABOLIC PANEL  Routine  2018    Results for



     4:58 AM CST    this procedure



         are in the



         results



         section.

 

 HC COMPLETE BLD COUNT W/AUTO  Routine  2018    Results for



 DIFF    4:58 AM CST    this procedure



         are in the



         results



         section.

 

 POC GLUCOSE  Routine  2018    Results for



     9:12 PM CST    this procedure



         are in the



         results



         section.

 

 ZZESTIMATED GFR  STAT  2018    Results for



     6:29 PM CST    this procedure



         are in the



         results



         section.

 

 CREATINE KINASE, TOTAL (CPK)  STAT  2018    Results for



     6:29 PM CST    this procedure



         are in the



         results



         section.

 

 HC COMPLETE BLD COUNT W/AUTO  STAT  2018    Results for



 DIFF    6:29 PM CST    this procedure



         are in the



         results



         section.

 

 BASIC METABOLIC PANEL  STAT  2018    Results for



     6:29 PM CST    this procedure



         are in the



         results



         section.

 

 CT HEAD WO CONTRAST  STAT  2018    Results for



     6:19 PM CST    this procedure



         are in the



         results



         section.

 

 ECG 12-LEAD  STAT  2018    Results for



     6:18 PM CST    this procedure



         are in the



         results



         section.

 

 ECG ED PRELIMINARY  Routine  2018    Results for



 INTERPRETATION    5:38 PM CST    this procedure



         are in the



         results



         section.

 

 POC GLUCOSE  Routine  2018    Results for



     10:56 AM CST    this procedure



         are in the



         results



         section.

 

 POC GLUCOSE  Routine  2018    Results for



     7:34 AM CST    this procedure



         are in the



         results



         section.

 

 POC GLUCOSE  Routine  2018    Results for



     8:37 PM CST    this procedure



         are in the



         results



         section.

 

 POC GLUCOSE  Routine  2018    Results for



     4:48 PM CST    this procedure



         are in the



         results



         section.

 

 SURGICAL PATHOLOGY REQUEST  Routine  2018    Results for



     1:28 PM CST    this procedure



         are in the



         results



         section.

 

 ESOPHAGOGASTRODUODENOSCOPY (EGD)    2018  Abdominal pain,  



     12:15 PM CST  unspecified  



       abdominal  



       location



  



       Intractable  



       vomiting with  



       nausea,  



       unspecified  



       vomiting type  

 

 POC GLUCOSE  Routine  2018    Results for



     6:56 AM CST    this procedure



         are in the



         results



         section.

 

 ZZESTIMATED GFR  Routine  2018    Results for



     6:03 AM CST    this procedure



         are in the



         results



         section.

 

 BASIC METABOLIC PANEL  Routine  2018    Results for



     6:03 AM CST    this procedure



         are in the



         results



         section.

 

 POC GLUCOSE  Routine  2018    Results for



     9:04 PM CST    this procedure



         are in the



         results



         section.

 

 POC GLUCOSE  Routine  2018    Results for



     3:39 PM CST    this procedure



         are in the



         results



         section.

 

 POC GLUCOSE  Routine  2018    Results for



     11:07 AM CST    this procedure



         are in the



         results



         section.

 

 POC GLUCOSE  Routine  2018    Results for



     7:12 AM CST    this procedure



         are in the



         results



         section.

 

 ZZESTIMATED GFR  Routine  2018    Results for



     5:59 AM CST    this procedure



         are in the



         results



         section.

 

 MAGNESIUM LEVEL  Routine  2018    Results for



     5:59 AM CST    this procedure



         are in the



         results



         section.

 

 COMPREHENSIVE METABOLIC PANEL  Routine  2018    Results for



     5:59 AM CST    this procedure



         are in the



         results



         section.

 

 CBC WITH PLATELET AND  Routine  2018    Results for



 DIFFERENTIAL    5:59 AM CST    this procedure



         are in the



         results



         section.

 

 POC GLUCOSE  Routine  2018    Results for



     7:54 PM CST    this procedure



         are in the



         results



         section.

 

 ECG ED PRELIMINARY  Routine  2018    Results for



 INTERPRETATION    5:59 PM CST    this procedure



         are in the



         results



         section.

 

 POC GLUCOSE  Routine  2018    Results for



     3:55 PM CST    this procedure



         are in the



         results



         section.

 

 POC GLUCOSE  Routine  2018    Results for



     11:54 AM CST    this procedure



         are in the



         results



         section.

 

 POC GLUCOSE  Routine  2018    Results for



     6:26 AM CST    this procedure



         are in the



         results



         section.

 

 ZZESTIMATED GFR  Routine  2018    Results for



     6:23 AM CST    this procedure



         are in the



         results



         section.

 

 COMPREHENSIVE METABOLIC PANEL  Routine  2018    Results for



     6:23 AM CST    this procedure



         are in the



         results



         section.

 

 HC COMPLETE BLD COUNT W/AUTO  Routine  2018    Results for



 DIFF    6:23 AM CST    this procedure



         are in the



         results



         section.

 

 POC GLUCOSE  Routine  2017    Results for



     8:54 PM CST    this procedure



         are in the



         results



         section.

 

 POC GLUCOSE  Routine  2017    Results for



     4:46 PM CST    this procedure



         are in the



         results



         section.

 

 POC GLUCOSE  Routine  2017    Results for



     11:33 AM CST    this procedure



         are in the



         results



         section.

 

 ZZESTIMATED GFR  Routine  2017    Results for



     7:05 AM CST    this procedure



         are in the



         results



         section.

 

 COMPREHENSIVE METABOLIC PANEL  Routine  2017    Results for



     7:05 AM CST    this procedure



         are in the



         results



         section.

 

 HC COMPLETE BLD COUNT W/AUTO  Routine  2017    Results for



 DIFF    7:05 AM CST    this procedure



         are in the



         results



         section.

 

 POC GLUCOSE  Routine  2017    Results for



     6:23 AM CST    this procedure



         are in the



         results



         section.

 

 POC GLUCOSE  Routine  2017    Results for



     8:30 PM CST    this procedure



         are in the



         results



         section.

 

 XR CHEST 2 VW  Routine  2017    Results for



     7:47 PM CST    this procedure



         are in the



         results



         section.

 

 ZZESTIMATED GFR  Routine  2017    Results for



     7:07 PM CST    this procedure



         are in the



         results



         section.

 

 COMPREHENSIVE METABOLIC PANEL  Routine  2017    Results for



     7:07 PM CST    this procedure



         are in the



         results



         section.

 

 HC COMPLETE BLD COUNT W/AUTO  Routine  2017    Results for



 DIFF    7:07 PM CST    this procedure



         are in the



         results



         section.

 

 POC GLUCOSE  Routine  2017    Results for



     3:58 PM CST    this procedure



         are in the



         results



         section.

 

 POC GLUCOSE  Routine  2017    Results for



     12:15 PM CST    this procedure



         are in the



         results



         section.

 

 POC GLUCOSE  Routine  2017    Results for



     7:03 AM CST    this procedure



         are in the



         results



         section.

 

 POC GLUCOSE  Routine  2017    Results for



     8:17 PM CST    this procedure



         are in the



         results



         section.

 

 POC GLUCOSE  Routine  2017    Results for



     3:34 PM CST    this procedure



         are in the



         results



         section.

 

 POC GLUCOSE  Routine  2017    Results for



     10:52 AM CST    this procedure



         are in the



         results



         section.

 

 URINALYSIS SCREEN AND  STAT  2017    Results for



 MICROSCOPY, WITH REFLEX TO    8:31 AM CST    this procedure



 CULTURE        are in the



         results



         section.

 

 URINE DRUGS OF ABUSE SCREEN  STAT  2017    Results for



     8:31 AM CST    this procedure



         are in the



         results



         section.

 

 URINE CULTURE  STAT  2017    Results for



     8:31 AM CST    this procedure



         are in the



         results



         section.

 

 POC GLUCOSE  Routine  2017    Results for



     7:02 AM CST    this procedure



         are in the



         results



         section.

 

 ZZESTIMATED GFR  Routine  2017    Results for



     5:34 AM CST    this procedure



         are in the



         results



         section.

 

 BASIC METABOLIC PANEL  Routine  2017    Results for



     5:34 AM CST    this procedure



         are in the



         results



         section.

 

 PROTHROMBIN TIME WITH INR  Routine  2017    Results for



     5:34 AM CST    this procedure



         are in the



         results



         section.

 

 HC COMPLETE BLD COUNT W/AUTO  Routine  2017    Results for



 DIFF    5:34 AM CST    this procedure



         are in the



         results



         section.

 

 POC GLUCOSE  Routine  2017    Results for



     12:44 AM CST    this procedure



         are in the



         results



         section.

 

 TROPONIN  Timed  2017    Results for



     7:33 PM CST    this procedure



         are in the



         results



         section.

 

 CT ABDOMEN PELVIS WO CONTRAST  STAT  2017    Results for



     4:23 PM CST    this procedure



         are in the



         results



         section.

 

 INFLUENZA ANTIGEN  Routine  2017    Results for



     3:58 PM CST    this procedure



         are in the



         results



         section.

 

 ECG 12-LEAD  STAT  2017    Results for



     3:48 PM CST    this procedure



         are in the



         results



         section.

 

 B NATRIURETIC PEPTIDE  STAT  2017    Results for



     3:45 PM CST    this procedure



         are in the



         results



         section.

 

 TROPONIN  STAT  2017    Results for



     3:45 PM CST    this procedure



         are in the



         results



         section.

 

 ALCOHOL LEVEL, BLOOD  STAT  2017    Results for



     3:45 PM CST    this procedure



         are in the



         results



         section.

 

 ZZESTIMATED GFR  STAT  2017    Results for



     3:45 PM CST    this procedure



         are in the



         results



         section.

 

 LIPASE LEVEL  STAT  2017    Results for



     3:45 PM CST    this procedure



         are in the



         results



         section.

 

 COMPREHENSIVE METABOLIC PANEL  STAT  2017    Results for



     3:45 PM CST    this procedure



         are in the



         results



         section.

 

 HC COMPLETE BLD COUNT W/AUTO  STAT  2017    Results for



 DIFF    3:45 PM CST    this procedure



         are in the



         results



         section.



after 2017



Results

Urinalysis screen and microscopy, with reflex to culture (2018  9:16 AM 
CDT)Only the most recent of2 resultswithin the time period is included.





 Component  Value  Ref Range  Performed At

 

 Specimen site  Clean catch    [facility] DEPARTMENT OF PATHOLOGY



       AND GENOMIC MEDICINE

 

 Color, UA  Yellow    [facility] DEPARTMENT OF PATHOLOGY



       AND GENOMIC MEDICINE

 

 Appearance, UA  Clear    [facility] DEPARTMENT OF PATHOLOGY



       AND GENOMIC MEDICINE

 

 Specific gravity, UA  1.011  1.001 - 1.035  [facility] DEPARTMENT OF PATHOLOGY



       AND GENOMIC MEDICINE

 

 pH, UA  5.0  5.0 - 8.5  [facility] DEPARTMENT OF PATHOLOGY



       AND GENOMIC MEDICINE

 

 Protein, UA  Negative  Negative  [facility] DEPARTMENT OF PATHOLOGY



       AND GENOMIC MEDICINE

 

 Glucose, UA  Negative  Negative  [facility] DEPARTMENT OF PATHOLOGY



       AND GENOMIC MEDICINE

 

 Ketones, UA  Negative  Negative  [facility] DEPARTMENT OF PATHOLOGY



       AND GENOMIC MEDICINE

 

 Bilirubin, UA  Negative  Negative  [facility] DEPARTMENT OF PATHOLOGY



       AND GENOMIC MEDICINE

 

 Blood, UA  Negative  Negative  [facility] DEPARTMENT OF PATHOLOGY



       AND GENOMIC MEDICINE

 

 Nitrite, UA  Negative  Negative  [facility] DEPARTMENT OF PATHOLOGY



       AND GENOMIC MEDICINE

 

 Urobilinogen, UA  2.0 (A)  <2.0  [facility] DEPARTMENT OF PATHOLOGY



       AND GENOMIC MEDICINE

 

 Leukocyte esterase, UA  Negative  Negative  [facility] DEPARTMENT OF PATHOLOGY



       AND GENOMIC MEDICINE

 

 Epithelial cells, UA  Few  /HPF  [facility] DEPARTMENT OF PATHOLOGY



       AND GENOMIC MEDICINE

 

 WBC, UA  1  0 - 1 /HPF  [facility] DEPARTMENT OF PATHOLOGY



       AND GENOMIC MEDICINE

 

 RBC, UA  <1  0 - 5 /HPF  [facility] DEPARTMENT OF PATHOLOGY



       AND GENOMIC MEDICINE

 

 Bacteria, UA  None seen  None seen  [facility] DEPARTMENT OF PATHOLOGY



       AND GENOMIC MEDICINE

 

 Yeast, UA  None seen    [facility] DEPARTMENT OF PATHOLOGY



       AND GENOMIC MEDICINE

 

 Yeast with pseudohyphae, UA  None seen    [facility] DEPARTMENT OF PATHOLOGY



       AND GENOMIC MEDICINE









 Specimen

 

 Urine









 Performing Organization  Address  City/State/Zipcode  Phone Number

 

 [facility] DEPARTMENT OF PATHOLOGY AND  [address]  [place], [place] [address] [place] [address]      



Urine drugs of abuse screen (2018  9:16 AM CDT)Only the most recent of2 
resultswithin the time period is included.





 Component  Value  Ref Range  Performed At

 

 Amphetamine screen, urine  NEG    [facility] DEPARTMENT OF



       PATHOLOGY AND GENOMIC



       MEDICINE

 

 Barbiturate screen, urine  NEG    [facility] DEPARTMENT OF



       PATHOLOGY AND GENOMIC



       MEDICINE

 

 Benzodiazepine screen,  POS    [facility] DEPARTMENT OF



 urine      PATHOLOGY AND GENOMIC



       MEDICINE

 

 Cocaine screen, urine  NEG    [facility] DEPARTMENT OF



       PATHOLOGY AND GENOMIC



       MEDICINE

 

 Methadone screen, urine  NEG    [facility] DEPARTMENT OF



       PATHOLOGY AND GENOMIC



       MEDICINE

 

 Opiates screen, urine  POS    [facility] DEPARTMENT OF



       PATHOLOGY AND GENOMIC



       MEDICINE

 

 Phencyclidine screen, urine  NEG    [facility] DEPARTMENT OF



       PATHOLOGY AND GENOMIC



       MEDICINE

 

 Cannabinoid screen, urine  POS    [facility] DEPARTMENT OF



   Comment:    PATHOLOGY AND GENOMIC



   Drug screen minimum concentration of detectability    MEDICINE



   Pumgaibbbsdg3121 ng/mL    



   Dnchkefwkgptghzk9298 ng/mL    



   Barbiturates 300 ng/mL    



   Xjvdsqdtgshklrr058 ng/mL    



   Agrjpyl352 ng/mL    



   Qzirrkmwj647 ng/mL    



   Vwttbwh179 ng/mL    



   Phencyclidine 25 ng/mL    



   Haerxgydtwub69 ng/mL    



   Ghjiooldgt2858 ng/mL    



   Negative test results indicates presumptive evidence of lack    



   of clinically significant drug concentration in this urine    



   specimen.    



   Positive test results are presumptive evidence of clinically    



   significant drug concentration in this urine specimen.    



   Testing performed for medical purposes only.    



       









 Specimen

 

 Urine









 Performing Organization  Address  City/State/Zipcode  Phone Number

 

 [facility] DEPARTMENT OF PATHOLOGY AND  [address]  [place] [address]  



 [facility]      



Urine culture (2018  9:16 AM CDT)Only the most recent of2 resultswithin 
the time period is included.





 Component  Value  Ref Range  Performed At

 

 Urine culture  SEE COMMENTComment: Bacteriuria    [facility] DEPARTMENT OF PATHOLOGY



   screen negative.    AND GENOMIC MEDICINE









 Specimen

 

 Urine









 Performing Organization  Address  City/State/Zipcode  Phone Number

 

 [facility] DEPARTMENT OF PATHOLOGY AND  [address] [place] [address]  



 GENOMIC MEDICINE      



Estimated GFR (2018  9:02 AM CDT)Only the most recent of14 resultswithin 
the time period is included.





 Component  Value  Ref Range  Performed At

 

 GFR Non Af Amer  49 (A)  mL/min/1.73 m2  [facility] DEPARTMENT OF



       PATHOLOGY AND GENOMIC



       MEDICINE

 

 GFR Af Amer  59 (A)  mL/min/1.73 m2  [facility] DEPARTMENT OF



   Comment:    PATHOLOGY AND GENOMIC



   Chronic kidney disease: <60 mL/min/1.73m2    MEDICINE



   Kidney failure: <15 mL/min/1.73m2    



   The estimated GFR is calculated from the IDMS-traceable Modification of Diet
    



   in Renal Disease Equation. The accuracy of the calculation is poor when the 
   



   creatinine is normal. Calculated values >90 mL/min/1.73m2 are not reported. 
   



   This equation has not been validated in children (<18 years), pregnant    



   women, the elderly (>70 years), or ethnic groups other than Caucasians and  
  



    Americans.    



       









 Specimen

 

 Plasma specimen









 Performing Organization  Address  City/State/Zipcode  Phone Number

 

 [facility] PATHOLOGY AND  [place] [address]  



 [facility]      



CBC with platelet and differential (2018  9:02 AM CDT)Only the most 
recent of13 resultswithin the time period is included.





 Component  Value  Ref Range  Performed At

 

 WBC  9.1  4.2 - 11.0 k/uL  [facility] DEPARTMENT OF PATHOLOGY AND



       GENOMIC MEDICINE

 

 RBC  4.57  4.04 - 5.86 m/uL  [facility] DEPARTMENT OF PATHOLOGY AND



       GENOMIC MEDICINE

 

 HGB  14.7  13.0 - 17.3 g/dL  [facility] DEPARTMENT OF PATHOLOGY AND



       GENOMIC MEDICINE

 

 HCT  44.6  34.0 - 45.0 %  [facility] DEPARTMENT OF PATHOLOGY AND



       GENOMIC MEDICINE

 

 MCV  97.6  80.0 - 98.0 fL  [facility] DEPARTMENT OF PATHOLOGY AND



       GENOMIC MEDICINE

 

 MCH  32.2  27.0 - 34.0 pg  [facility] DEPARTMENT OF PATHOLOGY AND



       GENOMIC MEDICINE

 

 MCHC  33.0  31.5 - 36.5 g/dL  [facility] DEPARTMENT OF PATHOLOGY AND



       GENOMIC MEDICINE

 

 RDW - SD  55.1 (H)  37.0 - 51.0 fL  [facility] DEPARTMENT OF PATHOLOGY AND



       GENOMIC MEDICINE

 

 MPV  10.1  7.4 - 10.4 fL  [facility] DEPARTMENT OF PATHOLOGY AND



       GENOMIC MEDICINE

 

 Platelet count  124 (L)  150 - 400 k/uL  [facility] DEPARTMENT OF PATHOLOGY AND



       GENOMIC MEDICINE

 

 Nucleated RBC  0.00  /100 WBC  [facility] DEPARTMENT OF PATHOLOGY AND



       GENOMIC MEDICINE

 

 Neutrophils  80.4 (H)  36.0 - 66.0 %  [facility] DEPARTMENT OF PATHOLOGY AND



       GENOMIC MEDICINE

 

 Lymphocytes  11.6 (L)  24.0 - 44.0 %  [facility] DEPARTMENT OF PATHOLOGY AND



       GENOMIC MEDICINE

 

 Monocytes  5.8  0.0 - 6.0 %  [facility] DEPARTMENT OF PATHOLOGY AND



       GENOMIC MEDICINE

 

 Eosinophils  1.3  0.0 - 6.0 %  [facility] DEPARTMENT OF PATHOLOGY AND



       GENOMIC MEDICINE

 

 Basophils  0.6  0.0 - 1.2 %  [facility] DEPARTMENT OF PATHOLOGY AND



       GENOMIC MEDICINE

 

 Immature granulocytes  0.3  0.0 - 1.0 %  [facility] DEPARTMENT OF PATHOLOGY AND



       GENOMIC MEDICINE









 Specimen

 

 Blood









 Performing Organization  Address  City/[facility]  Phone Number

 

 [facility] DEPARTMENT OF PATHOLOGY AND  [address] [place] [address]      



Thyroid stimulating hormone (2018  9:02 AM CDT)





 Component  Value  Ref Range  Performed At

 

 TSH  2.60  0.38 - 4.82 uIU/mL  [facility] DEPARTMENT OF PATHOLOGY AND GENOMIC 
MEDICINE









 Specimen

 

 Plasma specimen









 Performing Organization  Address  City/[facility] Cent[facility]ode  Phone Number

 

 [facility] DEPARTMENT OF PATHOLOGY AND  [address] [place] [address]      



T4, free (2018  9:02 AM CDT)





 Component  Value  Ref Range  Performed At

 

 T4, free  1.00  0.70 - 1.61 ng/dL  [facility] DEPARTMENT OF PATHOLOGY AND GENOMIC 
MEDICINE









 Specimen

 

 Plasma specimen









 Performing Organization  Address  City/[facility] Cent[facility]ode  Phone Number

 

 [facility] DEPARTMENT OF PATHOLOGY AND  [address] [place] [address]      



Alcohol level, blood (2018  9:02 AM CDT)Only the most recent of2 
resultswithin the time period is included.





 Component  Value  Ref Range  Performed At

 

 Alcohol  None Detected  mg/dL  [facility] DEPARTMENT OF PATHOLOGY



   Comment:    AND GENOMIC MEDICINE



   NormalNone Detected    



   Legal Intoxication in [place] 80 mg/dL (0.08%)    



   Toxic Concentration 200 mg/dL (0.2%)    



   Potentially Fatal 350-500 mg/dL (0.35%-0.5%)    



       

 

 Alcohol percent  None Detected  %  [facility] DEPARTMENT OF PATHOLOGY



       AND GENOMIC MEDICINE









 Specimen

 

 Blood









 Performing Organization  Address  City/[facility]/Zipcode  Phone Number

 

 [facility] DEPARTMENT OF PATHOLOGY AND  [address]  [place], [place] [id number] [place] [address]      



Acetaminophen level (2018  9:02 AM CDT)





 Component  Value  Ref Range  Performed At

 

 Acetaminophen level  <2.0 (L)  10.0 - 20.0 ug/mL  [facility] DEPARTMENT OF



   Comment:    PATHOLOGY AND GENOMIC



   Therapeutic 10-30 ug/mL
    MEDICINE



   Possible Toxicity 150-200 ug/mL
    



   Probable Toxicity >200 ug/mL    



       









 Specimen

 

 Blood









 Performing Organization  Address  City/State/Zipcode  Phone Number

 

 [facility] DEPARTMENT OF PATHOLOGY AND  [address]  [place], [place] [id number] [place] [address]      



Salicylate level (2018  9:02 AM CDT)





 Component  Value  Ref Range  Performed At

 

 Salicylate  7.0  mg/dL  [facility] DEPARTMENT OF PATHOLOGY AND GENOMIC



   Comment:    MEDICINE



   Therapeutic Range:    



    5 - 30 mg/dL    



       









 Specimen

 

 Blood









 Performing Organization  Address  City/[facility] Centercode  Phone Number

 

 [facility] DEPARTMENT OF PATHOLOGY AND  [address]  [place], [place] [address] [place] [address]      



Comprehensive metabolic panel (2018  9:02 AM CDT)Only the most recent of8 
resultswithin the time period is included.





 Component  Value  Ref Range  Performed At

 

 Sodium  140  135 - 150 mEq/L  [facility] DEPARTMENT OF PATHOLOGY AND



       GENOMIC MEDICINE

 

 Potassium  3.8  3.5 - 5.0 mEq/L  [facility] DEPARTMENT OF PATHOLOGY AND



       GENOMIC MEDICINE

 

 Chloride  109  100 - 109 mEq/L  [facility] DEPARTMENT OF PATHOLOGY AND



       GENOMIC MEDICINE

 

 CO2  24  24 - 32 mmol/L  [facility] DEPARTMENT OF PATHOLOGY AND



       GENOMIC MEDICINE

 

 Anion gap  7@ANIO  7 - 15 mEq/L  [facility] DEPARTMENT OF PATHOLOGY AND



       GENOMIC MEDICINE

 

 BUN  14  7 - 18 mg/dL  [facility] DEPARTMENT OF PATHOLOGY AND



       GENOMIC MEDICINE

 

 Creatinine  1.5  0.8 - 1.5 mg/dL  [facility] DEPARTMENT OF PATHOLOGY AND



       GENOMIC MEDICINE

 

 Glucose  103 (H)  65 - 100 mg/dL  [facility] DEPARTMENT OF PATHOLOGY AND



       GENOMIC MEDICINE

 

 Calcium  8.6  8.6 - 10.7 mg/dL  [facility] DEPARTMENT OF PATHOLOGY AND



       GENOMIC MEDICINE

 

 Protein  7.9  6.3 - 8.2 g/dL  [facility] DEPARTMENT OF PATHOLOGY AND



       GENOMIC MEDICINE

 

 Albumin  3.4  3.2 - 5.0 g/dL  [facility] DEPARTMENT OF PATHOLOGY AND



       GENOMIC MEDICINE

 

 A/G ratio  0.8  0.7 - 3.8  [facility] DEPARTMENT OF PATHOLOGY AND



       GENOMIC MEDICINE

 

 Alkaline phosphatase  153 (H)  30 - 120 U/L  [facility] DEPARTMENT OF PATHOLOGY AND



       GENOMIC MEDICINE

 

 AST  21  15 - 37 U/L  [facility] DEPARTMENT OF PATHOLOGY AND



       GENOMIC MEDICINE

 

 ALT  17 (L)  30 - 65 U/L  [facility] DEPARTMENT OF PATHOLOGY AND



       GENOMIC MEDICINE

 

 Total bilirubin  0.2  0.2 - 1.2 mg/dL  [facility] DEPARTMENT OF PATHOLOGY AND



       GENOMIC MEDICINE









 Specimen

 

 Plasma specimen









 Performing Organization  Address  City/State/Zipcode  Phone Number

 

 [facility] DEPARTMENT OF PATHOLOGY AND  [facility] [place] [address]  



 [facility]      



CT Abdomen Pelvis Wo Contrast (2018  3:27 AM CDT)Only the most recent of2 
resultswithin the time period is included.





 Narrative  Performed At

 

 EXAMINATION:CT ABDOMEN PELVIS WO CONTRAST



   [facility]



  



  



 CLINICAL HISTORY:R flank pain



  



  



  



 TECHNIQUE: Multiple axial images of the abdomen and pelvis were obtained  



 without intravenous administration of iodinated contrast. Sagittal and  



 coronal computerized reformatted images were also obtained. The lack of  



 intravenous contrast reduces the 



  



 sensitivity of detecting solid organ disease.



  



  



  



 CT imaging was performed with iterative reconstruction technique and/or  



 automated exposure control to reduce radiation dose.



  



  



  



 COMPARISON:2017



  



  



  



  



  



 IMPRESSION:



  



  



  



 Subsegmental atelectasis is seen of the lung bases.



  



  



  



 Patient is status post cholecystectomy. Pancreas and adrenal glands are  



 normal.



  



  



  



 Cirrhotic liver.



  



  



  



 Spleen is enlarged measuring 14.5 cm in length. 



  



  



  



 Kidneys, ureters and bladder are normal.



  



  



  



 No free intraperitoneal fluid or air. Atherosclerotic vascular  



 calcifications are seen. Some paraesophageal varices are seen,  



 compatible with portal hypertension.



  



  



  



 Diverticulosis is seen without diverticulitis. Appendix cannot be seen.  



 No gastrointestinal tract obstruction.



  



  



  



 No acute osseous abnormalities. Postoperative appearance of the lower  



 lumbar spine. A device is seen of the right gluteal region, with lead  



 entering the thoracic spinal canal.



  



  



  



 CONCLUSION:



  



  



  



 Cirrhotic liver with small paraesophageal varices, compatible with  



 portal hypertension.



  



  



  



  



  



  



  



  



  



  



  



  



  



  



  



  



  



  



  



  



  



 [facility]-[id number]



  



   









 Procedure Note

 

 Hm Interface, Radiology Results Incoming - 2018  4:08 AM CDT



EXAMINATION:  CT ABDOMEN PELVIS WO CONTRAST



 



 CLINICAL HISTORY:  R flank pain



 



 TECHNIQUE: Multiple axial images of the abdomen and pelvis were obtained 
without intravenous administration of iodinated contrast. Sagittal and coronal 
computerized reformatted images were also obtained. The lack of intravenous 
contrast reduces the



 sensitivity of detecting solid organ disease.



 



 CT imaging was performed with iterative reconstruction technique and/or 
automated exposure control to reduce radiation dose.



 



 COMPARISON:  2017



 



 



 IMPRESSION:



 



 Subsegmental atelectasis is seen of the lung bases.



 



 Patient is status post cholecystectomy. Pancreas and adrenal glands are normal.



 



 Cirrhotic liver.



 



 Spleen is enlarged measuring 14.5 cm in length.



 



 Kidneys, ureters and bladder are normal.



 



 No free intraperitoneal fluid or air. Atherosclerotic vascular calcifications 
are seen. Some paraesophageal varices are seen, compatible with portal 
hypertension.



 



 Diverticulosis is seen without diverticulitis. Appendix cannot be seen. No 
gastrointestinal tract obstruction.



 



 No acute osseous abnormalities. Postoperative appearance of the lower lumbar 
spine. A device is seen of the right gluteal region, with lead entering the 
thoracic spinal canal.



 



 CONCLUSION:



 



 Cirrhotic liver with small paraesophageal varices, compatible with portal 
hypertension.



 



 



 



 



 



 



 



 



 



 



 [facility]-[id number]









 Performing Organization  Address  City/State/Zipcode  Phone Number

 

  [facility]  [address] [place] [address]  



Smear review (2018  3:13 AM CDT)Only the most recent of3 resultswithin 
the time period is included.





 Component  Value  Ref Range  Performed At

 

 Smear review  Smear Reviewed    [facility] DEPARTMENT OF PATHOLOGY AND GENOMIC



       MEDICINE









 Performing Organization  Address  City/[facility] Centercode  Phone Number

 

 [facility] DEPARTMENT OF PATHOLOGY AND  [address] [place] [address]  



 [facility] MEDICINE      



Prothrombin time with INR (2018  3:13 AM CDT)Only the most recent of3 
resultswithin the time period is included.





 Component  Value  Ref Range  Performed At

 

 Prothrombin time  14.5  12.0 - 15.0 sec  [facility] DEPARTMENT OF



       PATHOLOGY AND GENOMIC



       MEDICINE

 

 INR  1.11  0.92 - 1.12  [facility] DEPARTMENT OF



   Comment:    PATHOLOGY AND GENOMIC



   For patients on anticoagulant therapy, reference ranges below:    MEDICINE



   Indication:INR 
Value    



   Treatment of Venous Thrombosis, 2.0-3.0    



   pulmonary emboli, or prophylaxis    



   of a venous thrombosis, or systemic    



   emboli.    



   High dose, high risk patients 3.0-4.5    



   with mechanical valves.    



   NOTE:INR values over 3.0 are sometimes associated with    



   gastrointestinal hemorrhage, especially values over 4.0.    



       









 Specimen

 

 Blood









 Performing Organization  Address  City/[facility] Centercode  Phone Number

 

 [facility] DEPARTMENT OF PATHOLOGY AND  [address] [place] [address]  



 GENOMIC MEDICINE      



Lipase level (2018  3:13 AM CDT)Only the most recent of3 resultswithin 
the time period is included.





 Component  Value  Ref Range  Performed At

 

 Lipase  129  65 - 230 U/L  [facility] DEPARTMENT OF PATHOLOGY AND GENOMIC MEDICINE









 Specimen

 

 Plasma specimen









 Performing Organization  Address  City/[facility]/Zipcode  Phone Number

 

 [facility] DEPARTMENT OF PATHOLOGY AND  [facility] [place] [address]  



 GENOMIC MEDICINE      



ECG 12 lead (2018  2:43 AM CDT)Only the most recent of3 resultswithin the 
time period is included.





 Component  Value  Ref Range  Performed At

 

 Ventricular rate  77    [facility]

 

 Atrial rate  77    [facility]

 

 OR interval  144    [facility]

 

 QRSD interval  94    [facility]

 

 QT interval  418    [facility]

 

 QTC interval  473    [facility]

 

 P axis 1  32    [facility]

 

 QRS axis 1  94    [facility]

 

 T wave axis  85    [facility]

 

 EKG impression  Normal sinus rhythm-Rightward axis-Borderline    [facility]



   ECG-In automated comparison with ECG of    



   2018 18:18,-No significant change was    



   found-Electronically Signed By Missy Meneses MD    



   ([id number]) on 5/10/2018 7:43:28 AM    









 Performing Organization  Address  City/[facility]  Phone Number

 

 [facility]  [address] [place] [address]  



ECG ED Preliminary Interpretation - NOT AN ORDER (2018  2:07 AM CDT)Only 
the most recent of3 resultswithin the time period is included.





 Narrative  Performed At

 

 Raiza Juarez MD 20183:47 AM



  



 ECG ED Preliminary Interpretation - Not an Order



  



 Performed by: RAIZA JUAREZ



  



 Authorized by: RAIZA JUAREZ 



  



  



  



 ECG reviewed by ED Physician in the absence of a cardiologist: yes



  



 Previous ECG: 



  



 Previous ECG:Compared to current



  



 Comparison ECG info:18



  



 Similarity:No change



  



 Interpretation: 



  



 Interpretation: normal



  



 Rate: 



  



 ECG rate:77



  



 ECG rate assessment: normal



  



 Rhythm: 



  



 Rhythm: sinus rhythm



  



 QRS: 



  



 QRS axis:Right



  



 Comments: 



  



  Normal sinus rhythm. Right axis deviation.  



PET/CT Skull Base To Mid Thigh (2018  1:45 PM CDT)





 Narrative  Performed At

 

 [facility] SKULL BASE TO MID THIGH



  



  



  



 Date and place of service: 3/26/2018 10:00 AM at [facility]



  



  



  



 DOSE: 11.8 mCi of 45-Bbhksg-5-Deoxyglucose (FDG) was injected  



 intravenously into left wrist done infiltration at a serum glucose level  



 of90 mg/dl.



  



  



  



 TECHNIQUE:



  



  



  



 PROCEDURE: Following injection of 51-Tcqvzl-4-Deoxyglucose (FDG) and a  



 standard uptake., The patient was imaged on a [employer] whole body PET CT  



 scanner. Multiple 6 minute bed position acquisitions were obtained along  



 the length of the patient's body from 



  



 approximately the Skull base to the mid thighs without administration of  



 intravenous or oral contrast. The software fused PET/CT images as well  



 as the PET and CT images could be reviewed separately.



  



  



  



 The dose length product for this procedure was 408 mGy-cm.



  



  



  



 CT imaging was performed with iterative reconstruction technique and/or  



 automated exposure control to reduce radiation dose



  



  



  



 COMPARISON:



  



 2017 done at [facility]. 



  



  



  



 CLINICAL HISTORY:



  



 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 Status  



 post right lower lobectomy . No history of recent chemotherapy  



 or radiation therapy. For restaging.



  



  



  



 FINDINGS:



  



  



  



 Software fusion was performed of the nuclear medicineFD PET scan  



 with the CAT scan from the Skull base to the mid thighs to the same time  



 as the PET scan.



  



  



  



 Physiologic uptake is present in the components of Waldeyer's ring in  



 the lateral pharynx. Vocal cord uptake is noted of doubtful  



 significance. Mild tracer uptake is seen in the right and left axillary  



 lymph node with the degree of uptake less than 2.5 



  



 and is of doubtful significance.



  



  



  



 No abnormal radiotracer uptake is demonstrated in the visualized brain,  



 neck, chest, abdomen, pelvis, spine, and visualized proximal upper and  



 lower extremities.



  



  



  



 No abnormal radiotracer uptake is demonstrated in the brain. However,  



 normal intense metabolic uptake of radiotracer in the brain can mask the  



 presence of lesions. Brain lesions are generally better evaluated with  



 an MRI without and with intravenous 



  



 gadolinium, if clinically indicated.



  



  



  



 Physiologic excretion of radiotracer is demonstrated into the kidneys,  



 ureters, and bladder. Physiologic excretion of radiotracer is also  



 demonstrated into the small and large bowel.



  



  



  



 Interrogation of the CT scan of the chest in lung window settings  



 demonstrates the previously noted masses in each lower lobe are not  



 identified on the current examination consistent with right lower  



 lobectomy on the right and posttreatment of the left 



  



 lung lesion.. Dependent atelectasis is noted in the posterior lung bases  



 bilaterally.



  



  



  



 Interrogation of the CT scan of the whole body with soft tissue window  



 settings demonstratessignificant dilatation of the pulmonary outflow  



 tract consistent with pulmonary arterial hypertension. Coronary artery  



 calcifications are present.



  



  



  



 An irregular contoured liver is noted consistent with cirrhosis.  



 Calcified atheromatous plaque formation is seen in the aortoiliac  



 system.



  



  



  



 Interrogation of the CT scan of the whole body and bone window settings  



 demonstrates no osteolytic or osteoblastic lesions. Patient is had a  



 spinal fusion in the lower lumbar spine.



  



  



  



 IMPRESSION:



  



 No evidence of metabolically active tumor.



  



  



  



 Otherwise unchanged .Pulmonary arterial hypertension as before.  



 Cirrhosis as before.



  



  



  



 [facility]-[id number]



  



   









 Procedure Note

 

 Hm Interface, Radiology Results Incoming - 2018  4:30 PM CDT



EXAMINATION:



 PET CT SKULL BASE TO MID THIGH



 



 Date and place of service: 3/26/2018 10:00 AM at [facility]



 



 DOSE: 11.8 mCi of 40-Kwtjqm-8-Deoxyglucose (FDG) was injected intravenously 
into left wrist done infiltration at a serum glucose level of  90 mg/dl.



 



 TECHNIQUE:



 



 PROCEDURE: Following injection of 64-Dwpqtf-3-Deoxyglucose (FDG) and a 
standard uptake., The patient was imaged on a [employer] whole body PET CT scanner. 
Multiple 6 minute bed position acquisitions were obtained along the length of 
the patient's body from



 approximately the Skull base to the mid thighs without administration of 
intravenous or oral contrast. The software fused PET/CT images as well as the 
PET and CT images could be reviewed separately.



 



 The dose length product for this procedure was 408 mGy-cm.



 



 CT imaging was performed with iterative reconstruction technique and/or 
automated exposure control to reduce radiation dose



 



 COMPARISON:



 2017 done at [facility].



 



 CLINICAL HISTORY:



 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 Status post 
right lower lobectomy . No history of recent chemotherapy or 
radiation therapy. For restaging.



 



 FINDINGS:



 



 Software fusion was performed of the nuclear medicine  FDG PET scan with the 
CAT scan from the Skull base to the mid thighs to the same time as the PET scan.



 



 Physiologic uptake is present in the components of Waldeyer's ring in the 
lateral pharynx. Vocal cord uptake is noted of doubtful significance. Mild 
tracer uptake is seen in the right and left axillary lymph node with the



 degree of uptake less than 2.5



 and is of doubtful significance.



 



 No abnormal radiotracer uptake is demonstrated in the visualized brain, neck, 
chest, abdomen, pelvis, spine, and visualized proximal upper and lower 
extremities.



 



 No abnormal radiotracer uptake is demonstrated in the brain. However, normal 
intense metabolic uptake of radiotracer in the brain can mask the presence of 
lesions. Brain lesions are generally better evaluated with an MRI without and 
with intravenous



 gadolinium, if clinically indicated.



 



 Physiologic excretion of radiotracer is demonstrated into the kidneys, ureters
, and bladder. Physiologic excretion of radiotracer is also demonstrated into 
the small and large bowel.



 



 Interrogation of the CT scan of the chest in lung window settings demonstrates 
the previously noted masses in each lower lobe are not identified on the 
current examination consistent with right lower



 lobectomy on the right and posttreatment of the left



 lung lesion.. Dependent atelectasis is noted in the posterior lung bases 
bilaterally.



 



 Interrogation of the CT scan of the whole body with soft tissue window 
settings demonstrates  significant dilatation of the pulmonary outflow tract 
consistent with pulmonary arterial hypertension. Coronary artery calcifications 
are present.



 



 An irregular contoured liver is noted consistent with cirrhosis. Calcified 
atheromatous plaque formation is seen in the aortoiliac system.



 



 Interrogation of the CT scan of the whole body and bone window settings 
demonstrates no osteolytic or osteoblastic lesions. Patient is had a spinal 
fusion in the lower lumbar spine.



 



 IMPRESSION:



 No evidence of metabolically active tumor.



 



 Otherwise unchanged .Pulmonary arterial hypertension as before. Cirrhosis as 
before.



 



 [facility]-[id number]









 Performing Organization  Address  City/State/Zipcode  Phone Number

 

 [facility]  [address] [place] [address]  



POC glucose (2018 11:36 AM CDT)Only the most recent of32 resultswithin 
the time period is included.





 Component  Value  Ref Range  Performed At

 

 POC glucose  90  65 - 100 mg/dL  [facility] DEPARTMENT OF PATHOLOGY AND



   Comment:    GENOMIC MEDICINE



   Meter ID: [id number]    



   : Heber Mcduffie    



       









 Performing Organization  Address  City/State/Zipcode  Phone Number

 

 [facility] DEPARTMENT OF PATHOLOGY AND  [address] [place] [id number]  



 GENOMIC MEDICINE      



Keppra (Levetiracetam) level (2018  5:13 AM CST)





 Component  Value  Ref Range  Performed At

 

 Levetiracetam  27  12 - 46 ug/mL  [facility] LABORATORY



   Comment:    



   INTERPRETIVE INFORMATION: Keppra (Levetiracetam)    



   Therapeutic Range:12-46 ug/mL    



   Toxic:Not well Established    



   Pharmacokinetics of levetiracetam are affected by renal function.    



   Adverse effects may include somnolence, weakness, headache and    



   vomiting.    



   Performed by [facility],
    



   [address] [place] [id number] [phone]
    



   [email or web address], Charlie Balbuena MD - Lab. Director    



       









 Specimen

 

 Serum









 Performing Organization  Address  City/[facility]/Zipcode  Phone Number

 

 [facility] LABORATORY  [address] [place] [address]  



Basic metabolic panel (2018  5:13 AM CST)Only the most recent of6 
resultswithin the time period is included.





 Component  Value  Ref Range  Performed At

 

 Sodium  134 (L)  135 - 150 mEq/L  [facility] DEPARTMENT OF



       PATHOLOGY AND GENOMIC



       MEDICINE

 

 Potassium  4.2  3.5 - 5.0 mEq/L  [facility] DEPARTMENT OF



       PATHOLOGY AND GENOMIC



       MEDICINE

 

 Chloride  102  100 - 109 mEq/L  [facility] DEPARTMENT OF



       PATHOLOGY AND GENOMIC



       MEDICINE

 

 CO2  22 (L)  24 - 32 mmol/L  [facility] DEPARTMENT OF



       PATHOLOGY AND GENOMIC



       MEDICINE

 

 Anion gap  10  7 - 15 mEq/L  [facility] DEPARTMENT OF



   Comment:    PATHOLOGY AND GENOMIC



   Starting from  , anion gap calculation    MEDICINE



   no longer incorporates potassium. Please note the change.    



       

 

 BUN  17  7 - 18 mg/dL  [facility] DEPARTMENT OF



       PATHOLOGY AND GENOMIC



       MEDICINE

 

 Creatinine  1.2  0.8 - 1.5 mg/dL  [facility] DEPARTMENT OF



       PATHOLOGY AND GENOMIC



       MEDICINE

 

 Glucose  139 (H)  65 - 100 mg/dL  [facility] DEPARTMENT OF



       PATHOLOGY AND GENOMIC



       MEDICINE

 

 Calcium  9.4  8.6 - 10.7 mg/dL  [facility] DEPARTMENT OF



       PATHOLOGY AND GENOMIC



       MEDICINE









 Specimen

 

 Plasma specimen









 Performing Organization  Address  City/[facility]  Phone Number

 

 [facility] DEPARTMENT OF PATHOLOGY AND  [facility] [place] [address]  



 GENOMIC MEDICINE      



Hepatic function panel (2018  8:14 PM CST)





 Component  Value  Ref Range  Performed At

 

 Albumin  3.8  3.2 - 5.0 g/dL  [facility] DEPARTMENT OF PATHOLOGY AND GENOMIC



       MEDICINE

 

 Total bilirubin  0.8  0.2 - 1.2 mg/dL  [facility] DEPARTMENT OF PATHOLOGY AND GENOMIC



       MEDICINE

 

 Bilirubin direct  0.2  0.0 - 0.4 mg/dL  [facility] DEPARTMENT OF PATHOLOGY AND 
GENOMIC



       MEDICINE

 

 Alkaline phosphatase  231 (H)  30 - 120 U/L  [facility] DEPARTMENT OF PATHOLOGY AND 
GENOMIC



       MEDICINE

 

 Protein  9.7 (H)  6.3 - 8.2 g/dL  [facility] DEPARTMENT OF PATHOLOGY AND GENOMIC



       MEDICINE

 

 ALT  17 (L)  30 - 65 U/L  [facility] DEPARTMENT OF PATHOLOGY AND GENOMIC



       MEDICINE

 

 AST  29  15 - 37 U/L  [facility] DEPARTMENT OF PATHOLOGY AND GENOMIC



       MEDICINE









 Specimen

 

 Plasma specimen









 Performing Organization  Address  City/[facility]/Zipcode  Phone Number

 

 [facility] DEPARTMENT OF PATHOLOGY AND  [address] [place], [place] [address] [place] [address]  



 [facility]      



CT Lumbar Spine Wo Contrast (2018  5:12 PM CST)





 Narrative  Performed At

 

 EXAMINATION: CT LUMBAR SPINE WO CONTRAST



   [facility]



  



  



 CLINICAL HISTORY: pain



  



  



  



 COMPARISON: CT myelogram lumbar spine 2011



  



  



  



 TECHNIQUE: Axial noncontrast enhanced images of lumbar spine was  



 performed with coronal sagittal reconstruction algorithms. CT imaging  



 was performed with iterative reconstruction technique and/or automated  



 exposure control to reduce radiation dose.



  



  



  



  



  



 FINDINGS:



  



  



  



 There are 5 non-rib bearing lumbar type vertebrae. Status post posterior  



 spinous mentation with rods and screws and anterior interbody grafts at  



 L4-5 and L5-S1. Status post decompressive laminectomies at L4-5 and  



 L5-S1. There is partial solid interbody 



  



 osseous fusion at L5-S1, without definite solid interbody osseous fusion  



 at L4-5. There is solid osseous fusion of the posterior elements of  



 L4-S1. No evidence of hardware loosening. Hardware is well positioned.



  



  



  



 No fracture. 1 to 2 mm retrolisthesis L3 on L4. Facet alignment is  



 anatomic.



  



  



  



 Limited dilation of the visualized intracranial contents demonstrates  



 atherosclerosis of the abdominal aorta and branch vessels. No abdominal  



 mass is identified on limited assessment. Osteoarthrosis sacroiliac  



 joints with small vacuum clefts and mild 



  



 sclerosis.



  



  



  



  



  



 Axial images through the disc spaces demonstrate the following:



  



  



  



 L1-L2: Small disc bulge without significant spinal canal or neural  



 foraminal stenosis. Small Schmorl's nodes.



  



  



  



 L2-L3: Small disc bulge contributes to mild subarticular zone stenosis  



 without significant spinal canal or neural foraminal stenosis. Small  



 Schmorl's nodes.



  



  



  



 L3-L4: Small disc bulge, slight prominence of dorsal epidural fat, and  



 mild ligament flavum infolding contribute to mild spinal canal stenosis.  



 There is mild bilateral neural foraminal stenosis secondary to facet  



 arthropathy and small endplate 



  



 osteophytes.



  



  



  



 L4-L5: Status post decompressive laminectomy. Mild neural foraminal  



 stenosis secondary to small endplate osteophytes bilaterally.



  



  



  



 L5-S1: Status post decompressive laminectomy. No significant neural  



 foraminal stenosis.



  



  



  



  



  



  



  



  



  



 IMPRESSION: 



  



  



  



 Postsurgical changes L4-S1 as detailed above.



  



  



  



 No fracture. Multilevel degenerative changes of the lumbar spine,  



 notably with mild spinal canal and neural foraminal stenosis at L3-4.



  



  



  



  



  



  



  



  



  



 [id number]



  



   









 Procedure Note

 

  Interface, Radiology Results Incoming - 2018  5:24 PM CST



EXAMINATION: CT LUMBAR SPINE WO CONTRAST



 



 CLINICAL HISTORY: pain



 



 COMPARISON: CT myelogram lumbar spine 2011



 



 TECHNIQUE: Axial noncontrast enhanced images of lumbar spine was performed 
with coronal sagittal reconstruction algorithms. CT imaging was performed with 
iterative reconstruction technique and/or automated exposure control to reduce 
radiation dose.



 



 



 FINDINGS:



 



 There are 5 non-rib bearing lumbar type vertebrae. Status post posterior 
spinous mentation with rods and screws and anterior interbody grafts at L4-5 
and L5-S1. Status post decompressive laminectomies at L4-5 and L5-S1.



 There is partial solid interbody



 osseous fusion at L5-S1, without definite solid interbody osseous fusion at L4-
5. There is solid osseous fusion of the posterior elements of L4-S1. No 
evidence of hardware loosening. Hardware is well positioned.



 



 No fracture. 1 to 2 mm retrolisthesis L3 on L4. Facet alignment is anatomic.



 



 Limited dilation of the visualized intracranial contents demonstrates 
atherosclerosis of the abdominal aorta and branch vessels. No abdominal mass is 
identified on limited assessment. Osteoarthrosis sacroiliac joints



 with small vacuum clefts and mild



 sclerosis.



 



 



 Axial images through the disc spaces demonstrate the following:



 



 L1-L2: Small disc bulge without significant spinal canal or neural foraminal 
stenosis. Small Schmorl's nodes.



 



 L2-L3: Small disc bulge contributes to mild subarticular zone stenosis without 
significant spinal canal or neural foraminal stenosis. Small Schmorl's nodes.



 



 L3-L4: Small disc bulge, slight prominence of dorsal epidural fat, and mild 
ligament flavum infolding contribute to mild spinal canal stenosis. There is 
mild bilateral neural foraminal stenosis secondary to facet arthropathy and 
small endplate



 osteophytes.



 



 L4-L5: Status post decompressive laminectomy. Mild neural foraminal stenosis 
secondary to small endplate osteophytes bilaterally.



 



 L5-S1: Status post decompressive laminectomy. No significant neural foraminal 
stenosis.



 



 



 



 



 IMPRESSION:



 



 Postsurgical changes L4-S1 as detailed above.



 



 No fracture. Multilevel degenerative changes of the lumbar spine, notably with 
mild spinal canal and neural foraminal stenosis at L3-4.



 



 



 



 



 [id number]









 Performing Organization  Address  City/State/Zipcode  Phone Number

 

 [facility]  [address] [place] [address]  



Partial thromboplastin time, activated (2018 12:29 PM CST)





 Component  Value  Ref Range  Performed At

 

 PTT  29.2  23.0 - 36.0 sec  [facility] DEPARTMENT OF



   Comment:    PATHOLOGY AND GENOMIC



   PTT therapeutic range for unfractionated heparin is    MEDICINE



   61.0-112.0 seconds which corresponds to Anti-Xa    



   0.3-0.7 U/ml.    



   Note:Change in Panic Value    



   The PTT Panic Value is changing from 110 sec. to 100 sec.    



   due to new instrumentation and reagents.    



   Correlation studies have been performed to validate this result.    



       









 Specimen

 

 Blood









 Performing Organization  Address  City/[facility]/Zipcode  Phone Number

 

 [facility] DEPARTMENT OF PATHOLOGY AND  [address] [facility] [address]  [place], [place] [address] [place] [address]      



Creatine kinase, total (CPK) (2018  6:29 PM CST)





 Component  Value  Ref Range  Performed At

 

 Creatine kinase  134  61 - 224 U/L  [facility] DEPARTMENT OF PATHOLOGY AND [facility] 
MEDICINE









 Specimen

 

 Plasma specimen









 Performing Organization  Address  City/[facility]  Phone Number

 

 [facility] DEPARTMENT OF PATHOLOGY AND  [address] [facility] [address]  [place], [place] [address] [place] [address]      



CT Head Wo Contrast (2018  6:19 PM CST)





 Narrative  Performed At

 

 EXAMINATION: CT HEAD WO CONTRAST



   [facility]



  



  



 CLINICAL HISTORY: seizure syncope



  



  



  



 COMPARISON:Brain CT dated 2017



  



  



  



 TECHNIQUE: Noncontrast enhanced images of the brain were obtained from  



 the skull base to the vertex. Both soft tissue and bone reconstruction  



 algorithms were performed.CT imaging was performed with iterative  



 reconstruction technique and/or automated 



  



 exposure control to reduce radiation dose.



  



  



  



  



  



 FINDINGS:



  



  



  



 There is no evidence of acute intracranial hemorrhage, intracranial  



 mass, mass effect, midline shift or focal extra-axial collection. The  



 gray-white matter differentiation is preserved, and I do not see a  



 confluent area of acute transcortical ischemia. 



  



 No discretely hyperdense vessel is identified. 



  



  



  



 The ventricles and extra-axial spaces are mildly prominent bilaterally.  



 The ventricles have remained stable in size and configuration compared  



 to the previous head CT.



  



  



  



 Minimal foci of decreased attenuation are noted mainly along the  



 periventricular white matter at the level of the corona radiata. These  



 are nonspecific, but most commonly related to chronic microvascular  



 ischemic change.



  



  



  



 Beam hardening artifact obscures details at the level of the skull base,  



 including portions of the supraorbital frontal lobes, inferior temporal  



 lobes, brainstem and cerebellum.



  



  



  



 The mastoid air cells and middle ear cavities are clear. There is soft  



 tissue density in the external auditory canals bilaterally, nonspecific,  



 but most commonly representing cerumen.



  



  



  



 The calvarium shows no aggressive bone lesion or evidence of fracture.  



 No fluid levels are seen in the visualized paranasal sinuses.



  



  



  



 IMPRESSION:



  



  



  



 No acute intracranial abnormality identified.



  



  



  



  



  



  



  



  



  



  



  



  



  



 [id number]



  



   









 Procedure Note

 

 [facility], Radiology Results Incoming - 2018  6:27 PM CST



EXAMINATION: CT HEAD WO CONTRAST



 



 CLINICAL HISTORY: seizure syncope



 



 COMPARISON:  Brain CT dated 2017



 



 TECHNIQUE: Noncontrast enhanced images of the brain were obtained from the 
skull base to the vertex. Both soft tissue and bone reconstruction algorithms 
were performed.  CT imaging was performed with iterative



 reconstruction technique and/or automated



 exposure control to reduce radiation dose.



 



 



 FINDINGS:



 



 There is no evidence of acute intracranial hemorrhage, intracranial mass, mass 
effect, midline shift or focal extra-axial collection. The gray-white matter 
differentiation is preserved, and I do not see a confluent area of



 acute transcortical ischemia.



 No discretely hyperdense vessel is identified.



 



 The ventricles and extra-axial spaces are mildly prominent bilaterally. The 
ventricles have remained stable in size and configuration compared to the 
previous head CT.



 



 Minimal foci of decreased attenuation are noted mainly along the 
periventricular white matter at the level of the corona radiata. These are 
nonspecific, but most commonly related to chronic microvascular ischemic change.



 



 Beam hardening artifact obscures details at the level of the skull base, 
including portions of the supraorbital frontal lobes, inferior temporal lobes, 
brainstem and cerebellum.



 



 The mastoid air cells and middle ear cavities are clear. There is soft tissue 
density in the external auditory canals bilaterally, nonspecific, but most 
commonly representing cerumen.



 



 The calvarium shows no aggressive bone lesion or evidence of fracture. No 
fluid levels are seen in the visualized paranasal sinuses.



 



 IMPRESSION:



 



 No acute intracranial abnormality identified.



 



 



 



 



 



 



 [id number]









 Performing Organization  Address  City/State/Zipcode  Phone Number

 

 [facility]  [address] [place] [address]  



Surgical pathology request (2018  1:28 PM CST)





 Component  Value  Ref Range  Performed At

 

 Case number  [id number]    [facility] DEPARTMENT OF



       PATHOLOGY AND GENOMIC



       MEDICINE

 

 Surgical pathology report  See link below for PDF    [facility] DEPARTMENT OF



   Lab Report    PATHOLOGY AND GENOMIC



       MEDICINE

 

 Result status  This is Final Report to    [facility] DEPARTMENT OF



   [id number]    PATHOLOGY AND GENOMIC



       MEDICINE









 Performing Organization  Address  City/State/Zipcode  Phone Number

 

 [facility] DEPARTMENT OF PATHOLOGY AND  [address] [place] [address]  



 [facility] MEDICINE      



Magnesium level (2018  5:59 AM CST)





 Component  Value  Ref Range  Performed At

 

 Magnesium  2.00  1.60 - 2.40 mg/dL  [facility] DEPARTMENT OF PATHOLOGY AND [facility] 
MEDICINE









 Specimen

 

 Plasma specimen









 Performing Organization  Address  City/State/Zipcode  Phone Number

 

 [facility] DEPARTMENT OF PATHOLOGY AND  [address] [place] [address]  



 GENOMIC MEDICINE      



XR Chest 2 Vw (2017  7:47 PM CST)





 Narrative  Performed At

 

 EXAMINATION:XR CHEST 2 VW



  [facility]



  



  



 CLINICAL HISTORY:sob cough todayleukocytosisR basilar  



 crackles



  



  



  



  



  



  



  



 IMPRESSION:



  



  



  



 Aortic arch calcified. Heart size is normal. Lungs are clear of acute  



 infiltrate. There has been a prior partial pulmonary resection in the  



 right side. There are no effusions. There is an intraspinal catheter  



 present, and there is hardware in cervical 



  



 spine.



  



  



  



  



  



  



  



 [id number]



  



   









 Procedure Note

 

 Hm Interface, Radiology Results Incoming - 2017  8:23 PM CST



EXAMINATION:  XR CHEST 2 VW



 



 CLINICAL HISTORY:  sob cough today  leukocytosis  R basilar crackles



 



 



 



 IMPRESSION:



 



 Aortic arch calcified. Heart size is normal. Lungs are clear of acute 
infiltrate. There has been a prior partial pulmonary resection in the right 
side. There are no effusions. There is an intraspinal catheter



 present, and there is hardware in cervical



 spine.



 



 



 



 [facility]-[id number]









 Performing Organization  Address  City/[facility]/Zipcode  Phone Number

 

 [facility]  [address] [place] [address]  



Troponin (2017  7:33 PM CST)Only the most recent of2 resultswithin the 
time period is included.





 Component  Value  Ref Range  Performed At

 

 Troponin  <0.01  0.00 - 0.60 ng/mL  [facility] DEPARTMENT OF PATHOLOGY



   Comment:    AND GENOMIC MEDICINE



   0.11 - 1.49 ng/mlMay indicate increased risk of acute
    



    coronary syndrome.
    



   >=1.5 ng/mlConsistent with acute myocardial    



    infarction.    



   
    



   The diagnostic value of a single normal or non-diagnostic    



   result is questionable.Serial samples at 2-6 hour intervals    



   are required to rule out acute myocardial injury.
    



       



       









 Specimen

 

 Plasma specimen









 Performing Organization  Address  City/State/Zipcode  Phone Number

 

 [facility] DEPARTMENT OF PATHOLOGY AND  [address] [place] [address]  



 [facility] MEDICINE      



Influenza antigen (2017  3:58 PM CST)





 Component  Value  Ref Range  Performed At

 

 Influenza antigen  Negative for Influenza A/B antigen.    [facility] DEPARTMENT OF 
PATHOLOGY



   Comment:    AND GENOMIC MEDICINE



   Specimen Information    



   Specimen Source: Nares    



   Specimen Site: Right    



       









 Specimen

 

 Nares - Right









 Performing Organization  Address  City/State/Zipcode  Phone Number

 

 [facility] DEPARTMENT OF PATHOLOGY AND  [address] [place] [address]  



 GENOMIC MEDICINE      



B natriuretic peptide (2017  3:45 PM CST)





 Component  Value  Ref Range  Performed At

 

 BNP  43  0 - 100 pg/mL  [facility] DEPARTMENT OF PATHOLOGY AND GENOMIC MEDICINE









 Specimen

 

 Blood









 Performing Organization  Address  City/State/Zipcode  Phone Number

 

 [facility] DEPARTMENT OF PATHOLOGY AND  [address] [place] [address]  



 GENOMIC MEDICINE      



after 2017



Insurance







 Payer  Benefit Plan / Group  Subscriber ID  Type  Phone  Address

 

 [employer]ARE  [employer] HMO/PPO [facility]  xxxxxxxx  HMO    









 Guarantor Name  Account Type  Relation to  Date of  Phone  Billing



     Patient  Birth    Address

 

 Cleveland Ricketts  Personal/Family  Self  1965  [phone]  [phone]        (Home)  [address]







           [place]



           [address]







Advance Directives

Patient has advance care planning documents on file. For more information, 
please contact:Luis Marie[address] [place] [address]

## 2018-12-31 ENCOUNTER — HOSPITAL ENCOUNTER (EMERGENCY)
Dept: HOSPITAL 97 - ER | Age: 53
Discharge: ANOTHER HOSPITAL | End: 2018-12-31
Payer: COMMERCIAL

## 2018-12-31 VITALS — TEMPERATURE: 98.1 F | OXYGEN SATURATION: 100 %

## 2018-12-31 VITALS — SYSTOLIC BLOOD PRESSURE: 129 MMHG | DIASTOLIC BLOOD PRESSURE: 80 MMHG

## 2018-12-31 DIAGNOSIS — F17.210: ICD-10-CM

## 2018-12-31 DIAGNOSIS — I10: ICD-10-CM

## 2018-12-31 DIAGNOSIS — K92.0: Primary | ICD-10-CM

## 2018-12-31 DIAGNOSIS — F43.10: ICD-10-CM

## 2018-12-31 DIAGNOSIS — J44.9: ICD-10-CM

## 2018-12-31 DIAGNOSIS — G40.909: ICD-10-CM

## 2018-12-31 LAB
ALBUMIN SERPL BCP-MCNC: 4.1 G/DL (ref 3.4–5)
ALP SERPL-CCNC: 177 U/L (ref 45–117)
ALT SERPL W P-5'-P-CCNC: 28 U/L (ref 12–78)
AST SERPL W P-5'-P-CCNC: 22 U/L (ref 15–37)
BUN BLD-MCNC: 17 MG/DL (ref 7–18)
GLUCOSE SERPLBLD-MCNC: 94 MG/DL (ref 74–106)
HCT VFR BLD CALC: 48.4 % (ref 39.6–49)
INR BLD: 0.99
LIPASE SERPL-CCNC: 347 U/L (ref 73–393)
LYMPHOCYTES # SPEC AUTO: 1 K/UL (ref 0.7–4.9)
PMV BLD: 9.2 FL (ref 7.6–11.3)
POTASSIUM SERPL-SCNC: 4.4 MMOL/L (ref 3.5–5.1)
RBC # BLD: 5.03 M/UL (ref 4.33–5.43)

## 2018-12-31 PROCEDURE — 85610 PROTHROMBIN TIME: CPT

## 2018-12-31 PROCEDURE — 99285 EMERGENCY DEPT VISIT HI MDM: CPT

## 2018-12-31 PROCEDURE — 93005 ELECTROCARDIOGRAM TRACING: CPT

## 2018-12-31 PROCEDURE — 36415 COLL VENOUS BLD VENIPUNCTURE: CPT

## 2018-12-31 PROCEDURE — 86850 RBC ANTIBODY SCREEN: CPT

## 2018-12-31 PROCEDURE — 85730 THROMBOPLASTIN TIME PARTIAL: CPT

## 2018-12-31 PROCEDURE — 86900 BLOOD TYPING SEROLOGIC ABO: CPT

## 2018-12-31 PROCEDURE — 80048 BASIC METABOLIC PNL TOTAL CA: CPT

## 2018-12-31 PROCEDURE — 83690 ASSAY OF LIPASE: CPT

## 2018-12-31 PROCEDURE — 80076 HEPATIC FUNCTION PANEL: CPT

## 2018-12-31 PROCEDURE — 85025 COMPLETE CBC W/AUTO DIFF WBC: CPT

## 2018-12-31 PROCEDURE — 86901 BLOOD TYPING SEROLOGIC RH(D): CPT

## 2018-12-31 NOTE — ER
Nurse's Notes                                                                                     

 [facility]                                                                

Name: Cleveland Ricketts Jr                                                                            

Age: 53 yrs                                                                                       

Sex: Male                                                                                         

: 1965                                                                                   

MRN: [id number]                                                                                   

Arrival Date: 2018                                                                          

Time: 10:37                                                                                       

Account#: [id number]                                                                            

Bed 8                                                                                             

Private MD:                                                                                       

Diagnosis: Acute upper GI Bleed;Hematemesis                                                       

                                                                                                  

Presentation:                                                                                     

                                                                                             

10:38 Presenting complaint: EMS states: RUQ pain and black tarry stools x 2 days, blood       hb  

      streaked vomit x 1 today. Transition of care: patient was not received from another         

      setting of care. Onset of symptoms was 2018. Risk Assessment: Do you want      

      to hurt yourself or someone else? Patient reports no desire to harm self or others.         

      Initial Sepsis Screen: Does the patient meet any 2 criteria? No. Patient's initial          

      sepsis screen is negative. Does the patient have a suspected source of infection? No.       

      Patient's initial sepsis screen is negative. Care prior to arrival: None.                   

10:38 Method Of Arrival: EMS: [facility]  

10:38 Acuity: JENNIFER 3                                                                           hb  

                                                                                                  

Historical:                                                                                       

- Allergies:                                                                                      

10:41 No Known Allergies;                                                                     hb  

- Home Meds:                                                                                      

10:41 Effexor  mg Oral cp24 1 cap once daily [Active]; diazepam Oral [Active]; Keppra   hb  

      1,000 mg Oral tab 1 tab every 12 hours [Active]; Norco  mg Oral tab 1 tab every 4     

      hours [Active]; Seroquel 600 mg Oral 1 tab nightly [Active];                                

- PMHx:                                                                                           

10:41 Chronic pain; COPD; Emphysema; Hypertension; PTSD; Seizures;                            hb  

- PSHx:                                                                                           

10:41 lumbar fusion; cervical fusion; dorsal column stimulator implant; Appendectomy; right   hb  

      lower lobe lobectomy; right great toe; collarbone; Cholecystectomy;                         

                                                                                                  

- Immunization history:: Adult Immunizations up to date.                                          

- Social history:: Smoking status: Patient uses tobacco products, smokes one-half pack            

  cigarettes per day.                                                                             

- Ebola Screening: : No symptoms or risks identified at this time.                                

- Family history:: not pertinent.                                                                 

- Hospitalizations: : No recent hospitalization is reported.                                      

                                                                                                  

                                                                                                  

Screenin:00 Abuse screen: Denies threats or abuse. Denies injuries from another. Nutritional        sg  

      screening: No deficits noted. Tuberculosis screening: No symptoms or risk factors           

      identified. Never had TB. Fall Risk None identified.                                        

                                                                                                  

Assessment:                                                                                       

11:00 General: Appears in no apparent distress. uncomfortable, ill, slender, well groomed,    sg  

      well developed, well nourished, Behavior is calm, cooperative, appropriate for age.         

11:00 Pain: Complains of pain in epigastric area Pain does not radiate. Quality of pain is    sg  

      described as aching, sharp, stabbing.                                                       

11:00 General: Behavior is drowsy. Neuro: Level of Consciousness is awake, obeys commands,    sg  

      confused, Oriented to person, place, situation,  are equal bilaterally Moves all       

      extremities. Speech is normal, Facial symmetry appears normal, Denies weakness blurred      

      vision dizziness, difficulty swallowing, paresthesias numbness headache photophobia         

      diplopia. Cardiovascular: Patient's skin is warm and dry. Pulses are palpable in right      

      radial artery and left radial artery Chest pain is denied. Respiratory: Airway is           

      patent Respiratory effort is even, unlabored, Respiratory pattern is regular,               

      symmetrical, Breath sounds are clear. GI: Abdomen is flat, non-distended, Bowel sounds      

      present X 4 quads. Reports upper abdominal pain, bloody stool, nausea, bloody emesis.       

      : No signs and/or symptoms were reported regarding the genitourinary system. EENT: No     

      signs and/or symptoms were reported regarding the EENT system. Derm: Skin is pink, warm     

      \T\ dry. Musculoskeletal: No signs and/or symptoms reported regarding the musculoskeletal   

      system.                                                                                     

                                                                                                  

Vital Signs:                                                                                      

10:38  / 93; Pulse 88; Resp 16; Temp 98.1; Pulse Ox 100% on R/A; Pain 9/10;             hb  

15:00  / 80; Pulse 75; Resp 17; Pulse Ox 100% on R/A;                                   sg  

                                                                                                  

ED Course:                                                                                        

10:37 Patient arrived in ED.                                                                  hb  

10:39 Triage completed.                                                                       hb  

10:39 Arm band placed on.                                                                     hb  

10:42 Theron May MD is Attending Physician.                                                rn  

10:45 EKG done, by EKG tech. reviewed by Theron May MD.                                      [id number] 

11:00 Patient has correct armband on for positive identification. Placed in gown. Bed in low  sg  

      position. Call light in reach. Side rails up X2. Cardiac monitor on. Pulse ox on. NIBP      

      on. Warm blanket given. Pillow given. Verbal reassurance given. Head of bed elevated.       

11:00 Initial lab(s) drawn, by me, sent to lab. T\T\S collected, blood band applied to patient. sg

      Inserted saline lock: 22 gauge in left antecubital area, using aseptic technique. Blood     

      collected.                                                                                  

11:20 Inserted saline lock: 20 gauge in right forearm, using aseptic technique.               iw  

12:16 Attila Larry, RN is Primary Nurse.                                                       sg  

15:00 No provider procedures requiring assistance completed. Patient transferred, IV remains  sg  

      in place. intact, No redness/swelling at site. Pressure dressing applied.                   

16:15 Assisted to bedside commode. placed in clean brief and assisted back to bed, caregiver  sg  

      remains at bedside at this time, no new orders received, will continue to monitor.          

                                                                                                  

Administered Medications:                                                                         

12:00 Drug: Zofran 4 mg Route: IVP; Site: right forearm;                                      sg  

13:00 Follow up: Response: No adverse reaction                                                sg  

12:00 Drug: Octreotide 50 mcg Route: IV; Rate: calculated rate; Site: right forearm;          sg  

12:00 Drug: ProTONIX 40 mg Route: IVP; Site: right forearm;                                   sg  

13:00 Follow up: Response: No adverse reaction                                                sg  

12:10 Drug: Demerol 25 mg Route: IVP; Site: right forearm;                                    sg  

13:00 Follow up: Response: No adverse reaction; Pain is decreased                             sg  

12:17 Drug: ProTONIX 8 mg/hr Route: IV; Rate: 25 ml/hr; Site: right forearm;                  sg  

12:17 Drug: Rocephin - (cefTRIAXone) 1 grams Route: IVPB; Infused Over: 30 mins; Site: right  sg  

      forearm;                                                                                    

12:17 Drug: NS 0.9% 1000 ml Route: IV; Rate: 1000 ml; Site: right forearm;                    sg  

13:30 Follow up: Response: No adverse reaction; IV Status: Completed infusion; IV Intake:     sg  

      990ml                                                                                       

12:30 Drug: Octreotide Infusion (50 mcg/hr) - (Octreotide 500 mcg, NS 0.9% 500 ml) Route: IV; sg  

      Rate: 50 ml/hr; Site: left antecubital;                                                     

15:10 Drug: Demerol - Meperidine 12.5 mg Route: IVP; Site: right forearm;                     sg  

16:00 Follow up: Response: No adverse reaction; Pain is decreased                             sg  

                                                                                                  

                                                                                                  

Intake:                                                                                           

13:30 IV: 990ml; Total: 990ml.                                                                sg  

                                                                                                  

Outcome:                                                                                          

11:58 ER care complete, transfer ordered by MD.                                               rn  

15:12 Transferred [place] [facility].    sg  

15:12 Condition: stable                                                                           

15:12 Instructed on follow up and referral plans. safety practices, Demonstrated                  

      understanding of instructions.                                                              

15:22 Patient left the ED.                                                                    sg  

                                                                                                  

Signatures:                                                                                       

Attila Larry RN RN sg Williams, Irene, RN RN iw Nieto, Roman, MD MD rn Baxter, Heather, RN RN                                                      

[place]                              3                                                  

                                                                                                  

Corrections: (The following items were deleted from the chart)                                    

18:51 11:00 General: Appears in no apparent distress. uncomfortable, ill, slender, well       sg  

      groomed, well developed, well nourished, Behavior is calm, cooperative, appropriate for     

      age, sg                                                                                     

                                                                                                  

**************************************************************************************************

## 2018-12-31 NOTE — EDPHYS
Physician Documentation                                                                           

 [facility]                                                                

Name: Cleveland Ricketts Jr                                                                            

Age: 53 yrs                                                                                       

Sex: Male                                                                                         

: 1965                                                                                   

MRN: [id number]                                                                                   

Arrival Date: 2018                                                                          

Time: 10:37                                                                                       

Account#: [id number]                                                                            

Bed 8                                                                                             

Private MD:                                                                                       

ED Physician Theron May                                                                         

HPI:                                                                                              

                                                                                             

10:58 This 53 yrs old  Male presents to ER via EMS with complaints of upper GI       rn  

      bleeding.                                                                                   

10:58 The patient presents to the emergency department vomiting blood, a small amount, blood  rn  

      streaked emesis, coffee grounds in nature, with multiple such episodes. Onset: The          

      symptoms/episode began/occurred 2 day(s) ago. Abdominal pain: described as sharp,           

      stabbing, located in the epigastric area, that does not radiate. Modifying factors: The     

      symptoms are alleviated by nothing, the symptoms are aggravated by nothing. Severity of     

      symptoms: At their worst the symptoms were moderate in the emergency department the         

      symptoms are unchanged. The patient has experienced similar episodes in the past. The       

      patient has not recently seen a physician. REports 2-3 days of vomiting blood-streaked      

      emesis, + dark black stool, feels generalized weakness, no syncope/sob/chest pain. Has      

      hx of alcoholic cirrhosis and chronic pain. Has had upper GI bleed before. .                

                                                                                                  

Historical:                                                                                       

- Allergies:                                                                                      

10:41 No Known Allergies;                                                                     hb  

- Home Meds:                                                                                      

10:41 Effexor  mg Oral cp24 1 cap once daily [Active]; diazepam Oral [Active]; Keppra   hb  

      1,000 mg Oral tab 1 tab every 12 hours [Active]; Norco  mg Oral tab 1 tab every 4     

      hours [Active]; Seroquel 600 mg Oral 1 tab nightly [Active];                                

- PMHx:                                                                                           

10:41 Chronic pain; COPD; Emphysema; Hypertension; PTSD; Seizures;                            hb  

- PSHx:                                                                                           

10:41 lumbar fusion; cervical fusion; dorsal column stimulator implant; Appendectomy; right   hb  

      lower lobe lobectomy; right great toe; collarbone; Cholecystectomy;                         

                                                                                                  

- Immunization history:: Adult Immunizations up to date.                                          

- Social history:: Smoking status: Patient uses tobacco products, smokes one-half pack            

  cigarettes per day.                                                                             

- Ebola Screening: : No symptoms or risks identified at this time.                                

- Family history:: not pertinent.                                                                 

- Hospitalizations: : No recent hospitalization is reported.                                      

                                                                                                  

                                                                                                  

ROS:                                                                                              

10:58 Constitutional: Negative for fever, chills, and weight loss, Eyes: Negative for injury, rn  

      pain, redness, and discharge, Cardiovascular: Negative for chest pain, palpitations,        

      and edema, Respiratory: Negative for shortness of breath, cough, wheezing, and              

      pleuritic chest pain, Abdomen/GI: Negative for diarrhea, and constipation,                  

      MS/Extremity: Negative for injury and deformity, Skin: Negative for injury, rash, and       

      discoloration, Neuro: Negative for headache, numbness, tingling, and seizure.               

                                                                                                  

Exam:                                                                                             

10:58 Constitutional:  This is a well developed, well nourished patient who is awake, alert,  rn  

      and in no acute distress. Head/Face:  Normocephalic, atraumatic. Eyes:  Pupils equal        

      round and reactive to light, extra-ocular motions intact.   ENT:  No oral bleeding, +       

      dry coffee ground/blood around mouth Cardiovascular:  Regular rate and rhythm with a        

      normal S1 and S2.  No gallops, murmurs, or rubs.  No JVD.  No pulse deficits.               

      Respiratory:  Lungs have equal breath sounds bilaterally, clear to auscultation.  No        

      increased work of breathing, no retractions or nasal flaring. Abdomen/GI:  soft, mild       

      epigastric tenderness, no rebound MS/ Extremity:  Pulses equal, no cyanosis.                

      Neurovascular intact.  Full, normal range of motion.  Equal circumference. Neuro:           

      Awake and alert, GCS 15, oriented to person, place, time, and situation.  Cranial           

      nerves II-XII grossly intact.  Motor strength 5/5 in all extremities.  Sensory grossly      

      intact.  Cerebellar exam normal.                                                            

                                                                                                  

Vital Signs:                                                                                      

10:38  / 93; Pulse 88; Resp 16; Temp 98.1; Pulse Ox 100% on R/A; Pain 9/10;             hb  

15:00  / 80; Pulse 75; Resp 17; Pulse Ox 100% on R/A;                                   sg  

                                                                                                  

MDM:                                                                                              

10:42 Patient medically screened.                                                             rn  

11:56 Differential diagnosis: gastritis, varices, PUD, UGIB. Data reviewed: vital signs,      rn  

      nurses notes, lab test result(s), and as a result, I will admit patient. Counseling: I      

      had a detailed discussion with the patient and/or guardian regarding: the historical        

      points, exam findings, and any diagnostic results supporting the discharge/admit            

      diagnosis, lab results, the need to transfer to another facility, for higher level of       

      care, [facility] does not immediately have the required specialist.       

      Response to treatment: the patient's symptoms have markedly improved after treatment,       

      and as a result, I will admit patient. Admission orders: after a detailed discussion of     

      the patient's condition and case, the admit orders are written by me. ED course: Pt         

      with UGIB, + hx of cirrhosis, possible variceal vs gastric ulcers, no further               

      hematemesis here, stable vitals, pain addressed, and feels better, NO GI here, will         

      have to transfer to [place] for further care/EGD. .                                         

                                                                                                  

                                                                                             

10:51 Order name: CBC with Diff; Complete Time: 11:24                                         rn  

                                                                                             

10:51 Order name: Basic Metabolic Panel; Complete Time: 11:49                                 rn  

                                                                                             

10:51 Order name: Protime (+inr); Complete Time: 11:49                                        rn  

                                                                                             

10:51 Order name: Ptt, Activated; Complete Time: 11:49                                        rn  

                                                                                             

10:51 Order name: Lipase; Complete Time: 11:49                                                rn  

                                                                                             

10:51 Order name: LFT's; Complete Time: 11:49                                                 rn  

                                                                                             

10:51 Order name: Type And Screen; Complete Time: 11:59                                       rn  

                                                                                             

10:51 Order name: IV Start; Complete Time: 11:14                                              rn  

                                                                                             

10:51 Order name: EKG; Complete Time: 10:52                                                   rn  

                                                                                             

10:51 Order name: EKG - Nurse/Tech; Complete Time: 11:59                                      rn  

                                                                                                  

Administered Medications:                                                                         

12:00 Drug: Zofran 4 mg Route: IVP; Site: right forearm;                                      sg  

13:00 Follow up: Response: No adverse reaction                                                sg  

12:00 Drug: Octreotide 50 mcg Route: IV; Rate: calculated rate; Site: right forearm;          sg  

12:00 Drug: ProTONIX 40 mg Route: IVP; Site: right forearm;                                   sg  

13:00 Follow up: Response: No adverse reaction                                                sg  

12:10 Drug: Demerol 25 mg Route: IVP; Site: right forearm;                                    sg  

13:00 Follow up: Response: No adverse reaction; Pain is decreased                             sg  

12:17 Drug: ProTONIX 8 mg/hr Route: IV; Rate: 25 ml/hr; Site: right forearm;                  sg  

12:17 Drug: Rocephin - (cefTRIAXone) 1 grams Route: IVPB; Infused Over: 30 mins; Site: right  sg  

      forearm;                                                                                    

12:17 Drug: NS 0.9% 1000 ml Route: IV; Rate: 1000 ml; Site: right forearm;                    sg  

13:30 Follow up: Response: No adverse reaction; IV Status: Completed infusion; IV Intake:     sg  

      990ml                                                                                       

12:30 Drug: Octreotide Infusion (50 mcg/hr) - (Octreotide 500 mcg, NS 0.9% 500 ml) Route: IV; sg  

      Rate: 50 ml/hr; Site: left antecubital;                                                     

15:10 Drug: Demerol - Meperidine 12.5 mg Route: IVP; Site: right forearm;                     sg  

16:00 Follow up: Response: No adverse reaction; Pain is decreased                             sg  

                                                                                                  

                                                                                                  

Disposition:                                                                                      

18 11:58 Transfer ordered to [facility]. Diagnosis are Acute upper      

  GI Bleed, Hematemesis.                                                                          

- Reason for transfer: Higher level of care.                                                      

- Accepting physician is .                                                                     

- Condition is Stable.                                                                            

- Problem is new.                                                                                 

- Symptoms have improved.                                                                         

                                                                                                  

                                                                                                  

                                                                                                  

Signatures:                                                                                       

Dispatcher [employer]                           EDMS                                                 

Attila Larry RN RN   sg                                                   

Theron May MD MD rn Baxter, Heather, RN RN                                                      

                                                                                                  

Corrections: (The following items were deleted from the chart)                                    

15:22 11:58 2018 11:58 Transfer ordered to [facility]. Diagnosis is sg  

      Acute upper GI Bleed; Hematemesis. Reason for transfer: Higher level of care. Accepting     

      physician is . Condition is Stable. Problem is new. Symptoms have improved. rn           

                                                                                                  

**************************************************************************************************

## 2018-12-31 NOTE — EKG
Test Date:    2018-12-31               Test Time:    10:41:12

Technician:   JEAN CLAUDE                                     

                                                     

MEASUREMENT RESULTS:                                       

Intervals:                                           

Rate:         83                                     

OK:           148                                    

QRSD:         84                                     

QT:           368                                    

QTc:          432                                    

Axis:                                                

P:            37                                     

OK:           148                                    

QRS:          95                                     

T:            64                                     

                                                     

INTERPRETIVE STATEMENTS:                                       

                                                     

Normal sinus rhythm

Rightward axis

Borderline ECG

Compared to ECG 12/19/2018 17:24:04

No significant changes



Electronically Signed On 12-31-18 11:57:00 CST by Bryn Toribio

## 2018-12-31 NOTE — XMS REPORT
Clinical Summary

 Created on:2018



Patient:Cleveland Ricketts Jr

Sex:Male

:1965

External Reference #:[id number]





Demographics







 Address  [address]



   



   [place] [address]

 

 Home Phone  [phone]

 

 Home Phone  [phone]

 

 Email Address  [email or web address]

 

 Preferred Language  English

 

 Marital Status  

 

 Tenriism Affiliation  Unknown

 

 Race  White

 

 Ethnic Group  Not  or 









Author







 Organization  [facility]

 

 Address  [address] [place] [address]









Support







 Name  Relationship  Address  Phone

 

 Sabrina Greer  Unavailable  Unavailable  [phone]









Care Team Providers







 Name  Role  Phone

 

 Asked, No Pcp  Primary Care Provider  Unavailable









Allergies

No Known Allergies



Medications







 Medication  Sig  Dispensed  Refills  Start  End  Status



         Date  Date  

 

 metFORMIN XR  Take 1,000 mg    0  20    Active



 (GLUCOPHAGE-XR) 500  by mouth daily.      17    



 mg 24 hr tablet            

 

 insulin GLARGINE  Inject 20 Units    0      Active



 (LANTUS) 100  under the skin          



 unit/mL injection  nightly.          



 (vial)            

 

 levETIRAcetam  Take 1,000 mg    0      Active



 (KEPPRA) 1000 MG  by mouth 2          



 tablet  (two) times a          



   day.          

 

 venlafaxine XR  Take 150 mg by    0      Active



 (EFFEXOR-XR) 150 MG  mouth daily.          



 24 hr capsule            

 

 diazePAM (VALIUM)  Take 10 mg by    0  20    Active



 10 MG tablet  mouth 3 (three)      18    



   times a day.          

 

 QUEtiapine  Take 600 mg by    0  20    Active



 (SEROquel) 300 MG  mouth nightly.      18    



 tablet            

 

 traMADol (ULTRAM)  Take 50 mg by    0      Active



 50 mg tablet  mouth every 6          



   (six) hours as          



   needed for          



   moderate pain.          

 

 gabapentin  Take 800 mg by    2  20  Discontinued



 (NEURONTIN) 800 mg  mouth 4 (four)      17  018  



 tablet  times a day.          

 

 QUEtiapine  Take 800 mg by    2  05/15/20  02/22/2  Discontinued



 (SEROquel) 400 MG  mouth nightly.      17  018  



 tablet  Take 2 tablets          



   at bedtime          

 

 busPIRone (BUSPAR)  Take 10 mg by    0      Discontinued



 10 MG tablet  mouth 3 (three)        018  



   times a day.          

 

 acetaminophen-codei  TK 1 TO 2 TS PO    0  07/15/20  03/03/2  Discontinued



 ne (TYLENOL WITH  Q 6 H PRN P      17  018  



 CODEINE #3) 300-30            



 mg per tablet            

 

 gabapentin  Take 1 tablet  90 tablet  0  20  



 (NEURONTIN) 800 mg  (800 mg total)      18  018  



 tablet  by mouth 3          



   (three) times a          



   day for 30          



   days.          

 

 metoprolol  Take 0.5  15 tablet  0  20  



 succinate XL  tablets (12.5      18  018  



 (TOPROL-XL) 25 mg  mg total) by          



 24 hr tablet  mouth daily for          



   30 days.          

 

 HYDROcodone-acetami  Take 1 tablet  15 tablet  0  20  



 nophen (NORCO)  by mouth every      18  018  



 7.5-325 mg per  6 (six) hours          



 tablet  as needed for          



   severe pain for          



   up to 14 days.          



   Max Daily          



   Amount: 4          



   tablets          

 

 pantoprazole  Take 1 tablet  30 tablet  0  20  



 (PROTONIX) 40 MG EC  (40 mg total)      18  018  



 tablet  by mouth daily          



   for 30 days.          

 

 ondansetron ODT  Take 1 tablet  15 tablet  0  20  Discontinued



 (ZOFRAN ODT) 4 MG  (4 mg total) by      18  018  



 disintegrating  mouth every 8          



 tablet  (eight) hours          



   as needed for          



   nausea or          



   vomiting for up          



   to 15 doses.          

 

 promethazine  Insert 1  12 suppository  0  20  Discontinued



 (PHENERGAN) 25 MG  suppository (25      18  018  



 suppository  mg total) into          



   the rectum          



   every 6 (six)          



   hours as needed          



   for nausea or          



   vomiting for up          



   to 12 doses.          

 

 hydromorPHONE  Take 1 tablet  10 tablet  0  03/03/20  03/10/2  



 (DILAUDID) 2 MG  (2 mg total) by      18  018  



 tablet  mouth every 8          



   (eight) hours          



   as needed for          



   severe pain          



   (score 7-10)          



   for up to 7          



   days. Max Daily          



   Amount: 6 mg          

 

 promethazine  Take 1 tablet  20 tablet  0  20  



 (PHENERGAN) 12.5 MG  (12.5 mg total)      18  018  



 tablet  by mouth every          



   8 (eight) hours          



   as needed for          



   nausea or          



   vomiting for up          



   to 30 days.          

 

 HYDROcodone-acetami  Take 1 tablet  20 tablet  0  03/03/20  03/10/2  



 nophen (NORCO)  by mouth every      18  018  



 7.5-325 mg per  8 (eight) hours          



 tablet  as needed for          



   moderate pain          



   for up to 7          



   days. Max Daily          



   Amount: 3          



   tablets          

 

 naproxen (NAPROSYN)  Take 1 tablet  28 tablet  0  20  



 500 MG tablet  (500 mg total)      18  018  



   by mouth 2          



   (two) times a          



   day with meals          



   for 14 days.          

 

 omeprazole  Take 2 capsules  60 capsule  0  20  



 (PriLOSEC) 20 MG  (40 mg total)      18  018  



 capsule  by mouth daily          



   for 30 days.          

 

 cyclobenzaprine  Take 1 tablet  20 tablet  0  20  



 (FLEXERIL) 10 mg  (10 mg total)      18  018  



 tablet  by mouth 3          



   (three) times a          



   day as needed          



   for muscle          



   spasms for up          



   to 10 days.          







Active Problems







 Problem  Noted Date

 

 Sciatica of left side  2018

 

 Syncope  2018

 

 Essential hypertension  2017

 

 Type 2 diabetes mellitus with hyperglycemia  2017

 

 PRIMITIVO (acute kidney injury)  2017

 

 Seizure disorder  2017

 

 Intractable vomiting with nausea  2017

 

 Abdominal pain  2017









 Overview: 







 Added automatically from request for surgery [id number]









 Gastrointestinal hemorrhage  10/11/2017

 

 Gastrointestinal hemorrhage with hematemesis  10/11/2017









 Overview: 







 Added automatically from request for surgery [id number]









 Vertigo  2017

 

 Pneumonia due to infectious organism  2017

 

 COPD with acute bronchitis  2017

 

 COPD (chronic obstructive pulmonary disease)  2017

 

 COPD exacerbation  2017

 

 Lung mass  2017

 

 Tobacco dependence  2017







Resolved Problems







 Problem  Noted Date  Resolved Date

 

 Leukocytosis  2017







Encounters







 Date  Type  Specialty  Care Team  Description

 

 20  Emergency  Emergency Medicine  Cait,  Suicidal ideation (Primary Dx
)



 18      Chago Olivas MD  

 

 20  Emergency  Emergency Medicine  [place]  Chronic right-sided thoracic 
back pain (Primary Dx);



 18      Judy,  Narcotic abuse;



       Raiza  Drug-seeking behavior



       MD Bryn  

 

 20  Hospital  Radiology  Dioni Garay (bronchogenic carcinoma)



 18  Encounter    MD Milagros  

 

 20  Transc[facility] Early  BC (bronchogenic carcinoma)



 18  Juli Linares MD  (Primary Dx)

 

 20  Emergency  General Internal  Landaverde, Drew  Sciatica of left side (
Primary Dx);



 18 -    Gary GRECO MD



  Intractable pain



 20      Travon,  



 18      MD Jessenia De La Vega, Princess Guerin MD  

 

 20  Emergency  Emergency Medicine  Lelia,  Other chronic pain (Primary



 18      Chago FORTUNE,  Dx)



       DO  

 

 20  Emergency  General Internal  Lelia,  Syncope, unspecified syncope 
type (Primary Dx);



 18 -    Medicine  Chago FORTUNE,  Other chronic pain



 20      DO



  



 18      Marianela Turner MD  

 

 20  Surgery  Gastroenterology  Con Knowles  ESOPHAGOGASTRODUODENOSCOPY



 18      MD Lang  (EGD) with bx

 

 20  Anesthesia  Gastroenterology  Natali,  



 18  Event    Svitlana Villalta MD  

 

 20  [facility] Internal  [facility]  Intractable vomiting with 
nausea, unspecified vomiting type (Primary Dx);



 17 -  Encounter  Medicine  MD Ariel



  Pain;



 20      [place],  Abdominal pain, unspecified abdominal location;



 18      Jm Cabrera,  Essential hypertension;



       MD



  Type 2 diabetes mellitus with hyperglycemia, with long-term current use of 
insulin



       Heber Orourke DO  



after 2017



Immunizations







 Name  Dates Previously Given  Next Due

 

 FLUCELVAX QUAD PF (0.5mL syringe)  2018  







Family History







 Medical History  Relation  Name  Comments

 

 Cerebral aneurysm  Father    

 

 Heart disease  Father    

 

 Kidney cancer  Father    

 

 Lung cancer  Father    

 

 Stroke  Father    

 

 Diabetes  Mother    

 

 Heart attack  Mother    

 

 Heart disease  Mother    

 

 Hyperlipidemia  Sister    

 

 Hypertension  Sister    









 Relation  Name  Status  Comments

 

 Father      

 

 Mother      

 

 Sister      







Social History







 Tobacco Use  Types  Packs/Day  Years Used  Date

 

 Current Every Day Smoker  Cigarettes  0.5  35  









 Smokeless Tobacco: Former User      









 Tobacco Cessation: Counseling Given: Yes



 Comments: per patient now only smoking 4-5 cigarette/day









 Alcohol Use  Drinks/Week  oz/Week  Comments

 

 No      stopped for 1.5 yrs









 Sex Assigned at Birth  Date Recorded

 

 Not on file  









 Job Start Date  Occupation  Industry

 

 Not on file  Not on file  Not on file









 Travel History  Travel Start  Travel End









 No recent travel history available.







Last Filed Vital Signs







 Vital Sign  Reading  Time Taken

 

 Blood Pressure  120/77  2018  1:15 PM CDT

 

 Pulse  76  2018  1:15 PM CDT

 

 Temperature  35.8 C (96.5 F)  2018  1:15 PM CDT

 

 Respiratory Rate  16  2018  1:15 PM CDT

 

 Oxygen Saturation  97%  2018  1:15 PM CDT

 

 Inhaled Oxygen Concentration  -  -

 

 Weight  73.9 kg (163 lb)  2018  8:09 AM CDT

 

 Height  185.4 cm (6' 1")  2018  8:09 AM CDT

 

 Body Mass Index  21.51  2018  8:09 AM CDT







Plan of Treatment







 Health Maintenance  Due Date  Last Done  Comments

 

 DIABETIC RETINAL EYE EXAM  1965    

 

 DIABETIC FOOT EXAM  1974    

 

 COLON CANCER SCREENING  2014    

 

 SHINGLES VACCINES (1 of 2)  2014    

 

 INFLUENZA VACCINE  2018  







Implants







 Implanted  Type  Area    Device  Shelf  Model /



         Identifier  Expiration  Serial /



           Date  Lot

 

 Stimulator  Stimulator          

 

 Stimulator-2007  Stimulator  Hip        



 Implanted: 2007 (Quantity not on file)            

 

 Kit [employer] Air Leak 4ml Strl Progel - [id number]  Surgical  N/A: N/A  
[employer]      [id number] /



 Implanted: Qty: 1 on 2017 by Jonathan Fuentes Jr., MD  Implants;       
    /



   Expanders;          



   Extenders;          



   Surgical Wires          

 

 Dorsal Colum            



 Stimulator            

 

 Dorsal Column            



 Stimulator            

 

 Dorsal Colum Stimulator-2007    Lower:        



 Implanted: 2007 (Quantity not on file)    Back,        



     Other        



     than        



     Spine        







Procedures







 Procedure Name  Priority  Date/Time  Associated  Comments



       Diagnosis  

 

 URINALYSIS SCREEN AND  STAT  2018    Results for



 MICROSCOPY, WITH REFLEX TO    9:16 AM CDT    this procedure



 CULTURE        are in the



         results



         section.

 

 URINE DRUGS OF ABUSE SCREEN  STAT  2018    Results for



     9:16 AM CDT    this procedure



         are in the



         results



         section.

 

 URINE CULTURE  STAT  2018    Results for



     9:16 AM CDT    this procedure



         are in the



         results



         section.

 

 ZZESTIMATED GFR  STAT  2018    Results for



     9:02 AM CDT    this procedure



         are in the



         results



         section.

 

 SALICYLATE LEVEL  STAT  2018    Results for



     9:02 AM CDT    this procedure



         are in the



         results



         section.

 

 ACETAMINOPHEN LEVEL  STAT  2018    Results for



     9:02 AM CDT    this procedure



         are in the



         results



         section.

 

 ALCOHOL LEVEL, BLOOD  STAT  2018    Results for



     9:02 AM CDT    this procedure



         are in the



         results



         section.

 

 HC COMPLETE BLD COUNT W/AUTO  STAT  2018    Results for



 DIFF    9:02 AM CDT    this procedure



         are in the



         results



         section.

 

 THYROID STIMULATING HORMONE  STAT  2018    Results for



     9:02 AM CDT    this procedure



         are in the



         results



         section.

 

 T4, FREE  STAT  2018    Results for



     9:02 AM CDT    this procedure



         are in the



         results



         section.

 

 COMPREHENSIVE METABOLIC PANEL  STAT  2018    Results for



     9:02 AM CDT    this procedure



         are in the



         results



         section.

 

 CT ABDOMEN PELVIS WO CONTRAST  STAT  2018    Results for



     3:27 AM CDT    this procedure



         are in the



         results



         section.

 

 SMEAR REVIEW  STAT  2018    Results for



     3:13 AM CDT    this procedure



         are in the



         results



         section.

 

 ZZESTIMATED GFR  STAT  2018    Results for



     3:13 AM CDT    this procedure



         are in the



         results



         section.

 

 LIPASE LEVEL  STAT  2018    Results for



     3:13 AM CDT    this procedure



         are in the



         results



         section.

 

 COMPREHENSIVE METABOLIC PANEL  STAT  2018    Results for



     3:13 AM CDT    this procedure



         are in the



         results



         section.

 

 PROTHROMBIN TIME WITH INR  STAT  2018    Results for



     3:13 AM CDT    this procedure



         are in the



         results



         section.

 

 HC COMPLETE BLD COUNT W/AUTO  STAT  2018    Results for



 DIFF    3:13 AM CDT    this procedure



         are in the



         results



         section.

 

 ECG 12-LEAD  STAT  2018    Results for



     2:43 AM CDT    this procedure



         are in the



         results



         section.

 

 ECG ED PRELIMINARY  Routine  2018    Results for



 INTERPRETATION    2:07 AM CDT    this procedure



         are in the



         results



         section.

 

 PET CT SKULL BASE TO MID THIGH  Routine  2018  BC (bronchogenic  Results 
for



     1:45 PM CDT  carcinoma)  this procedure



         are in the



         results



         section.

 

 POC GLUCOSE  Routine  2018    Results for



     11:36 AM CDT    this procedure



         are in the



         results



         section.

 

 POC GLUCOSE  Routine  2018    Results for



     11:29 AM CST    this procedure



         are in the



         results



         section.

 

 POC GLUCOSE  Routine  2018    Results for



     6:52 AM CST    this procedure



         are in the



         results



         section.

 

 SMEAR REVIEW  Routine  2018    Results for



     5:13 AM CST    this procedure



         are in the



         results



         section.

 

 ZZESTIMATED GFR  Routine  2018    Results for



     5:13 AM CST    this procedure



         are in the



         results



         section.

 

 BASIC METABOLIC PANEL  Routine  2018    Results for



     5:13 AM CST    this procedure



         are in the



         results



         section.

 

 HC COMPLETE BLD COUNT W/AUTO  Routine  2018    Results for



 DIFF    5:13 AM CST    this procedure



         are in the



         results



         section.

 

 KEPPRA (LEVETIRACETAM) LEVEL  Routine  2018    Results for



     5:13 AM CST    this procedure



         are in the



         results



         section.

 

 SMEAR REVIEW  STAT  2018    Results for



     8:14 PM CST    this procedure



         are in the



         results



         section.

 

 ZZESTIMATED GFR  STAT  2018    Results for



     8:14 PM CST    this procedure



         are in the



         results



         section.

 

 HEPATIC FUNCTION PANEL  STAT  2018    Results for



     8:14 PM CST    this procedure



         are in the



         results



         section.

 

 BASIC METABOLIC PANEL  STAT  2018    Results for



     8:14 PM CST    this procedure



         are in the



         results



         section.

 

 HC COMPLETE BLD COUNT W/AUTO  STAT  2018    Results for



 DIFF    8:14 PM CST    this procedure



         are in the



         results



         section.

 

 CT LUMBAR SPINE WO CONTRAST  STAT  2018    Results for



     5:12 PM CST    this procedure



         are in the



         results



         section.

 

 ZZESTIMATED GFR  STAT  2018    Results for



     12:29 PM CST    this procedure



         are in the



         results



         section.

 

 LIPASE LEVEL  STAT  2018    Results for



     12:29 PM CST    this procedure



         are in the



         results



         section.

 

 COMPREHENSIVE METABOLIC PANEL  STAT  2018    Results for



     12:29 PM CST    this procedure



         are in the



         results



         section.

 

 PARTIAL THROMBOPLASTIN TIME  STAT  2018    Results for



 (PTT)    12:29 PM CST    this procedure



         are in the



         results



         section.

 

 PROTHROMBIN TIME WITH INR  STAT  2018    Results for



     12:29 PM CST    this procedure



         are in the



         results



         section.

 

 HC COMPLETE BLD COUNT W/AUTO  STAT  2018    Results for



 DIFF    12:29 PM CST    this procedure



         are in the



         results



         section.

 

 POC GLUCOSE  Routine  2018    Results for



     11:06 AM CST    this procedure



         are in the



         results



         section.

 

 POC GLUCOSE  Routine  2018    Results for



     6:41 AM CST    this procedure



         are in the



         results



         section.

 

 ZZESTIMATED GFR  Routine  2018    Results for



     4:58 AM CST    this procedure



         are in the



         results



         section.

 

 BASIC METABOLIC PANEL  Routine  2018    Results for



     4:58 AM CST    this procedure



         are in the



         results



         section.

 

 HC COMPLETE BLD COUNT W/AUTO  Routine  2018    Results for



 DIFF    4:58 AM CST    this procedure



         are in the



         results



         section.

 

 POC GLUCOSE  Routine  2018    Results for



     9:12 PM CST    this procedure



         are in the



         results



         section.

 

 ZZESTIMATED GFR  STAT  2018    Results for



     6:29 PM CST    this procedure



         are in the



         results



         section.

 

 CREATINE KINASE, TOTAL (CPK)  STAT  2018    Results for



     6:29 PM CST    this procedure



         are in the



         results



         section.

 

 HC COMPLETE BLD COUNT W/AUTO  STAT  2018    Results for



 DIFF    6:29 PM CST    this procedure



         are in the



         results



         section.

 

 BASIC METABOLIC PANEL  STAT  2018    Results for



     6:29 PM CST    this procedure



         are in the



         results



         section.

 

 CT HEAD WO CONTRAST  STAT  2018    Results for



     6:19 PM CST    this procedure



         are in the



         results



         section.

 

 ECG 12-LEAD  STAT  2018    Results for



     6:18 PM CST    this procedure



         are in the



         results



         section.

 

 ECG ED PRELIMINARY  Routine  2018    Results for



 INTERPRETATION    5:38 PM CST    this procedure



         are in the



         results



         section.

 

 POC GLUCOSE  Routine  2018    Results for



     10:56 AM CST    this procedure



         are in the



         results



         section.

 

 POC GLUCOSE  Routine  2018    Results for



     7:34 AM CST    this procedure



         are in the



         results



         section.

 

 POC GLUCOSE  Routine  2018    Results for



     8:37 PM CST    this procedure



         are in the



         results



         section.

 

 POC GLUCOSE  Routine  2018    Results for



     4:48 PM CST    this procedure



         are in the



         results



         section.

 

 SURGICAL PATHOLOGY REQUEST  Routine  2018    Results for



     1:28 PM CST    this procedure



         are in the



         results



         section.

 

 ESOPHAGOGASTRODUODENOSCOPY (EGD)    2018  Abdominal pain,  



     12:15 PM CST  unspecified  



       abdominal  



       location



  



       Intractable  



       vomiting with  



       nausea,  



       unspecified  



       vomiting type  

 

 POC GLUCOSE  Routine  2018    Results for



     6:56 AM CST    this procedure



         are in the



         results



         section.

 

 ZZESTIMATED GFR  Routine  2018    Results for



     6:03 AM CST    this procedure



         are in the



         results



         section.

 

 BASIC METABOLIC PANEL  Routine  2018    Results for



     6:03 AM CST    this procedure



         are in the



         results



         section.

 

 POC GLUCOSE  Routine  2018    Results for



     9:04 PM CST    this procedure



         are in the



         results



         section.

 

 POC GLUCOSE  Routine  2018    Results for



     3:39 PM CST    this procedure



         are in the



         results



         section.

 

 POC GLUCOSE  Routine  2018    Results for



     11:07 AM CST    this procedure



         are in the



         results



         section.

 

 POC GLUCOSE  Routine  2018    Results for



     7:12 AM CST    this procedure



         are in the



         results



         section.

 

 ZZESTIMATED GFR  Routine  2018    Results for



     5:59 AM CST    this procedure



         are in the



         results



         section.

 

 MAGNESIUM LEVEL  Routine  2018    Results for



     5:59 AM CST    this procedure



         are in the



         results



         section.

 

 COMPREHENSIVE METABOLIC PANEL  Routine  2018    Results for



     5:59 AM CST    this procedure



         are in the



         results



         section.

 

 CBC WITH PLATELET AND  Routine  2018    Results for



 DIFFERENTIAL    5:59 AM CST    this procedure



         are in the



         results



         section.

 

 POC GLUCOSE  Routine  2018    Results for



     7:54 PM CST    this procedure



         are in the



         results



         section.

 

 ECG ED PRELIMINARY  Routine  2018    Results for



 INTERPRETATION    5:59 PM CST    this procedure



         are in the



         results



         section.

 

 POC GLUCOSE  Routine  2018    Results for



     3:55 PM CST    this procedure



         are in the



         results



         section.

 

 POC GLUCOSE  Routine  2018    Results for



     11:54 AM CST    this procedure



         are in the



         results



         section.

 

 POC GLUCOSE  Routine  2018    Results for



     6:26 AM CST    this procedure



         are in the



         results



         section.

 

 ZZESTIMATED GFR  Routine  2018    Results for



     6:23 AM CST    this procedure



         are in the



         results



         section.

 

 COMPREHENSIVE METABOLIC PANEL  Routine  2018    Results for



     6:23 AM CST    this procedure



         are in the



         results



         section.

 

 HC COMPLETE BLD COUNT W/AUTO  Routine  2018    Results for



 DIFF    6:23 AM CST    this procedure



         are in the



         results



         section.

 

 POC GLUCOSE  Routine  2017    Results for



     8:54 PM CST    this procedure



         are in the



         results



         section.

 

 POC GLUCOSE  Routine  2017    Results for



     4:46 PM CST    this procedure



         are in the



         results



         section.

 

 POC GLUCOSE  Routine  2017    Results for



     11:33 AM CST    this procedure



         are in the



         results



         section.

 

 ZZESTIMATED GFR  Routine  2017    Results for



     7:05 AM CST    this procedure



         are in the



         results



         section.

 

 COMPREHENSIVE METABOLIC PANEL  Routine  2017    Results for



     7:05 AM CST    this procedure



         are in the



         results



         section.

 

 HC COMPLETE BLD COUNT W/AUTO  Routine  2017    Results for



 DIFF    7:05 AM CST    this procedure



         are in the



         results



         section.

 

 POC GLUCOSE  Routine  2017    Results for



     6:23 AM CST    this procedure



         are in the



         results



         section.

 

 POC GLUCOSE  Routine  2017    Results for



     8:30 PM CST    this procedure



         are in the



         results



         section.

 

 XR CHEST 2 VW  Routine  2017    Results for



     7:47 PM CST    this procedure



         are in the



         results



         section.

 

 ZZESTIMATED GFR  Routine  2017    Results for



     7:07 PM CST    this procedure



         are in the



         results



         section.

 

 COMPREHENSIVE METABOLIC PANEL  Routine  2017    Results for



     7:07 PM CST    this procedure



         are in the



         results



         section.

 

 HC COMPLETE BLD COUNT W/AUTO  Routine  2017    Results for



 DIFF    7:07 PM CST    this procedure



         are in the



         results



         section.

 

 POC GLUCOSE  Routine  2017    Results for



     3:58 PM CST    this procedure



         are in the



         results



         section.

 

 POC GLUCOSE  Routine  2017    Results for



     12:15 PM CST    this procedure



         are in the



         results



         section.

 

 POC GLUCOSE  Routine  2017    Results for



     7:03 AM CST    this procedure



         are in the



         results



         section.



after 2017



Results

Urinalysis screen and microscopy, with reflex to culture (2018  9:16 AM 
CDT)





 Component  Value  Ref Range  Performed At

 

 Specimen site  Clean catch    [facility] DEPARTMENT OF PATHOLOGY



       AND GENOMIC MEDICINE

 

 Color, UA  Yellow    [facility] DEPARTMENT OF PATHOLOGY



       AND GENOMIC MEDICINE

 

 Appearance, UA  Clear    [facility] DEPARTMENT OF PATHOLOGY



       AND GENOMIC MEDICINE

 

 Specific gravity, UA  1.011  1.001 - 1.035  [facility] DEPARTMENT OF PATHOLOGY



       AND GENOMIC MEDICINE

 

 pH, UA  5.0  5.0 - 8.5  [facility] DEPARTMENT OF PATHOLOGY



       AND GENOMIC MEDICINE

 

 Protein, UA  Negative  Negative  [facility] DEPARTMENT OF PATHOLOGY



       AND GENOMIC MEDICINE

 

 Glucose, UA  Negative  Negative  [facility] DEPARTMENT OF PATHOLOGY



       AND GENOMIC MEDICINE

 

 Ketones, UA  Negative  Negative  [facility] DEPARTMENT OF PATHOLOGY



       AND GENOMIC MEDICINE

 

 Bilirubin, UA  Negative  Negative  [facility] DEPARTMENT OF PATHOLOGY



       AND GENOMIC MEDICINE

 

 Blood, UA  Negative  Negative  [facility] DEPARTMENT OF PATHOLOGY



       AND GENOMIC MEDICINE

 

 Nitrite, UA  Negative  Negative  [facility] DEPARTMENT OF PATHOLOGY



       AND GENOMIC MEDICINE

 

 Urobilinogen, UA  2.0 (A)  <2.0  [facility] DEPARTMENT OF PATHOLOGY



       AND GENOMIC MEDICINE

 

 Leukocyte esterase, UA  Negative  Negative  [facility] DEPARTMENT OF PATHOLOGY



       AND GENOMIC MEDICINE

 

 Epithelial cells, UA  Few  /HPF  [facility] DEPARTMENT OF PATHOLOGY



       AND GENOMIC MEDICINE

 

 WBC, UA  1  0 - 1 /HPF  [facility] DEPARTMENT OF PATHOLOGY



       AND GENOMIC MEDICINE

 

 RBC, UA  <1  0 - 5 /HPF  [facility] DEPARTMENT OF PATHOLOGY



       AND GENOMIC MEDICINE

 

 Bacteria, UA  None seen  None seen  [facility] DEPARTMENT OF PATHOLOGY



       AND GENOMIC MEDICINE

 

 Yeast, UA  None seen    [facility] DEPARTMENT OF PATHOLOGY



       AND GENOMIC MEDICINE

 

 Yeast with pseudohyphae, UA  None seen    [facility] DEPARTMENT OF PATHOLOGY



       AND GENOMIC MEDICINE









 Specimen

 

 Urine









 Performing Organization  Address  City[facility]  Phone Number

 

 [facility] DEPARTMENT OF PATHOLOGY AND  [address] [place] [address]  



 [facility]      



Urine drugs of abuse screen (2018  9:16 AM CDT)





 Component  Value  Ref Range  Performed At

 

 Amphetamine screen, urine  NEG    [facility] DEPARTMENT OF



       PATHOLOGY AND GENOMIC



       MEDICINE

 

 Barbiturate screen, urine  NEG    [facility] DEPARTMENT OF



       PATHOLOGY AND GENOMIC



       MEDICINE

 

 Benzodiazepine screen,  POS    [facility] DEPARTMENT OF



 urine      PATHOLOGY AND GENOMIC



       MEDICINE

 

 Cocaine screen, urine  NEG    [facility] DEPARTMENT OF



       PATHOLOGY AND GENOMIC



       MEDICINE

 

 Methadone screen, urine  NEG    [facility] DEPARTMENT OF



       PATHOLOGY AND GENOMIC



       MEDICINE

 

 Opiates screen, urine  POS    [facility] DEPARTMENT OF



       PATHOLOGY AND GENOMIC



       MEDICINE

 

 Phencyclidine screen, urine  NEG    [facility] DEPARTMENT OF



       PATHOLOGY AND GENOMIC



       MEDICINE

 

 Cannabinoid screen, urine  POS    [facility] DEPARTMENT OF



   Comment:    PATHOLOGY AND GENOMIC



   Drug screen minimum concentration of detectability    MEDICINE



   Hqhvizrazeys8809 ng/mL    



   Vatwcfhclhgooqsw2899 ng/mL    



   Barbiturates 300 ng/mL    



   Hqbpofxqjirefit715 ng/mL    



   Enjbwsf412 ng/mL    



   Yyxmzdcgj480 ng/mL    



   Jajcvdm335 ng/mL    



   Phencyclidine 25 ng/mL    



   Yklyuimojore72 ng/mL    



   Hwjvvztapx6582 ng/mL    



   Negative test results indicates presumptive evidence of lack    



   of clinically significant drug concentration in this urine    



   specimen.    



   Positive test results are presumptive evidence of clinically    



   significant drug concentration in this urine specimen.    



   Testing performed for medical purposes only.    



       









 Specimen

 

 Urine









 Performing Organization  Address  City/[facility]ode  Phone Number

 

 [facility] DEPARTMENT OF PATHOLOGY AND  [address]  [place] [address]  



 [facility] MEDICINE      



Urine culture (2018  9:16 AM CDT)





 Component  Value  Ref Range  Performed At

 

 Urine culture  SEE COMMENTComment: Bacteriuria    [facility] DEPARTMENT OF PATHOLOGY



   screen negative.    AND GENOMIC MEDICINE









 Specimen

 

 Urine









 Performing Organization  Address  City/[facility]/Zipcode  Phone Number

 

 [facility] DEPARTMENT OF PATHOLOGY AND  [address] [address]  



 [facility] MEDICINE      



Estimated GFR (2018  9:02 AM CDT)Only the most recent of12 resultswithin 
the time period is included.





 Component  Value  Ref Range  Performed At

 

 GFR Non Af Amer  49 (A)  mL/min/1.73 m2  [facility] DEPARTMENT OF



       PATHOLOGY AND GENOMIC



       MEDICINE

 

 GFR Af Amer  59 (A)  mL/min/1.73 m2  [facility] DEPARTMENT OF



   Comment:    PATHOLOGY AND GENOMIC



   Chronic kidney disease: <60 mL/min/1.73m2    MEDICINE



   Kidney failure: <15 mL/min/1.73m2    



   The estimated GFR is calculated from the IDMS-traceable Modification of Diet
    



   in Renal Disease Equation. The accuracy of the calculation is poor when the 
   



   creatinine is normal. Calculated values >90 mL/min/1.73m2 are not reported. 
   



   This equation has not been validated in children (<18 years), pregnant    



   women, the elderly (>70 years), or ethnic groups other than Caucasians and  
  



    Americans.    



       









 Specimen

 

 Plasma specimen









 Performing Organization  Address  City/State/Zipcode  Phone Number

 

 [facility] DEPARTMENT OF PATHOLOGY AND  [facility]  [place] [address]  



 [facility]      



CBC with platelet and differential (2018  9:02 AM CDT)Only the most 
recent of11 resultswithin the time period is included.





 Component  Value  Ref Range  Performed At

 

 WBC  9.1  4.2 - 11.0 k/uL  [facility] DEPARTMENT OF PATHOLOGY AND



       GENOMIC MEDICINE

 

 RBC  4.57  4.04 - 5.86 m/uL  [facility] DEPARTMENT OF PATHOLOGY AND



       GENOMIC MEDICINE

 

 HGB  14.7  13.0 - 17.3 g/dL  [facility] DEPARTMENT OF PATHOLOGY AND



       GENOMIC MEDICINE

 

 HCT  44.6  34.0 - 45.0 %  [facility] DEPARTMENT OF PATHOLOGY AND



       GENOMIC MEDICINE

 

 MCV  97.6  80.0 - 98.0 fL  [facility] DEPARTMENT OF PATHOLOGY AND



       GENOMIC MEDICINE

 

 MCH  32.2  27.0 - 34.0 pg  [facility] DEPARTMENT OF PATHOLOGY AND



       GENOMIC MEDICINE

 

 MCHC  33.0  31.5 - 36.5 g/dL  [facility] DEPARTMENT OF PATHOLOGY AND



       GENOMIC MEDICINE

 

 RDW - SD  55.1 (H)  37.0 - 51.0 fL  [facility] DEPARTMENT OF PATHOLOGY AND



       GENOMIC MEDICINE

 

 MPV  10.1  7.4 - 10.4 fL  [facility] DEPARTMENT OF PATHOLOGY AND



       GENOMIC MEDICINE

 

 Platelet count  124 (L)  150 - 400 k/uL  [facility] DEPARTMENT OF PATHOLOGY AND



       GENOMIC MEDICINE

 

 Nucleated RBC  0.00  /100 WBC  [facility] DEPARTMENT OF PATHOLOGY AND



       GENOMIC MEDICINE

 

 Neutrophils  80.4 (H)  36.0 - 66.0 %  [facility] DEPARTMENT OF PATHOLOGY AND



       GENOMIC MEDICINE

 

 Lymphocytes  11.6 (L)  24.0 - 44.0 %  [facility] DEPARTMENT OF PATHOLOGY AND



       GENOMIC MEDICINE

 

 Monocytes  5.8  0.0 - 6.0 %  [facility] DEPARTMENT OF PATHOLOGY AND



       GENOMIC MEDICINE

 

 Eosinophils  1.3  0.0 - 6.0 %  [facility] DEPARTMENT OF PATHOLOGY AND



       GENOMIC MEDICINE

 

 Basophils  0.6  0.0 - 1.2 %  [facility] DEPARTMENT OF PATHOLOGY AND



       GENOMIC MEDICINE

 

 Immature granulocytes  0.3  0.0 - 1.0 %  [facility] DEPARTMENT OF PATHOLOGY AND



       GENOMIC MEDICINE









 Specimen

 

 Blood









 Performing Organization  Address  City/[facility] Mex[facility]ode  Phone Number

 

 [facility] DEPARTMENT OF PATHOLOGY AND  [address] [place] [address] [address]  [address]      



Thyroid stimulating hormone (2018  9:02 AM CDT)





 Component  Value  Ref Range  Performed At

 

 TSH  2.60  0.38 - 4.82 uIU/mL  [facility] DEPARTMENT OF PATHOLOGY AND GENOMIC 
MEDICINE









 Specimen

 

 Plasma specimen









 Performing Organization  Address  City/[facility] Mex[facility]ode  Phone Number

 

 [facility] DEPARTMENT OF PATHOLOGY AND  [address] [place] [address] [address]  [address]      



T4, free (2018  9:02 AM CDT)





 Component  Value  Ref Range  Performed At

 

 T4, free  1.00  0.70 - 1.61 ng/dL  [facility] DEPARTMENT OF PATHOLOGY AND GENOMIC 
MEDICINE









 Specimen

 

 Plasma specimen









 Performing Organization  Address  City/[facility] [place]  Phone Number

 

 [facility] DEPARTMENT OF PATHOLOGY AND  [address] [place] [address] [address]  [address]      



Alcohol level, blood (2018  9:02 AM CDT)





 Component  Value  Ref Range  Performed At

 

 Alcohol  None Detected  mg/dL  [facility] DEPARTMENT OF PATHOLOGY



   Comment:    AND [facility] MEDICINE



   NormalNone Detected    



   Legal Intoxication in [place] 80 mg/dL (0.08%)    



   Toxic Concentration 200 mg/dL (0.2%)    



   Potentially Fatal 350-500 mg/dL (0.35%-0.5%)    



       

 

 Alcohol percent  None Detected  %  [facility] DEPARTMENT OF PATHOLOGY



       AND GENOMIC MEDICINE









 Specimen

 

 Blood









 Performing Organization  Address  City/[facility] Mex[facility]ocode  Phone Number

 

 [facility] DEPARTMENT OF PATHOLOGY AND  [address] [place] [address] [address]  [address]      



Acetaminophen level (2018  9:02 AM CDT)





 Component  Value  Ref Range  Performed At

 

 Acetaminophen level  <2.0 (L)  10.0 - 20.0 ug/mL  [facility] DEPARTMENT OF



   Comment:    PATHOLOGY AND GENOMIC



   Therapeutic 10-30 ug/mL
    MEDICINE



   Possible Toxicity 150-200 ug/mL
    



   Probable Toxicity >200 ug/mL    



       









 Specimen

 

 Blood









 Performing Organization  Address  City/[facility] Mex[facility]ode  Phone Number

 

 [facility] DEPARTMENT OF PATHOLOGY AND  [place] [address]  



 GENOMIC MEDICINE      



Salicylate level (2018  9:02 AM CDT)





 Component  Value  Ref Range  Performed At

 

 Salicylate  7.0  mg/dL  [facility] DEPARTMENT OF PATHOLOGY AND GENOMIC



   Comment:    MEDICINE



   Therapeutic Range:    



    5 - 30 mg/dL    



       









 Specimen

 

 Blood









 Performing Organization  Address  City/[facility] Mex[facility]ocode  Phone Number

 

 [facility] DEPARTMENT OF PATHOLOGY AND  [place] [address]  



 [facility]      



Comprehensive metabolic panel (2018  9:02 AM CDT)Only the most recent of7 
resultswithin the time period is included.





 Component  Value  Ref Range  Performed At

 

 Sodium  140  135 - 150 mEq/L  [facility] DEPARTMENT OF PATHOLOGY AND



       GENOMIC MEDICINE

 

 Potassium  3.8  3.5 - 5.0 mEq/L  [facility] DEPARTMENT OF PATHOLOGY AND



       GENOMIC MEDICINE

 

 Chloride  109  100 - 109 mEq/L  [facility] DEPARTMENT OF PATHOLOGY AND



       GENOMIC MEDICINE

 

 CO2  24  24 - 32 mmol/L  [facility] DEPARTMENT OF PATHOLOGY AND



       GENOMIC MEDICINE

 

 Anion gap  7@ANIO  7 - 15 mEq/L  [facility] DEPARTMENT OF PATHOLOGY AND



       GENOMIC MEDICINE

 

 BUN  14  7 - 18 mg/dL  [facility] DEPARTMENT OF PATHOLOGY AND



       GENOMIC MEDICINE

 

 Creatinine  1.5  0.8 - 1.5 mg/dL  [facility] DEPARTMENT OF PATHOLOGY AND



       GENOMIC MEDICINE

 

 Glucose  103 (H)  65 - 100 mg/dL  [facility] DEPARTMENT OF PATHOLOGY AND



       GENOMIC MEDICINE

 

 Calcium  8.6  8.6 - 10.7 mg/dL  [facility] DEPARTMENT OF PATHOLOGY AND



       GENOMIC MEDICINE

 

 Protein  7.9  6.3 - 8.2 g/dL  [facility] DEPARTMENT OF PATHOLOGY AND



       GENOMIC MEDICINE

 

 Albumin  3.4  3.2 - 5.0 g/dL  [facility] DEPARTMENT OF PATHOLOGY AND



       GENOMIC MEDICINE

 

 A/G ratio  0.8  0.7 - 3.8  [facility] DEPARTMENT OF PATHOLOGY AND



       GENOMIC MEDICINE

 

 Alkaline phosphatase  153 (H)  30 - 120 U/L  [facility] DEPARTMENT OF PATHOLOGY AND



       GENOMIC MEDICINE

 

 AST  21  15 - 37 U/L  [facility] DEPARTMENT OF PATHOLOGY AND



       GENOMIC MEDICINE

 

 ALT  17 (L)  30 - 65 U/L  [facility] DEPARTMENT OF PATHOLOGY AND



       GENOMIC MEDICINE

 

 Total bilirubin  0.2  0.2 - 1.2 mg/dL  [facility] DEPARTMENT OF PATHOLOGY AND



       GENOMIC MEDICINE









 Specimen

 

 Plasma specimen









 Performing Organization  Address  City/[facility] Mex[facility]ode  Phone Number

 

 [facility] DEPARTMENT OF PATHOLOGY AND  [place] [address]  



 [facility]      



CT Abdomen Pelvis Wo Contrast (2018  3:27 AM CDT)





 Narrative  Performed At

 

 EXAMINATION:CT ABDOMEN PELVIS WO CONTRAST



   RADIANT



  



  



 CLINICAL HISTORY:R flank pain



  



  



  



 TECHNIQUE: Multiple axial images of the abdomen and pelvis were obtained  



 without intravenous administration of iodinated contrast. Sagittal and  



 coronal computerized reformatted images were also obtained. The lack of  



 intravenous contrast reduces the 



  



 sensitivity of detecting solid organ disease.



  



  



  



 CT imaging was performed with iterative reconstruction technique and/or  



 automated exposure control to reduce radiation dose.



  



  



  



 COMPARISON:2017



  



  



  



  



  



 IMPRESSION:



  



  



  



 Subsegmental atelectasis is seen of the lung bases.



  



  



  



 Patient is status post cholecystectomy. Pancreas and adrenal glands are  



 normal.



  



  



  



 Cirrhotic liver.



  



  



  



 Spleen is enlarged measuring 14.5 cm in length. 



  



  



  



 Kidneys, ureters and bladder are normal.



  



  



  



 No free intraperitoneal fluid or air. Atherosclerotic vascular  



 calcifications are seen. Some paraesophageal varices are seen,  



 compatible with portal hypertension.



  



  



  



 Diverticulosis is seen without diverticulitis. Appendix cannot be seen.  



 No gastrointestinal tract obstruction.



  



  



  



 No acute osseous abnormalities. Postoperative appearance of the lower  



 lumbar spine. A device is seen of the right gluteal region, with lead  



 entering the thoracic spinal canal.



  



  



  



 CONCLUSION:



  



  



  



 Cirrhotic liver with small paraesophageal varices, compatible with  



 portal hypertension.



  



  



  



  



  



  



  



  



  



  



  



  



  



  



  



  



  



  



  



  



  



 [facility]-[id number]



  



   









 Procedure Note

 

  Interface, Radiology Results Incoming - 2018  4:08 AM CDT



EXAMINATION:  CT ABDOMEN PELVIS WO CONTRAST



 



 CLINICAL HISTORY:  R flank pain



 



 TECHNIQUE: Multiple axial images of the abdomen and pelvis were obtained 
without intravenous administration of iodinated contrast. Sagittal and coronal 
computerized reformatted images were also obtained. The lack of intravenous 
contrast reduces the



 sensitivity of detecting solid organ disease.



 



 CT imaging was performed with iterative reconstruction technique and/or 
automated exposure control to reduce radiation dose.



 



 COMPARISON:  2017



 



 



 IMPRESSION:



 



 Subsegmental atelectasis is seen of the lung bases.



 



 Patient is status post cholecystectomy. Pancreas and adrenal glands are normal.



 



 Cirrhotic liver.



 



 Spleen is enlarged measuring 14.5 cm in length.



 



 Kidneys, ureters and bladder are normal.



 



 No free intraperitoneal fluid or air. Atherosclerotic vascular calcifications 
are seen. Some paraesophageal varices are seen, compatible with portal 
hypertension.



 



 Diverticulosis is seen without diverticulitis. Appendix cannot be seen. No 
gastrointestinal tract obstruction.



 



 No acute osseous abnormalities. Postoperative appearance of the lower lumbar 
spine. A device is seen of the right gluteal region, with lead entering the 
thoracic spinal canal.



 



 CONCLUSION:



 



 Cirrhotic liver with small paraesophageal varices, compatible with portal 
hypertension.



 



 



 



 



 



 



 



 



 



 



 [facility]-[id number]









 Performing Organization  Address  City/State/Zipcode  Phone Number

 

  [facility]  [address] [place] [address]  



Smear review (2018  3:13 AM CDT)Only the most recent of3 resultswithin 
the time period is included.





 Component  Value  Ref Range  Performed At

 

 Smear review  Smear Reviewed    [facility] DEPARTMENT OF PATHOLOGY AND GENOMIC



       MEDICINE









 Performing Organization  Address  City/[facility] Mex[facility]ode  Phone Number

 

 [facility] DEPARTMENT OF PATHOLOGY AND  [address] [place] [address].  [place], [place] [address] [place] [address]      



Prothrombin time with INR (2018  3:13 AM CDT)Only the most recent of2 
resultswithin the time period is included.





 Component  Value  Ref Range  Performed At

 

 Prothrombin time  14.5  12.0 - 15.0 sec  [facility] DEPARTMENT OF



       PATHOLOGY AND GENOMIC



       MEDICINE

 

 INR  1.11  0.92 - 1.12  [facility] DEPARTMENT OF



   Comment:    PATHOLOGY AND GENOMIC



   For patients on anticoagulant therapy, reference ranges below:    MEDICINE



   Indication:INR 
Value    



   Treatment of Venous Thrombosis, 2.0-3.0    



   pulmonary emboli, or prophylaxis    



   of a venous thrombosis, or systemic    



   emboli.    



   High dose, high risk patients 3.0-4.5    



   with mechanical valves.    



   NOTE:INR values over 3.0 are sometimes associated with    



   gastrointestinal hemorrhage, especially values over 4.0.    



       









 Specimen

 

 Blood









 Performing Organization  Address  City/State/[facility] [place]  Phone Number

 

 [facility] DEPARTMENT OF PATHOLOGY AND  [address] [place] [address].  [place], [place] [address] [place] [address]      



Lipase level (2018  3:13 AM CDT)Only the most recent of2 resultswithin 
the time period is included.





 Component  Value  Ref Range  Performed At

 

 Lipase  129  65 - 230 U/L  [facility] DEPARTMENT OF PATHOLOGY AND GENOMIC MEDICINE









 Specimen

 

 Plasma specimen









 Performing Organization  Address  City/[facility]/Zipcode  Phone Number

 

 [facility] DEPARTMENT OF PATHOLOGY AND  [address] [facility]  [place], [place] [address] [place] [address]      



ECG 12 lead (2018  2:43 AM CDT)Only the most recent of2 resultswithin the 
time period is included.





 Component  Value  Ref Range  Performed At

 

 Ventricular rate  77    [facility]

 

 Atrial rate  77    [facility]

 

 IN interval  144    [facility]

 

 QRSD interval  94    [facility]

 

 QT interval  418    [facility]

 

 QTC interval  473    [facility]

 

 P axis 1  32    [facility]

 

 QRS axis 1  94    [facility]

 

 T wave axis  85    [facility]

 

 EKG impression  Normal sinus rhythm-Rightward axis-Borderline    [facility]



   ECG-In automated comparison with ECG of    



   2018 18:18,-No significant change was    



   found-Electronically Signed By Missy Meneses MD    



   ([id number]) on 5/10/2018 7:43:28 AM    









 Performing Organization  Address  City/State/Zipcode  Phone Number

 

 [facility] MUSE  [address] [place] [address]  



ECG ED Preliminary Interpretation - NOT AN ORDER (2018  2:07 AM CDT)Only 
the most recent of3 resultswithin the time period is included.





 Narrative  Performed At

 

 Raiza Juarez MD 20183:47 AM



  



 ECG ED Preliminary Interpretation - Not an Order



  



 Performed by: RAIZA JUAREZ



  



 Authorized by: RAIZA JUAREZ 



  



  



  



 ECG reviewed by ED Physician in the absence of a cardiologist: yes



  



 Previous ECG: 



  



 Previous ECG:Compared to current



  



 Comparison ECG info:18



  



 Similarity:No change



  



 Interpretation: 



  



 Interpretation: normal



  



 Rate: 



  



 ECG rate:77



  



 ECG rate assessment: normal



  



 Rhythm: 



  



 Rhythm: sinus rhythm



  



 QRS: 



  



 QRS axis:Right



  



 Comments: 



  



  Normal sinus rhythm. Right axis deviation.  



PET/CT Skull Base To Mid Thigh (2018  1:45 PM CDT)





 Narrative  Performed At

 

 EXAMINATION:



  [facility]



 PET CT SKULL BASE TO MID THIGH



  



  



  



 Date and place of service: 3/26/2018 10:00 AM at [facility]



  



  



  



 DOSE: 11.8 mCi of 88-Ievsar-4-Deoxyglucose (FDG) was injected  



 intravenously into left wrist done infiltration at a serum glucose level  



 of90 mg/dl.



  



  



  



 TECHNIQUE:



  



  



  



 PROCEDURE: Following injection of 89-Cedqte-6-Deoxyglucose (FDG) and a  



 standard uptake., The patient was imaged on a [employer] whole body PET CT  



 scanner. Multiple 6 minute bed position acquisitions were obtained along  



 the length of the patient's body from 



  



 approximately the Skull base to the mid thighs without administration of  



 intravenous or oral contrast. The software fused PET/CT images as well  



 as the PET and CT images could be reviewed separately.



  



  



  



 The dose length product for this procedure was 408 mGy-cm.



  



  



  



 CT imaging was performed with iterative reconstruction technique and/or  



 automated exposure control to reduce radiation dose



  



  



  



 COMPARISON:



  



 2017 done at [facility]. 



  



  



  



 CLINICAL HISTORY:



  



 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 Status  



 post right lower lobectomy . No history of recent chemotherapy  



 or radiation therapy. For restaging.



  



  



  



 FINDINGS:



  



  



  



 [facility]  



 with the CAT scan from the Skull base to the mid thighs to the same time  



 as the PET scan.



  



  



  



 Physiologic uptake is present in the components of Waldeyer's ring in  



 the lateral pharynx. Vocal cord uptake is noted of doubtful  



 significance. Mild tracer uptake is seen in the right and left axillary  



 lymph node with the degree of uptake less than 2.5 



  



 and is of doubtful significance.



  



  



  



 No abnormal radiotracer uptake is demonstrated in the visualized brain,  



 neck, chest, abdomen, pelvis, spine, and visualized proximal upper and  



 lower extremities.



  



  



  



 No abnormal radiotracer uptake is demonstrated in the brain. However,  



 normal intense metabolic uptake of radiotracer in the brain can mask the  



 presence of lesions. Brain lesions are generally better evaluated with  



 an MRI without and with intravenous 



  



 gadolinium, if clinically indicated.



  



  



  



 Physiologic excretion of radiotracer is demonstrated into the kidneys,  



 ureters, and bladder. Physiologic excretion of radiotracer is also  



 demonstrated into the small and large bowel.



  



  



  



 Interrogation of the CT scan of the chest in lung window settings  



 demonstrates the previously noted masses in each lower lobe are not  



 identified on the current examination consistent with right lower  



 lobectomy on the right and posttreatment of the left 



  



 lung lesion.. Dependent atelectasis is noted in the posterior lung bases  



 bilaterally.



  



  



  



 Interrogation of the CT scan of the whole body with soft tissue window  



 settings demonstratessignificant dilatation of the pulmonary outflow  



 tract consistent with pulmonary arterial hypertension. Coronary artery  



 calcifications are present.



  



  



  



 An irregular contoured liver is noted consistent with cirrhosis.  



 Calcified atheromatous plaque formation is seen in the aortoiliac  



 system.



  



  



  



 Interrogation of the CT scan of the whole body and bone window settings  



 demonstrates no osteolytic or osteoblastic lesions. Patient is had a  



 spinal fusion in the lower lumbar spine.



  



  



  



 IMPRESSION:



  



 No evidence of metabolically active tumor.



  



  



  



 Otherwise unchanged .Pulmonary arterial hypertension as before.  



 Cirrhosis as before.



  



  



  



 [facility]-[id number]



  



   









 Procedure Note

 

 Hm Interface, Radiology Results Incoming - 2018  4:30 PM CDT



EXAMINATION:



 PET CT SKULL BASE TO MID THIGH



 



 Date and place of service: 3/26/2018 10:00 AM at [facility]



 



 DOSE: 11.8 mCi of 10-Xhwara-3-Deoxyglucose (FDG) was injected intravenously 
into left wrist done infiltration at a serum glucose level of  90 mg/dl.



 



 TECHNIQUE:



 



 PROCEDURE: Following injection of 81-Yqoanc-7-Deoxyglucose (FDG) and a 
standard uptake., The patient was imaged on a [employer] whole body PET CT scanner. 
Multiple 6 minute bed position acquisitions were obtained along the length of 
the patient's body from



 approximately the Skull base to the mid thighs without administration of 
intravenous or oral contrast. The software fused PET/CT images as well as the 
PET and CT images could be reviewed separately.



 



 The dose length product for this procedure was 408 mGy-cm.



 



 CT imaging was performed with iterative reconstruction technique and/or 
automated exposure control to reduce radiation dose



 



 COMPARISON:



 2017 done at [facility].



 



 CLINICAL HISTORY:



 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 Status post 
right lower lobectomy . No history of recent chemotherapy or 
radiation therapy. For restaging.



 



 FINDINGS:



 



 Software fusion was performed of the nuclear medicine  FDG PET scan with the 
CAT scan from the Skull base to the mid thighs to the same time as the PET scan.



 



 Physiologic uptake is present in the components of Waldeyer's ring in the 
lateral pharynx. Vocal cord uptake is noted of doubtful significance. Mild 
tracer uptake is seen in the right and left axillary lymph node with the



 degree of uptake less than 2.5



 and is of doubtful significance.



 



 No abnormal radiotracer uptake is demonstrated in the visualized brain, neck, 
chest, abdomen, pelvis, spine, and visualized proximal upper and lower 
extremities.



 



 No abnormal radiotracer uptake is demonstrated in the brain. However, normal 
intense metabolic uptake of radiotracer in the brain can mask the presence of 
lesions. Brain lesions are generally better evaluated with an MRI without and 
with intravenous



 gadolinium, if clinically indicated.



 



 Physiologic excretion of radiotracer is demonstrated into the kidneys, ureters
, and bladder. Physiologic excretion of radiotracer is also demonstrated into 
the small and large bowel.



 



 Interrogation of the CT scan of the chest in lung window settings demonstrates 
the previously noted masses in each lower lobe are not identified on the 
current examination consistent with right lower



 lobectomy on the right and posttreatment of the left



 lung lesion.. Dependent atelectasis is noted in the posterior lung bases 
bilaterally.



 



 Interrogation of the CT scan of the whole body with soft tissue window 
settings demonstrates  significant dilatation of the pulmonary outflow tract 
consistent with pulmonary arterial hypertension. Coronary artery calcifications 
are present.



 



 An irregular contoured liver is noted consistent with cirrhosis. Calcified 
atheromatous plaque formation is seen in the aortoiliac system.



 



 Interrogation of the CT scan of the whole body and bone window settings 
demonstrates no osteolytic or osteoblastic lesions. Patient is had a spinal 
fusion in the lower lumbar spine.



 



 IMPRESSION:



 No evidence of metabolically active tumor.



 



 Otherwise unchanged .Pulmonary arterial hypertension as before. Cirrhosis as 
before.



 



 [facility]-[id number]









 Performing Organization  Address  City/[facility]/Zipcode  Phone Number

 

 [facility]  [address] [place] [address]  



POC glucose (2018 11:36 AM CDT)Only the most recent of27 resultswithin 
the time period is included.





 Component  Value  Ref Range  Performed At

 

 POC glucose  90  65 - 100 mg/dL  [facility] DEPARTMENT OF PATHOLOGY AND



   Comment:    [facility]



   Meter ID: [id number]    



   : Heber Mcduffie    



       









 Performing Organization  Address  City/[facility]/Zipcode  Phone Number

 

 [facility] DEPARTMENT OF PATHOLOGY AND  [facility] [address]  [place] [address]  



 [facility]      



Keppra (Levetiracetam) level (2018  5:13 AM CST)





 Component  Value  Ref Range  Performed At

 

 Levetiracetam  27  12 - 46 ug/mL  [facility]



   Comment:    



   INTERPRETIVE INFORMATION: Keppra (Levetiracetam)    



   Therapeutic Range:12-46 ug/mL    



   Toxic:Not well Established    



   Pharmacokinetics of levetiracetam are affected by renal function.    



   Adverse effects may include somnolence, weakness, headache and    



   vomiting.    



   Performed by [facility],
    



   [address], [place] [id number] [phone]
    



   [email or web address], Charlie Balbuena MD - Lab. Director    



       









 Specimen

 

 Serum









 Performing Organization  Address  City/[facility]  Phone Number

 

 [facility]  [address] [place] [address]  



Basic metabolic panel (2018  5:13 AM CST)Only the most recent of5 
resultswithin the time period is included.





 Component  Value  Ref Range  Performed At

 

 Sodium  134 (L)  135 - 150 mEq/L  [facility] DEPARTMENT OF



       PATHOLOGY AND GENOMIC



       MEDICINE

 

 Potassium  4.2  3.5 - 5.0 mEq/L  [facility] DEPARTMENT OF



       PATHOLOGY AND GENOMIC



       MEDICINE

 

 Chloride  102  100 - 109 mEq/L  [facility] DEPARTMENT OF



       PATHOLOGY AND GENOMIC



       MEDICINE

 

 CO2  22 (L)  24 - 32 mmol/L  [facility] DEPARTMENT OF



       PATHOLOGY AND [facility]



       MEDICINE

 

 Anion gap  10  7 - 15 mEq/L  [facility] DEPARTMENT OF



   Comment:    PATHOLOGY AND GENOMIC



   Starting from  , anion gap calculation    MEDICINE



   no longer incorporates potassium. Please note the change.    



       

 

 BUN  17  7 - 18 mg/dL  [facility] DEPARTMENT OF



       PATHOLOGY AND GENOMIC



       MEDICINE

 

 Creatinine  1.2  0.8 - 1.5 mg/dL  [facility] DEPARTMENT OF



       PATHOLOGY AND GENOMIC



       MEDICINE

 

 Glucose  139 (H)  65 - 100 mg/dL  [facility] DEPARTMENT OF



       PATHOLOGY AND GENOMIC



       MEDICINE

 

 Calcium  9.4  8.6 - 10.7 mg/dL  [facility] DEPARTMENT OF



       PATHOLOGY AND GENOMIC



       MEDICINE









 Specimen

 

 Plasma specimen









 Performing Organization  Address  City/[facility] Mex[facility]ode  Phone Number

 

 [facility] DEPARTMENT  PATHOLOGY AND  [address] [place] [address].  [place], [place] [address] [place] [address]      



Hepatic function panel (2018  8:14 PM CST)





 Component  Value  Ref Range  Performed At

 

 Albumin  3.8  3.2 - 5.0 g/dL  [facility] DEPARTMENT OF PATHOLOGY AND GENOMIC



       MEDICINE

 

 Total bilirubin  0.8  0.2 - 1.2 mg/dL  [facility] DEPARTMENT OF PATHOLOGY AND GENOMIC



       MEDICINE

 

 Bilirubin direct  0.2  0.0 - 0.4 mg/dL  [facility] DEPARTMENT OF PATHOLOGY AND 
GENOMIC



       MEDICINE

 

 Alkaline phosphatase  231 (H)  30 - 120 U/L  [facility] DEPARTMENT OF PATHOLOGY AND 
[facility]



       MEDICINE

 

 Protein  9.7 (H)  6.3 - 8.2 g/dL  [facility] DEPARTMENT OF PATHOLOGY AND GENOMIC



       MEDICINE

 

 ALT  17 (L)  30 - 65 U/L  [facility] DEPARTMENT OF PATHOLOGY AND GENOMIC



       MEDICINE

 

 AST  29  15 - 37 U/L  [facility] DEPARTMENT OF PATHOLOGY AND GENOMIC



       MEDICINE









 Specimen

 

 Plasma specimen









 Performing Organization  Address  City/[facility] Mex[facility]ode  Phone Number

 

 [facility] DEPARTMENT  PATHOLOGY AND  [address] [place] [address].  [place], [place] [id number] [place] [address]      



CT Lumbar Spine Wo Contrast (2018  5:12 PM CST)





 Narrative  Performed At

 

 EXAMINATION: CT LUMBAR SPINE WO CONTRAST



   [facility]



  



  



 CLINICAL HISTORY: pain



  



  



  



 COMPARISON: CT myelogram lumbar spine 2011



  



  



  



 TECHNIQUE: Axial noncontrast enhanced images of lumbar spine was  



 performed with coronal sagittal reconstruction algorithms. CT imaging  



 was performed with iterative reconstruction technique and/or automated  



 exposure control to reduce radiation dose.



  



  



  



  



  



 FINDINGS:



  



  



  



 There are 5 non-rib bearing lumbar type vertebrae. Status post posterior  



 spinous mentation with rods and screws and anterior interbody grafts at  



 L4-5 and L5-S1. Status post decompressive laminectomies at L4-5 and  



 L5-S1. There is partial solid interbody 



  



 osseous fusion at L5-S1, without definite solid interbody osseous fusion  



 at L4-5. There is solid osseous fusion of the posterior elements of  



 L4-S1. No evidence of hardware loosening. Hardware is well positioned.



  



  



  



 No fracture. 1 to 2 mm retrolisthesis L3 on L4. Facet alignment is  



 anatomic.



  



  



  



 Limited dilation of the visualized intracranial contents demonstrates  



 atherosclerosis of the abdominal aorta and branch vessels. No abdominal  



 mass is identified on limited assessment. Osteoarthrosis sacroiliac  



 joints with small vacuum clefts and mild 



  



 sclerosis.



  



  



  



  



  



 Axial images through the disc spaces demonstrate the following:



  



  



  



 L1-L2: Small disc bulge without significant spinal canal or neural  



 foraminal stenosis. Small Schmorl's nodes.



  



  



  



 L2-L3: Small disc bulge contributes to mild subarticular zone stenosis  



 without significant spinal canal or neural foraminal stenosis. Small  



 Schmorl's nodes.



  



  



  



 L3-L4: Small disc bulge, slight prominence of dorsal epidural fat, and  



 mild ligament flavum infolding contribute to mild spinal canal stenosis.  



 There is mild bilateral neural foraminal stenosis secondary to facet  



 arthropathy and small endplate 



  



 osteophytes.



  



  



  



 L4-L5: Status post decompressive laminectomy. Mild neural foraminal  



 stenosis secondary to small endplate osteophytes bilaterally.



  



  



  



 L5-S1: Status post decompressive laminectomy. No significant neural  



 foraminal stenosis.



  



  



  



  



  



  



  



  



  



 IMPRESSION: 



  



  



  



 Postsurgical changes L4-S1 as detailed above.



  



  



  



 No fracture. Multilevel degenerative changes of the lumbar spine,  



 notably with mild spinal canal and neural foraminal stenosis at L3-4.



  



  



  



  



  



  



  



  



  



 [id number]



  



   









 Procedure Note

 

 Hm Interface, Radiology Results Incoming - 2018  5:24 PM CST



EXAMINATION: CT LUMBAR SPINE WO CONTRAST



 



 CLINICAL HISTORY: pain



 



 COMPARISON: CT myelogram lumbar spine 2011



 



 TECHNIQUE: Axial noncontrast enhanced images of lumbar spine was performed 
with coronal sagittal reconstruction algorithms. CT imaging was performed with 
iterative reconstruction technique and/or automated exposure control to reduce 
radiation dose.



 



 



 FINDINGS:



 



 There are 5 non-rib bearing lumbar type vertebrae. Status post posterior 
spinous mentation with rods and screws and anterior interbody grafts at L4-5 
and L5-S1. Status post decompressive laminectomies at L4-5 and L5-S1.



 There is partial solid interbody



 osseous fusion at L5-S1, without definite solid interbody osseous fusion at L4-
5. There is solid osseous fusion of the posterior elements of L4-S1. No 
evidence of hardware loosening. Hardware is well positioned.



 



 No fracture. 1 to 2 mm retrolisthesis L3 on L4. Facet alignment is anatomic.



 



 Limited dilation of the visualized intracranial contents demonstrates 
atherosclerosis of the abdominal aorta and branch vessels. No abdominal mass is 
identified on limited assessment. Osteoarthrosis sacroiliac joints



 with small vacuum clefts and mild



 sclerosis.



 



 



 Axial images through the disc spaces demonstrate the following:



 



 L1-L2: Small disc bulge without significant spinal canal or neural foraminal 
stenosis. Small Schmorl's nodes.



 



 L2-L3: Small disc bulge contributes to mild subarticular zone stenosis without 
significant spinal canal or neural foraminal stenosis. Small Schmorl's nodes.



 



 L3-L4: Small disc bulge, slight prominence of dorsal epidural fat, and mild 
ligament flavum infolding contribute to mild spinal canal stenosis. There is 
mild bilateral neural foraminal stenosis secondary to facet arthropathy and 
small endplate



 osteophytes.



 



 L4-L5: Status post decompressive laminectomy. Mild neural foraminal stenosis 
secondary to small endplate osteophytes bilaterally.



 



 L5-S1: Status post decompressive laminectomy. No significant neural foraminal 
stenosis.



 



 



 



 



 IMPRESSION:



 



 Postsurgical changes L4-S1 as detailed above.



 



 No fracture. Multilevel degenerative changes of the lumbar spine, notably with 
mild spinal canal and neural foraminal stenosis at L3-4.



 



 



 



 



 [facility]-[id number]









 Performing Organization  Address  City/[facility]/Zipcode  Phone Number

 

 [facility]  [address] [place] [address]  



Partial thromboplastin time, activated (2018 12:29 PM CST)





 Component  Value  Ref Range  Performed At

 

 PTT  29.2  23.0 - 36.0 sec  [facility] DEPARTMENT OF



   Comment:    PATHOLOGY AND GENOMIC



   PTT therapeutic range for unfractionated heparin is    MEDICINE



   61.0-112.0 seconds which corresponds to Anti-Xa    



   0.3-0.7 U/ml.    



   Note:Change in Panic Value    



   The PTT Panic Value is changing from 110 sec. to 100 sec.    



   due to new instrumentation and reagents.    



   Correlation studies have been performed to validate this result.    



       









 Specimen

 

 Blood









 Performing Organization  Address  City/[facility]/Zipcode  Phone Number

 

 [facility] DEPARTMENT OF PATHOLOGY AND  [place] [address]  



 GENOMIC MEDICINE      



Creatine kinase, total (CPK) (2018  6:29 PM CST)





 Component  Value  Ref Range  Performed At

 

 Creatine kinase  134  61 - 224 U/L  [facility] DEPARTMENT OF PATHOLOGY AND GENOMIC 
MEDICINE









 Specimen

 

 Plasma specimen









 Performing Organization  Address  City/State/Zipcode  Phone Number

 

 [facility] DEPARTMENT OF PATHOLOGY AND  [place] [address]  



 [facility] MEDICINE      



CT Head Wo Contrast (2018  6:19 PM CST)





 Narrative  Performed At

 

 EXAMINATION: CT HEAD WO CONTRAST



   [facility]



  



  



 CLINICAL HISTORY: seizure syncope



  



  



  



 COMPARISON:Brain CT dated 2017



  



  



  



 TECHNIQUE: Noncontrast enhanced images of the brain were obtained from  



 the skull base to the vertex. Both soft tissue and bone reconstruction  



 algorithms were performed.CT imaging was performed with iterative  



 reconstruction technique and/or automated 



  



 exposure control to reduce radiation dose.



  



  



  



  



  



 FINDINGS:



  



  



  



 There is no evidence of acute intracranial hemorrhage, intracranial  



 mass, mass effect, midline shift or focal extra-axial collection. The  



 gray-white matter differentiation is preserved, and I do not see a  



 confluent area of acute transcortical ischemia. 



  



 No discretely hyperdense vessel is identified. 



  



  



  



 The ventricles and extra-axial spaces are mildly prominent bilaterally.  



 The ventricles have remained stable in size and configuration compared  



 to the previous head CT.



  



  



  



 Minimal foci of decreased attenuation are noted mainly along the  



 periventricular white matter at the level of the corona radiata. These  



 are nonspecific, but most commonly related to chronic microvascular  



 ischemic change.



  



  



  



 Beam hardening artifact obscures details at the level of the skull base,  



 including portions of the supraorbital frontal lobes, inferior temporal  



 lobes, brainstem and cerebellum.



  



  



  



 The mastoid air cells and middle ear cavities are clear. There is soft  



 tissue density in the external auditory canals bilaterally, nonspecific,  



 but most commonly representing cerumen.



  



  



  



 The calvarium shows no aggressive bone lesion or evidence of fracture.  



 No fluid levels are seen in the visualized paranasal sinuses.



  



  



  



 IMPRESSION:



  



  



  



 No acute intracranial abnormality identified.



  



  



  



  



  



  



  



  



  



  



  



  



  



 [id number]



  



   









 Procedure Note

 

 Hm Interface, Radiology Results Incoming - 2018  6:27 PM CST



EXAMINATION: CT HEAD WO CONTRAST



 



 CLINICAL HISTORY: seizure syncope



 



 COMPARISON:  Brain CT dated 2017



 



 TECHNIQUE: Noncontrast enhanced images of the brain were obtained from the 
skull base to the vertex. Both soft tissue and bone reconstruction algorithms 
were performed.  CT imaging was performed with iterative



 reconstruction technique and/or automated



 exposure control to reduce radiation dose.



 



 



 FINDINGS:



 



 There is no evidence of acute intracranial hemorrhage, intracranial mass, mass 
effect, midline shift or focal extra-axial collection. The gray-white matter 
differentiation is preserved, and I do not see a confluent area of



 acute transcortical ischemia.



 No discretely hyperdense vessel is identified.



 



 The ventricles and extra-axial spaces are mildly prominent bilaterally. The 
ventricles have remained stable in size and configuration compared to the 
previous head CT.



 



 Minimal foci of decreased attenuation are noted mainly along the 
periventricular white matter at the level of the corona radiata. These are 
nonspecific, but most commonly related to chronic microvascular ischemic change.



 



 Beam hardening artifact obscures details at the level of the skull base, 
including portions of the supraorbital frontal lobes, inferior temporal lobes, 
brainstem and cerebellum.



 



 The mastoid air cells and middle ear cavities are clear. There is soft tissue 
density in the external auditory canals bilaterally, nonspecific, but most 
commonly representing cerumen.



 



 The calvarium shows no aggressive bone lesion or evidence of fracture. No 
fluid levels are seen in the visualized paranasal sinuses.



 



 IMPRESSION:



 



 No acute intracranial abnormality identified.



 



 



 



 



 



 



 [facility]-[id number]









 Performing Organization  Address  City/[facility]  Phone Number

 

 [facility]  [address] [place] [address]  



Surgical pathology request (2018  1:28 PM CST)





 Component  Value  Ref Range  Performed At

 

 Case number  [id number]    [facility] DEPARTMENT OF



       PATHOLOGY AND GENOMIC



       MEDICINE

 

 Surgical pathology report  See link below for PDF    [facility] DEPARTMENT OF



   Lab Report    PATHOLOGY AND GENOMIC



       MEDICINE

 

 Result status  This is Final Report to    [facility] DEPARTMENT OF



   [id number]    PATHOLOGY AND GENOMIC



       MEDICINE









 Performing Organization  Address  City/[facility] Mex[facility]ode  Phone Number

 

 [facility] DEPARTMENT OF PATHOLOGY AND  [address] [place] [address].  [place] [address]  



 GENOMIC MEDICINE      



Magnesium level (2018  5:59 AM CST)





 Component  Value  Ref Range  Performed At

 

 Magnesium  2.00  1.60 - 2.40 mg/dL  [facility] DEPARTMENT OF PATHOLOGY AND GENOMIC 
MEDICINE









 Specimen

 

 Plasma specimen









 Performing Organization  Address  City/[facility] Mex[facility]ode  Phone Number

 

 [facility] [place] DEPARTMENT OF PATHOLOGY AND  [facility] [place] [address]  



 GENOMIC MEDICINE      



XR Chest 2 Vw (2017  7:47 PM CST)





 Narrative  Performed At

 

 EXAMINATION:[facility] CHEST 2 VW



   [facility]



  



  



 CLINICAL HISTORY:sob cough todayleukocytosisR basilar  



 crackles



  



  



  



  



  



  



  



 IMPRESSION:



  



  



  



 Aortic arch calcified. Heart size is normal. Lungs are clear of acute  



 infiltrate. There has been a prior partial pulmonary resection in the  



 right side. There are no effusions. There is an intraspinal catheter  



 present, and there is hardware in cervical 



  



 spine.



  



  



  



  



  



  



  



 [facility]-[id number]



  



   









 Procedure Note

 

 Hm Interface, Radiology Results Incoming - 2017  8:23 PM CST



EXAMINATION:  XR CHEST 2 VW



 



 CLINICAL HISTORY:  sob cough today  leukocytosis  R basilar crackles



 



 



 



 IMPRESSION:



 



 Aortic arch calcified. Heart size is normal. Lungs are clear of acute 
infiltrate. There has been a prior partial pulmonary resection in the right 
side. There are no effusions. There is an intraspinal catheter



 present, and there is hardware in cervical



 spine.



 



 



 



 [facility]-[id number]









 Performing Organization  Address  City/State/Zipcode  Phone Number

 

 [facility]  [address] [place] [address]  



after 2017



Insurance







 Payer  Benefit Plan / Group  Subscriber ID  Type  Phone  Address

 

 [employer]ET[employer] MEDICARE  [employer] MEDICARE HMO/PPO [facility]  xxxxxxxx  HMO    









 Guarantor Name  Account Type  Relation to  Date of  Phone  Billing



     Patient  Birth    Address

 

 [place]  Personal/Family  Self  1965  [phone]  [phone]        (Home)  [address]







           [place]



           [address]







Advance Directives

Patient has advance care planning documents on file. For more information, 
please contact:Luis Marie[address] [place] [address]

## 2018-12-31 NOTE — XMS REPORT
Patient Summary Document

 Created on:2018



Patient:CARLOS BARKLEY

Sex:Male

:1965

External Reference #:[id number]





Demographics







 Address  [address]



   [place] [address]

 

 Home Phone  [phone]

 

 Email Address  NONE

 

 Preferred Language  Unknown

 

 Marital Status  Unknown

 

 Anglican Affiliation  Unknown

 

 Race  Unknown

 

 Additional Race(s)  Unavailable

 

 Ethnic Group  Unknown









Author







 Organization  [facility]

 

 Address  [address]



   [place] [address]

 

 Phone  [phone]









Care Team Providers







 Name  Role  Phone

 

 BUD SAHU  Unavailable  Unavailable









Problems

This patient has no known problems.



Allergies, Adverse Reactions, Alerts

This patient has no known allergies or adverse reactions.



Medications

This patient has no known medications.



Results







 Test Description  Test Time  Test Comments  Text Results  Atomic Results  
Result Comments









 Comprehensive Metabolic Panel  2017 04:50:00    









   Test Item  Value  Reference Range  Comments









 Sodium (test code=NA)  137 mmol/L  135-145  

 

 Potassium (test code=K)  4.1 mmol/L  3.5-5.1  

 

 Chloride (test code=CL)  101 mmol/L    

 

 Carbon Dioxide (test  23 mmol/L  22-29  



 code=CO2)      

 

 Glucose (test code=GLU)  145 mg/dL    

 

 Blood Urea Nitrogen (test  9 mg/dL  6-20  



 code=BUN)      

 

 Creatinine (test code=CREAT)  1.0 mg/dL  0.7-1.2  

 

 Calcium (test code=CA)  9.6 mg/dL  8.3-10.5  

 

 Prot Total (test code=TP)  8.0 g/dL  6.4-8.3  

 

 Albumin (test code=ALB)  3.8 g/dL  3.5-5.2  

 

 A/G Ratio (test  0.9 Ratio    



 code=AGRATIO)      

 

 Globulin (test code=GLOB)  4.2  2.9-3.1  

 

 Bili Total (test code=TBIL)  0.4 mg/dL  0.1-0.9  

 

 Alk Phos (test code=APHOS)  149 U/L    

 

 AST (test code=AST)  29 U/L  1-40  

 

 ALT (test code=ALT)  22 U/L  1-41  

 

 BUN/Creatinine Ratio (test  9.0    



 code=BCRATIO)      

 

 Anion Gap (test code=AGAP)  13 mmol/L  7-16  

 

 Estimated GFR (test  >60 mL/min/1.73m2    eGFR (estimated Glomerular



 code=GFR)      Filtration Rate) is an



       estimated value,calculated



       from the patient's serum



       creatinine using the MDRD



       equation.It is NOT the



       patient's actual GFR. The eGFR



       provides a more



       clinicallyuseful measure of



       kidney disease than serum



       creatinine alone.***This



       calculation takes sex and race



       into account, if the



       informationis provided. If the



       race is not provided, and the



       patient isAfrican-American,



       multiply by 1.212. If sex is



       not provided, and thepatient



       is female, multiply by 0.742.



       Results for patients <18 years



       ofage have not been validated



       by the MDRD study and should



       be interpretedwith



       caution.eGFR Result



       Interpretation:eGFR > or=60 is



       in the Normal RangeeGFR < 60



       may mean kidney diseaseeGFR <



       15 may mean kidney



       failure***Ranges recommended



       by the National Kidney



       Foundation,http://nkdep.nih.go



       v



[id number] 04:50:00





 Test Item  Value  Reference Range  Comments

 

 Amphetamine (test code=AMPH)  Negative  Negative  For diagnostic purposes only,



       positive results should always



       be assessedin conjunctionwith



       the patient's medical



       history,clinical examination



       and otherfindings.To fulfill



       legal requirements, a more



       specific alternate chemical



       methodmust be used inorder to



       obtain a Confirmed analytical



       result. GC/MS is the preferred



       confirmatory method.

 

 Barbiturates (test code=MARQUIS)  Negative  Negative  

 

 Benzodiazepine (test  Negative  Negative  



 code=DEJAN)      

 

 Cocaine (test code=COCA)  Negative  Negative  

 

 Methadone (test code=MTHD)  Negative  Negative  

 

 Opiates (test code=OPIA)  Negative  Negative  

 

 PCP (test code=PCP)  Negative  Negative  

 

 Propoxyphene (test  Negative  Negative  



 code=PROPOX)      

 

 THC (test code=THC)  POSITIVE  Negative  



CBC with [id number] 03:53:00





 Test Item  Value  Reference Range  Comments

 

 WBC (test code=WBC)  12.5 K/cumm  4.4-10.5  

 

 RBC (test code=RBC)  4.71 M/cumm  4.10-5.70  

 

 Hemoglobin (test code=HGB)  15.0 gm/dL  13.4-17.4  

 

 Hematocrit (test code=HCT)  44.0 %  38.7-52.0  

 

 MCV (test code=MCV)  93.5 fL    

 

 MCH (test code=MCH)  31.8 pg  27.0-32.5  

 

 MCHC (test code=MCHC)  34.0 g/dL  32.0-37.5  

 

 RDW (test code=RDW)  14.7 %  11.5-14.5  

 

 Platelet Count (test code=PLTCT)  257 K/cumm  140-440  

 

 MPV (test code=MPV)  9.0 fL    

 

 Diff Method (test code=DIFFM)  Auto    

 

 Neutrophil (test code=NEUT)  79.4 %  36-70  

 

 Lymphocyte (test code=LYMPH)  13.7 %  12-44  

 

 Monocyte (test code=MONO)  5.2 %  0-11  

 

 Eosinophil (test code=EOS)  1.3 %  0-7  

 

 Basophil (test code=BASO)  0.4 %  0-2  

 

 Neutro Abs (test code=ANEUT)  9.9 K/cumm  1.6-7.4  

 

 Lymph Abs (test code=ALYMPH)  1.7 K/cumm  0.5-4.6  

 

 Mono Abs (test code=AMONO)  0.7 K/cumm  0.0-1.2  

 

 Eos Abs (test code=AEOS)  0.16 K/cumm  0.00-0.74  

 

 Baso Abs (test code=ABASO)  0.1 K/cumm  0.00-0.21